# Patient Record
Sex: FEMALE | Race: WHITE | Employment: OTHER | URBAN - METROPOLITAN AREA
[De-identification: names, ages, dates, MRNs, and addresses within clinical notes are randomized per-mention and may not be internally consistent; named-entity substitution may affect disease eponyms.]

---

## 2019-05-15 NOTE — IP AVS SNAPSHOT
Patient Demographics     Address  [address] [facility] [address] Phone  [phone] (Home) *Preferred*  [phone] (Mobile)      Patient Contacts     Name Relation Home Work Mobile    Nancy Mcmullen Daughter   [phone]      Allergies as of 4/9/2024  Review status set to Review Complete on 4/3/2024   No Known Allergies     Code Status Information     Code Status    Not on file      Patient Instructions    None        Your Home Meds List      TAKE these medications       Instructions Authorizing Provider Morning Afternoon Evening As Needed   chlordiazePOXIDE 5 MG Caps  Commonly known as: Librium  Next dose due: Tonight       Take 1 capsule (5 mg total) by mouth in the morning and 1 capsule (5 mg total) before bedtime.   Alvaro Arvizu         escitalopram 10 MG Tabs  Commonly known as: Lexapro  Next dose due: Tonight       Take 1 tablet (10 mg total) by mouth nightly.   Alvaro Arvizu         folic acid 1 MG Tabs  Commonly known as: Folvite  Next dose due: Tomorrow morning      Take 1 tablet (1 mg total) by mouth daily.   Alvaro Arvizu         glipiZIDE 5 MG Tabs  Commonly known as: Glucotrol  Next dose due: Tomorrow morning      Take 1 tablet (5 mg total) by mouth every morning before breakfast. With food          lamoTRIgine 25 MG Tabs  Commonly known as: LaMICtal  Next dose due: Tonight       Take 1 tablet (25 mg total) by mouth 2 (two) times daily.   Alvaro Arvizu         multivitamin with minerals Tabs  Next dose due: Tomorrow morning      Take 1 tablet by mouth daily.   Alvaro Arvizu         traZODone 50 MG Tabs  Commonly known as: Desyrel  Next dose due: Tonight       Take 0.5 tablets (25 mg total) by mouth nightly.   Alvaro Arvizu                  [id number] - MAR ACTION REPORT  (last 48 hrs)    ** SITE UNKNOWN **     Order ID Medication Name Action Time Action Reason Comments    [id number] chlordiazePOXIDE (Librium) cap 5 mg 04/07/24 2025 Given      [id number] chlordiazePOXIDE (Librium) cap 5 mg 04/08/24 0919 Given by Other       [id number] chlordiazePOXIDE (Librium) cap 5 mg 04/08/24 2129 Given      [id number] chlordiazePOXIDE (Librium) cap 5 mg 04/09/24 0943 Given      [id number] escitalopram (Lexapro) tab 10 mg 04/07/24 2025 Given      [id number] escitalopram (Lexapro) tab 10 mg 04/08/24 2129 Given      [id number] folic acid (Folvite) tab 1 mg 04/08/24 0919 Given by Other      [id number] folic acid (Folvite) tab 1 mg 04/09/24 0943 Given      [id number] lamoTRIgine (LaMICtal) tab 25 mg 04/07/24 2025 Given      [id number] lamoTRIgine (LaMICtal) tab 25 mg 04/08/24 0920 Given by Other      [id number] lamoTRIgine (LaMICtal) tab 25 mg 04/08/24 2129 Given      [id number] lamoTRIgine (LaMICtal) tab 25 mg 04/09/24 0943 Given      [id number] multivitamin with minerals (Thera M Plus) tab 1 tablet 04/08/24 0919 Given by Other      [id number] multivitamin with minerals (Thera M Plus) tab 1 tablet 04/09/24 0943 Given      [id number] pantoprazole (Protonix) 40 mg in sodium chloride 0.9% PF 10 mL IV push 04/07/24 1712 Given      [id number] pantoprazole (Protonix) 40 mg in sodium chloride 0.9% PF 10 mL IV push 04/08/24 0510 Given      [id number] pantoprazole (Protonix) 40 mg in sodium chloride 0.9% PF 10 mL IV push 04/08/24 1806 Given      [id number] pantoprazole (Protonix) 40 mg in sodium chloride 0.9% PF 10 mL IV push 04/09/24 0540 Given      [id number] tamsulosin (Flomax) cap 0.4 mg 04/08/24 0510 Given      [id number] tamsulosin (Flomax) cap 0.4 mg 04/09/24 0540 Given      [id number] thiamine (Vitamin B1) tab 100 mg 04/08/24 0920 Given by Other      [id number] thiamine (Vitamin B1) tab 100 mg 04/09/24 0943 Given      [id number] traZODone (Desyrel) tab 25 mg 04/07/24 2055 Given      [id number] traZODone (Desyrel) tab 25 mg 04/08/24 2129 Given            LEFT LOWER ABDOMEN     Order ID Medication Name Action Time Action Reason Comments    [id number] insulin aspart (NovoLOG) 100 Units/mL FlexPen 1-5 Units 04/07/24 2055 Given            LEFT LOWER ARM     Order ID Medication  Name Action Time Action Reason Comments    [id number] insulin aspart (NovoLOG) 100 Units/mL FlexPen 1-5 Units 24 2130 Given  bs 178          LEFT UPPER ARM     Order ID Medication Name Action Time Action Reason Comments    [id number] insulin aspart (NovoLOG) 100 Units/mL FlexPen 1-5 Units 24 1348 Given      [id number] insulin aspart (NovoLOG) 100 Units/mL FlexPen 1-5 Units 24 1801 Given            RIGHT UPPER ARM     Order ID Medication Name Action Time Action Reason Comments    [id number] insulin aspart (NovoLOG) 100 Units/mL FlexPen 1-5 Units 24 1806 Given  187            Recent Vital Signs    Flowsheet Row Most Recent Value   /60 Filed at 2024   Pulse 108 Filed at 2024   Resp 17 Filed at 2024   Temp 98.3 °F (36.8 °C) Filed at 2024   SpO2 95 % Filed at 2024      Patient's Most Recent Weight    Flowsheet Row Most Recent Value   Patient Weight 69.7 kg (153 lb 11.2 oz)      Lab Results Last 24 Hours    No matching results found     Microbiology Results (All)     Procedure Component Value Units Date/Time    Blood Culture [[id number]] Collected: 24 0249    Order Status: Completed Lab Status: Final result Updated: 24    Specimen: Blood,peripheral      Blood Culture Result No Growth 5 Days    Blood Culture [[id number]] Collected: 24 0251    Order Status: Completed Lab Status: Final result Updated: 24    Specimen: Blood,peripheral      Blood Culture Result No Growth 5 Days         H&P - H&P Note      H&P signed by Marcia Meadows MD at 4/3/2024  6:30 AM  Version 1 of 1    Author: Marcia Meadows MD Service: Internal Medicine Author Type: Physician    Filed: 4/3/2024  6:30 AM Date of Service: 4/3/2024  2:56 AM Status: Signed    : Marcia Meadows MD (Physician)       [facility]  part [facility]    History & Physical    [facility] Patient Status:  Emergency     8/9/1960 N [id number]   Location [facility] EMERGENCY DEPARTMENT Attending Mariana Clay DO   Hosp Day # 0 PCP No primary care provider on file.     Date:  4/3/2024  Date of Admission:  4/2/2024    Chief Complaint:  Chief Complaint   Patient presents with    Weakness       History of Present Illness:  Christi Tavarez is a(n) 63 year old female, who presents for evaluation of multiple falls. PMHx significant for NIDDM type II, hypothyroidism, anxiety, hyperlipidemia, current alcohol use.  Patient accompanied by daughter at bedside.  History obtained mostly by patient who reports that she has been falling for the last 2 weeks.  She feels like her legs are giving out and she has been falling on the ground at home, hitting her head multiple times.  Denies lightheadedness or dizziness prior to the falls and states that her legs are just giving out and she feels very weak.  Daughter at bedside concerned that patient has had the falls as well as vomiting prior to ED presentation.  Daughter states that she has been vomiting, no blood noted per family and patient.  She has felt nauseous.  Has had difficulty with keeping any food down.  Denies any fever or chills.  Reports the last time she went to her PCP was in October 2023 and per chart review she did see her PCP back then.  Currently she reports taking Paxil for her anxiety.  She declines taking her metformin for her diabetes and does not check her blood sugar.  Denies any blood in her stool.  No recent colonoscopy.  Denies previous EGD.  Reports drinking alcohol, about 1-2 shots of vodka every day for the last 10 years.  Denies any previous history of alcohol withdrawal.  Last alcohol drink 2 days prior to current presentation.  Denies any change in vision, headache, loss of consciousness.  Reports that she is able to move her extremities, just difficult to walk.  Denies symptoms of dysuria.  Denies sick contacts.  On presentation to the ED, vital signs  [FreeTextEntry1] : Mr. CLAYTON SALAZAR is a 78 year who presents for evaluation of  right lower extremity leg pain and nonhealing right leg ulceration for over one year duration.  .s/p RFA to right LE.   patient states that he had recurrent ulcer after the wound had healed when he used Viagra and Cialis together.  He then developed a leg rash and swelling to the legs.\par  pt stated the wound stared off as a Basal cell which was excised by his dermatologist with clear margins and felt that the wound was not getting any better due to his varicose veins. Pt has no fevers or chills today he complains of pain in the wound. pt is currently wearing compression stocking.  He has been off prednisone\par Patient's leg discomfort is associated with  leg swelling, fatigue, heaviness, achiness. \par Patient's symptoms have persisted despite conservative management with leg elevation, exercise, attempted weight loss, over-the-counter medications.\par Patient's symptoms are aggravated by weight bearing, prolonged standing, prolonged sitting.\par \par 9/5 [facility] for right leg ablation 9/18\par VN 3x week\par Patient's symptoms are alleviated by  leg elevation, exercise. \par Patient's symptoms interfere with the patient's  ADLs such as work, shopping and housekeeping.  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 \par \par  remarkable for tachycardia.  Lab work remarkable for elevated blood glucose 168, mildly decreased sodium level 133, creatinine 1.10 with EGFR 56, WBC 13.8 with left shift, mild thrombocytopenia with platelet count 126.  Hemoglobin noted to be normal 12.0.   CT head reveals moderate acute to subacute inferior right cerebellar infarct with focal region of hemorrhagic transformation 2 x 1.9 cm versus mass with hemorrhage. Chest x-ray with right side anterior third/fourth rib fractures.    During my evaluation, patient noted to be have emesis with streaks of blood.  Patient is hemodynamically stable with reassuring hemoglobin of 12.  She will be admitted under hospitalist service with consultation to gastroenterology as well as neurology for further evaluation.     History:  History reviewed. No pertinent past medical history.  History reviewed. No pertinent surgical history.  No family history on file.   reports that she has never smoked. She has never used smokeless tobacco. She reports current alcohol use. She reports that she does not use drugs.    Allergies:  No Known Allergies    Home Medications:  None       Review of Systems:  Constitutional:  + Generalized weakness  Eye:  Negative.  Ear/Nose/Mouth/Throat:  Negative.  Respiratory:  Negative  Cardiovascular: Negative  Gastrointestinal:  Negative.  Genitourinary:  Negative  Endocrine:  Negative.  Immunologic:  Negative.  Musculoskeletal:  Negative.  Integumentary:  Negative.  Neurologic:  Negative.  Psychiatric:  Negative.  ROS reviewed as documented in chart    Physical Exam:  Temp:  [98.3 °F (36.8 °C)] 98.3 °F (36.8 °C)  Pulse:  [108-118] 108  Resp:  [18] 18  BP: (138-143)/(62-67) 143/67  SpO2:  [96 %-99 %] 99 %    General:  Alert and oriented.  Acutely ill-appearing  Diffuse skin problem:  None.  Eye:  Pupils are equal, round and reactive to light, extraocular movements are intact, Normal conjunctiva.  HENT:  Normocephalic, oral mucosa is moist.  Head:   Normocephalic, right-sided abrasion  Neck:  Supple, non-tender, no carotid bruit, no jugular venous distention, no lymphadenopathy, no thyromegaly.  Respiratory:  Lungs are clear to auscultation, respirations are non-labored, breath sounds are equal, symmetrical chest wall expansion.  Cardiovascular:  Normal rate, regular rhythm, no murmur, no edema.  Gastrointestinal:  Soft, non-tender, non-distended, normal bowel sounds, no organomegaly.  Lymphatics:  No lymphadenopathy neck, axilla, groin.  Musculoskeletal: Normal range of motion.  normal strength.  Feet:  Normal pulses.  Neurologic:  Alert, oriented, no focal deficits, cranial nerves II-XII are grossly intact.  Cognition and Speech:  Oriented, speech clear and coherent.  Psychiatric:  Cooperative, appropriate mood & affect.      Laboratory Data:   Lab Results   Component Value Date    WBC 13.8 04/03/2024    HGB 12.0 04/03/2024    HCT 32.5 04/03/2024    .0 04/03/2024    CREATSERUM 1.10 04/03/2024    BUN 13 04/03/2024     04/03/2024    K 3.9 04/03/2024    CL 97 04/03/2024    CO2 20.0 04/03/2024     04/03/2024    CA 8.3 04/03/2024       Imaging:  No results found.     Assessment and Plan:    Generalized weakness  Recurrent falls  Moderate acute to subacute inferior right cerebellar infarct versus mass with hemorrhage  -Obtain MRI brain to further evaluate based on CT findings.  -No neurological deficits on physical exam however will obtain neurology consult for further evaluation and recs, Dr. Hedrick notified.  -Neurochecks  -TSH/B12/Folate ordered  -PT/OT eval    Hematemesis  -Noted during physical exam, patient with emesis that contains streaks of blood.  Patient denies melena/hematochezia  -No previous EGD/colonoscopy  -Patient is a current alcohol user with last alcohol drink 2 days prior to presentation.  Cannot exclude bleeding ulcer.  -Hemoglobin stable at 12.    -Maintain n.p.o.  -Gastroenterology has been consulted, Dr. Mena notified,  pending evaluation for possible EGD  -Initiate Protonix IV twice daily  -Start patient on IV fluid hydration    Current alcohol use  -Last alcohol use 2 days prior to presentation  -Has been drinking heavily liquor for the last 10 years, almost daily  -No previous alcohol withdrawal symptoms/Dts  -Initiate CIWA protocol given her longstanding history of alcohol use    Acute kidney injury  Mild hyponatremia  Hypochloremia  Metabolic acidosis  -Suspecting secondary to decreased oral intake as well as worsened by alcohol use.  -Patient treated per sepsis protocol w/ 30 ml/kg IVF   -Initiate IV fluid hydration and reassess renal function in the a.m.  If renal function worsens, consider nephrology consult    Thrombocytopenia  -Noted to have platelet count 126.  -Suspecting secondary to alcohol use  -Continue to monitor with daily CBC    NIDDM type II  -Patient stopped taking metformin months ago.  -Last hemoglobin A1c  5.5  -Blood glucose on presentation 168.  Will initiate insulin sliding scale.  Hypoglycemia protocol.  Accu-Cheks.    H/o hyperlipidemia  -Patient stopped taking statin months ago.    H/o anxiety  -Reports being on Paxil.    Prophylaxis  SCDs    CODE STATUS  Full    Primary care physician  No primary care provider on file.    60 minutes spent on this admission - examining patient, obtaining history, reviewing previous medical records, going over test results/imaging and discussing plan of care. All questions answered.     Disposition  Clinical course will dictate outcome      Marcia Meadows MD  4/3/2024  2:56 AM       Electronically signed by Marcia Meadows MD on 4/3/2024  6:30 AM              Consults - MD Consult Notes      Consults signed by Kenny Dean MD at 4/8/2024  8:56 AM     Author: Kenny Dean MD Service: Physical Medicine and Rehabilitation Author Type: Physician    Filed: 4/8/2024  8:56 AM Date of Service: 4/8/2024  8:46 AM Status: Signed    : Kenny Dean MD  (Physician)       [facility]  [facility] Patient Status:  Inpatient    1960 MRN [id number]   Location [facility] 3W/SW Attending Alvaro Arvizu MD   Hosp Day # 5 PCP ÁNGEL Delgado       CC: Subacute CVA, hemorrhagic mass, complications of EtOH abuse    History of Present Illness:    63-year-old female normally independent lives by herself has been drinking heavily for the last 10 years has been having multiple falls.  Decreased p.o. intake.  Increasing difficulty ambulating.  CT of the head showed a acute to subacute infarct in the right cerebellar area with hemorrhagic transformation.  She is also found to have third and fourth right-sided rib fractures related to her fall.  By neurology in consultation focal workup initiated in addition to neuropathy/EtOH workup.  B12 levels are okay TSH was elevated however T4 is normal.  Neurology has recommended follow-up MRI in 3 months.  Inpatient course also significant for hematemesis.  Seen by GI in consultation.  Started on PPI therapy and manage conservatively.  H&H has been trending and stabilized.  Inpatient course also significant for acute kidney injury acute urinary retention.  Patient is voiding better.  Bladder scans thought to be inaccurate due to underlying ascites.  Of note patient does have underlying diabetes mellitus type 2.  Has been noncompliant with her medications.  Hemoglobin A1c was 5.5.  She is on Lexapro for anxiety.  Is also been followed by psychiatry.  Has significant proximal limb weakness underlying neuropathy and we were asked to see what the best way to approach her rehabilitation would be.      Medications Prior to Admission   Medication Sig Dispense Refill Last Dose    PARoxetine 10 MG Oral Tab Take 1 tablet (10 mg total) by mouth every morning.   Past Week    glipiZIDE 5 MG Oral Tab Take 1 tablet (5 mg total) by mouth every morning before breakfast. With food   Past Week     No  Known Allergies  History reviewed. No pertinent past medical history.  History reviewed. No pertinent surgical history.  Social History     Socioeconomic History    Marital status:      Spouse name: Not on file    Number of children: Not on file    Years of education: Not on file    Highest education level: Not on file   Occupational History    Not on file   Tobacco Use    Smoking status: Never    Smokeless tobacco: Never   Vaping Use    Vaping Use: Never used   Substance and Sexual Activity    Alcohol use: Yes     Comment: socially    Drug use: Never    Sexual activity: Not on file   Other Topics Concern    Not on file   Social History Narrative    Not on file     Social Determinants of Health     Financial Resource Strain: Not on file   Food Insecurity: No Food Insecurity (4/3/2024)    Food Insecurity     Food Insecurity: Never true   Transportation Needs: No Transportation Needs (4/3/2024)    Transportation Needs     Lack of Transportation: No   Physical Activity: Not on file   Stress: Not on file   Social Connections: Not on file   Housing Stability: Low Risk  (4/3/2024)    Housing Stability     Housing Instability: No     Housing Instability Emergency: Not on file     No family history on file.    PAIN SCALE: denies    ACP: Full code    Review of Systems  Denies any shortness of breath, chest pain except for on deep inspiration related to her rib pain she denies any nausea.  P.o. intake is slowly improving.  She would like to be able to stop drinking.  She has had multiple falls at home.  Was drinking at least two 6 ounce drinks of vodka a day.  She is having normal formed stools, has been able to urinate.  Has good support system including her 2 children are present during this exam.  All other systems and on 12 point review are negative    Physical Exam  Temp:  [97.9 °F (36.6 °C)-98.5 °F (36.9 °C)] 98.5 °F (36.9 °C)  Pulse:  [77-97] 77  Resp:  [16-18] 16  BP: (104-107)/(55-62) 107/58  SpO2:  [96 %-100  %] 96 %  96%    I/O last 3 completed shifts:  In: 400 [P.O.:400]  Out: 900 [Urine:900]  No intake/output data recorded.       S-S1-S2 RRR trace ankle edema by extremities  Extremities-warm to touch peripheral pulses are palpable  Lymph nodes-no palpable lymph nodes in neck or groin  Skin-shows ecchymosis around the right side of her head, both knees, elbows and along the thighs.  No breakdown of skin.  MSK-bilateral upper extremity strength  is a 5, elbow flexion elbow extension 4 show abduction is a 3.  Lower extremity examination DF PF 5 HF 2+  Neurological exam-alert and oriented x 3, sensation diminished to light touch vibration lower extremities joint proprioception unequivocal.  Good historian at this time    Previous Function:    Patient is independent in her own home, normally is able to drive.  Has had recurrent falls, may be related to intoxication but also feels that she has balance problems    CURRENT FUNCTION:  AM-PAC Score:  Raw Score: 11   Approx Degree of Impairment: 72.57%   Standardized Score (AM-PAC Scale): 33.86   CMS Modifier (G-Code): CL     FUNCTIONAL ABILITY STATUS  Functional Mobility/Gait Assessment  Gait Assistance: Maximum assistance (2nd assist with chair follow)  Distance (ft): 10ft  Assistive Device: Rolling walker  Pattern: Shuffle (decreased lali speed, unsteady, posterior lean, narrow base of support. Cues for upright posture and increasing step length. Distance ambulated limited by c/o dizziness.)  Supine to Sit: moderate assist. Supine>sit. Task was labored requiring increased time to complete. Assist with LE's and trunk. Sat EOB for 8 minutes requiring initial Mod for balance improving to Min A x 1 as time in sitting increased.   Sit to Stand: maximum assist. Cues for appropriate UE positioning with transitional movements. Posterior lean. Cues for anterior weight dispersion. Mod A x 1 to maintain static standing balance.     Labs  Lab Results   Component Value Date    WBC  10.6 04/07/2024    HGB 9.7 (L) 04/07/2024    HCT 26.4 (L) 04/07/2024    .8 (H) 04/07/2024    PLT 82.0 (L) 04/07/2024     No results found for: \"PTT\"  No results found for: \"PROTIME\"  Lab Results   Component Value Date     (L) 04/07/2024    K 4.3 04/07/2024    CO2 25.0 04/07/2024     04/07/2024    BUN 8 (L) 04/07/2024       Radiology:  CT UROGRAM (W+WO) (CPT=74178)    Result Date: 4/7/2024  PROCEDURE: CT UROGRAM (W+WO)  (CPT=74178)  COMPARISON: None.  INDICATIONS: Hematuria.  TECHNIQUE:   CT images of the abdomen and pelvis were obtained without and with non-ionic intravenous contrast material. Automated exposure control for dose reduction was used. Adjustment of the mA and/or kV was done based on the patient's size. Use of iterative reconstruction technique for dose reduction was used.  Dose information is transmitted to the ACR (American College of Radiology) NRDR (National Radiology Data Registry) which includes the Dose Index Registry.  FINDINGS:  KIDNEYS AND URINARY TRACT:  RIGHT: Majority of the ureter is not opacified.  The opacified collecting system is intact.  No   radiopaque renal calculus or hydronephrosis.  No enhancing renal lesion.  LEFT: Majority of the ureter is not opacified.  The opacified collecting system is intact. No   radiopaque renal calculus or hydronephrosis.  No enhancing renal lesion.  BLADDER: The bladder is decompressed from a Colmenares catheter.  There is hyperdensity seen within the bladder lumen.  Excretory phase sequences demonstrate marked bladder wall trabeculation.  No definite hematoma.  No bladder calculus is seen given limitations from hyperdensity within the bladder and decompressed state.   ADRENALS:   The adrenal glands are unremarkable. LIVER: The liver is atrophic with a nodular contour.  There are widening of the fissures. GALLBLADDER: Small amount of gallbladder sludge and stones. BILIARY: There is no intra-or extrahepatic biliary duct dilation.  SPLEEN: The spleen is unremarkable. PANCREAS: There are no pancreatic masses or pancreatic ductal dilation. AORTA/VASCULAR:   There is atherosclerotic calcification of the abdominal aorta extending into bilateral iliac arteries. RETROPERITONEUM: There are scattered subcentimeter nonspecific retroperitoneal lymph nodes. BOWEL/MESENTERY:  Mild colonic wall thickening seen involving majority of the colon.  Multiple areas of small bowel wall thickening seen within the left upper and lower quadrants.  No abnormal dilation. The appendix is normal. There are no inflammatory changes within the right lower quadrant.  There is diverticulosis involving the distal descending colon and sigmoid colon without evidence of diverticulitis.  Moderate volume of abdominal ascites with diffuse mesenteric edema.  There is no mass or loculated collection. Small hiatal hernia with wall thickening at the gastroesophageal junction. Remainder of the bowel is otherwise unremarkable without dilation or wall thickening. PELVIS: Moderate volume of pelvic ascites.  No mass or loculated collection.  Uterine fibroids are seen.  No pelvic lymphadenopathy. BONES:   There is degenerative disease of the thoracic and lumbar spine. Degenerative changes are seen within the sacroiliac joints and pubic symphysis. Degenerative changes are seen in the bilateral hips.  Soft tissue edema seen within the bilateral upper and lower quadrant subcutaneous tissues. LUNG BASES: Trace right and small left pleural effusions.  Left lower lobe atelectasis.          CONCLUSION:   Decompressed bladder which limits evaluation.  Hyperdensity seen within the bladder lumen suggestive of blood byproducts.  No renal calculus, enhancing renal lesion, hydronephrosis or filling defects within the opacified collecting systems. If there is continued hematuria, direct visualization could be performed per clinical discretion.  Cirrhosis.  Moderate volume of abdominal and pelvic ascites.   Diffuse large and small bowel wall thickening suggestive of reactive etiology from patient's cirrhosis and presumed low protein state.  A diffuse widespread enterocolitis is felt less likely but is also within the differential.  Anasarca  No obstruction.  Diverticulosis without diverticulitis.  Cholelithiasis with gallbladder sludge without CT evidence of acute cholecystitis.  Multiple other incidental findings as described in the body of the report.    Dictated by (CST): Johnson Waterman MD on 4/07/2024 at 10:07 AM     Finalized by (CST): Johnson Waterman MD on 4/07/2024 at 10:14 AM          MRI BRAIN (W+WO) (CPT=70553)    Result Date: 4/3/2024  PROCEDURE: MRI BRAIN (W+WO) (CPT=70553)  COMPARISON: [facility], CT BRAIN OR HEAD (CPT=70450), 4/03/2024, 1:08 AM.  INDICATIONS: Recurrent falls, CT head reveals moderate acute to subacute inferior right cerebellar infarct with focal region of hemorrhagic transformation, versus mass with  TECHNIQUE: A variety of imaging planes and parameters were utilized for visualization of suspected pathology.  Images were performed without and with gadolinium contrast.   FINDINGS:  Exam quality is degraded by patient motion despite repeat imaging.  This decreases sensitivity for small-sized abnormalities. CEREBRUM: No edema, hemorrhage, mass, acute infarction, or inappropriate atrophy.  CEREBELLUM: Lobulated intrinsic T1 and T2 bright lobulated focus within the right cerebellar vermis and adjacent inferior cerebellar hemisphere measuring 33 x 25 x 24 mm (AP x transverse x CC), without significant signal changes on post-contrast phase compatible with absence of significant enhancement.  It has a few thin internal septations and a lobulated hypointense T1 central component.  Minimal FLAIR signal edema along its lateral margin.  No significant mass effect upon the adjacent structures.  No abnormal diffusion restriction. BRAINSTEM: No edema, hemorrhage, mass, acute infarction, or  inappropriate atrophy.  CSF SPACES: Ventricles, cisterns, and sulci are appropriate for age.  No hydrocephalus, subarachnoid hemorrhage, or mass.  SKULL: No mass or other significant visible lesion.  SINUSES: Limited views demonstrate no significant mucosal thickening or fluid.  ORBITS: Limited views are unremarkable.          CONCLUSION: 3 x 3 x 2 cm located focus within the right cerebellum, with characterize most compatible with a subacute hematoma.  However, because there is patient motion and given the current [place] of T1 signal changes, there is potential this finding obscures a very tiny lesion.  Exam is also limited by patient motion.  Therefore recommend follow-up in approximately 3 months to assess for changes.  Dictated by (CST): Rob Duvall MD on 4/03/2024 at 11:12 AM     Finalized by (CST): Rob Duvall MD on 4/03/2024 at 12:19 PM          US LIVER (CPT=76705)    Result Date: 4/3/2024  PROCEDURE: US LIVER (CPT=76705)  COMPARISON: None.  INDICATIONS: Elevated Liver function tests, current alcohol use  TECHNIQUE:   The liver was evaluated with gray scale and colorflow of the main vessels.   FINDINGS:  LIVER:   Liver measures 16.8 cm in length increased echogenicity..  No masses or bile duct dilatation. Color flow and Doppler imaging of portal and hepatic veins show patency and antegrade flow. BILE DUCTS:   There is cholelithiasis and gallbladder sludge.  Mild circumferential gallbladder wall thickening.  Positive sonographic Pretty sign.  Common bile duct is nondilated measures 0.5 cm OTHER:   Limited visualization pancreas due to overlying bowel.  Visualized pancreas unremarkable.  Right kidney measures 12.7 cm in length and is unremarkable.  There is mild right upper quadrant ascites.         CONCLUSION:   Hepatic steatosis.  Mild right upper quadrant ascites.  Cholelithiasis and gallbladder sludge with mild circumferential gallbladder wall thickening.  Positive sonographic Pretty sign.  Findings may  relate to acute cholecystitis although gallbladder wall thickening can be seen in patients with hepatocellular disease and or ascites.  Advise clinical follow-up and consider evaluation with nuclear medicine hepatobiliary scan.     Dictated by (CST): Henrry Garduno MD on 4/03/2024 at 11:51 AM     Finalized by (CST): Henrry Garduno MD on 4/03/2024 at 11:54 AM          XR FOREARM (2 VIEWS), LEFT (CPT=73090)    Result Date: 4/3/2024  PROCEDURE: XR FOREARM (2 VIEWS), LEFT (CPT=73090)  COMPARISON: None.  INDICATIONS: Multiple falls within past 2 weeks.  TECHNIQUE: 2 views were obtained.   FINDINGS:  BONES: No significant arthropathy, fracture or acute abnormality. SOFT TISSUES: No visible soft tissue swelling. EFFUSION: None visible. OTHER: Negative.         CONCLUSION: No acute osseous abnormality of the left forearm.  [facility] provided a preliminary report for this examination. This final report agrees with their preliminary findings.   Dictated by (CST): Rob Duvall MD on 4/03/2024 at 10:02 AM     Finalized by (CST): Rob Duvall MD on 4/03/2024 at 10:02 AM          XR ELBOW, COMPLETE (MIN 3 VIEWS), RIGHT (CPT=73080)    Result Date: 4/3/2024  PROCEDURE: XR ELBOW, COMPLETE (MIN 3 VIEWS), RIGHT (CPT=73080)  COMPARISON: None.  INDICATIONS: Multiple falls within past 2 weeks.  TECHNIQUE: 4 views were obtained.   FINDINGS:  BONES: Enthesophyte along the peripheral aspect of the lateral femoral condyle.  Otherwise, no significant arthropathy, fracture or acute abnormality. SOFT TISSUES: Peripheral IV over the lateral aspect of the proximal forearm. No visible soft tissue swelling. EFFUSION: None visible. OTHER: Negative.         CONCLUSION: No acute osseous abnormality of the right elbow.  [facility] provided a preliminary report for this examination. This final report agrees with their preliminary findings.   Dictated by (CST): Rob Duvall MD on 4/03/2024 at 9:28 AM     Finalized by (CST): Jadiel  MD Rob on 4/03/2024 at 9:29 AM          XR CHEST AP PORTABLE  (CPT=71045)    Result Date: 4/3/2024  PROCEDURE: XR CHEST AP PORTABLE  (CPT=71045) TIME: 0045 hours  COMPARISON: None.  INDICATIONS: Multiple falls within past 2 weeks.  TECHNIQUE:   Single view.   FINDINGS:  CARDIAC/VASC: No cardiac silhouette abnormality or cardiomegaly.  Unremarkable pulmonary vasculature.  MEDIAST/YASMIN:   Atherosclerotic aorta with no visible aneurysm.  LUNGS/PLEURA: No pneumothorax.  No significant pulmonary parenchymal abnormalities.  No effusion or pleural thickening. BONES: Mild endplate change within the spine.  Subtle cortical deformity in the anterior right 3rd and 4th ribs, which may represent nondisplaced fractures. OTHER: Negative.          CONCLUSION: Findings suspicious for acute nondisplaced fractures of the anterior right 3rd and 4th ribs.  Correlate for pain at this site.  No pneumothorax.  Otherwise no acute cardiopulmonary abnormality.   [facility] provided a preliminary report for this examination. This final report agrees with their preliminary findings.   Dictated by (CST): Rob Duvall MD on 4/03/2024 at 9:26 AM     Finalized by (CST): Rob Duvall MD on 4/03/2024 at 9:28 AM          XR KNEE (1 OR 2 VIEWS), LEFT (CPT=73560)    Result Date: 4/3/2024  PROCEDURE: XR KNEE (1 OR 2 VIEWS), LEFT (CPT=73560)  COMPARISON: None.  INDICATIONS: Multiple falls within past 2 weeks.  TECHNIQUE: 2 views were obtained.   FINDINGS:  BONES: Fabella is present. No significant arthropathy, fracture or acute abnormality. SOFT TISSUES: No visible soft tissue swelling. EFFUSION: None visible. OTHER: Negative.         CONCLUSION: No acute osseous abnormality of the left knee.  [facility] provided a preliminary report for this examination. This final report agrees with their preliminary findings.   Dictated by (CST): Rob Duvall MD on 4/03/2024 at 9:25 AM     Finalized by (CST): Rob Duvall MD on 4/03/2024 at 9:26 AM           XR KNEE (1 OR 2 VIEWS), RIGHT (CPT=73560)    Result Date: 4/3/2024  PROCEDURE: XR KNEE (1 OR 2 VIEWS), RIGHT (CPT=73560)  COMPARISON: None.  INDICATIONS: Multiple falls within past 2 weeks.  TECHNIQUE: 2 views were obtained.   FINDINGS:  BONES: Fabella is present. No significant arthropathy, fracture or acute abnormality. SOFT TISSUES: Soft tissue inflammation ventral to the patella and patellar tendon EFFUSION: None visible. OTHER: Negative.         CONCLUSION: Soft tissue inflammation ventral to the patella and patellar tendon suggesting soft tissue injury.  No acute osseous abnormality of the right knee.  [facility] provided a preliminary report for this examination. This final report agrees with their preliminary findings.    Dictated by (CST): Rob Duvall MD on 4/03/2024 at 9:24 AM     Finalized by (CST): Rob Duvall MD on 4/03/2024 at 9:25 AM          XR PELVIS (1 VIEW) (CPT=72170)    Result Date: 4/3/2024  PROCEDURE: XR PELVIS (1 VIEW) (CPT=72170)  COMPARISON: None.  INDICATIONS: Multiple falls within past 2 weeks.  TECHNIQUE: 1 AP view was obtained.   FINDINGS:  BONES: Mild degenerative change at the lumbosacral junction.  Pseudoarthrosis of the right L5 transverse process with the sacral ala.  No acute fracture of the bony pelvis.  10 mm hypodense focus within the right femoral neck which could represent a bone  cyst or lytic lesion. SOFT TISSUES: No visible soft tissue swelling. EFFUSION: None visible. OTHER: Negative.         CONCLUSION:  1.  Hypodense focus within the right femoral neck which could represent a bone cyst or lytic lesion.  Consider bone scan or other follow up imaging. 2.  Mild degenerative change in the lower lumbar spine.   [facility] provided a preliminary report for this examination. This final report agrees with their preliminary findings.   Dictated by (CST): Rob Duvall MD on 4/03/2024 at 9:23 AM     Finalized by (CST): Rob Duvall MD on 4/03/2024 at 9:24 AM           XR FEMUR MIN 2 VIEWS RIGHT (CPT=73552)    Result Date: 4/3/2024  PROCEDURE: XR FEMUR MIN 2 VIEWS RIGHT (CPT=73552)  COMPARISON: [facility], XR PELVIS (1 VIEW) (CPT=72170), 4/03/2024, 0:54 AM.  INDICATIONS: Multiple falls within past 2 weeks.  TECHNIQUE: 2 views were obtained.   FINDINGS:  BONES: Ovoid 8-9 mm diameter lucencies within the right femoral neck, which could represent bone cysts or lytic foci.  Femoral head otherwise has normal articulation within the acetabulum and articulation at the knee is otherwise anatomic.  No acute fracture. SOFT TISSUES: No visible soft tissue swelling. EFFUSION: None visible. OTHER: Negative.         CONCLUSION: Subcentimeter ovoid lucencies within the right femoral neck which could represent bone cysts or lytic foci.  This could be further assessed with bone scan.   [facility] provided a preliminary report for this examination. This final report agrees with their preliminary findings.   Dictated by (CST): Rob Duvall MD on 4/03/2024 at 9:21 AM     Finalized by (CST): Rob Duvall MD on 4/03/2024 at 9:22 AM          CT BRAIN OR HEAD (68977)    Result Date: 4/3/2024  PROCEDURE: CT BRAIN OR HEAD (CPT=70450)  COMPARISON: [facility], XR PELVIS (1 VIEW) (CPT=72170), 4/03/2024, 0:54 AM.  INDICATIONS: fall.  Post strike to head.  TECHNIQUE: CT images were obtained without contrast material.  Automated exposure control for dose reduction was used.  Dose information is transmitted to the ACR (American College of Radiology) NRDR (National Radiology Data Registry) which includes the Dose Index Registry.  FINDINGS:  CSF SPACES: Ventricles, cisterns, and sulci are appropriate for age.  No hydrocephalus, subarachnoid hemorrhage, or mass.  No midline shift.  CEREBRUM: No edema, hemorrhage, mass, acute infarction, or significant atrophy.  WHITE MATTER: Mild chronic white matter ischemic changes.  CEREBELLUM: Mildly hyperdense ovoid shaped 28 x 14 x  18-mm focus within the right cerebellar vermis and adjacent hemisphere with surrounding vasogenic edema.  No definite mass effect.  No midline shift or intraventricular extension. BRAINSTEM: No edema, hemorrhage, mass, acute infarction, or significant atrophy.  CALVARIUM: No apparent fracture, mass, or other significant visible lesion.  SINUSES: Limited views demonstrate no significant mucosal thickening or fluid.  ORBITS: Limited views are unremarkable.   OTHER: Negative.          CONCLUSION: 3 x 1 x 2 cm for ovoid soft tissue density in the right cerebellar vermis and adjacent hemisphere with surrounding edema.  It could represent an acute/subacute/recent site of hemorrhage/hematoma versus hemorrhagic lesion (such as primary malignancy versus metastatic focus).  Recommend further characterization with MRI brain with without contrast.   [facility] provided a preliminary report for this examination. This final report agrees with their preliminary findings.   Dictated by (CST): Rob Duvall MD on 4/03/2024 at 8:43 AM     Finalized by (CST): Rob Duvall MD on 4/03/2024 at 8:50 AM              Assessment    Patient Active Problem List   Diagnosis    Fall    Fall, initial encounter    Cerebellar hemorrhage (HCC)    Major depressive disorder, recurrent episode, moderate degree (HCC)    Alcohol dependence, continuous (HCC)    Alcohol withdrawal syndrome, uncomplicated (HCC)       Plan    63-year-old female self-care mobility deficits secondary right cerebellar infarct, hematoma with versus mass with hemorrhage, hematemesis, EtOH abuse, EtOH neuropathy, UTI, urinary retention, thrombocytopenia, diabetes mellitus type 2, anxiety     Continue PT OT SLP  PVRs as per urology, primary service  Hematemesis-H&H being trended  Diabetes mellitus-SSI, being monitored  Neuropathy-TSH high T4 normal, B12 in normal range, may be in part related to diabetes as well as EtOH neuropathy    Patient with significant functional  deficits mild cognitive deficits poor nutritional state.  However good rehab potential appropriate for acute inpatient rehabilitation once hemodynamically ready from a medical standpoint    Thank you for this  consultation and allowing  me to participate in this patient's care        HECTOR DEAN MD    2024    8:47 AM      Electronically signed by Hector Dean MD on 2024  8:56 AM              Discharge Summary - D/C Summary      Discharge Summary signed by Alvaro Arvizu MD at 2024 12:56 PM  Version 1 of 1    Author: Alvaro Arvizu MD Service: Hospitalist Author Type: Physician    Filed: 2024 12:56 PM Date of Service: 2024 12:56 PM Status: Signed    : Alvaro Arvizu MD (Physician)       [facility]  Discharge Summary     Christi Tavarez  : 1960    Status: Inpatient  Day #: 6    Attending: Alvaro Arvizu MD  PCP: ÁNGEL Delgado     Date of Admission:  2024  Date of Discharge:  2024     Hospital Discharge Diagnoses:     Generalized weakness  Recurrent falls  Moderate acute to subacute inferior right cerebellar hematoma vs infarct vs mass with hemorrhage  Hematemesis  Alcohol abuse with withdrawal  Hematuria from lucas trauma  RAN  Mild hyponatremia  Metabolic acidosis  Thrombocytopenia  DM2   HL  Anxiety d/o      History of Present Illness:     Copied from Admission H&P:    Christi Tavarez is a(n) 63 year old female, who presents for evaluation of multiple falls. PMHx significant for NIDDM type II, hypothyroidism, anxiety, hyperlipidemia, current alcohol use.  Patient accompanied by daughter at bedside.  History obtained mostly by patient who reports that she has been falling for the last 2 weeks.  She feels like her legs are giving out and she has been falling on the ground at home, hitting her head multiple times.  Denies lightheadedness or dizziness prior to the falls and states that her legs are just giving out and she feels very weak.  Daughter at bedside concerned that  patient has had the falls as well as vomiting prior to ED presentation.  Daughter states that she has been vomiting, no blood noted per family and patient.  She has felt nauseous.  Has had difficulty with keeping any food down.  Denies any fever or chills.  Reports the last time she went to her PCP was in October 2023 and per chart review she did see her PCP back then.  Currently she reports taking Paxil for her anxiety.  She declines taking her metformin for her diabetes and does not check her blood sugar.  Denies any blood in her stool.  No recent colonoscopy.  Denies previous EGD.  Reports drinking alcohol, about 1-2 shots of vodka every day for the last 10 years.  Denies any previous history of alcohol withdrawal.  Last alcohol drink 2 days prior to current presentation.  Denies any change in vision, headache, loss of consciousness.  Reports that she is able to move her extremities, just difficult to walk.  Denies symptoms of dysuria.  Denies sick contacts.  On presentation to the ED, vital signs remarkable for tachycardia.  Lab work remarkable for elevated blood glucose 168, mildly decreased sodium level 133, creatinine 1.10 with EGFR 56, WBC 13.8 with left shift, mild thrombocytopenia with platelet count 126.  Hemoglobin noted to be normal 12.0.   CT head reveals moderate acute to subacute inferior right cerebellar infarct with focal region of hemorrhagic transformation 2 x 1.9 cm versus mass with hemorrhage. Chest x-ray with right side anterior third/fourth rib fractures.     During my evaluation, patient noted to be have emesis with streaks of blood.  Patient is hemodynamically stable with reassuring hemoglobin of 12.  She will be admitted under hospitalist service with consultation to gastroenterology as well as neurology for further evaluation.       Hospital Course:     Generalized weakness  Recurrent falls  Moderate acute to subacute inferior right cerebellar hematoma vs infarct vs mass with  hemorrhage  -MRI brain results reviewed - rec repeat MRI 3 months  -neuro consult appreciated  -Neurochecks stable  -TSH elevated, normal free T4  -vit B12 ok  -PT/OT eval  -PMR consulted - rec acute rehab     Hematemesis  -resolved  -GI consult appreciated  -monitor hgb - stable  -cont PPI  -no endoscopy rec as hgb now stable     Current alcohol use  Alcohol withdrawal - resolved  -Last alcohol use 2 days prior to presentation  -CIWA scores low. Ativan prn.     Questionable Acute UTI  -no culture sent unfortunately. UA without pyuria  -was put on ceftriaxone on admission, now off     Acute urinary retention - ruled out  Hematuria - likely lucas trauma  -appreciate urology consult  -bladder scans likely false positive due to ascites as not much came out when catheterized  -CT urogram reviewed  -lucas removed.      Acute kidney injury - resolved  Mild hyponatremia - resolved  Hypochloremia- resolved  Metabolic acidosis - resolved  -Suspecting secondary to decreased oral intake as well as worsened by alcohol use.  -off IVF     Thrombocytopenia  -Suspecting secondary to alcohol use  -stable     NIDDM type II  -Patient stopped taking metformin months ago.   -Last hemoglobin A1c  5.5  -ISS  -resume glipizide on discharge  -monitor BG     H/o hyperlipidemia  -Patient stopped taking statin months ago.     H/o anxiety  -cont lexapro     Consultants         Provider   Role Specialty     Niko Zuluaga MD  Consulting Physician UROLOGY     Kenny Dean MD  Consulting Physician Rehabilitation     Ma, Leonela Montano MD  Consulting Physician GASTROENTEROLOGY     Wallace Guillen MD  Consulting Physician Psychiatry     Lawrence Mena MD  Consulting Physician GASTROENTEROLOGY     Ryley, Pepper Caballero DO  Consulting Physician NEUROLOGY     Marcia Meadows MD  Consulting Physician Internal Medicine             Physical Exam:   Blood pressure 107/60, pulse 108, temperature 98.3 °F (36.8 °C), temperature source Oral, resp. rate 17,  height 4' 10\" (1.473 m), weight 153 lb 11.2 oz (69.7 kg), SpO2 95%.  General:  Alert, no distress  HEENT:  Normocephalic, atraumatic  Cardiac:  Regular rate, regular rhythm  Pulmonary:  Clear to auscultation bilaterally, respirations unlabored  Gastrointestinal:  Soft, non-tender, normal bowel sounds  Musculoskeletal:  No joint swelling  Extremities:  No edema, no cyanosis, no clubbing  Neurologic:  nonfocal  Psychiatric:  Normal affect, calm and appropriate         Discharge Medications        START taking these medications        Instructions Prescription details   chlordiazePOXIDE 5 MG Caps  Commonly known as: Librium      Take 1 capsule (5 mg total) by mouth in the morning and 1 capsule (5 mg total) before bedtime.   Refills: 0     escitalopram 10 MG Tabs  Commonly known as: Lexapro      Take 1 tablet (10 mg total) by mouth nightly.   Refills: 0     folic acid 1 MG Tabs  Commonly known as: Folvite      Take 1 tablet (1 mg total) by mouth daily.   Refills: 0     lamoTRIgine 25 MG Tabs  Commonly known as: LaMICtal      Take 1 tablet (25 mg total) by mouth 2 (two) times daily.   Refills: 0     multivitamin with minerals Tabs      Take 1 tablet by mouth daily.   Refills: 0     traZODone 50 MG Tabs  Commonly known as: Desyrel      Take 0.5 tablets (25 mg total) by mouth nightly.   Refills: 0            CONTINUE taking these medications        Instructions Prescription details   glipiZIDE 5 MG Tabs  Commonly known as: Glucotrol      Take 1 tablet (5 mg total) by mouth every morning before breakfast. With food   Refills: 0            STOP taking these medications      PARoxetine 10 MG Tabs  Commonly known as: Paxil                   Hospital Discharge Diagnoses:  brain hemorrhage    Lace+ Score: 56  59-90 High Risk  29-58 Medium Risk  0-28   Low Risk.    TCM Follow-Up Recommendation:  LACE 29-58: Moderate Risk of readmission after discharge from the hospital.        I spent >30 minutes on this discharge. Discussed  treatment and discharge plans.       Alvaro Arvizu MD      Electronically signed by Alvaro Arvizu MD on 4/9/2024 12:56 PM              Physical Therapy Notes (last 72 hours)      Physical Therapy Note signed by Siomara Araiza PT at 4/9/2024  9:50 AM  Version 1 of 1    Author: Siomara Araiza PT Service: — Author Type: Physical Therapist    Filed: 4/9/2024  9:50 AM Date of Service: 4/9/2024  9:00 AM Status: Signed    : Siomara Araiza PT (Physical Therapist)       PHYSICAL THERAPY TREATMENT NOTE - INPATIENT     Room Number: 303/303-A       Presenting Problem: LE weakness, R head trauma & s/p falls       Problem List  Principal Problem:    Fall, initial encounter  Active Problems:    Fall    Cerebellar hemorrhage (HCC)    Major depressive disorder, recurrent episode, moderate degree (HCC)    Alcohol dependence, continuous (HCC)    Alcohol withdrawal syndrome, uncomplicated (HCC)      PHYSICAL THERAPY ASSESSMENT   Patient demonstrates good  progress this session, goals  remain in progress.    Patient continues to function below baseline with bed mobility, transfers, and gait.  Contributing factors to remaining limitations include decreased functional strength and medical status.  Next session anticipate patient to progress bed mobility, transfers, and gait.  Physical Therapy will continue to follow patient for duration of hospitalization.    Patient continues to benefit from continued skilled PT services: to facilitate return to prior level of function as patient demonstrates high motivation with excellent tolerance to an intensive therapy program .    PLAN  PT Treatment Plan: Bed mobility;Body mechanics;Patient education;Gait training;Balance training;Transfer training  Frequency (Obs): 5x/week    SUBJECTIVE  \"I am worried about falling\"    OBJECTIVE  Precautions: Bed/chair alarm    PAIN ASSESSMENT   Rating: Unable to rate          BALANCE  Static Sitting: Fair +  Dynamic Sitting: Fair  Static Standing:  Poor +  Dynamic Standing: Poor    AM-PAC '6-Clicks' INPATIENT SHORT FORM - BASIC MOBILITY  How much difficulty does the patient currently have...  Patient Difficulty: Turning over in bed (including adjusting bedclothes, sheets and blankets)?: A Lot   Patient Difficulty: Sitting down on and standing up from a chair with arms (e.g., wheelchair, bedside commode, etc.): A Lot   Patient Difficulty: Moving from lying on back to sitting on the side of the bed?: A Lot   How much help from another person does the patient currently need...   Help from Another: Moving to and from a bed to a chair (including a wheelchair)?: A Lot   Help from Another: Need to walk in hospital room?: A Lot   Help from Another: Climbing 3-5 steps with a railing?: Total     AM-PAC Score:  Raw Score: 11   Approx Degree of Impairment: 72.57%   Standardized Score (AM-PAC Scale): 33.86   CMS Modifier (G-Code): CL    FUNCTIONAL ABILITY STATUS  Functional Mobility/Gait Assessment  Gait Assistance: Moderate assistance  Distance (ft): 10ft x 2  Assistive Device: Rolling walker  Pattern: Shuffle (decreased step length bilaterally, pushing walker in front)  Rolling:  not tested   Supine to Sit: moderate assist  Sit to Supine:  not tested   Sit to Stand: moderate assist    Additional information: Patient on room air, heart rate 109 and 119 after ambulation. Patient currently with mod A x 1-2 for mobility during the session. Stand pivot transfer mod A x 1 to bedside chair due to height of bed. Patient able to ambulate about 10ft x 2 with rolling walker with mod A x 1, chair follow, posterior lean when initially standing. Patient with shuffling gait and decreased step length bilaterally. Patient able to stand from bedside chair with min A x 1 first time then mod A x 1 for second attempt to stand. Patient with chair follow for ambulation. Patient confused during the session, good carryover for hand placement when sitting in bedside chair, cues needed for poor  carryover when standing from bedside chair. The patient's Approx Degree of Impairment: 72.57% has been calculated based on documentation in the [facility] '6 clicks' Inpatient Daily Activity Short Form.  Research supports that patients with this level of impairment may benefit from rehab facility. Patient received semi-fowlers in bed, agreeable to physical therapy. Education with patient provided verbally on physical therapy plan of care and physiological benefits of out of bed mobility. Patient with good carryover during session. Anticipated therapy needs remain appropriate based on the patient's performance, personal factors, and remaining functional impairments. Final disposition will be made by interdisciplinary medical team.    Patient End of Session: Up in chair;Needs met;Call light within reach;RN aware of session/findings;All patient questions and concerns addressed;Alarm set;Family present    CURRENT GOALS   Goals to be met by: 4/17  Patient Goal Patient's self-stated goal is: walk   Goal #1 Patient is able to demonstrate supine - sit EOB @ level: minimum assistance      Goal #1   Current Status Mod A x 1    Goal #2 Patient is able to demonstrate transfers Sit to/from Stand at assistance level: minimum assistance       Goal #2  Current Status Mod A x 1 with rolling walker    Goal #3 Patient is able to ambulate 50 feet with assist device: RW at assistance level: supervision   Goal #3   Current Status Mod A x 1 10ft x 2 with rolling walker chair follow   Goal #4 Patient will negotiate  stairs/one curb w/ assistive device and supervision   Goal #4   Current Status Not tested    Goal #5 Patient to demonstrate independence with home activity/exercise instructions provided to patient in preparation for discharge.   Goal #5   Current Status In progress     Gait Training: 10 minutes  Therapeutic Activity: 13 minutes           Physical Therapy Note signed by Titi Keating PTA at 4/8/2024  3:37 PM  Version 1 of 1     Author: Titi Keating PTA Service: Rehab Author Type: Physical Therapist    Filed: 2024  3:37 PM Date of Service: 2024  3:34 PM Status: Signed    : Titi Keating PTA (Physical Therapist)       PHYSICAL THERAPY TREATMENT NOTE - INPATIENT     Room Number: 303/303-A       Presenting Problem: LE weakness, R head trauma & s/p falls       Problem List  Principal Problem:    Fall, initial encounter  Active Problems:    Fall    Cerebellar hemorrhage (HCC)    Major depressive disorder, recurrent episode, moderate degree (HCC)    Alcohol dependence, continuous (HCC)    Alcohol withdrawal syndrome, uncomplicated (HCC)      PHYSICAL THERAPY ASSESSMENT   Patient demonstrates good  progress this session, goals  remain in progress.    Patient continues to function below baseline with bed mobility, transfers, and gait.  Contributing factors to remaining limitations include decreased functional strength, decreased endurance/aerobic capacity, and pain.  Next session anticipate patient to progress bed mobility, transfers, and gait.  Physical Therapy will continue to follow patient for duration of hospitalization.    Patient continues to benefit from continued skilled PT services: to facilitate return to prior level of function as patient demonstrates high motivation with excellent tolerance to an intensive therapy program .    PLAN  PT Treatment Plan: Body mechanics;Bed mobility;Patient education;Family education;Energy conservation  Frequency (Obs): 5x/week    SUBJECTIVE  Limited participation    OBJECTIVE  Precautions: Bed/chair alarm    WEIGHT BEARING RESTRICTION                PAIN ASSESSMENT   Ratin          BALANCE  Static Sitting: Poor +  Dynamic Sitting: Poor  Static Standing: Poor -  Dynamic Standing: Poor +    ACTIVITY TOLERANCE                          O2 WALK       AM-PAC '6-Clicks' INPATIENT SHORT FORM - BASIC MOBILITY  How much difficulty does the patient currently have...  Patient  Difficulty: Turning over in bed (including adjusting bedclothes, sheets and blankets)?: A Lot   Patient Difficulty: Sitting down on and standing up from a chair with arms (e.g., wheelchair, bedside commode, etc.): A Lot   Patient Difficulty: Moving from lying on back to sitting on the side of the bed?: A Lot   How much help from another person does the patient currently need...   Help from Another: Moving to and from a bed to a chair (including a wheelchair)?: A Lot   Help from Another: Need to walk in hospital room?: A Lot   Help from Another: Climbing 3-5 steps with a railing?: Total     AM-PAC Score:  Raw Score: 11   Approx Degree of Impairment: 72.57%   Standardized Score (AM-PAC Scale): 33.86   CMS Modifier (G-Code): CL    FUNCTIONAL ABILITY STATUS  Functional Mobility/Gait Assessment  Gait Assistance: Not tested  Distance (ft): 10ft  Assistive Device: Rolling walker  Pattern: Shuffle (decreased lali speed, unsteady, posterior lean, narrow base of support. Cues for upright posture and increasing step length. Distance ambulated limited by c/o dizziness.)  Rolling: maximum assist  Supine to Sit: maximum assist  Sit to Supine: maximum assist  Sit to Stand:     Additional information: Pt seen daily    The patient's Approx Degree of Impairment: 72.57% has been calculated based on documentation in the [facility] '6 clicks' Inpatient Daily Activity Short Form.  Research supports that patients with this level of impairment may benefit from Acute Rehabilitation.  Final disposition will be made by interdisciplinary medical team.    THERAPEUTIC EXERCISES  Lower Extremity Ankle pumps  Glut sets  Quad sets     Position Supine       Patient End of Session: Up in chair;Call light within reach;RN aware of session/findings;All patient questions and concerns addressed    CURRENT GOALS     Patient Goal Patient's self-stated goal is: walk   Goal #1 Patient is able to demonstrate supine - sit EOB @ level: minimum assistance      Goal  #1   Current Status Mod A x 1 supine>sit   Goal #2 Patient is able to demonstrate transfers Sit to/from Stand at assistance level: minimum assistance       Goal #2  Current Status NT   Goal #3 Patient is able to ambulate 50 feet with assist device: RW at assistance level: supervision   Goal #3   Current Status NT   Goal #4 Patient will negotiate  stairs/one curb w/ assistive device and supervision   Goal #4   Current Status NT   Goal #5 Patient to demonstrate independence with home activity/exercise instructions provided to patient in preparation for discharge.   Goal #5   Current Status ongoing     Gait Training:  minutes  Therapeutic Activity: 20 minutes  Neuromuscular Re-education:  minutes  Therapeutic Exercise: 10 minutes  Canalith Repositioning:  minutes  Manual Therapy:  minutes  Can add/delete any of these               Occupational Therapy Notes (last 72 hours)  Notes from 4/6/2024  1:02 PM through 4/9/2024  1:02 PM   No notes of this type exist for this encounter.     Video Swallow Study Notes    No notes of this type exist for this encounter.        SLP Note - SLP Notes      SLP Note signed by Lili Hooper SLP at 4/9/2024 11:02 AM  Version 1 of 1    Author: Chambers, Lili, SLP Service: Rehab Author Type: Speech and Language Pathologist    Filed: 4/9/2024 11:02 AM Date of Service: 4/9/2024 10:55 AM Status: Signed    : Lili Hooper SLP (Speech and Language Pathologist)    Summary:regular/thin liquids, pick softer foods             SPEECH DAILY NOTE - INPATIENT    ASSESSMENT & PLAN   ASSESSMENT  PPE REQUIRED. THIS THERAPIST WORE GLOVES AND MASK FOR DURATION OF EVALUATION. HANDS WASHED UPON ENTRANCE/EXIT.    SLP f/u for ongoing dysphagia tx/meal assessment per recommendations of soft bite sized/thin liquids per BSE. RN reports pt tolerates diet and medication well with no overt clinical s/s aspiration. Pt denies any swallowing challenges.     Pt positioned upright in bedside chair,  alert/cooperative/family member present. Pt afebrile, tolerating room air with oxygen status 95% prior to the start of oral trials. SLP reviewed aspiration precautions and safe swallowing compensatory strategies with the patient. Safe swallow guidelines remain written on the white board in purple. Patient and family v/u. Provided min assistance, pt tolerates soft bite sized consistency and thin liquids via cup with no overt clinical signs/symptoms of aspiration. Pt presented with trials of hard solids. Pt with adequate oral acceptance and bilabial seal. Pt with intact bite, prolonged mastication 2/2 sparse dentition, and timely A/P transfer. Pharyngeal swallow appears timely with adequate laryngeal elevation/excursion. No clinical signs of aspiration (e.g., immediate/delayed throat clear, immediate/delayed cough, wet vocal quality, increased O2 effort) observed across all trials. Oxygen status remained stable t/o the entire session. Recommend upgrade to regular consistency (pick softer foods), remain on thin liquids with adherence to aspiration precautions and swallow strategies.     SLP to f/u with meal assessment x1-2, monitor  CXR, and VFSS if any overt CSA and/or decline in CXR. RN alerted with results and recommendations.     MOST RECENT CXR 4/3  CONCLUSION: Findings suspicious for acute nondisplaced fractures of the anterior right 3rd and 4th ribs.  Correlate for pain at this site.  No pneumothorax.  Otherwise no acute cardiopulmonary abnormality.       Diet Recommendations - Solids: Regular (pick softer foods 2/2 dentition)  Diet Recommendations - Liquids: Thin Liquids    Compensatory Strategies Recommended: No straws;Slow rate;Alternate consistencies;Small bites and sips  Aspiration Precautions: Upright position;Slow rate;Small bites and sips;No straw  Medication Administration Recommendations:  (as tolerated)    Patient Experiencing Pain: No                Treatment Plan  Treatment Plan/Recommendations:  Aspiration precautions    Interdisciplinary Communication: Discussed with RN            GOALS  Goal #1 The patient will tolerate regular (softer foods) consistency and thin liquids without overt signs or symptoms of aspiration with 100 % accuracy over 2 session(s).        Revised 4/9   Goal #2 The patient/family/caregiver will demonstrate understanding and implementation of aspiration precautions and swallow strategies independently over 2 session(s).    In Progress   Goal #3 SLE pending MRI results.    MRI +. Refer to eval for full report.    GOAL MET        FOLLOW UP  Follow Up Needed (Documentation Required): Yes  SLP Follow-up Date: 04/10/24  Number of Visits to Meet Established Goals: 2    Session: 2    If you have any questions, please contact ASHANTI Llanos M.S. CCC-SLP  Speech Language Pathologist  Phone Number Ext. [phone]      Electronically signed by Lili Hooper SLP on 4/9/2024 11:02 AM           Immunizations     Name Date      TDAP 04/03/24       Multidisciplinary Problems     Active Goals     Not on file          Resolved Goals        Problem: Patient/Family Goals    Goal Priority Disciplines Outcome Interventions   Patient/Family Long Term Goal   (Resolved)     Interdisciplinary Adequate for Discharge    Description: Patient's Long Term Goal: to return home    Interventions:  - PT/OT eval  -monitor labs  -monitor CIWA  -monitor neuro status    - See additional Care Plan goals for specific interventions   Patient/Family Short Term Goal   (Resolved)     Interdisciplinary Adequate for Discharge    Description: Patient's Short Term Goal: improved strength    Interventions:   - PT/OT eval and treat  -monitor labs  -dietary consult    - See additional Care Plan goals for specific interventions

## 2024-04-02 ENCOUNTER — HOSPITAL ENCOUNTER (INPATIENT)
Facility: HOSPITAL | Age: 64
LOS: 6 days | Discharge: NURSING, REHAB OR LONG-TERM CARE | End: 2024-04-09
Attending: EMERGENCY MEDICINE | Admitting: STUDENT IN AN ORGANIZED HEALTH CARE EDUCATION/TRAINING PROGRAM
Payer: COMMERCIAL

## 2024-04-02 ENCOUNTER — HOSPITAL ENCOUNTER (INPATIENT)
Facility: HOSPITAL | Age: 64
LOS: 6 days | Discharge: NURSING, REHAB OR LONG-TERM CARE | DRG: 065 | End: 2024-04-09
Attending: EMERGENCY MEDICINE | Admitting: STUDENT IN AN ORGANIZED HEALTH CARE EDUCATION/TRAINING PROGRAM
Payer: COMMERCIAL
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
## 2024-04-02 DIAGNOSIS — F10.930 ALCOHOL WITHDRAWAL SYNDROME, UNCOMPLICATED (HCC): ICD-10-CM

## 2024-04-02 PROCEDURE — HZ2ZZZZ DETOXIFICATION SERVICES FOR SUBSTANCE ABUSE TREATMENT: ICD-10-PCS | Performed by: HOSPITALIST

## 2024-04-03 ENCOUNTER — APPOINTMENT (OUTPATIENT)
Dept: GENERAL RADIOLOGY | Facility: HOSPITAL | Age: 64
DRG: 065 | End: 2024-04-03
Attending: EMERGENCY MEDICINE
Payer: COMMERCIAL

## 2024-04-03 ENCOUNTER — APPOINTMENT (OUTPATIENT)
Dept: CT IMAGING | Facility: HOSPITAL | Age: 64
DRG: 065 | End: 2024-04-03
Attending: EMERGENCY MEDICINE
Payer: COMMERCIAL

## 2024-04-03 ENCOUNTER — APPOINTMENT (OUTPATIENT)
Dept: GENERAL RADIOLOGY | Facility: HOSPITAL | Age: 64
End: 2024-04-03
Attending: EMERGENCY MEDICINE
Payer: COMMERCIAL

## 2024-04-03 ENCOUNTER — APPOINTMENT (OUTPATIENT)
Dept: MRI IMAGING | Facility: HOSPITAL | Age: 64
End: 2024-04-03
Attending: STUDENT IN AN ORGANIZED HEALTH CARE EDUCATION/TRAINING PROGRAM
Payer: COMMERCIAL

## 2024-04-03 ENCOUNTER — APPOINTMENT (OUTPATIENT)
Dept: ULTRASOUND IMAGING | Facility: HOSPITAL | Age: 64
End: 2024-04-03
Attending: STUDENT IN AN ORGANIZED HEALTH CARE EDUCATION/TRAINING PROGRAM
Payer: COMMERCIAL

## 2024-04-03 ENCOUNTER — APPOINTMENT (OUTPATIENT)
Dept: ULTRASOUND IMAGING | Facility: HOSPITAL | Age: 64
DRG: 065 | End: 2024-04-03
Attending: STUDENT IN AN ORGANIZED HEALTH CARE EDUCATION/TRAINING PROGRAM
Payer: COMMERCIAL

## 2024-04-03 ENCOUNTER — APPOINTMENT (OUTPATIENT)
Dept: CT IMAGING | Facility: HOSPITAL | Age: 64
End: 2024-04-03
Attending: EMERGENCY MEDICINE
Payer: COMMERCIAL

## 2024-04-03 ENCOUNTER — APPOINTMENT (OUTPATIENT)
Dept: MRI IMAGING | Facility: HOSPITAL | Age: 64
DRG: 065 | End: 2024-04-03
Attending: STUDENT IN AN ORGANIZED HEALTH CARE EDUCATION/TRAINING PROGRAM
Payer: COMMERCIAL

## 2024-04-03 PROBLEM — F33.1 MAJOR DEPRESSIVE DISORDER, RECURRENT EPISODE, MODERATE DEGREE (HCC): Status: ACTIVE | Noted: 2024-04-03

## 2024-04-03 PROBLEM — I61.4 CEREBELLAR HEMORRHAGE (HCC): Status: ACTIVE | Noted: 2024-04-03

## 2024-04-03 PROBLEM — F10.930 ALCOHOL WITHDRAWAL SYNDROME, UNCOMPLICATED (HCC): Status: ACTIVE | Noted: 2024-04-03

## 2024-04-03 PROBLEM — F10.20 ALCOHOL DEPENDENCE, CONTINUOUS (HCC): Status: ACTIVE | Noted: 2024-04-03
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
## 2024-04-03 LAB
ALBUMIN SERPL-MCNC: 2.8 G/DL (ref 3.2–4.8)
ALP LIVER SERPL-CCNC: 248 U/L
ALT SERPL-CCNC: 47 U/L
ANION GAP SERPL CALC-SCNC: 16 MMOL/L (ref 0–18)
ANTIBODY SCREEN: NEGATIVE
AST SERPL-CCNC: 141 U/L (ref ?–34)
ATRIAL RATE: 110 BPM
BASOPHILS # BLD AUTO: 0.04 X10(3) UL (ref 0–0.2)
BASOPHILS # BLD AUTO: 0.05 X10(3) UL (ref 0–0.2)
BASOPHILS NFR BLD AUTO: 0.3 %
BASOPHILS NFR BLD AUTO: 0.4 %
BILIRUB DIRECT SERPL-MCNC: 3.9 MG/DL (ref ?–0.3)
BILIRUB SERPL-MCNC: 5.9 MG/DL (ref 0.2–1.1)
BUN BLD-MCNC: 13 MG/DL (ref 9–23)
BUN/CREAT SERPL: 11.8 (ref 10–20)
CALCIUM BLD-MCNC: 8.3 MG/DL (ref 8.7–10.4)
CHLORIDE SERPL-SCNC: 97 MMOL/L (ref 98–112)
CHOLEST SERPL-MCNC: 149 MG/DL (ref ?–200)
CK SERPL-CCNC: 138 U/L
CLARITY UR: CLEAR
CO2 SERPL-SCNC: 20 MMOL/L (ref 21–32)
CREAT BLD-MCNC: 1.1 MG/DL
DEPRECATED RDW RBC AUTO: 60.2 FL (ref 35.1–46.3)
DEPRECATED RDW RBC AUTO: 63.3 FL (ref 35.1–46.3)
EGFRCR SERPLBLD CKD-EPI 2021: 56 ML/MIN/1.73M2 (ref 60–?)
EOSINOPHIL # BLD AUTO: 0.01 X10(3) UL (ref 0–0.7)
EOSINOPHIL # BLD AUTO: 0.01 X10(3) UL (ref 0–0.7)
EOSINOPHIL NFR BLD AUTO: 0.1 %
EOSINOPHIL NFR BLD AUTO: 0.1 %
ERYTHROCYTE [DISTWIDTH] IN BLOOD BY AUTOMATED COUNT: 15.9 % (ref 11–15)
ERYTHROCYTE [DISTWIDTH] IN BLOOD BY AUTOMATED COUNT: 15.9 % (ref 11–15)
ETHANOL SERPL-MCNC: <3 MG/DL (ref ?–3)
GLUCOSE BLD-MCNC: 168 MG/DL (ref 70–99)
GLUCOSE BLDC GLUCOMTR-MCNC: 107 MG/DL (ref 70–99)
GLUCOSE BLDC GLUCOMTR-MCNC: 112 MG/DL (ref 70–99)
GLUCOSE BLDC GLUCOMTR-MCNC: 122 MG/DL (ref 70–99)
GLUCOSE BLDC GLUCOMTR-MCNC: 158 MG/DL (ref 70–99)
GLUCOSE BLDC GLUCOMTR-MCNC: 163 MG/DL (ref 70–99)
GLUCOSE BLDC GLUCOMTR-MCNC: 67 MG/DL (ref 70–99)
GLUCOSE UR-MCNC: 100 MG/DL
HCT VFR BLD AUTO: 27.3 %
HCT VFR BLD AUTO: 32.5 %
HDLC SERPL-MCNC: 9 MG/DL (ref 40–59)
HGB BLD-MCNC: 10 G/DL
HGB BLD-MCNC: 10.2 G/DL
HGB BLD-MCNC: 12 G/DL
HYALINE CASTS #/AREA URNS AUTO: PRESENT /LPF
HYALINE CASTS #/AREA URNS AUTO: PRESENT /LPF
IMM GRANULOCYTES # BLD AUTO: 0.04 X10(3) UL (ref 0–1)
IMM GRANULOCYTES # BLD AUTO: 0.05 X10(3) UL (ref 0–1)
IMM GRANULOCYTES NFR BLD: 0.3 %
IMM GRANULOCYTES NFR BLD: 0.4 %
INR BLD: 3.02 (ref 0.8–1.2)
KETONES UR-MCNC: 15 MG/DL
LACTATE SERPL-SCNC: 2.7 MMOL/L (ref 0.5–2)
LACTATE SERPL-SCNC: 6 MMOL/L (ref 0.5–2)
LACTATE SERPL-SCNC: 9.7 MMOL/L (ref 0.5–2)
LDLC SERPL CALC-MCNC: 116 MG/DL (ref ?–100)
LEUKOCYTE ESTERASE UR QL STRIP.AUTO: NEGATIVE
LYMPHOCYTES # BLD AUTO: 1.44 X10(3) UL (ref 1–4)
LYMPHOCYTES # BLD AUTO: 2.46 X10(3) UL (ref 1–4)
LYMPHOCYTES NFR BLD AUTO: 10.4 %
LYMPHOCYTES NFR BLD AUTO: 19.5 %
MAGNESIUM SERPL-MCNC: 1.6 MG/DL (ref 1.6–2.6)
MCH RBC QN AUTO: 37.9 PG (ref 26–34)
MCH RBC QN AUTO: 39.6 PG (ref 26–34)
MCHC RBC AUTO-ENTMCNC: 36.6 G/DL (ref 31–37)
MCHC RBC AUTO-ENTMCNC: 36.9 G/DL (ref 31–37)
MCV RBC AUTO: 103.4 FL
MCV RBC AUTO: 107.3 FL
MONOCYTES # BLD AUTO: 1.22 X10(3) UL (ref 0.1–1)
MONOCYTES # BLD AUTO: 1.22 X10(3) UL (ref 0.1–1)
MONOCYTES NFR BLD AUTO: 8.8 %
MONOCYTES NFR BLD AUTO: 9.7 %
NEUTROPHILS # BLD AUTO: 11.08 X10 (3) UL (ref 1.5–7.7)
NEUTROPHILS # BLD AUTO: 11.08 X10(3) UL (ref 1.5–7.7)
NEUTROPHILS # BLD AUTO: 8.86 X10 (3) UL (ref 1.5–7.7)
NEUTROPHILS # BLD AUTO: 8.86 X10(3) UL (ref 1.5–7.7)
NEUTROPHILS NFR BLD AUTO: 70 %
NEUTROPHILS NFR BLD AUTO: 80 %
NITRITE UR QL STRIP.AUTO: POSITIVE
NONHDLC SERPL-MCNC: 140 MG/DL (ref ?–130)
OSMOLALITY SERPL CALC.SUM OF ELEC: 280 MOSM/KG (ref 275–295)
P AXIS: 18 DEGREES
P-R INTERVAL: 116 MS
PH UR: 5.5 [PH] (ref 5–8)
PLATELET # BLD AUTO: 126 10(3)UL (ref 150–450)
PLATELET # BLD AUTO: 94 10(3)UL (ref 150–450)
PLATELETS.RETICULATED NFR BLD AUTO: 6.4 % (ref 0–7)
POTASSIUM SERPL-SCNC: 3.9 MMOL/L (ref 3.5–5.1)
PROT SERPL-MCNC: 8.3 G/DL (ref 5.7–8.2)
PROTHROMBIN TIME: 33.2 SECONDS (ref 11.6–14.8)
Q-T INTERVAL: 374 MS
QRS DURATION: 84 MS
QTC CALCULATION (BEZET): 506 MS
R AXIS: 5 DEGREES
RBC # BLD AUTO: 2.64 X10(6)UL
RBC # BLD AUTO: 3.03 X10(6)UL
RH BLOOD TYPE: POSITIVE
RH BLOOD TYPE: POSITIVE
SODIUM SERPL-SCNC: 133 MMOL/L (ref 136–145)
SP GR UR STRIP: >=1.03 (ref 1–1.03)
T AXIS: 52 DEGREES
T4 FREE SERPL-MCNC: 1 NG/DL (ref 0.8–1.7)
TRIGL SERPL-MCNC: 130 MG/DL (ref 30–149)
TROPONIN I SERPL HS-MCNC: 5 NG/L
TSI SER-ACNC: 8.14 MIU/ML (ref 0.55–4.78)
UROBILINOGEN UR STRIP-ACNC: >=8
VENTRICULAR RATE: 110 BPM
VIT B12 SERPL-MCNC: >2000 PG/ML (ref 211–911)
VLDLC SERPL CALC-MCNC: 23 MG/DL (ref 0–30)
WBC # BLD AUTO: 12.6 X10(3) UL (ref 4–11)
WBC # BLD AUTO: 13.8 X10(3) UL (ref 4–11)

## 2024-04-03 PROCEDURE — 90792 PSYCH DIAG EVAL W/MED SRVCS: CPT | Performed by: OTHER

## 2024-04-03 PROCEDURE — 72170 X-RAY EXAM OF PELVIS: CPT | Performed by: EMERGENCY MEDICINE

## 2024-04-03 PROCEDURE — 99255 IP/OBS CONSLTJ NEW/EST HI 80: CPT | Performed by: INTERNAL MEDICINE

## 2024-04-03 PROCEDURE — 73560 X-RAY EXAM OF KNEE 1 OR 2: CPT | Performed by: EMERGENCY MEDICINE

## 2024-04-03 PROCEDURE — 76705 ECHO EXAM OF ABDOMEN: CPT | Performed by: STUDENT IN AN ORGANIZED HEALTH CARE EDUCATION/TRAINING PROGRAM

## 2024-04-03 PROCEDURE — 99223 1ST HOSP IP/OBS HIGH 75: CPT | Performed by: STUDENT IN AN ORGANIZED HEALTH CARE EDUCATION/TRAINING PROGRAM

## 2024-04-03 PROCEDURE — 73090 X-RAY EXAM OF FOREARM: CPT | Performed by: EMERGENCY MEDICINE

## 2024-04-03 PROCEDURE — 73080 X-RAY EXAM OF ELBOW: CPT | Performed by: EMERGENCY MEDICINE

## 2024-04-03 PROCEDURE — 99223 1ST HOSP IP/OBS HIGH 75: CPT | Performed by: OTHER

## 2024-04-03 PROCEDURE — 73552 X-RAY EXAM OF FEMUR 2/>: CPT | Performed by: EMERGENCY MEDICINE

## 2024-04-03 PROCEDURE — 70553 MRI BRAIN STEM W/O & W/DYE: CPT | Performed by: STUDENT IN AN ORGANIZED HEALTH CARE EDUCATION/TRAINING PROGRAM

## 2024-04-03 PROCEDURE — 71045 X-RAY EXAM CHEST 1 VIEW: CPT | Performed by: EMERGENCY MEDICINE

## 2024-04-03 PROCEDURE — 70450 CT HEAD/BRAIN W/O DYE: CPT | Performed by: EMERGENCY MEDICINE

## 2024-04-03 RX ORDER — MAGNESIUM SULFATE HEPTAHYDRATE 40 MG/ML
2 INJECTION, SOLUTION INTRAVENOUS ONCE
Qty: 50 ML | Refills: 0 | Status: COMPLETED | OUTPATIENT
Start: 2024-04-03 | End: 2024-04-03

## 2024-04-03 RX ORDER — PAROXETINE 10 MG/1
10 TABLET, FILM COATED ORAL EVERY MORNING
COMMUNITY
End: 2024-04-09

## 2024-04-03 RX ORDER — LORAZEPAM 2 MG/1
2 TABLET ORAL EVERY 2 HOUR PRN
Status: DISCONTINUED | OUTPATIENT
Start: 2024-04-03 | End: 2024-04-05

## 2024-04-03 RX ORDER — CHLORDIAZEPOXIDE HYDROCHLORIDE 5 MG/1
5 CAPSULE, GELATIN COATED ORAL 2 TIMES DAILY
Status: DISCONTINUED | OUTPATIENT
Start: 2024-04-03 | End: 2024-04-09

## 2024-04-03 RX ORDER — PROCHLORPERAZINE EDISYLATE 5 MG/ML
5 INJECTION INTRAMUSCULAR; INTRAVENOUS EVERY 8 HOURS PRN
Status: DISCONTINUED | OUTPATIENT
Start: 2024-04-03 | End: 2024-04-09

## 2024-04-03 RX ORDER — POLYETHYLENE GLYCOL 3350 17 G/17G
17 POWDER, FOR SOLUTION ORAL DAILY PRN
Status: DISCONTINUED | OUTPATIENT
Start: 2024-04-03 | End: 2024-04-09

## 2024-04-03 RX ORDER — MULTIPLE VITAMINS W/ MINERALS TAB 9MG-400MCG
1 TAB ORAL DAILY
Status: DISCONTINUED | OUTPATIENT
Start: 2024-04-03 | End: 2024-04-09

## 2024-04-03 RX ORDER — BISACODYL 10 MG
10 SUPPOSITORY, RECTAL RECTAL
Status: DISCONTINUED | OUTPATIENT
Start: 2024-04-03 | End: 2024-04-09

## 2024-04-03 RX ORDER — LORAZEPAM 2 MG/ML
5 INJECTION INTRAMUSCULAR
Status: DISCONTINUED | OUTPATIENT
Start: 2024-04-03 | End: 2024-04-06

## 2024-04-03 RX ORDER — LAMOTRIGINE 25 MG/1
25 TABLET ORAL 2 TIMES DAILY
Status: DISCONTINUED | OUTPATIENT
Start: 2024-04-03 | End: 2024-04-09

## 2024-04-03 RX ORDER — ACETAMINOPHEN 500 MG
1000 TABLET ORAL ONCE
Status: COMPLETED | OUTPATIENT
Start: 2024-04-03 | End: 2024-04-03

## 2024-04-03 RX ORDER — FOLIC ACID 1 MG/1
1 TABLET ORAL DAILY
Status: DISCONTINUED | OUTPATIENT
Start: 2024-04-03 | End: 2024-04-09

## 2024-04-03 RX ORDER — GLIPIZIDE 5 MG/1
5 TABLET ORAL
Status: ON HOLD | COMMUNITY

## 2024-04-03 RX ORDER — ONDANSETRON 2 MG/ML
4 INJECTION INTRAMUSCULAR; INTRAVENOUS ONCE
Status: COMPLETED | OUTPATIENT
Start: 2024-04-03 | End: 2024-04-03

## 2024-04-03 RX ORDER — LORAZEPAM 2 MG/ML
2 INJECTION INTRAMUSCULAR EVERY 2 HOUR PRN
Status: DISCONTINUED | OUTPATIENT
Start: 2024-04-03 | End: 2024-04-05

## 2024-04-03 RX ORDER — ENEMA 19; 7 G/133ML; G/133ML
1 ENEMA RECTAL ONCE AS NEEDED
Status: DISCONTINUED | OUTPATIENT
Start: 2024-04-03 | End: 2024-04-09

## 2024-04-03 RX ORDER — TEMAZEPAM 15 MG/1
15 CAPSULE ORAL NIGHTLY PRN
Status: DISCONTINUED | OUTPATIENT
Start: 2024-04-03 | End: 2024-04-05

## 2024-04-03 RX ORDER — ESCITALOPRAM OXALATE 10 MG/1
10 TABLET ORAL NIGHTLY
Status: DISCONTINUED | OUTPATIENT
Start: 2024-04-03 | End: 2024-04-09

## 2024-04-03 RX ORDER — NICOTINE POLACRILEX 4 MG
30 LOZENGE BUCCAL
Status: DISCONTINUED | OUTPATIENT
Start: 2024-04-03 | End: 2024-04-09

## 2024-04-03 RX ORDER — NICOTINE POLACRILEX 4 MG
15 LOZENGE BUCCAL
Status: DISCONTINUED | OUTPATIENT
Start: 2024-04-03 | End: 2024-04-09

## 2024-04-03 RX ORDER — LORAZEPAM 2 MG/ML
4 INJECTION INTRAMUSCULAR EVERY 30 MIN PRN
Status: DISCONTINUED | OUTPATIENT
Start: 2024-04-03 | End: 2024-04-06

## 2024-04-03 RX ORDER — DIPHENHYDRAMINE HYDROCHLORIDE 50 MG/ML
10 INJECTION, SOLUTION INTRAMUSCULAR; INTRAVENOUS
Status: COMPLETED | OUTPATIENT
Start: 2024-04-03 | End: 2024-04-03

## 2024-04-03 RX ORDER — LORAZEPAM 2 MG/ML
1 INJECTION INTRAMUSCULAR EVERY 4 HOURS PRN
Status: DISCONTINUED | OUTPATIENT
Start: 2024-04-03 | End: 2024-04-06

## 2024-04-03 RX ORDER — SODIUM CHLORIDE 9 MG/ML
INJECTION, SOLUTION INTRAVENOUS CONTINUOUS
Status: DISCONTINUED | OUTPATIENT
Start: 2024-04-03 | End: 2024-04-05

## 2024-04-03 RX ORDER — LORAZEPAM 2 MG/ML
3 INJECTION INTRAMUSCULAR
Status: DISCONTINUED | OUTPATIENT
Start: 2024-04-03 | End: 2024-04-05

## 2024-04-03 RX ORDER — ONDANSETRON 2 MG/ML
4 INJECTION INTRAMUSCULAR; INTRAVENOUS EVERY 6 HOURS PRN
Status: DISCONTINUED | OUTPATIENT
Start: 2024-04-03 | End: 2024-04-09

## 2024-04-03 RX ORDER — PAROXETINE 10 MG/1
10 TABLET, FILM COATED ORAL EVERY MORNING
Status: DISCONTINUED | OUTPATIENT
Start: 2024-04-03 | End: 2024-04-03

## 2024-04-03 RX ORDER — THIAMINE HYDROCHLORIDE 100 MG/ML
100 INJECTION, SOLUTION INTRAMUSCULAR; INTRAVENOUS ONCE
Status: COMPLETED | OUTPATIENT
Start: 2024-04-03 | End: 2024-04-03

## 2024-04-03 RX ORDER — SODIUM CHLORIDE 9 MG/ML
INJECTION, SOLUTION INTRAVENOUS CONTINUOUS
Status: DISCONTINUED | OUTPATIENT
Start: 2024-04-03 | End: 2024-04-03

## 2024-04-03 RX ORDER — DEXTROSE MONOHYDRATE 25 G/50ML
50 INJECTION, SOLUTION INTRAVENOUS
Status: DISCONTINUED | OUTPATIENT
Start: 2024-04-03 | End: 2024-04-09

## 2024-04-03 RX ORDER — LORAZEPAM 0.5 MG/1
1 TABLET ORAL EVERY 4 HOURS PRN
Status: DISCONTINUED | OUTPATIENT
Start: 2024-04-03 | End: 2024-04-06

## 2024-04-03 RX ORDER — SENNOSIDES 8.6 MG
17.2 TABLET ORAL NIGHTLY PRN
Status: DISCONTINUED | OUTPATIENT
Start: 2024-04-03 | End: 2024-04-09

## 2024-04-03 RX ORDER — DEXTROSE MONOHYDRATE AND SODIUM CHLORIDE 5; .9 G/100ML; G/100ML
INJECTION, SOLUTION INTRAVENOUS CONTINUOUS
Status: DISCONTINUED | OUTPATIENT
Start: 2024-04-03 | End: 2024-04-03

## 2024-04-03 RX ORDER — MELATONIN
100 DAILY
Status: DISCONTINUED | OUTPATIENT
Start: 2024-04-04 | End: 2024-04-09

## 2024-04-03 RX ORDER — LORAZEPAM 2 MG/ML
6 INJECTION INTRAMUSCULAR EVERY 10 MIN PRN
Status: DISCONTINUED | OUTPATIENT
Start: 2024-04-03 | End: 2024-04-06

## 2024-04-03 NOTE — PAYOR COMM NOTE
--------------  ADMISSION REVIEW     Payor: [employer]  Subscriber #:  [id number]  Authorization Number: [id number]    Admit date: 4/3/24  Admit time:  5:11 AM       REVIEW DOCUMENTATION:       Patient Seen in: [facility] Emergency Department      History     Chief Complaint   Patient presents with    Weakness     Stated Complaint: vomiting    Subjective:   HPI    63-year-old female presenting to the emergency department due to generalized weakness noted most so in bilateral lower extremities with recurrent falls.    Patient states that weakness noted approximately 2 weeks ago.  States that weakness is most noted to bilateral lower extremities.  Does not detail specific aggravating relieving factors.  Does not detail specific injury as an inciting event for weakness.  Does not detail of strength or sensation changes to right versus left side of the body.  Does not detail changes in speech or mentation per patient or family.  Patient with recurrent falls over the past 2 weeks as well.  Once on prior Wednesday and once on Saturday.  Additional fall noted at 6 PM this evening with head strike without loss of consciousness.    Otherwise patient denies fever, chest pain, shortness of breath, change in vision or hearing, abdominal pain, dysuria.  Denies incontinence, retention, saddle paresthesia, IV drug use history.    Physical Exam     ED Triage Vitals [04/02/24 2121]   /62   Pulse 118   Resp 18   Temp 98.3 °F (36.8 °C)   Temp src Temporal   SpO2 96 %   O2 Device None (Room air)       Physical Exam:   /66   Pulse 101   Temp 98.3 °F (36.8 °C) (Temporal)   Resp 18   Ht 147.3 cm (4' 10\")   Wt 59 kg   SpO2 98%   BMI 27.17 kg/m²  - I reviewed these vital signs    Constitutional: Pt is well appearing, in no distress  HEENT: Normocephalic, abrasions noted to right side of face, PERRL, EOM grossly normal, Conjunctiva Clear, MMM   Neck: Range of motion intact, no midline tenderness to  palpation  Lungs: CTA B, No crepitus, Talking in full sentences in no respiratory distress  Cardiovascular: RRR: minimally tachycardic but regular  Abdominal: Normoactive BSs, No rebound or guarding, soft, non-distended, no masses, no discomfort to palpation   Back: No costo-vertebral angle discomfort on percussion, no point spinal ttp, no erythema or rash  Rectal: deferred  : deferred  Musculoskeletal: No deformities noted, No cyanosis/clubbing noted, tenderness to palpation noted over areas of ecchymosis focal to right and left elbow, right and left knee and right hip, no tenderness with logrolling of right or left lower extremity, no prepatellar tenderness to palpation, patient able to raise both legs with knee fully extended  Neurologic: A&O x 3, Speech clear, No facial asymmetry noted, DTR intact in UE and LE bilaterally, intact sensation throughout V1-3 distribution, Shoulder shrug equal bilaterally, midline tongue protrusion, equal audiotry capabilities and facial sensation bilaterally, 5/5 strength and sensation in UE and LE bilaterally  Psychiatric: Mood and affect are appropriate, Speech not pressured, Thought process is logical  Skin: Warm and dry, No rash      ED Course     Labs Reviewed   BASIC METABOLIC PANEL (8) - Abnormal; Notable for the following components:       Result Value    Glucose 168 (*)     Sodium 133 (*)     Chloride 97 (*)     CO2 20.0 (*)     Creatinine 1.10 (*)     Calcium, Total 8.3 (*)     eGFR-Cr 56 (*)     All other components within normal limits   URINALYSIS, ROUTINE - Abnormal; Notable for the following components:    Urine Color Elizabeth (*)     Glucose Urine 100 (*)     Bilirubin Urine Large (*)     Ketones Urine 15 (*)     Blood Urine Large (*)     Protein Urine 100 mg/dL (*)     Urobilinogen Urine >=8.0 (*)     Nitrite Urine Positive (*)     Bacteria Urine 2+ (*)     Squamous Epi. Cells Few (*)     Hyaline Casts Present (*)     All other components within normal limits   UA  MICROSCOPIC ONLY, URINE - Abnormal; Notable for the following components:    Bacteria Urine 2+ (*)     Squamous Epi. Cells Few (*)     Hyaline Casts Present (*)     All other components within normal limits   LACTIC ACID, PLASMA - Abnormal; Notable for the following components:    Lactic Acid 9.7 (*)     All other components within normal limits   CBC W/ DIFFERENTIAL - Abnormal; Notable for the following components:    WBC 13.8 (*)     RBC 3.03 (*)     HCT 32.5 (*)     .3 (*)     MCH 39.6 (*)     RDW-SD 63.3 (*)     RDW 15.9 (*)     .0 (*)     Neutrophil Absolute Prelim 11.08 (*)     Neutrophil Absolute 11.08 (*)     Monocyte Absolute 1.22 (*)     All other components within normal limits   TROPONIN I HIGH SENSITIVITY - Normal   LACTIC ACID REFLEX POST POSTIVE   BLOOD CULTURE   BLOOD CULTURE     ED Course as of 04/03/24 0503  ------------------------------------------------------------  Time: 04/03 0033  Comment: EKG read interpreted by me showing rate 110, , QRS 84, QTc 506, sinus tachycardia, no STEMI.  No evidence of arrhythmia.  ------------------------------------------------------------  Time: 04/03 0035  Comment: CBC notable for leukocytosis with left shift.  Unable to definitively rule out acute infectious etiology at this time.  Patient afebrile.  Hemoglobin at 12 lessening concern for anemia with platelets at 126.  ------------------------------------------------------------  Time: 04/03 0111  Value: Troponin I (High Sensitivity): 5  Comment: Lessening concern for ACS  ------------------------------------------------------------  Time: 04/03 0111  Comment: BMP with glucose at 168 lessening concern for hyper or hypoglycemia as a cause for the patient's weakness and falls.  ------------------------------------------------------------  Time: 04/03 0213  Comment: Radiology has called to discuss CT.  Concern for mass versus right cerebellar infarct with hemorrhagic transformation.   Recommending further evaluation with MRI  ------------------------------------------------------------  Time: 04/03 0214  Comment: Radiology has called to discuss x-rays and reports that they are negative aside from chest x-ray with possible right-sided third and fourth rib fracture.  ------------------------------------------------------------  Time: 04/03 0214  Comment: Urinalysis positive for nitrites with bacteria noted and 1-5 white blood cells.  Will treat for UTI at this time.     Patient given Tylenol and Zofran in the emergency department.  Tetanus updated.     Disposition and Plan     Clinical Impression:  1. Fall, initial encounter         Disposition:  Admit  4/3/2024  2:21 am      Signed by Mariana Clay DO on 4/3/2024  5:07 AM           HISTORY AND PHYSICAL      Reports drinking alcohol, about 1-2 shots of vodka every day for the last 10 years.  Denies any previous history of alcohol withdrawal.  Last alcohol drink 2 days prior to current presentation.  Denies any change in vision, headache, loss of consciousness.  Reports that she is able to move her extremities, just difficult to walk.  Denies symptoms of dysuria.  Denies sick contacts.  On presentation to the ED, vital signs remarkable for tachycardia.  Lab work remarkable for elevated blood glucose 168, mildly decreased sodium level 133, creatinine 1.10 with EGFR 56, WBC 13.8 with left shift, mild thrombocytopenia with platelet count 126.  Hemoglobin noted to be normal 12.0.   CT head reveals moderate acute to subacute inferior right cerebellar infarct with focal region of hemorrhagic transformation 2 x 1.9 cm versus mass with hemorrhage. Chest x-ray with right side anterior third/fourth rib fractures.     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  -No neurological  deficits on physical exam however will obtain neurology consult for further evaluation and recs, Dr. Hedrick notified.  -Neurochecks  -TSH/B12/Folate ordered  -PT/OT eval     Hematemesis  -Noted during physical exam, patient with emesis that contains streaks of blood.  Patient denies melena/hematochezia  -No previous EGD/colonoscopy  -Patient is a current alcohol user with last alcohol drink 2 days prior to presentation.  Cannot exclude bleeding ulcer.  -Hemoglobin stable at 12.    -Maintain n.p.o.  -Gastroenterology has been consulted, Dr. Mena notified, pending evaluation for possible EGD  -Initiate Protonix IV twice daily  -Start patient on IV fluid hydration     Current alcohol use  -Last alcohol use 2 days prior to presentation  -Has been drinking heavily liquor for the last 10 years, almost daily  -No previous alcohol withdrawal symptoms/Dts  -Initiate CIWA protocol given her longstanding history of alcohol use     Acute kidney injury  Mild hyponatremia  Hypochloremia  Metabolic acidosis  -Suspecting secondary to decreased oral intake as well as worsened by alcohol use.  -Patient treated per sepsis protocol w/ 30 ml/kg IVF   -Initiate IV fluid hydration and reassess renal function in the a.m.  If renal function worsens, consider nephrology consult     Thrombocytopenia  -Noted to have platelet count 126.  -Suspecting secondary to alcohol use  -Continue to monitor with daily CBC     NIDDM type II  -Patient stopped taking metformin months ago.  -Last hemoglobin A1c  5.5  -Blood glucose on presentation 168.  Will initiate insulin sliding scale.  Hypoglycemia protocol.  Accu-Cheks.     H/o hyperlipidemia  -Patient stopped taking statin months ago.     H/o anxiety  -Reports being on Paxil.     Prophylaxis  SCDs        CT SCAN OF THE HEAD     CONCLUSION: 3 x 1 x 2 cm for ovoid soft tissue density in the right cerebellar vermis and adjacent hemisphere with surrounding edema.  It could represent an acute/subacute/recent  site of hemorrhage/hematoma versus hemorrhagic lesion (such as primary malignancy versus metastatic focus).  Recommend further characterization with MRI brain with without contrast.       MRI BRAIN      CONCLUSION: 3 x 3 x 2 cm located focus within the right cerebellum, with characterize most compatible with a subacute hematoma.  However, because there is patient motion and given the current [place] of T1 signal changes, there is potential this finding   obscures a very tiny lesion.  Exam is also limited by patient motion.       MEDICATIONS ADMINISTERED IN LAST 1 DAY:  acetaminophen (Tylenol Extra Strength) tab 1,000 mg       Date Action Dose Route User    4/3/2024 0119 Given 1,000 mg Oral Sina Kam RN          cefTRIAXone (Rocephin) 1 g in D5W 100 mL IVPB-ADD       Date Action Dose Route User    4/3/2024 0252 New Bag 1 g Intravenous Sina Kam RN          dextrose 50% injection 50 mL       Date Action Dose Route User    4/3/2024 1135 Given 50 mL Intravenous Debbie Tamayo RN          dextrose 5%-sodium chloride 0.9% infusion   50 ML HR       Date Action Dose Route User    4/3/2024 1159 New Bag (none) Intravenous Debbie Tamayo RN          folic acid (Folvite) tab 1 mg       Date Action Dose Route User    4/3/2024 0615 Given 1 mg Oral Laney Cabral RN          gadoterate meglumine (Dotarem) 5 MMOL/10ML injection 10 mL       Date Action Dose Route User    4/3/2024 1051 Given 10 mL Intravenous (Right Antecubital) Kadeem Gomez          LORazepam (Ativan) tab 2 mg       Date Action Dose Route User    4/3/2024 0615 Given 2 mg Oral Laney Cabral RN          LORazepam (Ativan) 2 mg/mL injection 2 mg       Date Action Dose Route User    4/3/2024 0943 Given 2 mg Intravenous Debbie Tamayo RN          magnesium sulfate in sterile water for injection 2 g/50mL IVPB premix 2 g       Date Action Dose Route User    4/3/2024 1249 New Bag 2 g Intravenous Debbie Tamayo RN           ondansetron (Zofran) 4 MG/2ML injection 4 mg       Date Action Dose Route User    4/3/2024 0019 Given 4 mg Intravenous Sina Kam RN          pantoprazole (Protonix) 40 mg in sodium chloride 0.9% PF 10 mL IV push       Date Action Dose Route User    4/3/2024 0530 Given 40 mg Intravenous Laney Cabral RN          sodium chloride 0.9% infusion   100 ML HR        Date Action Dose Route User    4/3/2024 0530 New Bag (none) Intravenous Laney Cabral RN          sodium chloride 0.9 % IV bolus 1,770 mL       Date Action Dose Route User    4/3/2024 0432 New Bag 1,770 mL Intravenous Sina Kam RN          Tetanus-Diphth-Acell Pertussis (Tdap) (Boostrix) injection 0.5 mL       Date Action Dose Route User    4/3/2024 0149 Given 0.5 mL Intramuscular (Right Deltoid) Sina Kam RN          thiamine 100 mg/mL injection 100 mg       Date Action Dose Route User    4/3/2024 0615 Given 100 mg Intravenous Laney Cabral RN            Vitals (last day)       Date/Time Temp Pulse Resp BP SpO2 Weight O2 Device O2 Flow Rate (L/min) [facility]    04/03/24 1200 97.6 °F (36.4 °C) 86 16 103/61 95 % -- None (Room air) --     04/03/24 0900 -- 89 15 97/53 97 % -- None (Room air) --     04/03/24 0800 98 °F (36.7 °C) 90 18 106/53 95 % -- None (Room air) --     04/03/24 0300 -- 103 -- 127/59 97 % -- None (Room air) --     04/02/24 2345 -- 108 -- 143/67 99 % -- None (Room air) --     04/02/24 2121 98.3 °F (36.8 °C) 118 18 138/62 96 % 130 lb None (Room air) -- [place]          CIWA Scores (since admission)       Date/Time CIWA-Ar Total [facility]    04/03/24 1200 3     04/03/24 0900 2     04/03/24 9860 11 JS

## 2024-04-03 NOTE — ED QUICK NOTES
IV fluids recommended to MD for administration. Per MD, this RN is not to administer any fluids unless instructed by MD.

## 2024-04-03 NOTE — ED INITIAL ASSESSMENT (HPI)
Pt arrives through triage with family       complaints of \"weak in the legs for about for 2 weeks\", pt states that she has fallen 2-3x in the last 2 weeks. Pt reports hitting her head on carpet otoniel. Denies LOC or thinners.  Abrasion noted on right side of eye/ face. Bleeding controlled in triage. Report vomiting earlier today.

## 2024-04-03 NOTE — ED QUICK NOTES
Critical lab results reported to Carlos SWANN, hospitalist called for update. Per Carlos SWANN RN is to initiate sepsis bolus protocol.

## 2024-04-03 NOTE — CONSULTS
Is this a shared or split note between Advanced Practice Provider and Physician? Yes      [facility]   Gastroenterology Consultation Note    Christi Tavarez  Patient Status:    Inpatient  Date of Admission:         4/2/2024, Hospital day #0  Attending:   Luke Johnson MD  PCP:     ÁNGEL Delgado    Reason for Consultation:  Hematemesis    History of Present Illness:  Christi Tavarez is a 63 year old female w/ PMHx of BMI 29.21, DM2, hypothyroidism, HLD, disordered ETOH use who presents to the ED after falling at home.      Pt somewhat drowsy following administration of lorazepam per CIWA protocol.  Pt's granddaughter and son at bedside, assisted with hx.    Pt states that she had the stomach flu 2-3 weeks ago with upset stomach, nausea and intermittent vomiting.  She was taking ASA daily during this time.  She states that she did not notice blood in her vomit or anything that resembled coffee grounds but states that it looked like chili, which is also how she was describe her vomit today in the ED (noted to be streaks of BRB per provider note).  She denies ABD pain, current nausea, dyspepsia, difficult/painful swallowing, black/bloody stools, constipation/diarrhea, fever, chills, chest pain, SOB.  She feels that she had lost 5-10 pounds in the past month.      Pertinent Family Hx:  - No known history of esophageal, gastric or colon cancers  - No known IBD  - No known liver pathologies    Endoscopy Hx:  - No prior endoscopic evaluation    Social Hx:  - Current tobacco use, 5-10 cigarettes per day  - Drinks 2 shots of vodka per day for the past 12 years  - Denies cannabis/illicit drug use  - Takes ASA as needed, but noted daily use for the past 2-3 weeks with stomach flu  - Lives alone  - Occupation: not currently working       History:  History reviewed. No pertinent past medical history.  History reviewed. No pertinent surgical history.  No family history on file.   reports that she has never  smoked. She has never used smokeless tobacco. She reports current alcohol use. She reports that she does not use drugs.    Allergies:  No Known Allergies    Medications:    Current Facility-Administered Medications:     polyethylene glycol (PEG 3350) (Miralax) 17 g oral packet 17 g, 17 g, Oral, Daily PRN    sennosides (Senokot) tab 17.2 mg, 17.2 mg, Oral, Nightly PRN    bisacodyl (Dulcolax) 10 MG rectal suppository 10 mg, 10 mg, Rectal, Daily PRN    fleet enema (Fleet) 7-19 GM/118ML rectal enema 133 mL, 1 enema, Rectal, Once PRN    ondansetron (Zofran) 4 MG/2ML injection 4 mg, 4 mg, Intravenous, Q6H PRN    prochlorperazine (Compazine) 10 MG/2ML injection 5 mg, 5 mg, Intravenous, Q8H PRN    pantoprazole (Protonix) 40 mg in sodium chloride 0.9% PF 10 mL IV push, 40 mg, Intravenous, Q12H    [START ON 4/4/2024] cefTRIAXone (Rocephin) 1 g in D5W 100 mL IVPB-ADD, 1 g, Intravenous, Q24H    glucose (Dex4) 15 GM/59ML oral liquid 15 g, 15 g, Oral, Q15 Min PRN **OR** glucose (Glutose) 40% oral gel 15 g, 15 g, Oral, Q15 Min PRN **OR** glucose-vitamin C (Dex-4) chewable tab 4 tablet, 4 tablet, Oral, Q15 Min PRN **OR** dextrose 50% injection 50 mL, 50 mL, Intravenous, Q15 Min PRN **OR** glucose (Dex4) 15 GM/59ML oral liquid 30 g, 30 g, Oral, Q15 Min PRN **OR** glucose (Glutose) 40% oral gel 30 g, 30 g, Oral, Q15 Min PRN **OR** glucose-vitamin C (Dex-4) chewable tab 8 tablet, 8 tablet, Oral, Q15 Min PRN    insulin aspart (NovoLOG) 100 Units/mL FlexPen 1-5 Units, 1-5 Units, Subcutaneous, TID CC and HS    LORazepam (Ativan) tab 1 mg, 1 mg, Oral, Q4H PRN **OR** LORazepam (Ativan) 2 mg/mL injection 1 mg, 1 mg, Intravenous, Q4H PRN    LORazepam (Ativan) tab 2 mg, 2 mg, Oral, Q2H PRN **OR** LORazepam (Ativan) 2 mg/mL injection 2 mg, 2 mg, Intravenous, Q2H PRN    LORazepam (Ativan) tab 3 mg, 3 mg, Oral, Q1H PRN **OR** LORazepam (Ativan) 2 mg/mL injection 3 mg, 3 mg, Intravenous, Q1H PRN    LORazepam (Ativan) 2 mg/mL injection 4 mg, 4 mg,  Intravenous, Q30 Min PRN    LORazepam (Ativan) 2 mg/mL injection 5 mg, 5 mg, Intravenous, Q15 Min PRN    LORazepam (Ativan) 2 mg/mL injection 6 mg, 6 mg, Intravenous, Q10 Min PRN    [START ON 4/4/2024] thiamine (Vitamin B1) tab 100 mg, 100 mg, Oral, Daily    multivitamin with minerals (Thera M Plus) tab 1 tablet, 1 tablet, Oral, Daily    folic acid (Folvite) tab 1 mg, 1 mg, Oral, Daily    magnesium sulfate in sterile water for injection 2 g/50mL IVPB premix 2 g, 2 g, Intravenous, Once    dextrose 5%-sodium chloride 0.9% infusion, , Intravenous, Continuous    Review of Systems:   CONSTITUTIONAL:  negative for fevers, chills, unintentional weight loss   EYES:  negative for diplopia or change in vision   RESPIRATORY:  negative for severe shortness of breath  CARDIOVASCULAR:  negative for crushing sub-sternal chest pain  GASTROINTESTINAL:  see HPI  GENITOURINARY:  negative for dysuria or gross hematuria  INTEGUMENT/BREAST:  SKIN:  negative for jaundice or new rash   ALLERGIC/IMMUNOLOGIC:  negative for hay fever   ENDOCRINE:  negative for cold intolerance and heat intolerance  MUSCULOSKELETAL:  negative for joint effusion/severe erythema  NEURO: negative for new loss of consciousness or dizziness   BEHAVIOR/PSYCH:  negative for psychotic behavior    Physical Exam:    Blood pressure 103/61, pulse 86, temperature 97.6 °F (36.4 °C), temperature source Temporal, resp. rate 16, height 4' 10\" (1.473 m), weight 139 lb 12.4 oz (63.4 kg), SpO2 95%. Body mass index is 29.21 kg/m².    Gen: awake, drowsy patient, NAD  HEENT: EOMI, the sclera appears icteric, oropharynx clear, mucus membranes appear moist, bruising to R orbit  CV: RRR  Lung: no conversational dyspnea   Abdomen: soft NTND abdomen with NABS appreciated   Back: No CVA tenderness   Skin: dry, warm, + jaundice  Ext: no LE edema is evident  Neuro: Drowsy. Oriented x3 and interactive  Psych: calm, cooperative    Laboratory Data:  Lab Results   Component Value Date    WBC  12.6 04/03/2024    HGB 10.0 04/03/2024    HCT 27.3 04/03/2024    PLT 94.0 04/03/2024    CREATSERUM 1.10 04/03/2024    BUN 13 04/03/2024     04/03/2024    K 3.9 04/03/2024    CL 97 04/03/2024    CO2 20.0 04/03/2024     04/03/2024    CA 8.3 04/03/2024    ALB 2.8 04/03/2024    ALKPHO 248 04/03/2024    BILT 5.9 04/03/2024    TP 8.3 04/03/2024     04/03/2024    ALT 47 04/03/2024    T4F 1.0 04/03/2024    TSH 8.140 04/03/2024    MG 1.6 04/03/2024    ETOH <3 04/03/2024       Imaging:  MRI BRAIN (W+WO) (CPT=70553)    Result Date: 4/3/2024  CONCLUSION: 3 x 3 x 2 cm located focus within the right cerebellum, with characterize most compatible with a subacute hematoma.  However, because there is patient motion and given the current [place] of T1 signal changes, there is potential this finding obscures a very tiny lesion.  Exam is also limited by patient motion.  Therefore recommend follow-up in approximately 3 months to assess for changes.  Dictated by (CST): Rob Duvall MD on 4/03/2024 at 11:12 AM     Finalized by (CST): Rbo Duvall MD on 4/03/2024 at 12:19 PM          US LIVER (CPT=76705)    Result Date: 4/3/2024  CONCLUSION:   Hepatic steatosis.  Mild right upper quadrant ascites.  Cholelithiasis and gallbladder sludge with mild circumferential gallbladder wall thickening.  Positive sonographic Pretty sign.  Findings may relate to acute cholecystitis although gallbladder wall thickening can be seen in patients with hepatocellular disease and or ascites.  Advise clinical follow-up and consider evaluation with nuclear medicine hepatobiliary scan.     Dictated by (CST): Henrry Garduno MD on 4/03/2024 at 11:51 AM     Finalized by (CST): Henrry Garduno MD on 4/03/2024 at 11:54 AM          XR FOREARM (2 VIEWS), LEFT (CPT=73090)    Result Date: 4/3/2024  CONCLUSION: No acute osseous abnormality of the left forearm.  [facility] provided a preliminary report for this examination. This final report  agrees with their preliminary findings.   Dictated by (CST): Rob Duvall MD on 4/03/2024 at 10:02 AM     Finalized by (CST): Rob Duvall MD on 4/03/2024 at 10:02 AM          XR ELBOW, COMPLETE (MIN 3 VIEWS), RIGHT (CPT=73080)    Result Date: 4/3/2024  CONCLUSION: No acute osseous abnormality of the right elbow.  [facility] provided a preliminary report for this examination. This final report agrees with their preliminary findings.   Dictated by (CST): Rob Duvall MD on 4/03/2024 at 9:28 AM     Finalized by (CST): Rob Duvall MD on 4/03/2024 at 9:29 AM          XR CHEST AP PORTABLE  (CPT=71045)    Result Date: 4/3/2024  CONCLUSION: Findings suspicious for acute nondisplaced fractures of the anterior right 3rd and 4th ribs.  Correlate for pain at this site.  No pneumothorax.  Otherwise no acute cardiopulmonary abnormality.   [facility] provided a preliminary report for this examination. This final report agrees with their preliminary findings.   Dictated by (CST): Rob Duvall MD on 4/03/2024 at 9:26 AM     Finalized by (CST): Rob Duvall MD on 4/03/2024 at 9:28 AM          XR KNEE (1 OR 2 VIEWS), LEFT (CPT=73560)    Result Date: 4/3/2024  CONCLUSION: No acute osseous abnormality of the left knee.  [facility] provided a preliminary report for this examination. This final report agrees with their preliminary findings.   Dictated by (CST): oRb Duvall MD on 4/03/2024 at 9:25 AM     Finalized by (CST): Rob Duvall MD on 4/03/2024 at 9:26 AM          XR KNEE (1 OR 2 VIEWS), RIGHT (CPT=73560)    Result Date: 4/3/2024  CONCLUSION: Soft tissue inflammation ventral to the patella and patellar tendon suggesting soft tissue injury.  No acute osseous abnormality of the right knee.  [facility] provided a preliminary report for this examination. This final report agrees with their preliminary findings.    Dictated by (CST): Rob Duvall MD on 4/03/2024 at 9:24 AM     Finalized by (CST): Rob Duvall  MD on 4/03/2024 at 9:25 AM          XR PELVIS (1 VIEW) (CPT=72170)    Result Date: 4/3/2024  CONCLUSION:  1.  Hypodense focus within the right femoral neck which could represent a bone cyst or lytic lesion.  Consider bone scan or other follow up imaging. 2.  Mild degenerative change in the lower lumbar spine.   [facility] provided a preliminary report for this examination. This final report agrees with their preliminary findings.   Dictated by (CST): Rob Duvall MD on 4/03/2024 at 9:23 AM     Finalized by (CST): Rob Duvall MD on 4/03/2024 at 9:24 AM          XR FEMUR MIN 2 VIEWS RIGHT (CPT=73552)    Result Date: 4/3/2024  CONCLUSION: Subcentimeter ovoid lucencies within the right femoral neck which could represent bone cysts or lytic foci.  This could be further assessed with bone scan.   [facility] provided a preliminary report for this examination. This final report agrees with their preliminary findings.   Dictated by (CST): Rob Duvall MD on 4/03/2024 at 9:21 AM     Finalized by (CST): Rob Duvall MD on 4/03/2024 at 9:22 AM          CT BRAIN OR HEAD (19993)    Result Date: 4/3/2024  CONCLUSION: 3 x 1 x 2 cm for ovoid soft tissue density in the right cerebellar vermis and adjacent hemisphere with surrounding edema.  It could represent an acute/subacute/recent site of hemorrhage/hematoma versus hemorrhagic lesion (such as primary malignancy versus metastatic focus).  Recommend further characterization with MRI brain with without contrast.   [facility] provided a preliminary report for this examination. This final report agrees with their preliminary findings.   Dictated by (CST): Rob Duvall MD on 4/03/2024 at 8:43 AM     Finalized by (CST): Rob Duvall MD on 4/03/2024 at 8:50 AM           Assessment & Plan   Christi Tavarez is a 63 year old female w/ PMHx of BMI 29.21, DM2, hypothyroidism, HLD, disordered ETOH use who presents to the ED after falling at home.  GI consulted for hematemesis  noted by admitting provider during assessment.     #ETOH use disorder  #ETOH Hepatitis  -labs on admission , , T.bili 5.9 Lactic acid 9.7, albumin 2.8, INR 3.02, Hgb 12 down to 10, WBC 13.8 and .  -Liver US with hepatic steatosis, mild RUQ ascites, cholelithiasis, GB sludge, GB wall thickening  -Pt drinks 2 shots of vodka per day, currently on CIWA protocol with last drink 2 days ago.  -Etiology suspect component of malnutrition with ETOH disuse along with ETOH hepatitis.  MDF 90.5.  Will give vitamin K for elevated INR.  Infectious workup for leukocytosis.  Lactic acidosis, possible ketosis from malnutrition.  Thrombocytopenia likely bone marrow suppression.  Will consider initiation of prednisolone if infectious work up negative and INR/T. Bili does not improve.    #Hematemesis  -Reported as stomach contents streaked with blood.  Pt states she thought it was chili, which her son relays she reported 2 weeks ago during stomach flu with reported N/V at home  -ETOH use daily along with ASA use  -No prior EGD  -Etiology, possible emetogenic injury, esophagitis, gastritis, PUD, AVM, less likely neoplasm  -No recurrence since admission, brown stool this am  -Hgb stable  -Will start empiric PPI.  Consider inpatient EGD pending clinical course with improvement in acute illness and withdrawal.    Recommend:  -Vitamin K 10mg PO daily x3 days  -Nutrition consult   -Thiamine, folic acid and MV  -CIWA per protocol  -Empiric PPI BID  -Trend LFTs, CBC, PT/INR  -Monitor for overt GIB  -Transfuse to hgb goal >7  -CLD  -Inpatient EGD pending clinical course    Thank you for the opportunity to participate in the care of this patient.    Case discussed with Leonela Castellon MD and Debbie GARCIA.    Sarah Lim DNP, FNP-[facility]  4/3/2024

## 2024-04-03 NOTE — PHYSICAL THERAPY NOTE
PHYSICAL THERAPY EVALUATION - INPATIENT     Room Number: 206/206-A  Evaluation Date: 4/3/2024  Type of Evaluation: Initial   Physician Order: PT Eval and Treat    Presenting Problem: LE weakness, R head trauma & s/p falls, Rib fxs     Reason for Therapy: Mobility Dysfunction and Discharge Planning    PHYSICAL THERAPY ASSESSMENT   Patient is a 63 year old female admitted 2024 for frequent falls, subacute brain bleed.  Prior to admission, patient's baseline is to go home.  Patient is currently functioning below baseline with bed mobility, transfers, and gait.  Patient is requiring mod A as a result of the following impairments: decreased functional strength impaired coordination and impaired balance.  Patient has hypometria when aiming towards targets. Physical Therapy will continue to follow for duration of hospitalization.    Patient will benefit from continued skilled PT Services to promote return to prior level of function and safety with continuous assistance and gradual rehabilitative therapy .    PLAN  PT Treatment Plan: Bed mobility;Patient education;Family education;Gait training;Transfer training  Rehab Potential : Fair  Frequency (Obs): 5x/week    PHYSICAL THERAPY MEDICAL/SOCIAL HISTORY   History related to current admission:      Problem List  Principal Problem:    Fall, initial encounter  Active Problems:    Fall    Cerebellar hemorrhage (HCC)      HOME SITUATION        Prior Level of [place]: ind    SUBJECTIVE  \"Did I have my MRI?\"    PHYSICAL THERAPY EXAMINATION   OBJECTIVE          WEIGHT BEARING RESTRICTION                   PAIN ASSESSMENT  Ratin          COGNITION  Overall Cognitive Status:  WFL - within functional limits    RANGE OF MOTION AND STRENGTH ASSESSMENT  Upper extremity ROM and strength are below functional limits   Lower extremity ROM is below functional limits   Lower extremity strength is below functional limits     BALANCE  Static Sitting: Fair -  Dynamic Sitting: Fair  -  Static Standing: Fair -  Dynamic Standing: Fair -    AM-PAC '6-Clicks' INPATIENT SHORT FORM - BASIC MOBILITY  How much difficulty does the patient currently have...  Patient Difficulty: Turning over in bed (including adjusting bedclothes, sheets and blankets)?: A Little   Patient Difficulty: Sitting down on and standing up from a chair with arms (e.g., wheelchair, bedside commode, etc.): A Little   Patient Difficulty: Moving from lying on back to sitting on the side of the bed?: A Lot   How much help from another person does the patient currently need...   Help from Another: Moving to and from a bed to a chair (including a wheelchair)?: A Lot   Help from Another: Need to walk in hospital room?: A Lot   Help from Another: Climbing 3-5 steps with a railing?: Total     AM-PAC Score:  Raw Score: 13   Approx Degree of Impairment: 64.91%   Standardized Score (AM-PAC Scale): 36.74   CMS Modifier (G-Code): CL    FUNCTIONAL ABILITY STATUS  Functional Mobility/Gait Assessment  Gait Assistance: Moderate assistance  Distance (ft): 3  Rolling: moderate assist  Supine to Sit: moderate assist  Sit to Supine: moderate assist  Sit to Stand: moderate assist    Exercise/Education Provided:  Bed mobility  Transfer training    The patient's Approx Degree of Impairment: 64.91% has been calculated based on documentation in the [facility] '6 clicks' Inpatient Basic Mobility Short Form.  Research supports that patients with this level of impairment may benefit from ELISABETH.  Final disposition will be made by interdisciplinary medical team.    Patient End of Session: Up in chair    CURRENT GOALS  Goals to be met by: 4/17  Patient Goal Patient's self-stated goal is: walk   Goal #1 Patient is able to demonstrate supine - sit EOB @ level: minimum assistance     Goal #1   Current Status    Goal #2 Patient is able to demonstrate transfers Sit to/from Stand at assistance level: minimum assistance      Goal #2  Current Status    Goal #3 Patient is able  to ambulate 50 feet with assist device: RW at assistance level: supervision   Goal #3   Current Status    Goal #4 Patient will negotiate  stairs/one curb w/ assistive device and supervision   Goal #4   Current Status    Goal #5 Patient to demonstrate independence with home activity/exercise instructions provided to patient in preparation for discharge.   Goal #5   Current Status    Goal #6    Goal #6  Current Status      Patient Evaluation Complexity Level:  History Low - no personal factors and/or co-morbidities   Examination of body systems Low -  addressing 1-2 elements   Clinical Presentation Low- Stable   Clinical Decision Making  Low Complexity     Theractivity: 13 minutes

## 2024-04-03 NOTE — PLAN OF CARE
Patient alert and oriented x4. Ativan given per CIWA protocol and before MRI. Patient on room air. Brief run of vtach. Magnesium replaced per protocol. No nausea or vomiting. Tolerating clear liquid diet. While cleaning patient after BM bright red blood smear on wipe but no evidence in stool.  Patient had large incontinence episode in AM soaked the bed sheets and bed pad. Patient only had 150ml out, in afternoon bladder scan only 56 ml. SCDs on. Neuro q 4hr. CIWA per protocol.   Problem: Diabetes/Glucose Control  Goal: Glucose maintained within prescribed range  Description: INTERVENTIONS:  - Monitor Blood Glucose as ordered  - Assess for signs and symptoms of hyperglycemia and hypoglycemia  - Administer ordered medications to maintain glucose within target range  - Assess barriers to adequate nutritional intake and initiate nutrition consult as needed  - Instruct patient on self management of diabetes  Outcome: Progressing     Problem: NEUROLOGICAL - ADULT  Goal: Achieves stable or improved neurological status  Description: INTERVENTIONS  - Assess for and report changes in neurological status  - Initiate measures to prevent increased intracranial pressure  - Maintain blood pressure and fluid volume within ordered parameters to optimize cerebral perfusion and minimize risk of hemorrhage  - Monitor temperature, glucose, and sodium. Initiate appropriate interventions as ordered  Outcome: Progressing  Goal: Absence of seizures  Description: INTERVENTIONS  - Monitor for seizure activity  - Administer anti-seizure medications as ordered  - Monitor neurological status  Outcome: Progressing     Problem: Impaired Functional Mobility  Goal: Achieve highest/safest level of mobility/gait  Description: Interventions:  - Assess patient's functional ability and stability  - Promote increasing activity/tolerance for mobility and gait  - Educate and engage patient/family in tolerated activity level and precautions  - Recommend use  of total lift for transfers  Outcome: Not Progressing

## 2024-04-03 NOTE — ED QUICK NOTES
Orders for admission, patient is aware of plan and ready to go upstairs. Any questions, please call ED RN Sina at extension [phone].     Patient Covid vaccination status: Fully vaccinated     COVID Test Ordered in ED: None    COVID Suspicion at Admission: N/A    Running Infusions:      Mental Status/LOC at time of transport: a&oX4    Other pertinent information:   CIWA score: N/A   NIH score:  N/A

## 2024-04-03 NOTE — SEPSIS REASSESSMENT
[facility]  part of [facility]    Sepsis Reassessment Note-    BP 97/53 (BP Location: Left arm)   Pulse 89   Temp 98 °F (36.7 °C) (Temporal)   Resp 15   Ht 147.3 cm (4' 10\")   Wt 139 lb 12.4 oz (63.4 kg)   SpO2 97%   BMI 29.21 kg/m²      I completed the sepsis reassessment at 0730    Cardiac:  Regularity: Regular  Rate: Normal  Heart Sounds: S1,S2    Lungs:   Right: Clear  Left: Clear    Peripheral Pulses:  Radial: Right 1+ or Left 1+      Capillary Refill:  <3 Secs    Skin:  Temp/Moisture: Warm and Dry  Color: Normal      DIANE Alas  4/3/2024  10:06 AM

## 2024-04-03 NOTE — CONSULTS
[facility]  [address], [address]  [facility] [address]  [phone]          INPATIENT NEUROLOGY   INITIAL CONSULT NOTE       [facility]  part of [facility]    Report of Consultation    Christi Tavarez Patient Status:  Inpatient     1960 MRN [id number]    Location [facility] 2W/SW Attending Luke Johnson MD    Hosp Day # 0 PCP ÁNGEL Delgado      Date of Admission:  2024  Date of Consult:  4/3/2024    HPI:     Christi Tavarez is a 63 year old woman with past medical history of type 2 diabetes, hypothyroidism, fatty liver, anxiety, hyperlipidemia, alcohol use disorder who presented with 2 weeks of generalized weakness worse in her lower extremities complicate by recurrent falls.  1 was associated with head trauma without loss of consciousness.    In the emergency department she was normotensive but tachycardic to 118.  Afebrile.  Noted to have full strength throughout.  Labs are notable for hyponatremia 133.  CT brain with right cerebellar vermis/hemispheric hyperdensity with mass effect, further characterized by contrasted MRI brain most compatible with subacute hematoma.    Has had a chronic postural tremor of UE.  Her paternal uncle also had a tremor.  She had Ativan prior to MRI.       CURRENT MEDICATIONS  No current outpatient medications on file.       OUTPATIENT MEDICATIONS  No current facility-administered medications on file prior to encounter.     Current Outpatient Medications on File Prior to Encounter   Medication Sig Dispense Refill    PARoxetine 10 MG Oral Tab Take 1 tablet (10 mg total) by mouth every morning.      glipiZIDE 5 MG Oral Tab Take 1 tablet (5 mg total) by mouth every morning before breakfast. With food          MEDICAL HISTORY  History reviewed. No pertinent past medical history.    ?SURGICAL HISTORY  History reviewed. No pertinent surgical history.    SOCIAL HISTORY  Social History     Socioeconomic History     Marital status:    Tobacco Use    Smoking status: Never    Smokeless tobacco: Never   Vaping Use    Vaping Use: Never used   Substance and Sexual Activity    Alcohol use: Yes     Comment: socially    Drug use: Never       FAMILY HISTORY  No family history on file.    ALLERGIES  No Known Allergies    ?REVIEW OF SYSTEMS:   13-point review of systems was done and is negative unless otherwise stated in HPI.     ?PHYSICAL EXAM:     /61 (BP Location: Left arm)   Pulse 86   Temp 97.6 °F (36.4 °C) (Temporal)   Resp 16   Ht 58\"   Wt 139 lb 12.4 oz (63.4 kg)   SpO2 95%   BMI 29.21 kg/m²   General appearance: Well appearing, and in no acute distress  Skin: skin color, texture normal.  No rashes or lesions.    Head: Normocephalic, atraumatic.  Extremely poor dentition     Neurological exam:  Mental Status: Lethargic, needs stimulation to stay awake, follows commands, speech fluent   Cranial Nerves: visual fields intact to confrontation, extraocular movements intact, facial sensation intact, face symmetric, no facial droop or ptosis, normal bedside auditory acuity bilaterally, moderate-severe dysarthria  Motor: muscle strength 5/5 both upper and lower extremities  Reflexes: UE and LE reflexes are equal and hypoactive  Sensation: intact to light touch  Coordination: Finger-to- nose-finger intact bilaterally   Gait: not assessed       LABS/DATA:    Lab Results   Component Value Date    WBC 12.6 04/03/2024    HGB 10.0 04/03/2024    HCT 27.3 04/03/2024    PLT 94.0 04/03/2024    CREATSERUM 1.10 04/03/2024    BUN 13 04/03/2024     04/03/2024    K 3.9 04/03/2024    CL 97 04/03/2024    CO2 20.0 04/03/2024     04/03/2024    CA 8.3 04/03/2024    ALB 2.8 04/03/2024    ALKPHO 248 04/03/2024    BILT 5.9 04/03/2024    TP 8.3 04/03/2024     04/03/2024    ALT 47 04/03/2024    T4F 1.0 04/03/2024    TSH 8.140 04/03/2024    MG 1.6 04/03/2024    ETOH <3 04/03/2024       HGBA1C:  No results found for: \"A1C\",  \"EAG\"    Lab Results   Component Value Date     04/03/2024    HDL 9 04/03/2024    TRIG 130 04/03/2024    VLDL 23 04/03/2024           IMAGING:  MRI BRAIN (W+WO) (CPT=70553)    Result Date: 4/3/2024  CONCLUSION: 3 x 3 x 2 cm located focus within the right cerebellum, with characterize most compatible with a subacute hematoma.  However, because there is patient motion and given the current 10 [place] of T1 signal changes, there is potential this finding obscures a very tiny lesion.  Exam is also limited by patient motion.  Therefore recommend follow-up in approximately 3 months to assess for changes.  Dictated by (CST): Rob Duvall MD on 4/03/2024 at 11:12 AM     Finalized by (CST): Rob Duvall MD on 4/03/2024 at 12:19 PM          XR ELBOW, COMPLETE (MIN 3 VIEWS), RIGHT (CPT=73080)    Result Date: 4/3/2024  CONCLUSION: No acute osseous abnormality of the right elbow.  [facility] provided a preliminary report for this examination. This final report agrees with their preliminary findings.   Dictated by (CST): Rob Duvall MD on 4/03/2024 at 9:28 AM     Finalized by (CST): Rob Duvall MD on 4/03/2024 at 9:29 AM          XR CHEST AP PORTABLE  (CPT=71045)    Result Date: 4/3/2024  CONCLUSION: Findings suspicious for acute nondisplaced fractures of the anterior right 3rd and 4th ribs.  Correlate for pain at this site.  No pneumothorax.  Otherwise no acute cardiopulmonary abnormality.   [facility] provided a preliminary report for this examination. This final report agrees with their preliminary findings.   Dictated by (CST): Rob Duvall MD on 4/03/2024 at 9:26 AM     Finalized by (CST): Rob Duvall MD on 4/03/2024 at 9:28 AM          XR KNEE (1 OR 2 VIEWS), LEFT (CPT=73560)    Result Date: 4/3/2024  CONCLUSION: No acute osseous abnormality of the left knee.  [facility] provided a preliminary report for this examination. This final report agrees with their preliminary findings.   Dictated by (CST):  Rob Duvall MD on 4/03/2024 at 9:25 AM     Finalized by (CST): Rob Duvall MD on 4/03/2024 at 9:26 AM          XR KNEE (1 OR 2 VIEWS), RIGHT (CPT=73560)    Result Date: 4/3/2024  CONCLUSION: Soft tissue inflammation ventral to the patella and patellar tendon suggesting soft tissue injury.  No acute osseous abnormality of the right knee.  [facility] provided a preliminary report for this examination. This final report agrees with their preliminary findings.    Dictated by (CST): Rob Duvall MD on 4/03/2024 at 9:24 AM     Finalized by (CST): Rob Duvall MD on 4/03/2024 at 9:25 AM          XR PELVIS (1 VIEW) (CPT=72170)    Result Date: 4/3/2024  CONCLUSION:  1.  Hypodense focus within the right femoral neck which could represent a bone cyst or lytic lesion.  Consider bone scan or other follow up imaging. 2.  Mild degenerative change in the lower lumbar spine.   [facility] provided a preliminary report for this examination. This final report agrees with their preliminary findings.   Dictated by (CST): Rob Duvall MD on 4/03/2024 at 9:23 AM     Finalized by (CST): Rob Duvall MD on 4/03/2024 at 9:24 AM          XR FEMUR MIN 2 VIEWS RIGHT (CPT=73552)    Result Date: 4/3/2024  CONCLUSION: Subcentimeter ovoid lucencies within the right femoral neck which could represent bone cysts or lytic foci.  This could be further assessed with bone scan.   [facility] provided a preliminary report for this examination. This final report agrees with their preliminary findings.   Dictated by (CST): Rob Duvall MD on 4/03/2024 at 9:21 AM     Finalized by (CST): Rob Duvall MD on 4/03/2024 at 9:22 AM          CT BRAIN OR HEAD (95899)    Result Date: 4/3/2024  CONCLUSION: 3 x 1 x 2 cm for ovoid soft tissue density in the right cerebellar vermis and adjacent hemisphere with surrounding edema.  It could represent an acute/subacute/recent site of hemorrhage/hematoma versus hemorrhagic lesion (such as primary malignancy  versus metastatic focus).  Recommend further characterization with MRI brain with without contrast.   [facility] provided a preliminary report for this examination. This final report agrees with their preliminary findings.   Dictated by (CST): Rob Duvall MD on 4/03/2024 at 8:43 AM     Finalized by (CST): Rob Duvall MD on 4/03/2024 at 8:50 AM          I PERSONALLY REVIEWED THESE IMAGES.     ASSESSMENT:  The patient is a 63 year old woman with past medical history of type 2 diabetes, hypothyroidism, fatty liver, anxiety, hyperlipidemia, alcohol use disorder who presented with 2 weeks of generalized weakness worse in her lower extremities complicate by recurrent falls.  1 was associated with head trauma without loss of consciousness.    In the emergency department she was normotensive but tachycardic to 118.  Afebrile.  Noted to have full strength throughout.  Labs are notable for hyponatremia 133.  CT brain with right cerebellar vermis/hemispheric hyperdensity with mass effect, further characterized by contrasted MRI brain most compatible with subacute hematoma.      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      Subacute right cerebellar intracerebral hemorrhage suspect multifactorial from thrombocytopenia (could be hepatic in etiology), alcohol use disorder, hepatic injury itself and SSRI use   -Stop alcohol use with [facility] protocol  - Stop Paroxetine  -PT, OT and speech therapy  - Repeat MRI brain with and without in 3 months    No further inpatient neurological testing needed.  Follow up in 1 month.  Please call w/ further questions.        This note was prepared using Dragon Medical voice recognition dictation software and as a result, errors may occur. When identified, these errors have been corrected. While every attempt is  made to correct errors during dictation, discrepancies may still exist    AKASH Hedrick DO   Staff Neurologist   4/3/2024  12:33 PM

## 2024-04-03 NOTE — CONSULTS
[facility]  part of [facility]    Report of Consultation    Christi Tavarez Patient Status:  Inpatient    1960 MRN [id number]   Location [facility] 2W/SW Attending Luke Johnson MD   Hosp Day # 0 PCP ÁNGEL Delgado     Date of Admission:  2024  Date of Consult:  2024   Reason for Consultation:   Patient presented with alcohol withdrawal, Luke Ellison MD requested psychiatric consult for evaluation and advice.    Consult Duration     The patient seen for initial psychiatric consult evaluation.   Record reviewed, communication with attending, communication with RN and patient seen face to face evaluation.    History of Present Illness:   Patient is a 63 year old   female with past medical history of NIDDM type II, hypothyroidism, anxiety, hyperlipidemia and current alcohol use  who was admitted to the hospital for fall. The patient has been demonstrating symptoms of alcohol withdrawal. Patient admitted to the CCU and psych consult requested for evaluation and advise.    Per chart review, the patient presented to the hospital with family with reports of weakness. Patient reports multiple falls recently. It was reported that patient drinks alcohol daily. Her last drink was two days ago. Patient was admitted to the CCU where she has been demonstrating some symptoms of withdrawal.     The patient received the following psychotropic medications: patient receiving ativan per [facility] protocol.     Labs and imaging reviewed: TSH 8.14, , BAL negative.   Brain MRI indicating small subacute hematoma.    Most recent [facility] 2  Vital signs BP 97/53 HR 89    The patient seen today in the presence of her son, daughter-in-law and granddaughter by the patient permission.    The patient is alert and oriented to person, place, date and condition. The patient otherwise demonstrates some tiredness and sleepiness.  Patient had multiple facial bruises.    The  patient indicating that recently has moved to the [facility] to be closer to her son and she has been anxious about the current move.  Patient also admitted that maybe she has been drinking little more recently.  Patient indicated that she has been drinking about 2 glasses of vodka every night for the last 7-10 years.  Patient reporting drinking but that might help her sleep and her her mind rested.  Patient reported that she has not been alcoholic prior to 7-10 years ago but she started picking up drink after her alcoholic  passed away in 2017.    The patient today admitted that she is naturally anxious woman and her anxiety has been escalated after the death of her  with some depressive symptoms presented by tearfulness, difficulty sleeping, lack of energy and lack of motivation.  Patient reported that she retired at age 55 and since then her desire to go out and be active  .    The patient reported that she never had withdrawal symptoms before but she had not started drinking at all.  Patient reporting that lately she feel her legs has been wobbly and weak.  Patient admitted that her appetite has been declined but no definite weight loss.    The patient denies any direct hopelessness or helplessness otherwise patient demonstrate some sense of worthlessness with increased depressed mood, anxiety and decline in her health and self-care.    Family admitted that patient has been demonstrating some deterioration for that they tried to move her to close to where the son and his wife lives.    Today the patient demonstrated fine tremors, tiredness, restlessness, increased anxiety and difficulty sleeping.  Patient denied history of seizure or DTs.    The patient is not demonstrating any lisa or psychosis  The patient denies auditory or visual hallucinations  The patient denies suicidal or homicidal ideation.    The patient has been demonstrating increased anxiety, depression which perpetuate the increase of  alcohol intake, subsequently increased neuropathy and weakness leads to fall.  Patient agreeable on the need for treatment.    Past Psychiatric/Medication History:  1. Prior diagnoses: Alcohol abuse, anxiety  2. Past psychiatric inpatient: None  3. Past outpatient history: PCP  4. Past suicide history: None  5. Medication history: paxil 10 mg daily    Social History:   Patient is  female who retired at age 55 lost her  in 2017.  Patient lives in her own and recently moved closer to live near her son and his wife.  Patient with history of daily drinking for the last 7-10 years of 2 glasses of straight vodka nightly    Family History:  Unknown  Medical History:   Past Medical History  History reviewed. No pertinent past medical history.    Past Surgical History  History reviewed. No pertinent surgical history.    Family History  No family history on file.    Social History  Social History     Socioeconomic History    Marital status:    Tobacco Use    Smoking status: Never    Smokeless tobacco: Never   Vaping Use    Vaping Use: Never used   Substance and Sexual Activity    Alcohol use: Yes     Comment: socially    Drug use: Never     Social Determinants of Health     Food Insecurity: No Food Insecurity (4/3/2024)    Food Insecurity     Food Insecurity: Never true   Transportation Needs: No Transportation Needs (4/3/2024)    Transportation Needs     Lack of Transportation: No   Housing Stability: Low Risk  (4/3/2024)    Housing Stability     Housing Instability: No           Current Medications:  Current Facility-Administered Medications   Medication Dose Route Frequency    sodium chloride 0.9% infusion   Intravenous Continuous    polyethylene glycol (PEG 3350) (Miralax) 17 g oral packet 17 g  17 g Oral Daily PRN    sennosides (Senokot) tab 17.2 mg  17.2 mg Oral Nightly PRN    bisacodyl (Dulcolax) 10 MG rectal suppository 10 mg  10 mg Rectal Daily PRN    fleet enema (Fleet) 7-19 GM/118ML rectal  enema 133 mL  1 enema Rectal Once PRN    ondansetron (Zofran) 4 MG/2ML injection 4 mg  4 mg Intravenous Q6H PRN    prochlorperazine (Compazine) 10 MG/2ML injection 5 mg  5 mg Intravenous Q8H PRN    pantoprazole (Protonix) 40 mg in sodium chloride 0.9% PF 10 mL IV push  40 mg Intravenous Q12H    [START ON 4/4/2024] cefTRIAXone (Rocephin) 1 g in D5W 100 mL IVPB-ADD  1 g Intravenous Q24H    glucose (Dex4) 15 GM/59ML oral liquid 15 g  15 g Oral Q15 Min PRN    Or    glucose (Glutose) 40% oral gel 15 g  15 g Oral Q15 Min PRN    Or    glucose-vitamin C (Dex-4) chewable tab 4 tablet  4 tablet Oral Q15 Min PRN    Or    dextrose 50% injection 50 mL  50 mL Intravenous Q15 Min PRN    Or    glucose (Dex4) 15 GM/59ML oral liquid 30 g  30 g Oral Q15 Min PRN    Or    glucose (Glutose) 40% oral gel 30 g  30 g Oral Q15 Min PRN    Or    glucose-vitamin C (Dex-4) chewable tab 8 tablet  8 tablet Oral Q15 Min PRN    insulin aspart (NovoLOG) 100 Units/mL FlexPen 1-5 Units  1-5 Units Subcutaneous TID CC and HS    LORazepam (Ativan) tab 1 mg  1 mg Oral Q4H PRN    Or    LORazepam (Ativan) 2 mg/mL injection 1 mg  1 mg Intravenous Q4H PRN    LORazepam (Ativan) tab 2 mg  2 mg Oral Q2H PRN    Or    LORazepam (Ativan) 2 mg/mL injection 2 mg  2 mg Intravenous Q2H PRN    LORazepam (Ativan) tab 3 mg  3 mg Oral Q1H PRN    Or    LORazepam (Ativan) 2 mg/mL injection 3 mg  3 mg Intravenous Q1H PRN    LORazepam (Ativan) 2 mg/mL injection 4 mg  4 mg Intravenous Q30 Min PRN    LORazepam (Ativan) 2 mg/mL injection 5 mg  5 mg Intravenous Q15 Min PRN    LORazepam (Ativan) 2 mg/mL injection 6 mg  6 mg Intravenous Q10 Min PRN    [START ON 4/4/2024] thiamine (Vitamin B1) tab 100 mg  100 mg Oral Daily    multivitamin with minerals (Thera M Plus) tab 1 tablet  1 tablet Oral Daily    folic acid (Folvite) tab 1 mg  1 mg Oral Daily     No medications prior to admission.       Allergies  No Known Allergies    Review of Systems:   As by Admitting/Attending    Results:    Laboratory Data:  Lab Results   Component Value Date    WBC 12.6 (H) 04/03/2024    HGB 10.0 (L) 04/03/2024    HCT 27.3 (L) 04/03/2024    PLT 94.0 (L) 04/03/2024    CREATSERUM 1.10 (H) 04/03/2024    BUN 13 04/03/2024     (L) 04/03/2024    K 3.9 04/03/2024    CL 97 (L) 04/03/2024    CO2 20.0 (L) 04/03/2024     (H) 04/03/2024    CA 8.3 (L) 04/03/2024    ALB 2.8 (L) 04/03/2024    ALKPHO 248 (H) 04/03/2024    TP 8.3 (H) 04/03/2024     (H) 04/03/2024    ALT 47 04/03/2024    T4F 1.0 04/03/2024    TSH 8.140 (H) 04/03/2024    ETOH <3 04/03/2024         Imaging:  XR FOREARM (2 VIEWS), LEFT (CPT=73090)    Result Date: 4/3/2024  CONCLUSION: No acute osseous abnormality of the left forearm.  [facility] provided a preliminary report for this examination. This final report agrees with their preliminary findings.   Dictated by (CST): Rob Duvall MD on 4/03/2024 at 10:02 AM     Finalized by (CST): Rob Duvall MD on 4/03/2024 at 10:02 AM          XR ELBOW, COMPLETE (MIN 3 VIEWS), RIGHT (CPT=73080)    Result Date: 4/3/2024  CONCLUSION: No acute osseous abnormality of the right elbow.  [facility] provided a preliminary report for this examination. This final report agrees with their preliminary findings.   Dictated by (CST): Rob Duvall MD on 4/03/2024 at 9:28 AM     Finalized by (CST): Rob Duvall MD on 4/03/2024 at 9:29 AM          XR CHEST AP PORTABLE  (CPT=71045)    Result Date: 4/3/2024  CONCLUSION: Findings suspicious for acute nondisplaced fractures of the anterior right 3rd and 4th ribs.  Correlate for pain at this site.  No pneumothorax.  Otherwise no acute cardiopulmonary abnormality.   [facility] provided a preliminary report for this examination. This final report agrees with their preliminary findings.   Dictated by (CST): Rob Duvall MD on 4/03/2024 at 9:26 AM     Finalized by (CST): Rob Duvall MD on 4/03/2024 at 9:28 AM          XR KNEE (1 OR 2 VIEWS), LEFT  (MVH=09242)    Result Date: 4/3/2024  CONCLUSION: No acute osseous abnormality of the left knee.  [facility] provided a preliminary report for this examination. This final report agrees with their preliminary findings.   Dictated by (CST): Rob Duvall MD on 4/03/2024 at 9:25 AM     Finalized by (CST): Rob Duvall MD on 4/03/2024 at 9:26 AM          XR KNEE (1 OR 2 VIEWS), RIGHT (CPT=73560)    Result Date: 4/3/2024  CONCLUSION: Soft tissue inflammation ventral to the patella and patellar tendon suggesting soft tissue injury.  No acute osseous abnormality of the right knee.  [facility] provided a preliminary report for this examination. This final report agrees with their preliminary findings.    Dictated by (CST): Rob Duvall MD on 4/03/2024 at 9:24 AM     Finalized by (CST): Rob Duvall MD on 4/03/2024 at 9:25 AM          XR PELVIS (1 VIEW) (CPT=72170)    Result Date: 4/3/2024  CONCLUSION:  1.  Hypodense focus within the right femoral neck which could represent a bone cyst or lytic lesion.  Consider bone scan or other follow up imaging. 2.  Mild degenerative change in the lower lumbar spine.   [facility] provided a preliminary report for this examination. This final report agrees with their preliminary findings.   Dictated by (CST): Rob Duvall MD on 4/03/2024 at 9:23 AM     Finalized by (CST): Rob Duvall MD on 4/03/2024 at 9:24 AM          XR FEMUR MIN 2 VIEWS RIGHT (CPT=73552)    Result Date: 4/3/2024  CONCLUSION: Subcentimeter ovoid lucencies within the right femoral neck which could represent bone cysts or lytic foci.  This could be further assessed with bone scan.   [facility] provided a preliminary report for this examination. This final report agrees with their preliminary findings.   Dictated by (CST): Rob Duvall MD on 4/03/2024 at 9:21 AM     Finalized by (CST): Rob Duvall MD on 4/03/2024 at 9:22 AM          CT BRAIN OR HEAD (91310)    Result Date: 4/3/2024  CONCLUSION: 3  x 1 x 2 cm for ovoid soft tissue density in the right cerebellar vermis and adjacent hemisphere with surrounding edema.  It could represent an acute/subacute/recent site of hemorrhage/hematoma versus hemorrhagic lesion (such as primary malignancy versus metastatic focus).  Recommend further characterization with MRI brain with without contrast.   [facility] provided a preliminary report for this examination. This final report agrees with their preliminary findings.   Dictated by (CST): Rob Duvall MD on 4/03/2024 at 8:43 AM     Finalized by (CST): Rob Duvall MD on 4/03/2024 at 8:50 AM           Vital Signs:   Blood pressure 97/53, pulse 89, temperature 98 °F (36.7 °C), temperature source Temporal, resp. rate 15, height 58\", weight 63.4 kg (139 lb 12.4 oz), SpO2 97%.    Mental Status Exam:   Appearance: Stated age female, in hospital gown, laying down in hospital bed.  Patient with facial bruises  Psychomotor: No psychomotor agitation, or retardation.  Fine tremors observed  Orientation: Alert and oriented to person, place, time and condition.  Gait: Not evaluated.  Attitude/Coorperation: Cooperative and attentive.  With some noticeable tiredness.  Behavior: Appropriate.  No bizarre behavior  Speech: Regular rate and rhythm speech.  Soft low-tone speech  Mood: Patient reporting depressed and anxious mood.  Affect: Anxious affect, congruent with the mood.  Thought process: Linear and appropriate.  Slightly slowed due to tiredness and concussion.  Thought content: Patient denies any suicidal or homicidal ideation.  Perceptions: Patient denies any auditory or visual hallucinations.  Concentration: Grossly slow  Memory: Grossly intact.  Intellect: Average.  Judgment and Insight: Questionable.     Impression:     Major depressive disorder, recurrent moderate.  Alcohol withdrawal syndrome uncomplicated.  Alcohol dependence, continuous    Fall, initial encounter    Fall    Patient is a 63 year old female with past  medical history of NIDDM type II, hypothyroidism, anxiety, hyperlipidemia, current alcohol use  who was admitted to the hospital for Fall, initial encounter: The patient has been demonstrating symptoms of alcohol withdrawal.    Evaluation today indicating that patient was not alcoholic and told she lost her alcoholic  in 2017 and possibly patient connecting to her   through alcohol and utilizing drinking as a method to deal with her emotion and to process her grieving.  Otherwise the patient has been demonstrating symptom of depression has started since which perpetuate the drinking habit.  Patient started to have some deficit in her mineral and vitamin and losing blood all related to alcohol and her depression and limited intake with increased neuropathy causing her fall.    Discussed risk and benefit, acknowledging the current symptom and severity.  At this point, I would recommend the following approach:     Focus on safety  Focus on education and support.  Focus on insight orientation helping the patient understand diagnosis and treatment plan.  Patient admitting to the fact that she need to stop drinking  Continue CIWA protocol.  Start Librium 5 mg twice daily.  Discontinue Paxil.  Start Lexapro 10 mg nightly.  Start Lamictal 25 mg twice daily.  Multivitamin with mineral and thiamine daily.  Patient and family received education about medication management and discussed risk and benefit.  All in agreement  Patient and family agreed on form of outpatient rehab treatment  Coordinate plan with team    Orders This Visit:  Orders Placed This Encounter   Procedures    CBC With Differential With Platelet    Basic Metabolic Panel (8)    Urinalysis, Routine    Troponin I (High Sensitivity)    UA Microscopic only, urine    Lactic Acid, Plasma    Basic Metabolic Panel (8)    CBC With Differential With Platelet    TSH W Reflex To Free T4    Vitamin B12 with Reflex to MMA    Ethyl Alcohol    Lipid  Panel    Hemoglobin A1C    Hepatic Function Panel (7)    Lactic Acid Reflex Post Positive    T4, FREE (S)    CBC With Differential With Platelet    Lactic Acid Post Positive    Scan slide    Type and screen    ABORH (Blood Type)    Antibody Screen    ABORH Confirmation    Blood Culture    Occult Blood Stool, Diagnostic       Meds This Visit:  Requested Prescriptions      No prescriptions requested or ordered in this encounter       Wallace Guillen MD  4/3/2024    Note to Patient: The 21st Century Cures Act makes medical notes like these available to patients in the interest of transparency. However, be advised this is a medical document. It is intended as peer to peer communication. It is written in medical language and may contain abbreviations or verbiage that are unfamiliar. It may appear blunt or direct. Medical documents are intended to carry relevant information, facts as evident, and the clinical opinion of the practitioner. This note may have been transcribed using a voice dictation system. Voice recognition errors may occur. This should not be taken to alter the content or meaning of this note.

## 2024-04-03 NOTE — ED QUICK NOTES
Pt to ed w/c of fall and weakness of the legs. Pt denies loc, denies blood thinners. Pt states that this is her 3rd fall in the last 2 weeks. Has not been checked until today. Pt in no apparent distress, denies any other symptoms. In room with family.

## 2024-04-03 NOTE — SLP NOTE
ADULT SWALLOWING EVALUATION    ASSESSMENT    ASSESSMENT/OVERALL IMPRESSION:  PPE REQUIRED. THIS THERAPIST WORE GLOVES, DROPLET MASK, AND GOGGLES FOR DURATION OF EVALUATION. HANDS WASHED UPON ENTRANCE/EXIT.    SLP BSSE orders received and acknowledged. A swallow evaluation warranted secondary to stroke protocol. Pt afebrile with clear vocal quality, on room air, with oxygen saturation at 100. Pt with no prior hx of dysphagia at [facility]. Pt positioned upright in bed with family at bedside. Pt with no complaints of pain, RN aware. Oral [facility] exam completed and mild L sided facial droop with overall reduced strength. Per family, pt chooses softer solids at home due to scattered dentition. Pt with adequate oral acceptance and bilabial seal across all trials. Pt with weak bite, prolonged mastication of solids, and increased LICHA. Pt's swallow response appears timely with reduced hyolaryngeal elevation/excursion. No clinical signs of aspiration (e.g., immediate/delayed throat clear, immediate/delayed cough, wet vocal quality, increased O2 effort) observed across all trials. Oxygen status remained >99 t/o the entire evaluation.     At this time, pt presents with mild oral dysphagia and probable pharyngeal dysfunction. Recommend a bite sized diet and thin liquids with strict adherence to safe swallowing compensatory strategies. Results and recommendations reviewed with RN, pt, and family. Pt/pt's family v/u to all results/recommendations. Recommendations remain written on whiteboard. SLP collaborated with RN for MD diet orders.     PLAN: SLP to f/u x2 meal assessments, monitor CXR, VFSS if clinically warranted, and SLE pending MRI results.     RECOMMENDATIONS   Diet Recommendations - Solids: Mechanical soft chopped/ Soft & Bite Sized  Diet Recommendations - Liquids: Thin Liquids      Compensatory Strategies Recommended: No straws;Slow rate;Alternate consistencies;Small bites and sips  Aspiration Precautions: Upright position;Slow  rate;Small bites and sips;No straw  Medication Administration Recommendations: One pill at a time  Treatment Plan/Recommendations: Aspiration precautions    HISTORY   MEDICAL HISTORY  Reason for Referral: Stroke protocol;R/O aspiration    Problem List  Principal Problem:    Fall, initial encounter  Active Problems:    Fall      Past Medical History  History reviewed. No pertinent past medical history.    Prior Living Situation: Home alone  Diet Prior to Admission: Thin liquids (soft solids)  Precautions: Aspiration    Patient/Family Goals: Pt chooses soft solids at home    SWALLOWING HISTORY  Current Diet Consistency: NPO  Dysphagia History: None at [facility]    Imaging Results:     CT BRAIN 4/3/24:  CONCLUSION: 3 x 1 x 2 cm for ovoid soft tissue density in the right cerebellar vermis and adjacent hemisphere with surrounding edema.  It could represent an acute/subacute/recent site of hemorrhage/hematoma versus hemorrhagic lesion (such as primary   malignancy versus metastatic focus).  Recommend further characterization with MRI brain with without contrast.         [facility] provided a preliminary report for this examination. This final report agrees with their preliminary findings.       Dictated by (CST): Rob Duvall MD on 4/03/2024 at 8:43 AM       Finalized by (CST): Rob Duvall MD on 4/03/2024 at 8:50 AM       CXR 4/3/24:  CONCLUSION: Findings suspicious for acute nondisplaced fractures of the anterior right 3rd and 4th ribs.  Correlate for pain at this site.  No pneumothorax.  Otherwise no acute cardiopulmonary abnormality.         [facility] provided a preliminary report for this examination. This final report agrees with their preliminary findings.       Dictated by (CST): Rob Duvall MD on 4/03/2024 at 9:26 AM       Finalized by (CST): Rob Duvall MD on 4/03/2024 at 9:28 AM     OBJECTIVE   ORAL MOTOR EXAMINATION  Dentition:  (scattered)  Symmetry: Reduced left facial  Strength:  (reduced)      Range of Motion: Within Functional Limits  Rate of Motion: Reduced    Voice Quality: Clear  Respiratory Status: Unlabored  Consistencies Trialed: Thin liquids;Puree;Soft solid;Hard solid  Method of Presentation: Self presentation;Staff/Clinician assistance;Spoon;Cup;Single sips  Patient Positioning: Upright;Midline    Oral Phase of Swallow: Impaired  Bolus Retrieval: Intact  Bilabial Seal: Intact  Bolus Formation: Impaired  Bolus Propulsion: Impaired  Mastication: Impaired  Retention: Impaired    Pharyngeal Phase of Swallow: Impaired  Laryngeal Elevation Timing: Appears intact  Laryngeal Elevation Strength: Appears impaired     (Please note: Silent aspiration cannot be evaluated clinically. Videofluoroscopic Swallow Study is required to rule-out silent aspiration.)    Esophageal Phase of Swallow: No complaints consistent with possible esophageal involvement      GOALS  Goal #1 The patient will tolerate bite sized consistency and thin liquids without overt signs or symptoms of aspiration with 100 % accuracy over 2 session(s).  In Progress   Goal #2 The patient/family/caregiver will demonstrate understanding and implementation of aspiration precautions and swallow strategies independently over 2 session(s).    In Progress   Goal #3 SLE pending MRI results.  In Progress       FOLLOW UP  Treatment Plan/Recommendations: Aspiration precautions  Number of Visits to Meet Established Goals: 2  Follow Up Needed (Documentation Required): Yes  SLP Follow-up Date: 04/04/24    Thank you for your referral.   If you have any questions, please contact ASHANTI Jc M.S. CCC-SLP  Speech Language Pathologist  Phone Number [phone]

## 2024-04-03 NOTE — ED QUICK NOTES
Multiple bruises at different healing stages noted throughout the patient's legs, arms and an abrasion noted on the right side of the face. The pt reports landing on her face on carpet.

## 2024-04-03 NOTE — ED PROVIDER NOTES
Patient Seen in: [facility] Emergency Department      History     Chief Complaint   Patient presents with    Weakness     Stated Complaint: vomiting    Subjective:   HPI    63-year-old female presenting to the emergency department due to generalized weakness noted most so in bilateral lower extremities with recurrent falls.    Patient states that weakness noted approximately 2 weeks ago.  States that weakness is most noted to bilateral lower extremities.  Does not detail specific aggravating relieving factors.  Does not detail specific injury as an inciting event for weakness.  Does not detail of strength or sensation changes to right versus left side of the body.  Does not detail changes in speech or mentation per patient or family.  Patient with recurrent falls over the past 2 weeks as well.  Once on prior Wednesday and once on Saturday.  Additional fall noted at 6 PM this evening with head strike without loss of consciousness.  Patient lives at home without family assistance immediately available.  Does have some difficulty performing activities of daily living such as getting to the bathroom.    Otherwise patient denies fever, chest pain, shortness of breath, change in vision or hearing, abdominal pain, dysuria.  Denies incontinence, retention, saddle paresthesia, IV drug use history.    Of note patient does report history of alcohol use.     Objective:   History reviewed. No pertinent past medical history.           History reviewed. No pertinent surgical history.             Social History     Socioeconomic History    Marital status:    Tobacco Use    Smoking status: Never    Smokeless tobacco: Never   Vaping Use    Vaping Use: Never used   Substance and Sexual Activity    Alcohol use: Yes     Comment: socially    Drug use: Never              Review of Systems    Positive for stated complaint: vomiting  Other systems are as noted in HPI.  Constitutional and vital signs reviewed.      All other  systems reviewed and negative except as noted above.    Physical Exam     ED Triage Vitals [04/02/24 2121]   /62   Pulse 118   Resp 18   Temp 98.3 °F (36.8 °C)   Temp src Temporal   SpO2 96 %   O2 Device None (Room air)       Physical Exam:   /66   Pulse 101   Temp 98.3 °F (36.8 °C) (Temporal)   Resp 18   Ht 147.3 cm (4' 10\")   Wt 59 kg   SpO2 98%   BMI 27.17 kg/m²  - I reviewed these vital signs    Constitutional: Pt is well appearing, in no distress  HEENT: Normocephalic, abrasions noted to right side of face, PERRL, EOM grossly normal, Conjunctiva Clear, MMM   Neck: Range of motion intact, no midline tenderness to palpation  Lungs: CTA B, No crepitus, Talking in full sentences in no respiratory distress  Cardiovascular: RRR: minimally tachycardic but regular  Abdominal: Normoactive BSs, No rebound or guarding, soft, non-distended, no masses, no discomfort to palpation   Back: No costo-vertebral angle discomfort on percussion, no point spinal ttp, no erythema or rash  Rectal: deferred  : deferred  Musculoskeletal: No deformities noted, No cyanosis/clubbing noted, tenderness to palpation noted over areas of ecchymosis focal to right and left elbow, right and left knee and right hip, no tenderness with logrolling of right or left lower extremity, no prepatellar tenderness to palpation, patient able to raise both legs with knee fully extended  Neurologic: A&O x 3, Speech clear, No facial asymmetry noted, DTR intact in UE and LE bilaterally, intact sensation throughout V1-3 distribution, Shoulder shrug equal bilaterally, midline tongue protrusion, equal audiotry capabilities and facial sensation bilaterally, 5/5 strength and sensation in UE and LE bilaterally  Psychiatric: Mood and affect are appropriate, Speech not pressured, Thought process is logical  Skin: Warm and dry, No rash      ED Course     Labs Reviewed   BASIC METABOLIC PANEL (8) - Abnormal; Notable for the following components:        Result Value    Glucose 168 (*)     Sodium 133 (*)     Chloride 97 (*)     CO2 20.0 (*)     Creatinine 1.10 (*)     Calcium, Total 8.3 (*)     eGFR-Cr 56 (*)     All other components within normal limits   URINALYSIS, ROUTINE - Abnormal; Notable for the following components:    Urine Color Elizabeth (*)     Glucose Urine 100 (*)     Bilirubin Urine Large (*)     Ketones Urine 15 (*)     Blood Urine Large (*)     Protein Urine 100 mg/dL (*)     Urobilinogen Urine >=8.0 (*)     Nitrite Urine Positive (*)     Bacteria Urine 2+ (*)     Squamous Epi. Cells Few (*)     Hyaline Casts Present (*)     All other components within normal limits   UA MICROSCOPIC ONLY, URINE - Abnormal; Notable for the following components:    Bacteria Urine 2+ (*)     Squamous Epi. Cells Few (*)     Hyaline Casts Present (*)     All other components within normal limits   LACTIC ACID, PLASMA - Abnormal; Notable for the following components:    Lactic Acid 9.7 (*)     All other components within normal limits   CBC W/ DIFFERENTIAL - Abnormal; Notable for the following components:    WBC 13.8 (*)     RBC 3.03 (*)     HCT 32.5 (*)     .3 (*)     MCH 39.6 (*)     RDW-SD 63.3 (*)     RDW 15.9 (*)     .0 (*)     Neutrophil Absolute Prelim 11.08 (*)     Neutrophil Absolute 11.08 (*)     Monocyte Absolute 1.22 (*)     All other components within normal limits   TROPONIN I HIGH SENSITIVITY - Normal   CBC WITH DIFFERENTIAL WITH PLATELET    Narrative:     The following orders were created for panel order CBC With Differential With Platelet.  Procedure                               Abnormality         Status                     ---------                               -----------         ------                     CBC W/ DIFFERENTIAL[[id number]]          Abnormal            Final result                 Please view results for these tests on the individual orders.   LACTIC ACID REFLEX POST POSTIVE   BLOOD CULTURE   BLOOD CULTURE          MDM         Admission disposition: 4/3/2024  2:21 AM           Medical Decision Making  63-year-old female presenting to the emergency department due to concern for generalized weakness noted mostly in the lower extremities bilaterally with recurrent falls.    Patient mentating appropriately on initial evaluation, minimally tachycardic but no acute distress.    Differential diagnosis includes the following but is not limited to: Arrhythmia, ACS, Acute Infectious etiology, Electrolyte Abnormality, Dehydration, Hypoglycemia, anemia, intoxication, stroke less likely given lack of focal findings for complaints, other acute intracranial structural abnormality such as hemorrhage, acute fracture versus dislocation, uncomplicated ecchymosis    ED Course as of 04/03/24 0503  ------------------------------------------------------------  Time: 04/03 0033  Comment: EKG read interpreted by me showing rate 110, , QRS 84, QTc 506, sinus tachycardia, no STEMI.  No evidence of arrhythmia.  ------------------------------------------------------------  Time: 04/03 0035  Comment: CBC notable for leukocytosis with left shift.  Unable to definitively rule out acute infectious etiology at this time.  Patient afebrile.  Hemoglobin at 12 lessening concern for anemia with platelets at 126.  ------------------------------------------------------------  Time: 04/03 0111  Value: Troponin I (High Sensitivity): 5  Comment: Lessening concern for ACS  ------------------------------------------------------------  Time: 04/03 0111  Comment: BMP with glucose at 168 lessening concern for hyper or hypoglycemia as a cause for the patient's weakness and falls.  ------------------------------------------------------------  Time: 04/03 0213  Comment: Radiology has called to discuss CT.  Concern for mass versus right cerebellar infarct with hemorrhagic transformation.  Recommending further evaluation with  MRI  ------------------------------------------------------------  Time: 04/03 0214  Comment: Radiology has called to discuss x-rays and reports that they are negative aside from chest x-ray with possible right-sided third and fourth rib fracture.  ------------------------------------------------------------  Time: 04/03 0214  Comment: Urinalysis positive for nitrites with bacteria noted and 1-5 white blood cells.  Will treat for UTI at this time.     Patient given Tylenol and Zofran in the emergency department.  Tetanus updated.     Unclear exact etiology of falls.  Possibly due to alcohol intoxication versus mechanical versus cerebellar changes versus UTI. Possible rib fractures noted on imaging. Uncomplicated ecchymosis otherwise.     Patient require further workup and admission at this time.  Have discussed with admitting physician who is agreeable with plan moving forward.  States that she will order MRI in addition to neurology consult after discussion.      Amount and/or Complexity of Data Reviewed  Independent Historian:      Details: Family x2  Labs: ordered.     Details: As detailed  Radiology: ordered and independent interpretation performed.     Details: As detailed    ECG/medicine tests: ordered and independent interpretation performed.     Details: As detailed    Discussion of management or test interpretation with external provider(s): Admitting team member    Risk  OTC drugs.  Prescription drug management.  Decision regarding hospitalization.        Disposition and Plan     Clinical Impression:  1. Fall, initial encounter         Disposition:  Admit  4/3/2024  2:21 am    Follow-up:  No follow-up provider specified.        Medications Prescribed:  There are no discharge medications for this patient.                        Hospital Problems       Present on Admission             ICD-10-CM Noted POA    * (Principal) Fall, initial encounter W19.XXXA 4/3/2024 Unknown    Fall W19.XXXA 4/3/2024 Unknown

## 2024-04-03 NOTE — OCCUPATIONAL THERAPY NOTE
2 attempts made to see pt for OT eval this date. Nurse requested to reschedule eval this am as imaging was pending. Upon returned this pm, Dr Guillen at bed side having a session with pt. Plan to re attempt tomorrow.

## 2024-04-03 NOTE — ED QUICK NOTES
Laney RN called for nurse to nurse report. All questions answered for RN, no further questions at this moment.

## 2024-04-03 NOTE — H&P
[facility]  part of [facility]    History & Physical    Christi Tavarez Patient Status:  Emergency    1960 MRN [id number]   Location [facility] EMERGENCY DEPARTMENT Attending Mariana Clay DO   Hosp Day # 0 PCP No primary care provider on file.     Date:  4/3/2024  Date of Admission:  2024    Chief Complaint:  Chief Complaint   Patient presents with    Weakness       History of Present Illness:  Christi Tavarez is a(n) 63 year old female, who presents for evaluation of multiple falls. PMHx significant for NIDDM type II, hypothyroidism, anxiety, hyperlipidemia, current alcohol use.  Patient accompanied by daughter at bedside.  History obtained mostly by patient who reports that she has been falling for the last 2 weeks.  She feels like her legs are giving out and she has been falling on the ground at home, hitting her head multiple times.  Denies lightheadedness or dizziness prior to the falls and states that her legs are just giving out and she feels very weak.  Daughter at bedside concerned that patient has had the falls as well as vomiting prior to ED presentation.  Daughter states that she has been vomiting, no blood noted per family and patient.  She has felt nauseous.  Has had difficulty with keeping any food down.  Denies any fever or chills.  Reports the last time she went to her PCP was in 2023 and per chart review she did see her PCP back then.  Currently she reports taking Paxil for her anxiety.  She declines taking her metformin for her diabetes and does not check her blood sugar.  Denies any blood in her stool.  No recent colonoscopy.  Denies previous EGD.  Reports drinking alcohol, about 1-2 shots of vodka every day for the last 10 years.  Denies any previous history of alcohol withdrawal.  Last alcohol drink 2 days prior to current presentation.  Denies any change in vision, headache, loss of consciousness.  Reports that she is able to move her  extremities, just difficult to walk.  Denies symptoms of dysuria.  Denies sick contacts.  On presentation to the ED, vital signs remarkable for tachycardia.  Lab work remarkable for elevated blood glucose 168, mildly decreased sodium level 133, creatinine 1.10 with EGFR 56, WBC 13.8 with left shift, mild thrombocytopenia with platelet count 126.  Hemoglobin noted to be normal 12.0.   CT head reveals moderate acute to subacute inferior right cerebellar infarct with focal region of hemorrhagic transformation 2 x 1.9 cm versus mass with hemorrhage. Chest x-ray with right side anterior third/fourth rib fractures.    During my evaluation, patient noted to be have emesis with streaks of blood.  Patient is hemodynamically stable with reassuring hemoglobin of 12.  She will be admitted under hospitalist service with consultation to gastroenterology as well as neurology for further evaluation.     History:  History reviewed. No pertinent past medical history.  History reviewed. No pertinent surgical history.  No family history on file.   reports that she has never smoked. She has never used smokeless tobacco. She reports current alcohol use. She reports that she does not use drugs.    Allergies:  No Known Allergies    Home Medications:  None       Review of Systems:  Constitutional:  + Generalized weakness  Eye:  Negative.  Ear/Nose/Mouth/Throat:  Negative.  Respiratory:  Negative  Cardiovascular: Negative  Gastrointestinal:  Negative.  Genitourinary:  Negative  Endocrine:  Negative.  Immunologic:  Negative.  Musculoskeletal:  Negative.  Integumentary:  Negative.  Neurologic:  Negative.  Psychiatric:  Negative.  ROS reviewed as documented in chart    Physical Exam:  Temp:  [98.3 °F (36.8 °C)] 98.3 °F (36.8 °C)  Pulse:  [108-118] 108  Resp:  [18] 18  BP: (138-143)/(62-67) 143/67  SpO2:  [96 %-99 %] 99 %    General:  Alert and oriented.  Acutely ill-appearing  Diffuse skin problem:  None.  Eye:  Pupils are equal, round and  reactive to light, extraocular movements are intact, Normal conjunctiva.  HENT:  Normocephalic, oral mucosa is moist.  Head:  Normocephalic, right-sided abrasion  Neck:  Supple, non-tender, no carotid bruit, no jugular venous distention, no lymphadenopathy, no thyromegaly.  Respiratory:  Lungs are clear to auscultation, respirations are non-labored, breath sounds are equal, symmetrical chest wall expansion.  Cardiovascular:  Normal rate, regular rhythm, no murmur, no edema.  Gastrointestinal:  Soft, non-tender, non-distended, normal bowel sounds, no organomegaly.  Lymphatics:  No lymphadenopathy neck, axilla, groin.  Musculoskeletal: Normal range of motion.  normal strength.  Feet:  Normal pulses.  Neurologic:  Alert, oriented, no focal deficits, cranial nerves II-XII are grossly intact.  Cognition and Speech:  Oriented, speech clear and coherent.  Psychiatric:  Cooperative, appropriate mood & affect.      Laboratory Data:   Lab Results   Component Value Date    WBC 13.8 04/03/2024    HGB 12.0 04/03/2024    HCT 32.5 04/03/2024    .0 04/03/2024    CREATSERUM 1.10 04/03/2024    BUN 13 04/03/2024     04/03/2024    K 3.9 04/03/2024    CL 97 04/03/2024    CO2 20.0 04/03/2024     04/03/2024    CA 8.3 04/03/2024       Imaging:  No results found.     Assessment and Plan:    Generalized weakness  Recurrent falls  Moderate acute to subacute inferior right cerebellar infarct versus mass with hemorrhage  -Obtain MRI brain to further evaluate based on CT findings.  -No neurological deficits on physical exam however will obtain neurology consult for further evaluation and recs, Dr. Hedrick notified.  -Neurochecks  -TSH/B12/Folate ordered  -PT/OT eval    Hematemesis  -Noted during physical exam, patient with emesis that contains streaks of blood.  Patient denies melena/hematochezia  -No previous EGD/colonoscopy  -Patient is a current alcohol user with last alcohol drink 2 days prior to presentation.  Cannot  exclude bleeding ulcer.  -Hemoglobin stable at 12.    -Maintain n.p.o.  -Gastroenterology has been consulted, Dr. Mena notified, pending evaluation for possible EGD  -Initiate Protonix IV twice daily  -Start patient on IV fluid hydration    Current alcohol use  -Last alcohol use 2 days prior to presentation  -Has been drinking heavily liquor for the last 10 years, almost daily  -No previous alcohol withdrawal symptoms/Dts  -Initiate CIWA protocol given her longstanding history of alcohol use    Acute kidney injury  Mild hyponatremia  Hypochloremia  Metabolic acidosis  -Suspecting secondary to decreased oral intake as well as worsened by alcohol use.  -Patient treated per sepsis protocol w/ 30 ml/kg IVF   -Initiate IV fluid hydration and reassess renal function in the a.m.  If renal function worsens, consider nephrology consult    Thrombocytopenia  -Noted to have platelet count 126.  -Suspecting secondary to alcohol use  -Continue to monitor with daily CBC    NIDDM type II  -Patient stopped taking metformin months ago.  -Last hemoglobin A1c  5.5  -Blood glucose on presentation 168.  Will initiate insulin sliding scale.  Hypoglycemia protocol.  Accu-Cheks.    H/o hyperlipidemia  -Patient stopped taking statin months ago.    H/o anxiety  -Reports being on Paxil.    Prophylaxis  SCDs    CODE STATUS  Full    Primary care physician  No primary care provider on file.    60 minutes spent on this admission - examining patient, obtaining history, reviewing previous medical records, going over test results/imaging and discussing plan of care. All questions answered.     Disposition  Clinical course will dictate outcome      Marcia Meadows MD  4/3/2024  2:56 AM

## 2024-04-03 NOTE — PLAN OF CARE
Pt A&OX4 on RA. Neuro q4. CIWA protocol initiated. Lactic 9.7 bolus given in ED, repeat 6, MD aware. Speech/PT/OT ordered. STAT MRI ordered, pt waiting to go, screening complete. Daughter at bedside and updated on plan of care.   Problem: Patient Centered Care  Goal: Patient preferences are identified and integrated in the patient's plan of care  Description: Interventions:  - What would you like us to know as we care for you?   - Provide timely, complete, and accurate information to patient/family  - Incorporate patient and family knowledge, values, beliefs, and cultural backgrounds into the planning and delivery of care  - Encourage patient/family to participate in care and decision-making at the level they choose  - Honor patient and family perspectives and choices  Outcome: Progressing     Problem: Diabetes/Glucose Control  Goal: Glucose maintained within prescribed range  Description: INTERVENTIONS:  - Monitor Blood Glucose as ordered  - Assess for signs and symptoms of hyperglycemia and hypoglycemia  - Administer ordered medications to maintain glucose within target range  - Assess barriers to adequate nutritional intake and initiate nutrition consult as needed  - Instruct patient on self management of diabetes  Outcome: Progressing     Problem: Patient/Family Goals  Goal: Patient/Family Long Term Goal  Description: Patient's Long Term Goal:     Interventions:  -   - See additional Care Plan goals for specific interventions  Outcome: Progressing  Goal: Patient/Family Short Term Goal  Description: Patient's Short Term Goal:     Interventions:   -   - See additional Care Plan goals for specific interventions  Outcome: Progressing     Problem: PAIN - ADULT  Goal: Verbalizes/displays adequate comfort level or patient's stated pain goal  Description: INTERVENTIONS:  - Encourage pt to monitor pain and request assistance  - Assess pain using appropriate pain scale  - Administer analgesics based on type and severity  of pain and evaluate response  - Implement non-pharmacological measures as appropriate and evaluate response  - Consider cultural and social influences on pain and pain management  - Manage/alleviate anxiety  - Utilize distraction and/or relaxation techniques  - Monitor for opioid side effects  - Notify MD/LIP if interventions unsuccessful or patient reports new pain  - Anticipate increased pain with activity and pre-medicate as appropriate  Outcome: Progressing     Problem: SAFETY ADULT - FALL  Goal: Free from fall injury  Description: INTERVENTIONS:  - Assess pt frequently for physical needs  - Identify cognitive and physical deficits and behaviors that affect risk of falls.  - [place] fall precautions as indicated by assessment.  - Educate pt/family on patient safety including physical limitations  - Instruct pt to call for assistance with activity based on assessment  - Modify environment to reduce risk of injury  - Provide assistive devices as appropriate  - Consider OT/PT consult to assist with strengthening/mobility  - Encourage toileting schedule  Outcome: Progressing     Problem: DISCHARGE PLANNING  Goal: Discharge to home or other facility with appropriate resources  Description: INTERVENTIONS:  - Identify barriers to discharge w/pt and caregiver  - Include patient/family/discharge partner in discharge planning  - Arrange for needed discharge resources and transportation as appropriate  - Identify discharge learning needs (meds, wound care, etc)  - Arrange for interpreters to assist at discharge as needed  - Consider post-discharge preferences of patient/family/discharge partner  - Complete POLST form as appropriate  - Assess patient's ability to be responsible for managing their own health  - Refer to Case Management Department for coordinating discharge planning if the patient needs post-hospital services based on physician/LIP order or complex needs related to functional status, cognitive ability or  social support system  Outcome: Progressing     Problem: NEUROLOGICAL - ADULT  Goal: Achieves stable or improved neurological status  Description: INTERVENTIONS  - Assess for and report changes in neurological status  - Initiate measures to prevent increased intracranial pressure  - Maintain blood pressure and fluid volume within ordered parameters to optimize cerebral perfusion and minimize risk of hemorrhage  - Monitor temperature, glucose, and sodium. Initiate appropriate interventions as ordered  Outcome: Progressing  Goal: Absence of seizures  Description: INTERVENTIONS  - Monitor for seizure activity  - Administer anti-seizure medications as ordered  - Monitor neurological status  Outcome: Progressing  Goal: Remains free of injury related to seizure activity  Description: INTERVENTIONS:  - Maintain airway, patient safety  and administer oxygen as ordered  - Monitor patient for seizure activity, document and report duration and description of seizure to MD/LIP  - If seizure occurs, turn patient to side and suction secretions as needed  - Reorient patient post seizure  - Seizure pads on all 4 side rails  - Instruct patient/family to notify RN of any seizure activity  - Instruct patient/family to call for assistance with activity based on assessment  Outcome: Progressing  Goal: Achieves maximal functionality and self care  Description: INTERVENTIONS  - Monitor swallowing and airway patency with patient fatigue and changes in neurological status  - Encourage and assist patient to increase activity and self care with guidance from PT/OT  - Encourage visually impaired, hearing impaired and aphasic patients to use assistive/communication devices  Outcome: Progressing     Problem: Impaired Functional Mobility  Goal: Achieve highest/safest level of mobility/gait  Description: Interventions:  - Assess patient's functional ability and stability  - Promote increasing activity/tolerance for mobility and gait  - Educate and  engage patient/family in tolerated activity level and precautions  - Recommend use of chair position in bed 3 times per day  Outcome: Progressing

## 2024-04-04 LAB
ALBUMIN SERPL-MCNC: 2.2 G/DL (ref 3.2–4.8)
ALP LIVER SERPL-CCNC: 191 U/L
ALT SERPL-CCNC: 33 U/L
ANION GAP SERPL CALC-SCNC: 5 MMOL/L (ref 0–18)
AST SERPL-CCNC: 95 U/L (ref ?–34)
BASOPHILS # BLD AUTO: 0.04 X10(3) UL (ref 0–0.2)
BASOPHILS NFR BLD AUTO: 0.5 %
BILIRUB DIRECT SERPL-MCNC: 4 MG/DL (ref ?–0.3)
BILIRUB SERPL-MCNC: 6.7 MG/DL (ref 0.2–1.1)
BUN BLD-MCNC: 8 MG/DL (ref 9–23)
BUN/CREAT SERPL: 12.3 (ref 10–20)
CALCIUM BLD-MCNC: 7.7 MG/DL (ref 8.7–10.4)
CHLORIDE SERPL-SCNC: 105 MMOL/L (ref 98–112)
CO2 SERPL-SCNC: 26 MMOL/L (ref 21–32)
CREAT BLD-MCNC: 0.65 MG/DL
DEPRECATED RDW RBC AUTO: 61.2 FL (ref 35.1–46.3)
EGFRCR SERPLBLD CKD-EPI 2021: 99 ML/MIN/1.73M2 (ref 60–?)
EOSINOPHIL # BLD AUTO: 0.08 X10(3) UL (ref 0–0.7)
EOSINOPHIL NFR BLD AUTO: 0.9 %
ERYTHROCYTE [DISTWIDTH] IN BLOOD BY AUTOMATED COUNT: 15.8 % (ref 11–15)
GLUCOSE BLD-MCNC: 195 MG/DL (ref 70–99)
GLUCOSE BLDC GLUCOMTR-MCNC: 134 MG/DL (ref 70–99)
GLUCOSE BLDC GLUCOMTR-MCNC: 161 MG/DL (ref 70–99)
GLUCOSE BLDC GLUCOMTR-MCNC: 191 MG/DL (ref 70–99)
GLUCOSE BLDC GLUCOMTR-MCNC: 76 MG/DL (ref 70–99)
HCT VFR BLD AUTO: 27.9 %
HGB BLD-MCNC: 10.1 G/DL
HGBA1C: 4.4 %
IMM GRANULOCYTES # BLD AUTO: 0.03 X10(3) UL (ref 0–1)
IMM GRANULOCYTES NFR BLD: 0.3 %
INR BLD: 2.68 (ref 0.8–1.2)
LYMPHOCYTES # BLD AUTO: 1.45 X10(3) UL (ref 1–4)
LYMPHOCYTES NFR BLD AUTO: 16.7 %
MAGNESIUM SERPL-MCNC: 2 MG/DL (ref 1.6–2.6)
MCH RBC QN AUTO: 38.5 PG (ref 26–34)
MCHC RBC AUTO-ENTMCNC: 36.2 G/DL (ref 31–37)
MCV RBC AUTO: 106.5 FL
MONOCYTES # BLD AUTO: 0.68 X10(3) UL (ref 0.1–1)
MONOCYTES NFR BLD AUTO: 7.8 %
NEUTROPHILS # BLD AUTO: 6.42 X10 (3) UL (ref 1.5–7.7)
NEUTROPHILS # BLD AUTO: 6.42 X10(3) UL (ref 1.5–7.7)
NEUTROPHILS NFR BLD AUTO: 73.8 %
OSMOLALITY SERPL CALC.SUM OF ELEC: 286 MOSM/KG (ref 275–295)
PLATELET # BLD AUTO: 83 10(3)UL (ref 150–450)
PLATELETS.RETICULATED NFR BLD AUTO: 7 % (ref 0–7)
POTASSIUM SERPL-SCNC: 3.5 MMOL/L (ref 3.5–5.1)
PROT SERPL-MCNC: 6.7 G/DL (ref 5.7–8.2)
PROTHROMBIN TIME: 30.2 SECONDS (ref 11.6–14.8)
RBC # BLD AUTO: 2.62 X10(6)UL
SODIUM SERPL-SCNC: 136 MMOL/L (ref 136–145)
VIT B12 SERPL-MCNC: >2000 PG/ML (ref 211–911)
WBC # BLD AUTO: 8.7 X10(3) UL (ref 4–11)

## 2024-04-04 PROCEDURE — 99233 SBSQ HOSP IP/OBS HIGH 50: CPT | Performed by: HOSPITALIST

## 2024-04-04 PROCEDURE — 99233 SBSQ HOSP IP/OBS HIGH 50: CPT | Performed by: INTERNAL MEDICINE

## 2024-04-04 PROCEDURE — 99233 SBSQ HOSP IP/OBS HIGH 50: CPT | Performed by: OTHER

## 2024-04-04 NOTE — PROGRESS NOTES
Patient is a 63 year old   female with past medical history of NIDDM type II, hypothyroidism, anxiety, hyperlipidemia and current alcohol use  who was admitted to the hospital for fall. The patient has been demonstrating symptoms of alcohol withdrawal. Patient admitted to the CCU and psych consult requested for evaluation and advise.    Consult Duration     The patient seen for over 50-minute, follow-up evaluation, over 50% counseling and coordinating care addressing alcohol withdrawal.  Record reviewed, communication with attending, communication with RN and patient seen face to face evaluation.     History of Present Illness:     Communicating with the team, no issues reported overnight. Patient has not demonstrated any concerning behaviors.    The patient received the following psychotropic medications: librium 5 mg BID, lexapro 10 mg, Lamictal 25 mg BID. Patient receiving ativan per [facility] protocol otherwise has not received any recently.     Labs and imaging reviewed: mag 2.0, B12>2000  Brain MRI indicating small subacute hematoma.    Most recent CIWA 1  Vital signs /69 HR 77    The patient seen today laying in hospital bed with her daughter and son at bedside. Patient states she has determination and wants to stop drinking alcohol.     The patient is alert and oriented to person, place, date and condition. The patient otherwise demonstrates some tiredness and sleepiness. Patient had multiple facial bruises.  No tremors observed.    Patient otherwise reporting that she is feeling much better today and she slept really well.    Patient reflecting on her life and her drinking and how she ended up in such position.  Patient admitted that she has been anxious generally speaking in her baseline and she gets more depressed after the death of her .  Patient indicated that she started to drink to help herself with the sleep and depression and suddenly find herself in serious  condition.    Patient decayed that she want to stop drinking completely with the support of her family.    The patient reported that she never had withdrawal symptoms before but she had not started drinking at all.  Patient reporting that lately she feel her legs has been wobbly and weak.  Patient admitted that her appetite has been declined but no definite weight loss.    The patient denies any direct hopelessness or helplessness otherwise patient demonstrate some sense of worthlessness with increased depressed mood, anxiety and decline in her health and self-care.    Family admitted that patient has been demonstrating some deterioration for that they tried to move her to close to where the son and his wife lives.    Patient denied history of seizure or DTs.    The patient is not demonstrating any lisa or psychosis  The patient denies auditory or visual hallucinations  The patient denies suicidal or homicidal ideation.    The patient has been demonstrating increased anxiety, depression which perpetuate the increase of alcohol intake, subsequently increased neuropathy and weakness leads to fall.  Patient agreeable on the need for treatment.    Past Psychiatric/Medication History:  1. Prior diagnoses: Alcohol abuse, anxiety  2. Past psychiatric inpatient: None  3. Past outpatient history: PCP  4. Past suicide history: None  5. Medication history: paxil 10 mg daily    Social History:   Patient is  female who retired at age 55 lost her  in 2017.  Patient lives in her own and recently moved closer to live near her son and his wife.  Patient with history of daily drinking for the last 7-10 years of 2 glasses of straight vodka nightly    Family History:  Unknown  Medical History:   Past Medical History  History reviewed. No pertinent past medical history.    Past Surgical History  History reviewed. No pertinent surgical history.    Family History  No family history on file.    Social History  Social History      Socioeconomic History    Marital status:    Tobacco Use    Smoking status: Never    Smokeless tobacco: Never   Vaping Use    Vaping Use: Never used   Substance and Sexual Activity    Alcohol use: Yes     Comment: socially    Drug use: Never     Social Determinants of Health     Food Insecurity: No Food Insecurity (4/3/2024)    Food Insecurity     Food Insecurity: Never true   Transportation Needs: No Transportation Needs (4/3/2024)    Transportation Needs     Lack of Transportation: No   Housing Stability: Low Risk  (4/3/2024)    Housing Stability     Housing Instability: No           Current Medications:      Medications Prior to Admission   Medication Sig    PARoxetine 10 MG Oral Tab Take 1 tablet (10 mg total) by mouth every morning.    glipiZIDE 5 MG Oral Tab Take 1 tablet (5 mg total) by mouth every morning before breakfast. With food       Allergies  No Known Allergies    Review of Systems:   As by Admitting/Attending    Results:   Laboratory Data:  Lab Results   Component Value Date    WBC 8.7 04/04/2024    HGB 10.1 (L) 04/04/2024    HCT 27.9 (L) 04/04/2024    PLT 83.0 (L) 04/04/2024    CREATSERUM 0.65 04/04/2024    BUN 8 (L) 04/04/2024     04/04/2024    K 3.5 04/04/2024     04/04/2024    CO2 26.0 04/04/2024     (H) 04/04/2024    CA 7.7 (L) 04/04/2024    ALB 2.8 (L) 04/03/2024    ALKPHO 248 (H) 04/03/2024    TP 8.3 (H) 04/03/2024     (H) 04/03/2024    ALT 47 04/03/2024    INR 2.68 (H) 04/04/2024    PTP 30.2 (H) 04/04/2024    T4F 1.0 04/03/2024    TSH 8.140 (H) 04/03/2024    MG 2.0 04/04/2024     04/03/2024    B12 >2,000 (H) 04/03/2024    ETOH <3 04/03/2024         Imaging:  MRI BRAIN (W+WO) (CPT=70553)    Result Date: 4/3/2024  CONCLUSION: 3 x 3 x 2 cm located focus within the right cerebellum, with characterize most compatible with a subacute hematoma.  However, because there is patient motion and given the current [place] of T1 signal changes, there is potential  this finding obscures a very tiny lesion.  Exam is also limited by patient motion.  Therefore recommend follow-up in approximately 3 months to assess for changes.  Dictated by (CST): Rob Duvall MD on 4/03/2024 at 11:12 AM     Finalized by (CST): Rob Duvall MD on 4/03/2024 at 12:19 PM          US LIVER (CPT=76705)    Result Date: 4/3/2024  CONCLUSION:   Hepatic steatosis.  Mild right upper quadrant ascites.  Cholelithiasis and gallbladder sludge with mild circumferential gallbladder wall thickening.  Positive sonographic Pretty sign.  Findings may relate to acute cholecystitis although gallbladder wall thickening can be seen in patients with hepatocellular disease and or ascites.  Advise clinical follow-up and consider evaluation with nuclear medicine hepatobiliary scan.     Dictated by (CST): Henrry Garduno MD on 4/03/2024 at 11:51 AM     Finalized by (CST): Henrry Garduno MD on 4/03/2024 at 11:54 AM          XR FOREARM (2 VIEWS), LEFT (CPT=73090)    Result Date: 4/3/2024  CONCLUSION: No acute osseous abnormality of the left forearm.  [facility] provided a preliminary report for this examination. This final report agrees with their preliminary findings.   Dictated by (CST): Rob Duvall MD on 4/03/2024 at 10:02 AM     Finalized by (CST): Rob Duvall MD on 4/03/2024 at 10:02 AM          XR ELBOW, COMPLETE (MIN 3 VIEWS), RIGHT (CPT=73080)    Result Date: 4/3/2024  CONCLUSION: No acute osseous abnormality of the right elbow.  [facility] provided a preliminary report for this examination. This final report agrees with their preliminary findings.   Dictated by (CST): Rob Duvall MD on 4/03/2024 at 9:28 AM     Finalized by (CST): Rob Duvall MD on 4/03/2024 at 9:29 AM          XR CHEST AP PORTABLE  (CPT=71045)    Result Date: 4/3/2024  CONCLUSION: Findings suspicious for acute nondisplaced fractures of the anterior right 3rd and 4th ribs.  Correlate for pain at this site.  No pneumothorax.   Otherwise no acute cardiopulmonary abnormality.   [facility] provided a preliminary report for this examination. This final report agrees with their preliminary findings.   Dictated by (CST): Rob Duvall MD on 4/03/2024 at 9:26 AM     Finalized by (CST): Rob Duvall MD on 4/03/2024 at 9:28 AM          XR KNEE (1 OR 2 VIEWS), LEFT (CPT=73560)    Result Date: 4/3/2024  CONCLUSION: No acute osseous abnormality of the left knee.  [facility] provided a preliminary report for this examination. This final report agrees with their preliminary findings.   Dictated by (CST): Rob Duvall MD on 4/03/2024 at 9:25 AM     Finalized by (CST): Rob Duvall MD on 4/03/2024 at 9:26 AM          XR KNEE (1 OR 2 VIEWS), RIGHT (CPT=73560)    Result Date: 4/3/2024  CONCLUSION: Soft tissue inflammation ventral to the patella and patellar tendon suggesting soft tissue injury.  No acute osseous abnormality of the right knee.  [facility] provided a preliminary report for this examination. This final report agrees with their preliminary findings.    Dictated by (CST): Rob Duvall MD on 4/03/2024 at 9:24 AM     Finalized by (CST): Rob Duvall MD on 4/03/2024 at 9:25 AM          XR PELVIS (1 VIEW) (CPT=72170)    Result Date: 4/3/2024  CONCLUSION:  1.  Hypodense focus within the right femoral neck which could represent a bone cyst or lytic lesion.  Consider bone scan or other follow up imaging. 2.  Mild degenerative change in the lower lumbar spine.   [facility] provided a preliminary report for this examination. This final report agrees with their preliminary findings.   Dictated by (CST): Rob Duvall MD on 4/03/2024 at 9:23 AM     Finalized by (CST): Rob Duvall MD on 4/03/2024 at 9:24 AM          XR FEMUR MIN 2 VIEWS RIGHT (CPT=73552)    Result Date: 4/3/2024  CONCLUSION: Subcentimeter ovoid lucencies within the right femoral neck which could represent bone cysts or lytic foci.  This could be further assessed with  bone scan.   [facility] provided a preliminary report for this examination. This final report agrees with their preliminary findings.   Dictated by (CST): Rob Duvall MD on 4/03/2024 at 9:21 AM     Finalized by (CST): Rob Duvall MD on 4/03/2024 at 9:22 AM          CT BRAIN OR HEAD (04802)    Result Date: 4/3/2024  CONCLUSION: 3 x 1 x 2 cm for ovoid soft tissue density in the right cerebellar vermis and adjacent hemisphere with surrounding edema.  It could represent an acute/subacute/recent site of hemorrhage/hematoma versus hemorrhagic lesion (such as primary malignancy versus metastatic focus).  Recommend further characterization with MRI brain with without contrast.   [facility] provided a preliminary report for this examination. This final report agrees with their preliminary findings.   Dictated by (CST): Rob Duvall MD on 4/03/2024 at 8:43 AM     Finalized by (CST): Rob Duvall MD on 4/03/2024 at 8:50 AM           Vital Signs:   Blood pressure 119/69, pulse 77, temperature 97 °F (36.1 °C), temperature source Temporal, resp. rate 14, height 58\", weight 64.5 kg (142 lb 3.2 oz), SpO2 94%.    Mental Status Exam:   Appearance: Stated age female, in hospital gown, laying down in hospital bed.  Patient with facial bruises  Psychomotor: No psychomotor agitation, or retardation.  Fine tremors observed  Orientation: Alert and oriented to person, place, time and condition.  Gait: Not evaluated.  Attitude/Coorperation: Cooperative and attentive.  With some noticeable tiredness.  Behavior: Appropriate.  No bizarre behavior  Speech: Regular rate and rhythm speech.  Soft low-tone speech  Mood: Patient reporting depressed and anxious mood.  Affect: Anxious affect, congruent with the mood.  Thought process: Linear and appropriate.  Slightly slowed due to tiredness and concussion.  Thought content: Patient denies any suicidal or homicidal ideation.  Perceptions: Patient denies any auditory or visual  hallucinations.  Concentration: Grossly slow  Memory: Grossly intact.  Intellect: Average.  Judgment and Insight: Questionable.     Impression:     Major depressive disorder, recurrent moderate.  Alcohol withdrawal syndrome uncomplicated.  Alcohol dependence, continuous    Fall, initial encounter    Fall    Patient is a 63 year old female with past medical history of NIDDM type II, hypothyroidism, anxiety, hyperlipidemia, current alcohol use  who was admitted to the hospital for Fall, initial encounter: The patient has been demonstrating symptoms of alcohol withdrawal.    Evaluation today indicating that patient was not alcoholic and told she lost her alcoholic  in 2017 and possibly patient connecting to her   through alcohol and utilizing drinking as a method to deal with her emotion and to process her grieving.  Otherwise the patient has been demonstrating symptom of depression has started since which perpetuate the drinking habit.  Patient started to have some deficit in her mineral and vitamin and losing blood all related to alcohol and her depression and limited intake with increased neuropathy causing her fall.    24: The patient today demonstrating depressed mood but motivated to stop drinking alcohol.  No withdrawal symptom has been observed or reported.    Discussed risk and benefit, acknowledging the current symptom and severity.  At this point, I would recommend the following approach:     Focus on safety  Focus on education and support.  Focus on insight orientation helping the patient understand diagnosis and treatment plan.  Patient admitting to the fact that she need to stop drinking  Continue CIWA protocol.  Continue Librium 5 mg twice daily.  Continue Lexapro 10 mg nightly.  Continue Lamictal 25 mg twice daily.  Multivitamin with mineral and thiamine daily.  Patient and family received education about medication management and discussed risk and benefit.  All in  agreement  Patient and family agreed on form of outpatient rehab treatment  Coordinate plan with team    Orders This Visit:  Orders Placed This Encounter   Procedures    CBC With Differential With Platelet    Basic Metabolic Panel (8)    Urinalysis, Routine    Troponin I (High Sensitivity)    UA Microscopic only, urine    Lactic Acid, Plasma    Basic Metabolic Panel (8)    CBC With Differential With Platelet    TSH W Reflex To Free T4    Vitamin B12 with Reflex to MMA    Ethyl Alcohol    Lipid Panel    Hemoglobin A1C    Hepatic Function Panel (7)    Lactic Acid Reflex Post Positive    T4, FREE (S)    CBC With Differential With Platelet    Lactic Acid Post Positive    Scan slide    Magnesium    Magnesium    Hemoglobin    Prothrombin Time (PT)    Hemoglobin A1C    Vitamin B12    CK (Creatine Kinase) (Not Creatinine)    Prothrombin Time (PT)    Vitamin B12 with reflex to MMA    Type and screen    ABORH (Blood Type)    Antibody Screen    ABORH Confirmation    Blood Culture       Meds This Visit:  Requested Prescriptions      No prescriptions requested or ordered in this encounter       Wallace Guillen MD  4/4/2024    Note to Patient: The 21st Century Cures Act makes medical notes like these available to patients in the interest of transparency. However, be advised this is a medical document. It is intended as peer to peer communication. It is written in medical language and may contain abbreviations or verbiage that are unfamiliar. It may appear blunt or direct. Medical documents are intended to carry relevant information, facts as evident, and the clinical opinion of the practitioner. This note may have been transcribed using a voice dictation system. Voice recognition errors may occur. This should not be taken to alter the content or meaning of this note.

## 2024-04-04 NOTE — OCCUPATIONAL THERAPY NOTE
OCCUPATIONAL THERAPY EVALUATION - INPATIENT     Room Number: 206/206-A  Evaluation Date: 4/4/2024  Type of Evaluation: Initial  Presenting Problem: weakness, fall    Physician Order: IP Consult to Occupational Therapy  Reason for Therapy: ADL/IADL Dysfunction and Discharge Planning    OCCUPATIONAL THERAPY ASSESSMENT   Patient is a 63 year old female admitted 4/2/2024 for multiple falls with c/o LE weakness.  Prior to admission, patient's baseline is independent.  Patient is currently functioning below baseline with BADLs and functional mobility.  Patient is requiring maximum assist as a result of the following impairments: decreased functional strength, decreased endurance, impaired stand balance, cognitive deficits (problem solving, insight), medical status, and decreased insight to deficits. Occupational Therapy will continue to follow for duration of hospitalization.    Patient will benefit from continued skilled OT Services to facilitate return to prior level of function as patient demonstrates high motivation with excellent tolerance to an intensive therapy program     PLAN  OT Treatment Plan: Balance activities;ADL training;Functional transfer training;Endurance training;Cognitive reorientation;UE strengthening/ROM;Patient/Family education;Patient/Family training;Equipment eval/education;Compensatory technique education     OCCUPATIONAL THERAPY MEDICAL/SOCIAL HISTORY   Problem List  Principal Problem:    Fall, initial encounter  Active Problems:    Fall    Cerebellar hemorrhage (HCC)    Major depressive disorder, recurrent episode, moderate degree (HCC)    Alcohol dependence, continuous (HCC)    Alcohol withdrawal syndrome, uncomplicated (HCC)    HOME SITUATION     Stairs in Home: [address] 2 story house  Use of Assistive Device(s): none    Prior Level of [place]: Pt lives alone and is typically I without AD, retired    SUBJECTIVE  \"My feet keep sliding\"    OCCUPATIONAL THERAPY EXAMINATION     OBJECTIVE  Precautions: Bed/chair alarm  Fall Risk: High fall risk    PAIN ASSESSMENT  Ratin    ACTIVITY TOLERANCE   Fatigues quickly, no SOB    COGNITION  Able to self correct her initial incorrect response to Orientation questions.   Pt demo impaired attention to task, impaired problem solving, impaired insight    Behavioral/Emotional/Social: pleasantly confused    RANGE OF MOTION   Upper extremity ROM is within functional limits     STRENGTH ASSESSMENT  Upper extremity strength is within functional limits     COORDINATION  Fine Motor: impaired    ACTIVITIES OF DAILY LIVING ASSESSMENT  AM-PAC ‘6-Clicks’ Inpatient Daily Activity Short Form  How much help from another person does the patient currently need…  -   Putting on and taking off regular lower body clothing?: A Lot  -   Bathing (including washing, rinsing, drying)?: A Lot  -   Toileting, which includes using toilet, bedpan or urinal? : A Lot  -   Putting on and taking off regular upper body clothing?: A Lot  -   Taking care of personal grooming such as brushing teeth?: A Lot  -   Eating meals?: A Little    AM-PAC Score:  Score: 13  Approx Degree of Impairment: 63.03%  Standardized Score (AM-PAC Scale): 32.03  CMS Modifier (G-Code): CL    FUNCTIONAL TRANSFER ASSESSMENT  Sit to Stand: Edge of Bed; Chair  Edge of Bed: Minimal Assist  Chair: Minimal Assist    BED MOBILITY  Rolling: Contact Guard Assist  Supine to Sit : Minimal Assist  Sit to Supine (OT): Not Tested    BALANCE ASSESSMENT  Impaired static and dynamic stand balance    FUNCTIONAL ADL ASSESSMENT  Grooming: Min A form supported sitting  UE self care: Min A  LE self care: Mod A    Skilled Therapy Provided: Pt received in bed with multiple family members. Pt awake and alert. Pt benefits from increased time to perform tasks, process verbal information.    The patient's Approx Degree of Impairment: 63.03% has been calculated based on documentation in the [facility] '6 clicks' Inpatient Daily Activity  Short Form.  Research supports that patients with this level of impairment may benefit from Acute Rehab.  Final disposition will be made by interdisciplinary medical team.     Patient End of Session: Up in chair;Needs met;Call light within reach;RN aware of session/findings;All patient questions and concerns addressed;Alarm set    OT Goals  Patients self stated goal is: Get home\"     Patient will complete functional transfer with CGA  Comment:     Patient will complete toileting with CGA  Comment:     Patient will tolerate standing for 3 minutes in prep for adls with CGA   Comment:    Patient will complete UE/LE dressing with Min A 1x  Comment:          Goals  on: 24  Frequency: 5x/week    Patient Evaluation Complexity Level:   Occupational Profile/Medical History MODERATE - Expanded review of history including review of medical or therapy record   Specific performance deficits impacting engagement in ADL/IADL MODERATE  3 - 5 performance deficits   Client Assessment/Performance Deficits MODERATE - Comorbidities and min to mod modifications of tasks    Clinical Decision Making MODERATE - Analysis of occupational profile, detailed assessments, several treatment options    Overall Complexity MODERATE       Self-Care Home Management: 25 minutes

## 2024-04-04 NOTE — SLP NOTE
SPEECH DAILY NOTE - INPATIENT    ASSESSMENT & PLAN   ASSESSMENT    Proper PPE worn. Hands sanitized upon entrance/exit Pt room.       Pt alert, afebrile and on room air. Pt seen for swallow analysis per BSE recommendations (after consulting with RN). Family present. Pt agreed to participate. Pt's preferred method of learning verbal. Pt upright in bed; observed with current diet of bite size/thin liquids for monitoring diet tolerance. Swallowing precautions/strategies discussed; mild cues needed for execution. Functional bite reflex/mastication/lingual skills on bite size food.   No significant oral retention. Pharyngeal response appeared to trigger within 1-2 sec per hyolaryngeal elevation to completion (functional rise/strength per palpation). No overt CSA on bite size nor thin liquids (no coughing, no throat clearing, clear vocal quality and no SOB). Collaborated with RN regarding Pt's swallowing plan of care. No report of difficulty taking meds. No new CXR completed. Sp02 ~96% during this session. Call light within Pt's reach upon SLP discharge from room.      CXR 4/03/24:  CONCLUSION: Findings suspicious for acute nondisplaced fractures of the anterior right 3rd and 4th ribs.  Correlate for pain at this site.  No pneumothorax.  Otherwise no acute cardiopulmonary abnormality.     PLAN:    To f/u x2 sessions to ensure safe intake of diet and reinforce swallowing/aspiration precautions. To monitor for new CXR results. Diet upgrade as tolerated. VFSS IF appropriate. Family education to be completed/continued as available.       Diet Recommendations - Solids: Mechanical soft chopped/ Soft & Bite Sized  Diet Recommendations - Liquids: Thin Liquids    Compensatory Strategies Recommended: No straws;Slow rate;Alternate consistencies;Small bites and sips  Aspiration Precautions: Upright position;Slow rate;Small bites and sips;No straw  Medication Administration Recommendations: One pill at a time    Patient Experiencing  Pain: No  Treatment Plan  Treatment Plan/Recommendations: Aspiration precautions  Interdisciplinary Communication: Discussed with RN    GOALS  Goal #1 The patient will tolerate bite sized consistency and thin liquids without overt signs or symptoms of aspiration with 100 % accuracy over 2 session(s).    No CSA on current diet.        In Progress   Goal #2 The patient/family/caregiver will demonstrate understanding and implementation of aspiration precautions and swallow strategies independently over 2 session(s).    Swallowing precautions posted in room. Discussed with family; v/u.     In Progress   Goal #3 SLE pending MRI results.    MRI +. Refer to eval for full report.    GOAL MET      FOLLOW UP  Follow Up Needed (Documentation Required): Yes  SLP Follow-up Date: 04/05/24  Number of Visits to Meet Established Goals: 2    Session: 1    If you have any questions, please contact   Kellee Morrison M.S. CCC/SLP  Speech-Language Pathologist  [facility]  #[phone]

## 2024-04-04 NOTE — PLAN OF CARE
Patient A&Ox3- 4, forgetful. Neuros intact- NEVILLE, PERRLA, no deficits noted. Tolerating room air, no distress. VSS, NSR on monitor. Afebrile. Denies any pain. Up to chair with PT. Tolerating diet, no nausea or vomiting noted. Safety maintained. Labs monitored. To transfer to tele [facility]. See assessments. Will monitor.       Problem: Patient Centered Care  Goal: Patient preferences are identified and integrated in the patient's plan of care  Description: Interventions:  - What would you like us to know as we care for you? From home alone, just moved closer to kids  - Provide timely, complete, and accurate information to patient/family  - Incorporate patient and family knowledge, values, beliefs, and cultural backgrounds into the planning and delivery of care  - Encourage patient/family to participate in care and decision-making at the level they choose  - Honor patient and family perspectives and choices  Outcome: Progressing     Problem: Diabetes/Glucose Control  Goal: Glucose maintained within prescribed range  Description: INTERVENTIONS:  - Monitor Blood Glucose as ordered  - Assess for signs and symptoms of hyperglycemia and hypoglycemia  - Administer ordered medications to maintain glucose within target range  - Assess barriers to adequate nutritional intake and initiate nutrition consult as needed  - Instruct patient on self management of diabetes  Outcome: Progressing     Problem: Patient/Family Goals  Goal: Patient/Family Long Term Goal  Description: Patient's Long Term Goal: to return home    Interventions:  - PT/OT eval  -monitor labs  -monitor CIWA  -monitor neuro status    - See additional Care Plan goals for specific interventions  Outcome: Progressing  Goal: Patient/Family Short Term Goal  Description: Patient's Short Term Goal: improved strength    Interventions:   - PT/OT eval and treat  -monitor labs  -dietary consult    - See additional Care Plan goals for specific interventions  Outcome: Progressing      Problem: PAIN - ADULT  Goal: Verbalizes/displays adequate comfort level or patient's stated pain goal  Description: INTERVENTIONS:  - Encourage pt to monitor pain and request assistance  - Assess pain using appropriate pain scale  - Administer analgesics based on type and severity of pain and evaluate response  - Implement non-pharmacological measures as appropriate and evaluate response  - Consider cultural and social influences on pain and pain management  - Manage/alleviate anxiety  - Utilize distraction and/or relaxation techniques  - Monitor for opioid side effects  - Notify MD/LIP if interventions unsuccessful or patient reports new pain  - Anticipate increased pain with activity and pre-medicate as appropriate  Outcome: Progressing     Problem: SAFETY ADULT - FALL  Goal: Free from fall injury  Description: INTERVENTIONS:  - Assess pt frequently for physical needs  - Identify cognitive and physical deficits and behaviors that affect risk of falls.  - [place] fall precautions as indicated by assessment.  - Educate pt/family on patient safety including physical limitations  - Instruct pt to call for assistance with activity based on assessment  - Modify environment to reduce risk of injury  - Provide assistive devices as appropriate  - Consider OT/PT consult to assist with strengthening/mobility  - Encourage toileting schedule  Outcome: Progressing     Problem: DISCHARGE PLANNING  Goal: Discharge to home or other facility with appropriate resources  Description: INTERVENTIONS:  - Identify barriers to discharge w/pt and caregiver  - Include patient/family/discharge partner in discharge planning  - Arrange for needed discharge resources and transportation as appropriate  - Identify discharge learning needs (meds, wound care, etc)  - Arrange for interpreters to assist at discharge as needed  - Consider post-discharge preferences of patient/family/discharge partner  - Complete POLST form as appropriate  - Assess  patient's ability to be responsible for managing their own health  - Refer to Case Management Department for coordinating discharge planning if the patient needs post-hospital services based on physician/LIP order or complex needs related to functional status, cognitive ability or social support system  Outcome: Progressing     Problem: NEUROLOGICAL - ADULT  Goal: Achieves stable or improved neurological status  Description: INTERVENTIONS  - Assess for and report changes in neurological status  - Initiate measures to prevent increased intracranial pressure  - Maintain blood pressure and fluid volume within ordered parameters to optimize cerebral perfusion and minimize risk of hemorrhage  - Monitor temperature, glucose, and sodium. Initiate appropriate interventions as ordered  Outcome: Progressing  Goal: Absence of seizures  Description: INTERVENTIONS  - Monitor for seizure activity  - Administer anti-seizure medications as ordered  - Monitor neurological status  Outcome: Progressing  Goal: Remains free of injury related to seizure activity  Description: INTERVENTIONS:  - Maintain airway, patient safety  and administer oxygen as ordered  - Monitor patient for seizure activity, document and report duration and description of seizure to MD/LIP  - If seizure occurs, turn patient to side and suction secretions as needed  - Reorient patient post seizure  - Seizure pads on all 4 side rails  - Instruct patient/family to notify RN of any seizure activity  - Instruct patient/family to call for assistance with activity based on assessment  Outcome: Progressing  Goal: Achieves maximal functionality and self care  Description: INTERVENTIONS  - Monitor swallowing and airway patency with patient fatigue and changes in neurological status  - Encourage and assist patient to increase activity and self care with guidance from PT/OT  - Encourage visually impaired, hearing impaired and aphasic patients to use assistive/communication  devices  Outcome: Progressing     Problem: Impaired Functional Mobility  Goal: Achieve highest/safest level of mobility/gait  Description: Interventions:  - Assess patient's functional ability and stability  - Promote increasing activity/tolerance for mobility and gait  - Educate and engage patient/family in tolerated activity level and precautions  - Recommend use of total lift for transfers  Outcome: Progressing

## 2024-04-04 NOTE — PHYSICAL THERAPY NOTE
PHYSICAL THERAPY TREATMENT NOTE - INPATIENT     Room Number: 206/-A       Presenting Problem: LE weakness, R head trauma & s/p falls       Problem List  Principal Problem:    Fall, initial encounter  Active Problems:    Fall    Cerebellar hemorrhage (HCC)    Major depressive disorder, recurrent episode, moderate degree (HCC)    Alcohol dependence, continuous (HCC)    Alcohol withdrawal syndrome, uncomplicated (HCC)      PHYSICAL THERAPY ASSESSMENT   Patient demonstrates limited progress this session, goals  remain in progress.    Patient continues to function near baseline with bed mobility, transfers, and gait.  Contributing factors to remaining limitations include decreased functional strength, impaired ataxic balance, and coordination .  Next session anticipate patient to progress transfers and gait.  Physical Therapy will continue to follow patient for duration of hospitalization.    Patient continues to benefit from continued skilled PT services: to facilitate return to prior level of function as patient demonstrates high motivation with excellent tolerance to an intensive therapy program .    PLAN  PT Treatment Plan: Bed mobility;Patient education;Family education;Gait training;Transfer training  Frequency (Obs): 5x/week    SUBJECTIVE  \"It feels wobbly\"    OBJECTIVE  Precautions: Bed/chair alarm    PAIN ASSESSMENT   Ratin          BALANCE  Static Sitting: Good  Dynamic Sitting: Good  Static Standing: Fair -  Dynamic Standing: Fair -      AM-PAC '6-Clicks' INPATIENT SHORT FORM - BASIC MOBILITY  How much difficulty does the patient currently have...  Patient Difficulty: Turning over in bed (including adjusting bedclothes, sheets and blankets)?: A Lot   Patient Difficulty: Sitting down on and standing up from a chair with arms (e.g., wheelchair, bedside commode, etc.): A Lot   Patient Difficulty: Moving from lying on back to sitting on the side of the bed?: A Lot   How much help from another person does  the patient currently need...   Help from Another: Moving to and from a bed to a chair (including a wheelchair)?: A Lot   Help from Another: Need to walk in hospital room?: A Lot   Help from Another: Climbing 3-5 steps with a railing?: Total     AM-PAC Score:  Raw Score: 11   Approx Degree of Impairment: 72.57%   Standardized Score (AM-PAC Scale): 33.86   CMS Modifier (G-Code): CL    FUNCTIONAL ABILITY STATUS  Functional Mobility/Gait Assessment  Gait Assistance: Moderate assistance  Distance (ft): 8  Assistive Device: Rolling walker  Rolling: moderate assist  Supine to Sit: moderate assist  Sit to Supine: moderate assist  Sit to Stand: moderate assist      The patient's Approx Degree of Impairment: 72.57% has been calculated based on documentation in the [facility] '6 clicks' Inpatient Daily Activity Short Form.  Research supports that patients with this level of impairment may benefit from IR.  Final disposition will be made by interdisciplinary medical team.    THERAPEUTIC EXERCISES  Lower Extremity Alternating marching  Ankle pumps  LAQ     Position Sitting  10xea     Patient End of Session: Up in chair    CURRENT GOALS   Goals to be met by: 4/17  Patient Goal Patient's self-stated goal is: walk   Goal #1 Patient is able to demonstrate supine - sit EOB @ level: minimum assistance     Goal #1   Current Status Goal partially met mod A   Goal #2 Patient is able to demonstrate transfers Sit to/from Stand at assistance level: minimum assistance      Goal #2  Current Status Goal partially met mod A   Goal #3 Patient is able to ambulate 50 feet with assist device: RW at assistance level: supervision   Goal #3   Current Status Goal partially met, Mod A   Goal #4 Patient will negotiate  stairs/one curb w/ assistive device and supervision   Goal #4   Current Status NT   Goal #5 Patient to demonstrate independence with home activity/exercise instructions provided to patient in preparation for discharge.   Goal #5   Current  Status NT   Goal #6    Goal #6  Current Status      Therex: 8 minutes  Gait: 8 minutes  Theractivity: 10 minutes

## 2024-04-04 NOTE — PLAN OF CARE
Pt A/O x4. Q4 neuro checks complete w/ no new deficits noted. CIWA per protocol. Meds given, see eMAR. Safety maintained. Hourly nursing rounds made. Plan of care ongoing.     Problem: Patient Centered Care  Goal: Patient preferences are identified and integrated in the patient's plan of care  Description: Interventions:  - What would you like us to know as we care for you?   - Provide timely, complete, and accurate information to patient/family  - Incorporate patient and family knowledge, values, beliefs, and cultural backgrounds into the planning and delivery of care  - Encourage patient/family to participate in care and decision-making at the level they choose  - Honor patient and family perspectives and choices  Outcome: Progressing     Problem: Diabetes/Glucose Control  Goal: Glucose maintained within prescribed range  Description: INTERVENTIONS:  - Monitor Blood Glucose as ordered  - Assess for signs and symptoms of hyperglycemia and hypoglycemia  - Administer ordered medications to maintain glucose within target range  - Assess barriers to adequate nutritional intake and initiate nutrition consult as needed  - Instruct patient on self management of diabetes  Outcome: Progressing     Problem: PAIN - ADULT  Goal: Verbalizes/displays adequate comfort level or patient's stated pain goal  Description: INTERVENTIONS:  - Encourage pt to monitor pain and request assistance  - Assess pain using appropriate pain scale  - Administer analgesics based on type and severity of pain and evaluate response  - Implement non-pharmacological measures as appropriate and evaluate response  - Consider cultural and social influences on pain and pain management  - Manage/alleviate anxiety  - Utilize distraction and/or relaxation techniques  - Monitor for opioid side effects  - Notify MD/LIP if interventions unsuccessful or patient reports new pain  - Anticipate increased pain with activity and pre-medicate as appropriate  Outcome:  Progressing     Problem: SAFETY ADULT - FALL  Goal: Free from fall injury  Description: INTERVENTIONS:  - Assess pt frequently for physical needs  - Identify cognitive and physical deficits and behaviors that affect risk of falls.  - [place] fall precautions as indicated by assessment.  - Educate pt/family on patient safety including physical limitations  - Instruct pt to call for assistance with activity based on assessment  - Modify environment to reduce risk of injury  - Provide assistive devices as appropriate  - Consider OT/PT consult to assist with strengthening/mobility  - Encourage toileting schedule  Outcome: Progressing     Problem: DISCHARGE PLANNING  Goal: Discharge to home or other facility with appropriate resources  Description: INTERVENTIONS:  - Identify barriers to discharge w/pt and caregiver  - Include patient/family/discharge partner in discharge planning  - Arrange for needed discharge resources and transportation as appropriate  - Identify discharge learning needs (meds, wound care, etc)  - Arrange for interpreters to assist at discharge as needed  - Consider post-discharge preferences of patient/family/discharge partner  - Complete POLST form as appropriate  - Assess patient's ability to be responsible for managing their own health  - Refer to Case Management Department for coordinating discharge planning if the patient needs post-hospital services based on physician/LIP order or complex needs related to functional status, cognitive ability or social support system  Outcome: Progressing     Problem: NEUROLOGICAL - ADULT  Goal: Achieves stable or improved neurological status  Description: INTERVENTIONS  - Assess for and report changes in neurological status  - Initiate measures to prevent increased intracranial pressure  - Maintain blood pressure and fluid volume within ordered parameters to optimize cerebral perfusion and minimize risk of hemorrhage  - Monitor temperature, glucose, and  sodium. Initiate appropriate interventions as ordered  Outcome: Progressing  Goal: Absence of seizures  Description: INTERVENTIONS  - Monitor for seizure activity  - Administer anti-seizure medications as ordered  - Monitor neurological status  Outcome: Progressing  Goal: Remains free of injury related to seizure activity  Description: INTERVENTIONS:  - Maintain airway, patient safety  and administer oxygen as ordered  - Monitor patient for seizure activity, document and report duration and description of seizure to MD/LIP  - If seizure occurs, turn patient to side and suction secretions as needed  - Reorient patient post seizure  - Seizure pads on all 4 side rails  - Instruct patient/family to notify RN of any seizure activity  - Instruct patient/family to call for assistance with activity based on assessment  Outcome: Progressing  Goal: Achieves maximal functionality and self care  Description: INTERVENTIONS  - Monitor swallowing and airway patency with patient fatigue and changes in neurological status  - Encourage and assist patient to increase activity and self care with guidance from PT/OT  - Encourage visually impaired, hearing impaired and aphasic patients to use assistive/communication devices  Outcome: Progressing     Problem: Impaired Functional Mobility  Goal: Achieve highest/safest level of mobility/gait  Description: Interventions:  - Assess patient's functional ability and stability  - Promote increasing activity/tolerance for mobility and gait  - Educate and engage patient/family in tolerated activity level and precautions  Outcome: Progressing

## 2024-04-04 NOTE — CM/SW NOTE
Anticipated therapy need: Intensive Therapy Program    PMR order was placed earlier with Dr. Dean.    CM to follow-up on results of PMR.  Speech noted cog deficits are more chronic in nature due to use of ETOH. Patient lives home alone.    Meg ARAUJOA BSN RN CRRN   RN Case Manager  [phone]

## 2024-04-04 NOTE — PROGRESS NOTES
[facility]  Progress Note     Christi Tavarez  : 1960    Status: Inpatient  Day #: 1    Attending: Alvaro Arvizu MD  PCP: ÁNGEL Delgado     Assessment and Plan:    Generalized weakness  Recurrent falls  Moderate acute to subacute inferior right cerebellar hematoma vs infarct vs mass with hemorrhage  -MRI brain results reviewed - rec repeat MRI 3 months  -neuro consult appreciated  -Neurochecks  -TSH/B12/Folate ordered  -PT/OT eval     Hematemesis  -resolved  -GI consult appreciated  -monitor hgb  -cont PPI  -holding off on endoscopy for now     Current alcohol use  -Last alcohol use 2 days prior to presentation  -CIWA scores low. Ativan prn.     Possible Acute UTI  -no culture sent unfortunately. UA without pyuria  -stop ceftriaxone IV and monitor    Acute kidney injury - resolved  Mild hyponatremia - resolved  Hypochloremia- resolved  Metabolic acidosis - resolved  -Suspecting secondary to decreased oral intake as well as worsened by alcohol use.  -cont IVF with lower UOP     Thrombocytopenia  -Suspecting secondary to alcohol use  -downtrend likely dilutional     NIDDM type II  -Patient stopped taking metformin months ago.  -Last hemoglobin A1c  5.5  -ISS  -monitor BG     H/o hyperlipidemia  -Patient stopped taking statin months ago.     H/o anxiety  -cont lexapro     DVT Mechanical Prophylaxis:   SCDs,    DVT Pharmacologic Prophylaxis   Medication   None               Subjective:      Initial Chief Complaint:  weakness    Feels better. Still unsteady and weak when walking. A little dizzy when getting up. No CP, no SOB.       Objective:      Temp:  [97 °F (36.1 °C)-97.9 °F (36.6 °C)] 97.6 °F (36.4 °C)  Pulse:  [] 92  Resp:  [13-19] 15  BP: ()/(55-83) 114/75  SpO2:  [92 %-98 %] 97 %  General:  Alert, no distress  HEENT:  Normocephalic, atraumatic  Cardiac:  Regular rate, regular rhythm  Pulmonary:  Clear to auscultation bilaterally, respirations unlabored  Gastrointestinal:  Soft,  non-tender, normal bowel sounds  Musculoskeletal:  No joint swelling  Extremities:  No edema, no cyanosis, no clubbing  Neurologic:  nonfocal  Psychiatric:  Normal affect, calm and appropriate    Intake/Output Summary (Last 24 hours) at 4/4/2024 1415  Last data filed at 4/4/2024 0800  Gross per 24 hour   Intake 2206.4 ml   Output 700 ml   Net 1506.4 ml         Recent Labs   Lab 04/03/24  0023 04/03/24  0638 04/03/24  1309 04/04/24  0907   WBC 13.8* 12.6*  --  8.7   HGB 12.0 10.0* 10.2* 10.1*   HCT 32.5* 27.3*  --  27.9*   .0* 94.0*  --  83.0*   RBC 3.03* 2.64*  --  2.62*   .3* 103.4*  --  106.5*   MCH 39.6* 37.9*  --  38.5*   MCHC 36.9 36.6  --  36.2   RDW 15.9* 15.9*  --  15.8*   NEPRELIM 11.08* 8.86*  --  6.42     Recent Labs   Lab 04/03/24  0023 04/03/24  0539 04/03/24  1309 04/03/24  1434 04/04/24  0906 04/04/24  0907   BUN 13  --   --   --   --  8*   CREATSERUM 1.10*  --   --   --   --  0.65   CA 8.3*  --   --   --   --  7.7*   ALB 2.8*  --   --   --   --  2.2*   *  --   --   --   --  136   K 3.9  --   --   --   --  3.5   CL 97*  --   --   --   --  105   CO2 20.0*  --   --   --   --  26.0   *  --   --   --   --  195*   MG 1.6  --   --   --   --  2.0   BILT 5.9*  --   --   --   --  6.7*   *  --   --   --   --  95*   ALT 47  --   --   --   --  33   ALKPHO 248*  --   --   --   --  191*   TP 8.3*  --   --   --   --  6.7   INR  --   --  3.02*  --   --  2.68*   TSH 8.140*  --   --   --   --   --    T4F 1.0  --   --   --   --   --    CK  --  138  --   --   --   --    B12  --   --   --  >2,000* >2,000*  --    ETOH  --  <3  --   --   --   --        MRI BRAIN (W+WO) (CPT=70553)    Result Date: 4/3/2024  CONCLUSION: 3 x 3 x 2 cm located focus within the right cerebellum, with characterize most compatible with a subacute hematoma.  However, because there is patient motion and given the current 10 City of T1 signal changes, there is potential this finding obscures a very tiny lesion.  Exam  is also limited by patient motion.  Therefore recommend follow-up in approximately 3 months to assess for changes.  Dictated by (CST): Rob Duvall MD on 4/03/2024 at 11:12 AM     Finalized by (CST): Rob Duvall MD on 4/03/2024 at 12:19 PM          US LIVER (CPT=76705)    Result Date: 4/3/2024  CONCLUSION:   Hepatic steatosis.  Mild right upper quadrant ascites.  Cholelithiasis and gallbladder sludge with mild circumferential gallbladder wall thickening.  Positive sonographic Pretty sign.  Findings may relate to acute cholecystitis although gallbladder wall thickening can be seen in patients with hepatocellular disease and or ascites.  Advise clinical follow-up and consider evaluation with nuclear medicine hepatobiliary scan.     Dictated by (CST): Henrry Garduno MD on 4/03/2024 at 11:51 AM     Finalized by (CST): Henrry Garduno MD on 4/03/2024 at 11:54 AM          XR FOREARM (2 VIEWS), LEFT (CPT=73090)    Result Date: 4/3/2024  CONCLUSION: No acute osseous abnormality of the left forearm.  [facility] provided a preliminary report for this examination. This final report agrees with their preliminary findings.   Dictated by (CST): Rob Duvall MD on 4/03/2024 at 10:02 AM     Finalized by (CST): Rob Duvall MD on 4/03/2024 at 10:02 AM          XR ELBOW, COMPLETE (MIN 3 VIEWS), RIGHT (CPT=73080)    Result Date: 4/3/2024  CONCLUSION: No acute osseous abnormality of the right elbow.  [facility] provided a preliminary report for this examination. This final report agrees with their preliminary findings.   Dictated by (CST): Rob Duvall MD on 4/03/2024 at 9:28 AM     Finalized by (CST): Rob Duvall MD on 4/03/2024 at 9:29 AM          XR CHEST AP PORTABLE  (CPT=71045)    Result Date: 4/3/2024  CONCLUSION: Findings suspicious for acute nondisplaced fractures of the anterior right 3rd and 4th ribs.  Correlate for pain at this site.  No pneumothorax.  Otherwise no acute cardiopulmonary abnormality.    [facility] provided a preliminary report for this examination. This final report agrees with their preliminary findings.   Dictated by (CST): Rob Duvall MD on 4/03/2024 at 9:26 AM     Finalized by (CST): Rob Duvall MD on 4/03/2024 at 9:28 AM          XR KNEE (1 OR 2 VIEWS), LEFT (CPT=73560)    Result Date: 4/3/2024  CONCLUSION: No acute osseous abnormality of the left knee.  [facility] provided a preliminary report for this examination. This final report agrees with their preliminary findings.   Dictated by (CST): Rob Duvall MD on 4/03/2024 at 9:25 AM     Finalized by (CST): Rob Duvall MD on 4/03/2024 at 9:26 AM          XR KNEE (1 OR 2 VIEWS), RIGHT (CPT=73560)    Result Date: 4/3/2024  CONCLUSION: Soft tissue inflammation ventral to the patella and patellar tendon suggesting soft tissue injury.  No acute osseous abnormality of the right knee.  [facility] provided a preliminary report for this examination. This final report agrees with their preliminary findings.    Dictated by (CST): Rob Duvall MD on 4/03/2024 at 9:24 AM     Finalized by (CST): Rob Duvall MD on 4/03/2024 at 9:25 AM          XR PELVIS (1 VIEW) (CPT=72170)    Result Date: 4/3/2024  CONCLUSION:  1.  Hypodense focus within the right femoral neck which could represent a bone cyst or lytic lesion.  Consider bone scan or other follow up imaging. 2.  Mild degenerative change in the lower lumbar spine.   [facility] provided a preliminary report for this examination. This final report agrees with their preliminary findings.   Dictated by (CST): Rob Duvall MD on 4/03/2024 at 9:23 AM     Finalized by (CST): Rob Duvall MD on 4/03/2024 at 9:24 AM          XR FEMUR MIN 2 VIEWS RIGHT (CPT=73552)    Result Date: 4/3/2024  CONCLUSION: Subcentimeter ovoid lucencies within the right femoral neck which could represent bone cysts or lytic foci.  This could be further assessed with bone scan.   [facility] provided a  preliminary report for this examination. This final report agrees with their preliminary findings.   Dictated by (CST): Rob Duvall MD on 4/03/2024 at 9:21 AM     Finalized by (CST): Rob Duvall MD on 4/03/2024 at 9:22 AM          CT BRAIN OR HEAD (37876)    Result Date: 4/3/2024  CONCLUSION: 3 x 1 x 2 cm for ovoid soft tissue density in the right cerebellar vermis and adjacent hemisphere with surrounding edema.  It could represent an acute/subacute/recent site of hemorrhage/hematoma versus hemorrhagic lesion (such as primary malignancy versus metastatic focus).  Recommend further characterization with MRI brain with without contrast.   [facility] provided a preliminary report for this examination. This final report agrees with their preliminary findings.   Dictated by (CST): Rob Duvall MD on 4/03/2024 at 8:43 AM     Finalized by (CST): Rob Duvall MD on 4/03/2024 at 8:50 AM         EKG    Result Date: 4/3/2024  Sinus tachycardia Septal infarct , age undetermined Abnormal ECG No previous ECGs found in [facility] Confirmed by CARO HENRIQUEZ MICHAEL ([id number]) on 4/3/2024 4:05:58 PM   Medications:   pantoprazole  40 mg Intravenous Q12H    cefTRIAXone  1 g Intravenous Q24H    insulin aspart  1-5 Units Subcutaneous TID CC and HS    thiamine  100 mg Oral Daily    multivitamin with minerals  1 tablet Oral Daily    folic acid  1 mg Oral Daily    chlordiazePOXIDE  5 mg Oral BID    lamoTRIgine  25 mg Oral BID    escitalopram  10 mg Oral Nightly    phytonadione  10 mg Oral Daily      PRN Meds: polyethylene glycol (PEG 3350), sennosides, bisacodyl, fleet enema, ondansetron, prochlorperazine, glucose **OR** glucose **OR** glucose-vitamin C **OR** dextrose **OR** glucose **OR** glucose **OR** glucose-vitamin C, LORazepam **OR** LORazepam, LORazepam **OR** LORazepam, LORazepam **OR** LORazepam, LORazepam, LORazepam, LORazepam, temazepam    Supplementary Documentation:        MDM High. Time spent on chart/note review, review of  labs/imaging, discussion with patient, physical exam, discussion with staff, consultants, coordinating care, writing progress note, discussion of plan of care.      Alvaro Arvizu MD

## 2024-04-04 NOTE — SLP NOTE
SPEECH/LANGUAGE/COGNITIVE EVALUATION - INPATIENT    Admission Date: 4/2/2024  Evaluation Date: 04/04/24    Reason for Referral: Stroke protocol;R/O aspiration    ASSESSMENT & PLAN     Pt admitted 4/02/24 d/t fall with subsequent subacute hematoma. PMH includes This Pt DM, anxiety, alcohol use. Pt resides at home alone; family involved. Pt alert, afebrile, on room air and assessed sitting upright in bed. Family present. Pt agreed to participate. Family present.     Brain MRI 4/03/24:   CONCLUSION: 3 x 3 x 2 cm located focus within the right cerebellum, with characterize most compatible with a subacute hematoma.  However, because there is patient motion and given the current 10 City of T1 signal changes, there is potential this finding obscures a very tiny lesion.  Exam is also limited by patient motion.  Therefore recommend follow-up in approximately 3 months to assess for changes.           The SLUMs was administered to d/t report of reduced cognitive skills. Pt achieved a score of     /30 which indicate presence of xxx cogntive deficits. Results were as follows:                  Orientation                                3/3              Delayed recall                           2/5              Mental calculation                     0/3              Word retrieval                           1/3              Attention/digit recall                  2/2              Clock drawing                           0/4              Auditory commands                 1/2              Paragraph comprehension       8/8                   Total Score                              17/30    Pt with fluctuating performance throughout exam with intermittent accurate responses to \"I don't know\"; \"that's enough after naming three animals during word-retrieval task. Significant errors during mental math task. Pt was able to maintain adequate attention during paragraph comprehension; however, unable to follow one of two simple commands.      IMPRESSIONS:    Pt presents with mild to moderate cognitive deficits. SLUMS results/rec discussed with Pt and family. Family v/u. Pt required reinforcement.     PLAN:    Rec cog-com tx at next LOC d/t cognitive deficits not attributed to acute change but to long-standing hx of alcohol use; discussed with RN.                 Patient Experiencing Pain: No      Prior Living Situation: Home alone  Prior Level of Function: Independent      Patient/Family Goals: to go home    Interdisciplinary Communication: Discussed with RN    Patient, family and/or caregiver has been informed and has taken part in this evaluation and plan of treatment and have been advised and agree on the findings and goals.      FOLLOW UP  Treatment Plan/Recommendations: Aspiration precautions  Number of Visits to Meet Established Goals: 2 for dysphagia  Follow Up Needed (Documentation Required): Yes next LOC for cog-com tx  SLP Follow-up Date: 04/05/24    Thank you for your referral.  If you have any questions please contact   Kellee Morrison M.S. CCC/SLP  Speech-Language Pathologist  [facility]  #[id number]

## 2024-04-04 NOTE — PROGRESS NOTES
[facility]     Gastroenterology Progress Note    Christi Tavarez Patient Status:  Inpatient    1960 MRN [id number]   Location [facility] 2W/SW Attending Alvaro Arvizu MD   Hosp Day # 1 PCP ÁNGEL Delgado       Subjective:   Says she feels better today. No abd pain, n/v. No bm. No vomiting.     Objective:   Blood pressure 119/69, pulse 77, temperature 97 °F (36.1 °C), temperature source Temporal, resp. rate 14, height 4' 10\" (1.473 m), weight 142 lb 3.2 oz (64.5 kg), SpO2 94%. Body mass index is 29.72 kg/m².    General: awake, alert and oriented, no acute distress  HEENT: moist mucus membranes  PULM: no conversational dyspnea  CARDIOVASCULAR: regular rate and rhythm, the extremities are warm and well perfused  GI: soft, non-tender, non-distended, + BS, no rebound/guarding   EXTREMITIES: no edema, moving all extremities  SKIN: no visible rash  NEURO: appropriate and interactive    Assessment and Plan:   63F with long standing ETOH use who presented after two weeks of generalized weakness and recurrent falls, found to have acute ICH. GI consulted for hematemesis and elevated LFTs.    # Hematemesis, self limited and resolved  - likely emetogenic injury such as esophagitis vs self limited margie burt  - hgb stable at 10.1  - continue ppi bid  - ok to advance to general diet   - reserve endoscopic evaluation to recurrent episodes or significant drop in hgb     # Acute alcohol hepatitis in setting of alcohol induced steatohepatitis, alcohol ketoacidosis.   - Vitamin K 10mg PO daily x 3 days for malnutrition.   - Daily LFTs and INR.   - Infectious work-up given mildly elevated leukocytosis.   - Nutrition is paramount.   - Considerations for steroids for treatment of acute alcohol hepatitis pending clinical course.         Leonela Castellon MD  [facility]      Results:     Lab Results   Component Value Date    WBC 8.7 2024    HGB 10.1 (L) 2024    HCT 27.9 (L)  04/04/2024    PLT 83.0 (L) 04/04/2024    CREATSERUM 0.65 04/04/2024    BUN 8 (L) 04/04/2024     04/04/2024    K 3.5 04/04/2024     04/04/2024    CO2 26.0 04/04/2024     (H) 04/04/2024    CA 7.7 (L) 04/04/2024    ALB 2.2 (L) 04/04/2024    ALKPHO 191 (H) 04/04/2024    BILT 6.7 (H) 04/04/2024    TP 6.7 04/04/2024    AST 95 (H) 04/04/2024    ALT 33 04/04/2024    INR 2.68 (H) 04/04/2024    T4F 1.0 04/03/2024    TSH 8.140 (H) 04/03/2024    MG 2.0 04/04/2024     04/03/2024    B12 >2,000 (H) 04/03/2024    ETOH <3 04/03/2024       MRI BRAIN (W+WO) (CPT=70553)    Result Date: 4/3/2024  CONCLUSION: 3 x 3 x 2 cm located focus within the right cerebellum, with characterize most compatible with a subacute hematoma.  However, because there is patient motion and given the current 10 City of T1 signal changes, there is potential this finding obscures a very tiny lesion.  Exam is also limited by patient motion.  Therefore recommend follow-up in approximately 3 months to assess for changes.  Dictated by (CST): Rob Duvall MD on 4/03/2024 at 11:12 AM     Finalized by (CST): Rob Duvall MD on 4/03/2024 at 12:19 PM          US LIVER (CPT=76705)    Result Date: 4/3/2024  CONCLUSION:   Hepatic steatosis.  Mild right upper quadrant ascites.  Cholelithiasis and gallbladder sludge with mild circumferential gallbladder wall thickening.  Positive sonographic Pretty sign.  Findings may relate to acute cholecystitis although gallbladder wall thickening can be seen in patients with hepatocellular disease and or ascites.  Advise clinical follow-up and consider evaluation with nuclear medicine hepatobiliary scan.     Dictated by (CST): Henrry Garduno MD on 4/03/2024 at 11:51 AM     Finalized by (CST): Henrry Garduno MD on 4/03/2024 at 11:54 AM          XR FOREARM (2 VIEWS), LEFT (CPT=73090)    Result Date: 4/3/2024  CONCLUSION: No acute osseous abnormality of the left forearm.  [facility] provided a preliminary  report for this examination. This final report agrees with their preliminary findings.   Dictated by (CST): Rob Duvall MD on 4/03/2024 at 10:02 AM     Finalized by (CST): Rob Duvall MD on 4/03/2024 at 10:02 AM          XR ELBOW, COMPLETE (MIN 3 VIEWS), RIGHT (CPT=73080)    Result Date: 4/3/2024  CONCLUSION: No acute osseous abnormality of the right elbow.  [facility] provided a preliminary report for this examination. This final report agrees with their preliminary findings.   Dictated by (CST): Rob Duvall MD on 4/03/2024 at 9:28 AM     Finalized by (CST): Rob Duvall MD on 4/03/2024 at 9:29 AM          XR CHEST AP PORTABLE  (CPT=71045)    Result Date: 4/3/2024  CONCLUSION: Findings suspicious for acute nondisplaced fractures of the anterior right 3rd and 4th ribs.  Correlate for pain at this site.  No pneumothorax.  Otherwise no acute cardiopulmonary abnormality.   [facility] provided a preliminary report for this examination. This final report agrees with their preliminary findings.   Dictated by (CST): Rob Duvall MD on 4/03/2024 at 9:26 AM     Finalized by (CST): Rob Duvall MD on 4/03/2024 at 9:28 AM          XR KNEE (1 OR 2 VIEWS), LEFT (CPT=73560)    Result Date: 4/3/2024  CONCLUSION: No acute osseous abnormality of the left knee.  [facility] provided a preliminary report for this examination. This final report agrees with their preliminary findings.   Dictated by (CST): Rob Duvall MD on 4/03/2024 at 9:25 AM     Finalized by (CST): Rob Duvall MD on 4/03/2024 at 9:26 AM          XR KNEE (1 OR 2 VIEWS), RIGHT (CPT=73560)    Result Date: 4/3/2024  CONCLUSION: Soft tissue inflammation ventral to the patella and patellar tendon suggesting soft tissue injury.  No acute osseous abnormality of the right knee.  [facility] provided a preliminary report for this examination. This final report agrees with their preliminary findings.    Dictated by (CST): Rob Duvall MD on 4/03/2024  at 9:24 AM     Finalized by (CST): Rob Duvall MD on 4/03/2024 at 9:25 AM          XR PELVIS (1 VIEW) (CPT=72170)    Result Date: 4/3/2024  CONCLUSION:  1.  Hypodense focus within the right femoral neck which could represent a bone cyst or lytic lesion.  Consider bone scan or other follow up imaging. 2.  Mild degenerative change in the lower lumbar spine.   [facility] provided a preliminary report for this examination. This final report agrees with their preliminary findings.   Dictated by (CST): Rob Duvall MD on 4/03/2024 at 9:23 AM     Finalized by (CST): Rob Duvall MD on 4/03/2024 at 9:24 AM          XR FEMUR MIN 2 VIEWS RIGHT (CPT=73552)    Result Date: 4/3/2024  CONCLUSION: Subcentimeter ovoid lucencies within the right femoral neck which could represent bone cysts or lytic foci.  This could be further assessed with bone scan.   [facility] provided a preliminary report for this examination. This final report agrees with their preliminary findings.   Dictated by (CST): Rob Duvall MD on 4/03/2024 at 9:21 AM     Finalized by (CST): Rob Duvall MD on 4/03/2024 at 9:22 AM          CT BRAIN OR HEAD (31270)    Result Date: 4/3/2024  CONCLUSION: 3 x 1 x 2 cm for ovoid soft tissue density in the right cerebellar vermis and adjacent hemisphere with surrounding edema.  It could represent an acute/subacute/recent site of hemorrhage/hematoma versus hemorrhagic lesion (such as primary malignancy versus metastatic focus).  Recommend further characterization with MRI brain with without contrast.   [facility] provided a preliminary report for this examination. This final report agrees with their preliminary findings.   Dictated by (CST): Rob Duvall MD on 4/03/2024 at 8:43 AM     Finalized by (CST): Rob Duvall MD on 4/03/2024 at 8:50 AM         EKG    Result Date: 4/3/2024  Sinus tachycardia Septal infarct , age undetermined Abnormal ECG No previous ECGs found in [facility] Confirmed by CARO HENRIQUEZ,  PRICILA ([id number]) on 4/3/2024 4:05:58 PM

## 2024-04-05 LAB
ALBUMIN SERPL-MCNC: 2.1 G/DL (ref 3.2–4.8)
ALP LIVER SERPL-CCNC: 186 U/L
ALT SERPL-CCNC: 36 U/L
ANION GAP SERPL CALC-SCNC: 3 MMOL/L (ref 0–18)
AST SERPL-CCNC: 71 U/L (ref ?–34)
BASOPHILS # BLD AUTO: 0.02 X10(3) UL (ref 0–0.2)
BASOPHILS NFR BLD AUTO: 0.2 %
BILIRUB DIRECT SERPL-MCNC: 3.6 MG/DL (ref ?–0.3)
BILIRUB SERPL-MCNC: 5.7 MG/DL (ref 0.2–1.1)
BUN BLD-MCNC: 7 MG/DL (ref 9–23)
BUN/CREAT SERPL: 14 (ref 10–20)
CALCIUM BLD-MCNC: 7.6 MG/DL (ref 8.7–10.4)
CHLORIDE SERPL-SCNC: 108 MMOL/L (ref 98–112)
CO2 SERPL-SCNC: 26 MMOL/L (ref 21–32)
CREAT BLD-MCNC: 0.5 MG/DL
DEPRECATED RDW RBC AUTO: 61.1 FL (ref 35.1–46.3)
EGFRCR SERPLBLD CKD-EPI 2021: 105 ML/MIN/1.73M2 (ref 60–?)
EOSINOPHIL # BLD AUTO: 0.12 X10(3) UL (ref 0–0.7)
EOSINOPHIL NFR BLD AUTO: 1.5 %
ERYTHROCYTE [DISTWIDTH] IN BLOOD BY AUTOMATED COUNT: 15.8 % (ref 11–15)
GLUCOSE BLD-MCNC: 86 MG/DL (ref 70–99)
GLUCOSE BLDC GLUCOMTR-MCNC: 106 MG/DL (ref 70–99)
GLUCOSE BLDC GLUCOMTR-MCNC: 135 MG/DL (ref 70–99)
GLUCOSE BLDC GLUCOMTR-MCNC: 139 MG/DL (ref 70–99)
GLUCOSE BLDC GLUCOMTR-MCNC: 147 MG/DL (ref 70–99)
GLUCOSE BLDC GLUCOMTR-MCNC: 68 MG/DL (ref 70–99)
GLUCOSE BLDC GLUCOMTR-MCNC: 75 MG/DL (ref 70–99)
GLUCOSE BLDC GLUCOMTR-MCNC: 99 MG/DL (ref 70–99)
HCT VFR BLD AUTO: 27.2 %
HGB BLD-MCNC: 9.9 G/DL
IMM GRANULOCYTES # BLD AUTO: 0.02 X10(3) UL (ref 0–1)
IMM GRANULOCYTES NFR BLD: 0.2 %
INR BLD: 2.2 (ref 0.8–1.2)
LYMPHOCYTES # BLD AUTO: 2.09 X10(3) UL (ref 1–4)
LYMPHOCYTES NFR BLD AUTO: 25.9 %
MCH RBC QN AUTO: 38.1 PG (ref 26–34)
MCHC RBC AUTO-ENTMCNC: 36.4 G/DL (ref 31–37)
MCV RBC AUTO: 104.6 FL
MONOCYTES # BLD AUTO: 0.72 X10(3) UL (ref 0.1–1)
MONOCYTES NFR BLD AUTO: 8.9 %
NEUTROPHILS # BLD AUTO: 5.09 X10 (3) UL (ref 1.5–7.7)
NEUTROPHILS # BLD AUTO: 5.09 X10(3) UL (ref 1.5–7.7)
NEUTROPHILS NFR BLD AUTO: 63.3 %
OSMOLALITY SERPL CALC.SUM OF ELEC: 281 MOSM/KG (ref 275–295)
PLATELET # BLD AUTO: 78 10(3)UL (ref 150–450)
PLATELETS.RETICULATED NFR BLD AUTO: 7.4 % (ref 0–7)
POTASSIUM SERPL-SCNC: 3.4 MMOL/L (ref 3.5–5.1)
PROT SERPL-MCNC: 6.3 G/DL (ref 5.7–8.2)
PROTHROMBIN TIME: 25.8 SECONDS (ref 11.6–14.8)
RBC # BLD AUTO: 2.6 X10(6)UL
SODIUM SERPL-SCNC: 137 MMOL/L (ref 136–145)
WBC # BLD AUTO: 8.1 X10(3) UL (ref 4–11)

## 2024-04-05 PROCEDURE — 99233 SBSQ HOSP IP/OBS HIGH 50: CPT | Performed by: OTHER

## 2024-04-05 PROCEDURE — 99233 SBSQ HOSP IP/OBS HIGH 50: CPT | Performed by: INTERNAL MEDICINE

## 2024-04-05 PROCEDURE — 99233 SBSQ HOSP IP/OBS HIGH 50: CPT | Performed by: HOSPITALIST

## 2024-04-05 RX ORDER — POTASSIUM CHLORIDE 1.5 G/1.58G
40 POWDER, FOR SOLUTION ORAL ONCE
Status: COMPLETED | OUTPATIENT
Start: 2024-04-05 | End: 2024-04-05

## 2024-04-05 RX ORDER — TRAZODONE HYDROCHLORIDE 50 MG/1
25 TABLET ORAL NIGHTLY
Status: DISCONTINUED | OUTPATIENT
Start: 2024-04-05 | End: 2024-04-09

## 2024-04-05 RX ORDER — TAMSULOSIN HYDROCHLORIDE 0.4 MG/1
0.4 CAPSULE ORAL
Status: DISCONTINUED | OUTPATIENT
Start: 2024-04-05 | End: 2024-04-09

## 2024-04-05 NOTE — PROGRESS NOTES
Patient is a 63 year old   female with past medical history of NIDDM type II, hypothyroidism, anxiety, hyperlipidemia and current alcohol use  who was admitted to the hospital for fall. The patient has been demonstrating symptoms of alcohol withdrawal. Patient admitted to the CCU and psych consult requested for evaluation and advise.    Consult Duration     The patient seen for over 50-minute, follow-up evaluation, over 50% counseling and coordinating care addressing alcohol withdrawal.  Record reviewed, communication with attending, communication with RN and patient seen face to face evaluation.     History of Present Illness:     Communicating with the team, no issues reported overnight. Patient has not demonstrated any concerning behaviors.    The patient received the following psychotropic medications: librium 5 mg BID, lexapro 10 mg, Lamictal 25 mg BID. Patient receiving ativan per [facility] protocol otherwise has not received any recently.     Labs and imaging reviewed: mag 2.0, B12>2000  Brain MRI indicating small subacute hematoma.    Most recent [facility] 3  Vital signs /95     The patient seen today laying in hospital bed with her daughter and son at bedside. Patient states she is not sleeping well due to moderate anxiety about being in the hospital. She is worried about difficulty walking and how long she will be in the hospital. Patient denies any numbness in her legs.     The patient is alert and oriented to person, place, date and condition. Patient had multiple facial bruises.  No tremors observed.    Patient reflecting on her life and her drinking and how she ended up in such position.  Patient admitted that she has been anxious generally speaking in her baseline and she gets more depressed after the death of her .  Patient indicated that she started to drink to help herself with the sleep and depression and suddenly find herself in serious condition.    Patient delayed that  she want to stop drinking completely with the support of her family.     The patient reported that she never had withdrawal symptoms before but she had not started drinking at all.  Patient reporting that lately she feel her legs has been wobbly and weak.  Patient admitted that her appetite has been declined but no definite weight loss.    The patient denies any direct hopelessness or helplessness otherwise patient demonstrate some sense of worthlessness with increased depressed mood, anxiety and decline in her health and self-care.    Family admitted that patient has been demonstrating some deterioration for that they tried to move her to close to where the son and his wife lives.    Patient denied history of seizure or DTs.    The patient is not demonstrating any lisa or psychosis  The patient denies auditory or visual hallucinations  The patient denies suicidal or homicidal ideation.    The patient has been demonstrating increased anxiety, depression which perpetuate the increase of alcohol intake, subsequently increased neuropathy and weakness leads to fall.  Patient agreeable on the need for treatment.    Past Psychiatric/Medication History:  1. Prior diagnoses: Alcohol abuse, anxiety  2. Past psychiatric inpatient: None  3. Past outpatient history: PCP  4. Past suicide history: None  5. Medication history: paxil 10 mg daily    Social History:   Patient is  female who retired at age 55 lost her  in 2017.  Patient lives in her own and recently moved closer to live near her son and his wife.  Patient with history of daily drinking for the last 7-10 years of 2 glasses of straight vodka nightly    Family History:  Unknown  Medical History:   Past Medical History  History reviewed. No pertinent past medical history.    Past Surgical History  History reviewed. No pertinent surgical history.    Family History  No family history on file.    Social History  Social History     Socioeconomic History     Marital status:    Tobacco Use    Smoking status: Never    Smokeless tobacco: Never   Vaping Use    Vaping Use: Never used   Substance and Sexual Activity    Alcohol use: Yes     Comment: socially    Drug use: Never     Social Determinants of Health     Food Insecurity: No Food Insecurity (4/3/2024)    Food Insecurity     Food Insecurity: Never true   Transportation Needs: No Transportation Needs (4/3/2024)    Transportation Needs     Lack of Transportation: No   Housing Stability: Low Risk  (4/3/2024)    Housing Stability     Housing Instability: No           Current Medications:      Medications Prior to Admission   Medication Sig    PARoxetine 10 MG Oral Tab Take 1 tablet (10 mg total) by mouth every morning.    glipiZIDE 5 MG Oral Tab Take 1 tablet (5 mg total) by mouth every morning before breakfast. With food       Allergies  No Known Allergies    Review of Systems:   As by Admitting/Attending    Results:   Laboratory Data:  Lab Results   Component Value Date    WBC 8.1 04/05/2024    HGB 9.9 (L) 04/05/2024    HCT 27.2 (L) 04/05/2024    PLT 78.0 (L) 04/05/2024    CREATSERUM 0.50 (L) 04/05/2024    BUN 7 (L) 04/05/2024     04/05/2024    K 3.4 (L) 04/05/2024     04/05/2024    CO2 26.0 04/05/2024    GLU 86 04/05/2024    CA 7.6 (L) 04/05/2024    ALB 2.1 (L) 04/05/2024    ALKPHO 186 (H) 04/05/2024    TP 6.3 04/05/2024    AST 71 (H) 04/05/2024    ALT 36 04/05/2024    INR 2.20 (H) 04/05/2024    PTP 25.8 (H) 04/05/2024    T4F 1.0 04/03/2024    TSH 8.140 (H) 04/03/2024    MG 2.0 04/04/2024     04/03/2024    B12 >2,000 (H) 04/04/2024    ETOH <3 04/03/2024         Imaging:  MRI BRAIN (W+WO) (CPT=70553)    Result Date: 4/3/2024  CONCLUSION: 3 x 3 x 2 cm located focus within the right cerebellum, with characterize most compatible with a subacute hematoma.  However, because there is patient motion and given the current [place] of T1 signal changes, there is potential this finding obscures a very tiny  lesion.  Exam is also limited by patient motion.  Therefore recommend follow-up in approximately 3 months to assess for changes.  Dictated by (CST): Rob Duvall MD on 4/03/2024 at 11:12 AM     Finalized by (CST): Rob Duvall MD on 4/03/2024 at 12:19 PM          US LIVER (CPT=76705)    Result Date: 4/3/2024  CONCLUSION:   Hepatic steatosis.  Mild right upper quadrant ascites.  Cholelithiasis and gallbladder sludge with mild circumferential gallbladder wall thickening.  Positive sonographic Pretty sign.  Findings may relate to acute cholecystitis although gallbladder wall thickening can be seen in patients with hepatocellular disease and or ascites.  Advise clinical follow-up and consider evaluation with nuclear medicine hepatobiliary scan.     Dictated by (CST): Henrry Garduno MD on 4/03/2024 at 11:51 AM     Finalized by (CST): Henrry Garduno MD on 4/03/2024 at 11:54 AM          XR FOREARM (2 VIEWS), LEFT (CPT=73090)    Result Date: 4/3/2024  CONCLUSION: No acute osseous abnormality of the left forearm.  [facility] provided a preliminary report for this examination. This final report agrees with their preliminary findings.   Dictated by (CST): Rob Duvall MD on 4/03/2024 at 10:02 AM     Finalized by (CST): Rob Duvall MD on 4/03/2024 at 10:02 AM          XR ELBOW, COMPLETE (MIN 3 VIEWS), RIGHT (CPT=73080)    Result Date: 4/3/2024  CONCLUSION: No acute osseous abnormality of the right elbow.  [facility] provided a preliminary report for this examination. This final report agrees with their preliminary findings.   Dictated by (CST): Rob Duvall MD on 4/03/2024 at 9:28 AM     Finalized by (CST): Rob Duvall MD on 4/03/2024 at 9:29 AM          XR CHEST AP PORTABLE  (CPT=71045)    Result Date: 4/3/2024  CONCLUSION: Findings suspicious for acute nondisplaced fractures of the anterior right 3rd and 4th ribs.  Correlate for pain at this site.  No pneumothorax.  Otherwise no acute cardiopulmonary  abnormality.   [facility] provided a preliminary report for this examination. This final report agrees with their preliminary findings.   Dictated by (CST): Rob Duvall MD on 4/03/2024 at 9:26 AM     Finalized by (CST): Rob Duvall MD on 4/03/2024 at 9:28 AM          XR KNEE (1 OR 2 VIEWS), LEFT (CPT=73560)    Result Date: 4/3/2024  CONCLUSION: No acute osseous abnormality of the left knee.  [facility] provided a preliminary report for this examination. This final report agrees with their preliminary findings.   Dictated by (CST): Rob Duvall MD on 4/03/2024 at 9:25 AM     Finalized by (CST): Rob Duvall MD on 4/03/2024 at 9:26 AM          XR KNEE (1 OR 2 VIEWS), RIGHT (CPT=73560)    Result Date: 4/3/2024  CONCLUSION: Soft tissue inflammation ventral to the patella and patellar tendon suggesting soft tissue injury.  No acute osseous abnormality of the right knee.  [facility] provided a preliminary report for this examination. This final report agrees with their preliminary findings.    Dictated by (CST): Rob Duvall MD on 4/03/2024 at 9:24 AM     Finalized by (CST): Rob Duvall MD on 4/03/2024 at 9:25 AM          XR PELVIS (1 VIEW) (CPT=72170)    Result Date: 4/3/2024  CONCLUSION:  1.  Hypodense focus within the right femoral neck which could represent a bone cyst or lytic lesion.  Consider bone scan or other follow up imaging. 2.  Mild degenerative change in the lower lumbar spine.   [facility] provided a preliminary report for this examination. This final report agrees with their preliminary findings.   Dictated by (CST): Rob Duvall MD on 4/03/2024 at 9:23 AM     Finalized by (CST): Rob Duvall MD on 4/03/2024 at 9:24 AM          XR FEMUR MIN 2 VIEWS RIGHT (CPT=73552)    Result Date: 4/3/2024  CONCLUSION: Subcentimeter ovoid lucencies within the right femoral neck which could represent bone cysts or lytic foci.  This could be further assessed with bone scan.   [facility]  provided a preliminary report for this examination. This final report agrees with their preliminary findings.   Dictated by (CST): Rob Duvall MD on 4/03/2024 at 9:21 AM     Finalized by (CST): Rob Duvall MD on 4/03/2024 at 9:22 AM          CT BRAIN OR HEAD (42469)    Result Date: 4/3/2024  CONCLUSION: 3 x 1 x 2 cm for ovoid soft tissue density in the right cerebellar vermis and adjacent hemisphere with surrounding edema.  It could represent an acute/subacute/recent site of hemorrhage/hematoma versus hemorrhagic lesion (such as primary malignancy versus metastatic focus).  Recommend further characterization with MRI brain with without contrast.   [facility] provided a preliminary report for this examination. This final report agrees with their preliminary findings.   Dictated by (CST): Rob Duvall MD on 4/03/2024 at 8:43 AM     Finalized by (CST): Rob Duvall MD on 4/03/2024 at 8:50 AM           Vital Signs:   Blood pressure (!) 112/95, pulse 82, temperature 98.4 °F (36.9 °C), temperature source Temporal, resp. rate 14, height 58\", weight 64.5 kg (142 lb 3.2 oz), SpO2 99%.    Mental Status Exam:   Appearance: Stated age female, in hospital gown, laying down in hospital bed.  Patient with facial bruises  Psychomotor: No psychomotor agitation, or retardation.  Fine tremors observed  Orientation: Alert and oriented to person, place, time and condition.  Gait: Not evaluated.  Attitude/Coorperation: Cooperative and attentive.   Behavior: Appropriate.  No bizarre behavior  Speech: Regular rate and rhythm speech.  Soft low-tone speech  Mood: Patient reporting anxious mood.  Affect: Anxious affect, congruent with the mood.  Thought process: Linear and appropriate.  Slightly slowed due to tiredness and concussion.  Thought content: Patient denies any suicidal or homicidal ideation.  Perceptions: Patient denies any auditory or visual hallucinations.  Concentration: Grossly slow  Memory: Grossly intact.  Intellect:  Average.  Judgment and Insight: Questionable.     Impression:     Major depressive disorder, recurrent moderate.  Alcohol withdrawal syndrome uncomplicated.  Alcohol dependence, continuous    Fall, initial encounter    Fall    Patient is a 63 year old female with past medical history of NIDDM type II, hypothyroidism, anxiety, hyperlipidemia, current alcohol use  who was admitted to the hospital for Fall, initial encounter: The patient has been demonstrating symptoms of alcohol withdrawal.    Evaluation today indicating that patient was not alcoholic and told she lost her alcoholic  in 2017 and possibly patient connecting to her   through alcohol and utilizing drinking as a method to deal with her emotion and to process her grieving.  Otherwise the patient has been demonstrating symptom of depression has started since which perpetuate the drinking habit.  Patient started to have some deficit in her mineral and vitamin and losing blood all related to alcohol and her depression and limited intake with increased neuropathy causing her fall.    24: The patient today demonstrating depressed mood but motivated to stop drinking alcohol.  No withdrawal symptom has been observed or reported.    24: The patient today demonstrating anxious mood and difficulty sleeping.  No withdrawal symptom has been observed or reported.  Patient consistently indicating her strong desire to not return to alcohol.    Discussed risk and benefit, acknowledging the current symptom and severity.  At this point, I would recommend the following approach:     Focus on safety  Focus on education and support.  Focus on insight orientation helping the patient understand diagnosis and treatment plan.  Patient admitting to the fact that she need to stop drinking  Continue CIWA protocol.  Discontinue Librium 5 mg twice daily.  Continue Lexapro 10 mg nightly.  Continue Lamictal 25 mg twice daily.  Discontinue temazepam 15 mg  nightly as needed for insomnia.   Start trazodone 25 mg nightly.  Multivitamin with mineral and thiamine daily.  Patient and family received education about medication management and discussed risk and benefit.  All in agreement  Patient and family agreed on form of outpatient rehab treatment  Coordinate plan with team    Orders This Visit:  Orders Placed This Encounter   Procedures    CBC With Differential With Platelet    Basic Metabolic Panel (8)    Urinalysis, Routine    Troponin I (High Sensitivity)    UA Microscopic only, urine    Lactic Acid, Plasma    Basic Metabolic Panel (8)    CBC With Differential With Platelet    TSH W Reflex To Free T4    Vitamin B12 with Reflex to MMA    Ethyl Alcohol    Lipid Panel    Hemoglobin A1C    Hepatic Function Panel (7)    Lactic Acid Reflex Post Positive    T4, FREE (S)    CBC With Differential With Platelet    Lactic Acid Post Positive    Scan slide    Magnesium    Magnesium    Hemoglobin    Prothrombin Time (PT)    Hemoglobin A1C    Vitamin B12    CK (Creatine Kinase) (Not Creatinine)    Prothrombin Time (PT)    Vitamin B12 with reflex to MMA    Hepatic Function Panel (7)    Prothrombin Time (PT)    Hepatic Function Panel (7)    Potassium    CBC With Differential With Platelet    Basic Metabolic Panel (8)    Type and screen    ABORH (Blood Type)    Antibody Screen    ABORH Confirmation    Blood Culture       Meds This Visit:  Requested Prescriptions      No prescriptions requested or ordered in this encounter       Wallace Guillen MD  4/5/2024    Note to Patient: The 21st Century Cures Act makes medical notes like these available to patients in the interest of transparency. However, be advised this is a medical document. It is intended as peer to peer communication. It is written in medical language and may contain abbreviations or verbiage that are unfamiliar. It may appear blunt or direct. Medical documents are intended to carry relevant information, facts as evident,  and the clinical opinion of the practitioner. This note may have been transcribed using a voice dictation system. Voice recognition errors may occur. This should not be taken to alter the content or meaning of this note.

## 2024-04-05 NOTE — PHYSICAL THERAPY NOTE
PHYSICAL THERAPY TREATMENT NOTE - INPATIENT     Room Number: 236/236-A       Presenting Problem: LE weakness, R head trauma & s/p falls       Problem List  Principal Problem:    Fall, initial encounter  Active Problems:    Fall    Cerebellar hemorrhage (HCC)    Major depressive disorder, recurrent episode, moderate degree (HCC)    Alcohol dependence, continuous (HCC)    Alcohol withdrawal syndrome, uncomplicated (HCC)      PHYSICAL THERAPY ASSESSMENT   Patient demonstrates good  progress this session, goals  remain in progress.    Patient continues to function below baseline with bed mobility, transfers, gait, and standing prolonged periods.  Contributing factors to remaining limitations include decreased functional strength, decreased endurance/aerobic capacity, impaired coordination, impaired motor planning, decreased muscular endurance, and medical status.  Next session anticipate patient to progress bed mobility, transfers, gait, and standing prolonged periods.  Physical Therapy will continue to follow patient for duration of hospitalization.    Patient continues to benefit from continued skilled PT services: to facilitate return to prior level of function as patient demonstrates high motivation with excellent tolerance to an intensive therapy program .    PLAN  PT Treatment Plan: Bed mobility;Coordination;Endurance;Energy conservation;Patient education;Gait training;Neuromuscular re-educate;Range of motion;Strengthening;Transfer training;Balance training  Frequency (Obs): 5x/week    SUBJECTIVE  Pt agreeable to participate.     OBJECTIVE  Precautions: Bed/chair alarm    WEIGHT BEARING RESTRICTION                PAIN ASSESSMENT   Ratin          BALANCE  Static Sitting: Good  Dynamic Sitting: Fair +  Static Standing: Fair -  Dynamic Standing: Poor +    ACTIVITY TOLERANCE                          O2 WALK       AM-PAC '6-Clicks' INPATIENT SHORT FORM - BASIC MOBILITY  How much difficulty does the patient  currently have...  Patient Difficulty: Turning over in bed (including adjusting bedclothes, sheets and blankets)?: A Lot   Patient Difficulty: Sitting down on and standing up from a chair with arms (e.g., wheelchair, bedside commode, etc.): A Lot   Patient Difficulty: Moving from lying on back to sitting on the side of the bed?: A Lot   How much help from another person does the patient currently need...   Help from Another: Moving to and from a bed to a chair (including a wheelchair)?: A Lot   Help from Another: Need to walk in hospital room?: A Lot   Help from Another: Climbing 3-5 steps with a railing?: Total     AM-PAC Score:  Raw Score: 11   Approx Degree of Impairment: 72.57%   Standardized Score (AM-PAC Scale): 33.86   CMS Modifier (G-Code): CL    FUNCTIONAL ABILITY STATUS  Functional Mobility/Gait Assessment  Gait Assistance: Moderate assistance  Distance (ft): 8 ft  Assistive Device: Rolling walker  Pattern:  (short, shuffle steps)  Rolling: moderate assist  Supine to Sit: moderate assist  Sit to Stand: moderate assist    Additional information: Pt received in bed at start of session, agreeable to participate. Family at bedside throughout session. Pt demonstrated supine to sit EOB with mod A. Good static sit balance. Completed functional transfers from EOB and chair with mod A and use of RW. Poor immediate stand balance. Increased postural sway noted. Pt ambulated 5-8 ft to bedside chair with mod A and step by step instructions. Pt with shuffle pattern and very short steps. Vcs and assist for managing walker. Pt remained up in chair at end of session, needs in reach and RN aware of session outcome.     The patient's Approx Degree of Impairment: 72.57% has been calculated based on documentation in the [facility] '6 clicks' Inpatient Daily Activity Short Form.  Research supports that patients with this level of impairment may benefit from an intensive therapy program.    Final disposition will be made by  interdisciplinary medical team.      Patient End of Session: Up in chair;Needs met;Call light within reach;RN aware of session/findings;All patient questions and concerns addressed;Family present    CURRENT GOALS   Goals to be met by: 4/17  Patient Goal Patient's self-stated goal is: walk   Goal #1 Patient is able to demonstrate supine - sit EOB @ level: minimum assistance      Goal #1   Current Status Goal partially met mod A   Goal #2 Patient is able to demonstrate transfers Sit to/from Stand at assistance level: minimum assistance       Goal #2  Current Status Goal partially met mod A   Goal #3 Patient is able to ambulate 50 feet with assist device: RW at assistance level: supervision   Goal #3   Current Status Goal partially met, Mod A   Goal #4 Patient will negotiate  stairs/one curb w/ assistive device and supervision   Goal #4   Current Status NT   Goal #5 Patient to demonstrate independence with home activity/exercise instructions provided to patient in preparation for discharge.   Goal #5   Current Status NT     Gait Training: 10 minutes  Therapeutic Activity: 15 minutes

## 2024-04-05 NOTE — PROGRESS NOTES
[facility]     Gastroenterology Progress Note    Christi Tavarez Patient Status:  Inpatient    1960 MRN [id number]   Location [facility] 2W/SW Attending Alvaro Arvizu MD   Hosp Day # 2 PCP ÁNGEL Delgado       Subjective:   Says she feels better today. No abd pain, n/v. No bm. No vomiting.     Objective:   Blood pressure (!) 112/95, pulse 82, temperature 98.4 °F (36.9 °C), temperature source Temporal, resp. rate 14, height 4' 10\" (1.473 m), weight 142 lb 3.2 oz (64.5 kg), SpO2 99%. Body mass index is 29.72 kg/m².    General: awake, alert and oriented, no acute distress  HEENT: moist mucus membranes  PULM: no conversational dyspnea  CARDIOVASCULAR: regular rate and rhythm, the extremities are warm and well perfused  GI: soft, non-tender, non-distended, + BS, no rebound/guarding   EXTREMITIES: no edema, moving all extremities  SKIN: no visible rash  NEURO: appropriate and interactive    Assessment and Plan:   63F with long standing ETOH use who presented after two weeks of generalized weakness and recurrent falls, found to have acute ICH. GI consulted for hematemesis and elevated LFTs.    # Hematemesis, self limited and resolved  - likely emetogenic injury such as esophagitis vs self limited margie burt  - hgb stable at around 10  - continue ppi bid  - diet as tolerated  - reserve endoscopic evaluation to recurrent episodes or significant drop in hgb     # Acute alcohol hepatitis in setting of alcohol induced steatohepatitis, alcohol ketoacidosis.   - Vitamin K 10mg PO daily x 3 days for malnutrition.   - Daily LFTs and INR.   - Infectious work-up given mildly elevated leukocytosis.   - Nutrition is paramount.   - INR and bilirubin improving with supportive care. Anticipate INR will continue to decline during admission s/p vitamin K. Considerations for initiation of prednisolone in coming days. MDF 64 today.     Leonela Castellon MD  [facility]      Results:     Lab  Results   Component Value Date    WBC 8.1 04/05/2024    HGB 9.9 (L) 04/05/2024    HCT 27.2 (L) 04/05/2024    PLT 78.0 (L) 04/05/2024    CREATSERUM 0.50 (L) 04/05/2024    BUN 7 (L) 04/05/2024     04/05/2024    K 3.4 (L) 04/05/2024     04/05/2024    CO2 26.0 04/05/2024    GLU 86 04/05/2024    CA 7.6 (L) 04/05/2024    ALB 2.1 (L) 04/05/2024    ALKPHO 186 (H) 04/05/2024    BILT 5.7 (H) 04/05/2024    TP 6.3 04/05/2024    AST 71 (H) 04/05/2024    ALT 36 04/05/2024    INR 2.20 (H) 04/05/2024    T4F 1.0 04/03/2024    TSH 8.140 (H) 04/03/2024    MG 2.0 04/04/2024     04/03/2024    B12 >2,000 (H) 04/04/2024    ETOH <3 04/03/2024       No results found.

## 2024-04-05 NOTE — PLAN OF CARE
Q4 neuros intact, no new deficits noted. CIWA per protocol. A/O x4. Plan of care ongoing. Safety maintained. Awaiting bed for transfer.     Problem: Patient Centered Care  Goal: Patient preferences are identified and integrated in the patient's plan of care  Description: Interventions:  - What would you like us to know as we care for you? From home alone, just moved closer to kids  - Provide timely, complete, and accurate information to patient/family  - Incorporate patient and family knowledge, values, beliefs, and cultural backgrounds into the planning and delivery of care  - Encourage patient/family to participate in care and decision-making at the level they choose  - Honor patient and family perspectives and choices  Outcome: Progressing     Problem: Diabetes/Glucose Control  Goal: Glucose maintained within prescribed range  Description: INTERVENTIONS:  - Monitor Blood Glucose as ordered  - Assess for signs and symptoms of hyperglycemia and hypoglycemia  - Administer ordered medications to maintain glucose within target range  - Assess barriers to adequate nutritional intake and initiate nutrition consult as needed  - Instruct patient on self management of diabetes  Outcome: Progressing     Problem: Patient/Family Goals  Goal: Patient/Family Long Term Goal  Description: Patient's Long Term Goal: to return home    Interventions:  - PT/OT eval  -monitor labs  -monitor CIWA  -monitor neuro status    - See additional Care Plan goals for specific interventions  Outcome: Progressing  Goal: Patient/Family Short Term Goal  Description: Patient's Short Term Goal: improved strength    Interventions:   - PT/OT eval and treat  -monitor labs  -dietary consult    - See additional Care Plan goals for specific interventions  Outcome: Progressing     Problem: PAIN - ADULT  Goal: Verbalizes/displays adequate comfort level or patient's stated pain goal  Description: INTERVENTIONS:  - Encourage pt to monitor pain and request  assistance  - Assess pain using appropriate pain scale  - Administer analgesics based on type and severity of pain and evaluate response  - Implement non-pharmacological measures as appropriate and evaluate response  - Consider cultural and social influences on pain and pain management  - Manage/alleviate anxiety  - Utilize distraction and/or relaxation techniques  - Monitor for opioid side effects  - Notify MD/LIP if interventions unsuccessful or patient reports new pain  - Anticipate increased pain with activity and pre-medicate as appropriate  Outcome: Progressing     Problem: SAFETY ADULT - FALL  Goal: Free from fall injury  Description: INTERVENTIONS:  - Assess pt frequently for physical needs  - Identify cognitive and physical deficits and behaviors that affect risk of falls.  - [place] fall precautions as indicated by assessment.  - Educate pt/family on patient safety including physical limitations  - Instruct pt to call for assistance with activity based on assessment  - Modify environment to reduce risk of injury  - Provide assistive devices as appropriate  - Consider OT/PT consult to assist with strengthening/mobility  - Encourage toileting schedule  Outcome: Progressing     Problem: DISCHARGE PLANNING  Goal: Discharge to home or other facility with appropriate resources  Description: INTERVENTIONS:  - Identify barriers to discharge w/pt and caregiver  - Include patient/family/discharge partner in discharge planning  - Arrange for needed discharge resources and transportation as appropriate  - Identify discharge learning needs (meds, wound care, etc)  - Arrange for interpreters to assist at discharge as needed  - Consider post-discharge preferences of patient/family/discharge partner  - Complete POLST form as appropriate  - Assess patient's ability to be responsible for managing their own health  - Refer to Case Management Department for coordinating discharge planning if the patient needs post-hospital  services based on physician/LIP order or complex needs related to functional status, cognitive ability or social support system  Outcome: Progressing     Problem: NEUROLOGICAL - ADULT  Goal: Achieves stable or improved neurological status  Description: INTERVENTIONS  - Assess for and report changes in neurological status  - Initiate measures to prevent increased intracranial pressure  - Maintain blood pressure and fluid volume within ordered parameters to optimize cerebral perfusion and minimize risk of hemorrhage  - Monitor temperature, glucose, and sodium. Initiate appropriate interventions as ordered  Outcome: Progressing  Goal: Absence of seizures  Description: INTERVENTIONS  - Monitor for seizure activity  - Administer anti-seizure medications as ordered  - Monitor neurological status  Outcome: Progressing  Goal: Remains free of injury related to seizure activity  Description: INTERVENTIONS:  - Maintain airway, patient safety  and administer oxygen as ordered  - Monitor patient for seizure activity, document and report duration and description of seizure to MD/LIP  - If seizure occurs, turn patient to side and suction secretions as needed  - Reorient patient post seizure  - Seizure pads on all 4 side rails  - Instruct patient/family to notify RN of any seizure activity  - Instruct patient/family to call for assistance with activity based on assessment  Outcome: Progressing  Goal: Achieves maximal functionality and self care  Description: INTERVENTIONS  - Monitor swallowing and airway patency with patient fatigue and changes in neurological status  - Encourage and assist patient to increase activity and self care with guidance from PT/OT  - Encourage visually impaired, hearing impaired and aphasic patients to use assistive/communication devices  Outcome: Progressing     Problem: Impaired Functional Mobility  Goal: Achieve highest/safest level of mobility/gait  Description: Interventions:  - Assess patient's  functional ability and stability  - Promote increasing activity/tolerance for mobility and gait  - Educate and engage patient/family in tolerated activity level and precautions  Outcome: Progressing

## 2024-04-05 NOTE — CM/SW NOTE
04/05/24 1800   CM/SW Referral Data   Referral Source    Reason for Referral Discharge planning   Medical Hx   Does patient have an established PCP? Yes   Significant Past Medical/Mental Health Hx ETOH use   Patient Info   Patient lives with Alone   Discharge Needs   Anticipated D/C needs Acute rehab     CM reached out to Dr. Dean earlier today.  Consult was completed, Dr. Dean will be placing note.  AIR referrals started in [place] as intensive rehab needs identified by therapy.  Patient has a [facility] fed plan which does not always offer ELIS[facility] services.    CM/SW will upload consult in [place] once completed and assist with dc planning needs.    / to remain available for support and/or discharge planning.      Meg Jimenez MBA BSN RN CRRN   RN Case Manager  [phone]

## 2024-04-05 NOTE — OCCUPATIONAL THERAPY NOTE
OCCUPATIONAL THERAPY TREATMENT NOTE - INPATIENT        Room Number: 236/236-A     Presenting Problem: weak, fall    Problem List  Principal Problem:    Fall, initial encounter  Active Problems:    Fall    Cerebellar hemorrhage (HCC)    Major depressive disorder, recurrent episode, moderate degree (HCC)    Alcohol dependence, continuous (HCC)    Alcohol withdrawal syndrome, uncomplicated (HCC)      OCCUPATIONAL THERAPY ASSESSMENT   Patient demonstrates good  progress this session, goals remain in progress.    Patient continues to function below baseline with ADLs/functional transfers/mobility.   Contributing factors to remaining limitations include decreased functional strength, decreased functional reach, decreased endurance, impaired   balance, decreased muscular endurance, cognitive deficits (forgetful at times), and decreased safety awareness.  Next session anticipate patient to progress lower body dressing, bed mobility, static standing balance, dynamic standing balance, and functional standing tolerance.  Occupational Therapy will continue to follow patient for duration of hospitalization.    Patient continues to benefit from continued skilled OT services: to facilitate return to prior level of function as patient demonstrates high motivation with excellent tolerance to an intensive therapy program .     PLAN  OT Treatment Plan: Balance activities;ADL training;Functional transfer training;Endurance training;Cognitive reorientation;UE strengthening/ROM;Patient/Family education;Patient/Family training;Equipment eval/education;Compensatory technique education     SUBJECTIVE  \"I want to walk\"    OBJECTIVE  Precautions: Bed/chair alarm    WEIGHT BEARING RESTRICTION     PAIN ASSESSMENT  [place]      ACTIVITIES OF DAILY LIVING ASSESSMENT  AM-PAC ‘6-Clicks’ Inpatient Daily Activity Short Form  How much help from another person does the patient currently need…  -   Putting on and taking off regular lower body  clothing?: A Lot  -   Bathing (including washing, rinsing, drying)?: A Lot  -   Toileting, which includes using toilet, bedpan or urinal? : A Lot  -   Putting on and taking off regular upper body clothing?: A Little  -   Taking care of personal grooming such as brushing teeth?: A Little  -   Eating meals?: A Little    AM-PAC Score:  Score: 15  Approx Degree of Impairment: 56.46%  Standardized Score (AM-PAC Scale): 34.69  CMS Modifier (G-Code): CK       THERAPEUTIC EXERCISE     Skilled Therapy Provided: RN approved session, performed functional mobility task bed>chair with mod A and FWW, increased time and cues for FWW management/placement. Performed sit<>stand transfers from chair x3 trials, fair carryover of cues for technique requiring repeated cues-practice performed to enhance safety/technique in prep for toilet transfers. Required mod A for transfers. Pt required pamper change, mod A to roberto/doff and required min-mod A while standing to pull to waist, educated on 1UE placement on FWW for assist with balance. Max A required for toileting hygiene in standing. Returned to seated in chair, performed grooming tasks with SPV. Left in chair with all needs met, RN aware    EDUCATION PROVIDED  Patient: Role of Occupational Therapy; Functional Transfer Techniques; Fall Prevention; Posture/Positioning; Compensatory ADL Techniques  Patient's Response to Education: Verbalized Understanding; Returned Demonstration; Requires Further Education  Family/Caregiver's Response to Education: Verbalized Understanding    The patient's Approx Degree of Impairment: 56.46% has been calculated based on documentation in the [facility] '6 clicks' Inpatient Daily Activity Short Form.  Research supports that patients with this level of impairment may benefit from rehab.  Final disposition will be made by interdisciplinary medical team.    Patient End of Session: Up in chair;Needs met;RN aware of session/findings;Call light within reach;All patient  questions and concerns addressed;Family present    OT Goals: ongoing   Patient will complete functional transfer with CGA  Comment:     Patient will complete toileting with CGA  Comment:     Patient will tolerate standing for 3 minutes in prep for adls with CGA   Comment:    Patient will complete UE/LE dressing with Min A 1x  Comment:            Goals  on: 24  Frequency: 5x/week    OT Session Time: 25 minutes  Self-Care Home Management: 10 minutes  Therapeutic Activity: 15 minutes    Danii Hoover OTR/L

## 2024-04-05 NOTE — PROGRESS NOTES
[facility]  Progress Note     Christi Tavarez  : 1960    Status: Inpatient  Day #: 2    Attending: Alvaro Arvizu MD  PCP: ÁNGEL Delgado     Assessment and Plan:    Generalized weakness  Recurrent falls  Moderate acute to subacute inferior right cerebellar hematoma vs infarct vs mass with hemorrhage  -MRI brain results reviewed - rec repeat MRI 3 months  -neuro consult appreciated  -Neurochecks  -TSH/B12/Folate ordered  -PT/OT eval  -PMR consult     Hematemesis  -resolved  -GI consult appreciated  -monitor hgb  -cont PPI  -holding off on endoscopy for now     Current alcohol use  -Last alcohol use 2 days prior to presentation  -CIWA scores low. Ativan prn.     Possible Acute UTI  -no culture sent unfortunately. UA without pyuria  -stop ceftriaxone IV and monitor    Acute kidney injury - resolved  Mild hyponatremia - resolved  Hypochloremia- resolved  Metabolic acidosis - resolved  -Suspecting secondary to decreased oral intake as well as worsened by alcohol use.  -ok to stop IVF     Thrombocytopenia  -Suspecting secondary to alcohol use  -downtrend likely dilutional     NIDDM type II  -Patient stopped taking metformin months ago.  -Last hemoglobin A1c  5.5  -ISS  -monitor BG - low this AM before breakfast     H/o hyperlipidemia  -Patient stopped taking statin months ago.     H/o anxiety  -cont lexapro     DVT Mechanical Prophylaxis:   SCDs,    DVT Pharmacologic Prophylaxis   Medication   None               Subjective:      Initial Chief Complaint:  weakness    Feeling better. Has not been out of bed yet today. No CP, no SOB, no abd pain, no n/v.       Objective:      Temp:  [97.1 °F (36.2 °C)-98.3 °F (36.8 °C)] 97.1 °F (36.2 °C)  Pulse:  [76-92] 82  Resp:  [13-19] 14  BP: ()/(59-95) 112/95  SpO2:  [94 %-100 %] 95 %  General:  Alert, no distress  HEENT:  Normocephalic, atraumatic  Cardiac:  Regular rate, regular rhythm  Pulmonary:  Clear to auscultation bilaterally, respirations  unlabored  Gastrointestinal:  Soft, non-tender, normal bowel sounds  Musculoskeletal:  No joint swelling  Extremities:  No edema, no cyanosis, no clubbing  Neurologic:  nonfocal  Psychiatric:  Normal affect, calm and appropriate    Intake/Output Summary (Last 24 hours) at 4/5/2024 0945  Last data filed at 4/5/2024 0600  Gross per 24 hour   Intake 2981.2 ml   Output 725 ml   Net 2256.2 ml         Recent Labs   Lab 04/03/24  0638 04/03/24  1309 04/04/24  0907 04/05/24  0426   WBC 12.6*  --  8.7 8.1   HGB 10.0* 10.2* 10.1* 9.9*   HCT 27.3*  --  27.9* 27.2*   PLT 94.0*  --  83.0* 78.0*   RBC 2.64*  --  2.62* 2.60*   .4*  --  106.5* 104.6*   MCH 37.9*  --  38.5* 38.1*   MCHC 36.6  --  36.2 36.4   RDW 15.9*  --  15.8* 15.8*   NEPRELIM 8.86*  --  6.42 5.09     Recent Labs   Lab 04/03/24  0023 04/03/24  0539 04/03/24  1309 04/03/24  1434 04/04/24  0906 04/04/24  0907 04/05/24  0426   BUN 13  --   --   --   --  8* 7*   CREATSERUM 1.10*  --   --   --   --  0.65 0.50*   CA 8.3*  --   --   --   --  7.7* 7.6*   ALB 2.8*  --   --   --   --  2.2* 2.1*   *  --   --   --   --  136 137   K 3.9  --   --   --   --  3.5 3.4*   CL 97*  --   --   --   --  105 108   CO2 20.0*  --   --   --   --  26.0 26.0   *  --   --   --   --  195* 86   MG 1.6  --   --   --   --  2.0  --    BILT 5.9*  --   --   --   --  6.7* 5.7*   *  --   --   --   --  95* 71*   ALT 47  --   --   --   --  33 36   ALKPHO 248*  --   --   --   --  191* 186*   TP 8.3*  --   --   --   --  6.7 6.3   INR  --   --  3.02*  --   --  2.68* 2.20*   TSH 8.140*  --   --   --   --   --   --    T4F 1.0  --   --   --   --   --   --    CK  --  138  --   --   --   --   --    B12  --   --   --  >2,000* >2,000*  --   --    ETOH  --  <3  --   --   --   --   --        MRI BRAIN (W+WO) (CPT=70553)    Result Date: 4/3/2024  CONCLUSION: 3 x 3 x 2 cm located focus within the right cerebellum, with characterize most compatible with a subacute hematoma.  However, because  there is patient motion and given the current [place] of T1 signal changes, there is potential this finding obscures a very tiny lesion.  Exam is also limited by patient motion.  Therefore recommend follow-up in approximately 3 months to assess for changes.  Dictated by (CST): Rob Duvall MD on 4/03/2024 at 11:12 AM     Finalized by (CST): Rob Duvall MD on 4/03/2024 at 12:19 PM          US LIVER (CPT=76705)    Result Date: 4/3/2024  CONCLUSION:   Hepatic steatosis.  Mild right upper quadrant ascites.  Cholelithiasis and gallbladder sludge with mild circumferential gallbladder wall thickening.  Positive sonographic Pretty sign.  Findings may relate to acute cholecystitis although gallbladder wall thickening can be seen in patients with hepatocellular disease and or ascites.  Advise clinical follow-up and consider evaluation with nuclear medicine hepatobiliary scan.     Dictated by (CST): Henrry Garduno MD on 4/03/2024 at 11:51 AM     Finalized by (CST): Henrry Garduno MD on 4/03/2024 at 11:54 AM             Medications:   pantoprazole  40 mg Intravenous Q12H    insulin aspart  1-5 Units Subcutaneous TID CC and HS    thiamine  100 mg Oral Daily    multivitamin with minerals  1 tablet Oral Daily    folic acid  1 mg Oral Daily    chlordiazePOXIDE  5 mg Oral BID    lamoTRIgine  25 mg Oral BID    escitalopram  10 mg Oral Nightly    phytonadione  10 mg Oral Daily      PRN Meds: polyethylene glycol (PEG 3350), sennosides, bisacodyl, fleet enema, ondansetron, prochlorperazine, glucose **OR** glucose **OR** glucose-vitamin C **OR** dextrose **OR** glucose **OR** glucose **OR** glucose-vitamin C, LORazepam **OR** LORazepam, LORazepam **OR** LORazepam, LORazepam **OR** LORazepam, LORazepam, LORazepam, LORazepam, temazepam    Supplementary Documentation:        [facility]. Time spent on chart/note review, review of labs/imaging, discussion with patient, physical exam, discussion with staff, consultants, coordinating care,  writing progress note, discussion of plan of care.      Alvaro Arvizu MD

## 2024-04-06 ENCOUNTER — TELEPHONE (OUTPATIENT)
Dept: ENDOSCOPY | Facility: HOSPITAL | Age: 64
End: 2024-04-06

## 2024-04-06 LAB
ALBUMIN SERPL-MCNC: 2.1 G/DL (ref 3.2–4.8)
ALP LIVER SERPL-CCNC: 185 U/L
ALT SERPL-CCNC: 26 U/L
ANION GAP SERPL CALC-SCNC: 3 MMOL/L (ref 0–18)
AST SERPL-CCNC: 73 U/L (ref ?–34)
BASOPHILS # BLD AUTO: 0.06 X10(3) UL (ref 0–0.2)
BASOPHILS NFR BLD AUTO: 0.6 %
BILIRUB DIRECT SERPL-MCNC: 3.3 MG/DL (ref ?–0.3)
BILIRUB SERPL-MCNC: 5 MG/DL (ref 0.2–1.1)
BUN BLD-MCNC: 7 MG/DL (ref 9–23)
BUN/CREAT SERPL: 13 (ref 10–20)
CALCIUM BLD-MCNC: 7.8 MG/DL (ref 8.7–10.4)
CHLORIDE SERPL-SCNC: 106 MMOL/L (ref 98–112)
CO2 SERPL-SCNC: 26 MMOL/L (ref 21–32)
CREAT BLD-MCNC: 0.54 MG/DL
DEPRECATED RDW RBC AUTO: 63.8 FL (ref 35.1–46.3)
EGFRCR SERPLBLD CKD-EPI 2021: 103 ML/MIN/1.73M2 (ref 60–?)
EOSINOPHIL # BLD AUTO: 0.25 X10(3) UL (ref 0–0.7)
EOSINOPHIL NFR BLD AUTO: 2.6 %
ERYTHROCYTE [DISTWIDTH] IN BLOOD BY AUTOMATED COUNT: 16.3 % (ref 11–15)
GLUCOSE BLD-MCNC: 99 MG/DL (ref 70–99)
GLUCOSE BLDC GLUCOMTR-MCNC: 103 MG/DL (ref 70–99)
GLUCOSE BLDC GLUCOMTR-MCNC: 155 MG/DL (ref 70–99)
GLUCOSE BLDC GLUCOMTR-MCNC: 162 MG/DL (ref 70–99)
GLUCOSE BLDC GLUCOMTR-MCNC: 174 MG/DL (ref 70–99)
HCT VFR BLD AUTO: 26.8 %
HGB BLD-MCNC: 9.6 G/DL
IMM GRANULOCYTES # BLD AUTO: 0.04 X10(3) UL (ref 0–1)
IMM GRANULOCYTES NFR BLD: 0.4 %
INR BLD: 1.9 (ref 0.8–1.2)
LYMPHOCYTES # BLD AUTO: 3.02 X10(3) UL (ref 1–4)
LYMPHOCYTES NFR BLD AUTO: 31.6 %
MCH RBC QN AUTO: 38.2 PG (ref 26–34)
MCHC RBC AUTO-ENTMCNC: 35.8 G/DL (ref 31–37)
MCV RBC AUTO: 106.8 FL
MONOCYTES # BLD AUTO: 1.08 X10(3) UL (ref 0.1–1)
MONOCYTES NFR BLD AUTO: 11.3 %
NEUTROPHILS # BLD AUTO: 5.12 X10 (3) UL (ref 1.5–7.7)
NEUTROPHILS # BLD AUTO: 5.12 X10(3) UL (ref 1.5–7.7)
NEUTROPHILS NFR BLD AUTO: 53.5 %
OSMOLALITY SERPL CALC.SUM OF ELEC: 278 MOSM/KG (ref 275–295)
PLATELET # BLD AUTO: 82 10(3)UL (ref 150–450)
PLATELETS.RETICULATED NFR BLD AUTO: 7.7 % (ref 0–7)
POTASSIUM SERPL-SCNC: 4 MMOL/L (ref 3.5–5.1)
POTASSIUM SERPL-SCNC: 4 MMOL/L (ref 3.5–5.1)
PROT SERPL-MCNC: 6.4 G/DL (ref 5.7–8.2)
PROTHROMBIN TIME: 23 SECONDS (ref 11.6–14.8)
RBC # BLD AUTO: 2.51 X10(6)UL
RBC #/AREA URNS AUTO: >10 /HPF
SODIUM SERPL-SCNC: 135 MMOL/L (ref 136–145)
WBC # BLD AUTO: 9.6 X10(3) UL (ref 4–11)
WBC #/AREA URNS AUTO: >50 /HPF

## 2024-04-06 PROCEDURE — 99233 SBSQ HOSP IP/OBS HIGH 50: CPT | Performed by: INTERNAL MEDICINE

## 2024-04-06 PROCEDURE — 99233 SBSQ HOSP IP/OBS HIGH 50: CPT | Performed by: HOSPITALIST

## 2024-04-06 NOTE — PLAN OF CARE
PT resting in bed overnight. VSS. Room air. Frequent bladder scans as PT is retaining urine, consistently >600 even after voiding, straight cath x2, only yielding 50 mL each time until flow of urine stopped. MD notified of this, Flomax and lucas ordered, will endorse need for urology consult to day shift RN. No other complaints. All safety measures maintained, bed locked in lowest position, frequent rounding. Family at bedside most of shift.    Problem: Patient Centered Care  Goal: Patient preferences are identified and integrated in the patient's plan of care  Description: Interventions:  - What would you like us to know as we care for you? From home alone, just moved closer to kids  - Provide timely, complete, and accurate information to patient/family  - Incorporate patient and family knowledge, values, beliefs, and cultural backgrounds into the planning and delivery of care  - Encourage patient/family to participate in care and decision-making at the level they choose  - Honor patient and family perspectives and choices  Outcome: Progressing     Problem: Diabetes/Glucose Control  Goal: Glucose maintained within prescribed range  Description: INTERVENTIONS:  - Monitor Blood Glucose as ordered  - Assess for signs and symptoms of hyperglycemia and hypoglycemia  - Administer ordered medications to maintain glucose within target range  - Assess barriers to adequate nutritional intake and initiate nutrition consult as needed  - Instruct patient on self management of diabetes  Outcome: Progressing     Problem: Patient/Family Goals  Goal: Patient/Family Long Term Goal  Description: Patient's Long Term Goal: to return home    Interventions:  - PT/OT eval  -monitor labs  -monitor CIWA  -monitor neuro status    - See additional Care Plan goals for specific interventions  Outcome: Progressing  Goal: Patient/Family Short Term Goal  Description: Patient's Short Term Goal: improved strength    Interventions:   - PT/OT eval and  treat  -monitor labs  -dietary consult    - See additional Care Plan goals for specific interventions  Outcome: Progressing     Problem: PAIN - ADULT  Goal: Verbalizes/displays adequate comfort level or patient's stated pain goal  Description: INTERVENTIONS:  - Encourage pt to monitor pain and request assistance  - Assess pain using appropriate pain scale  - Administer analgesics based on type and severity of pain and evaluate response  - Implement non-pharmacological measures as appropriate and evaluate response  - Consider cultural and social influences on pain and pain management  - Manage/alleviate anxiety  - Utilize distraction and/or relaxation techniques  - Monitor for opioid side effects  - Notify MD/LIP if interventions unsuccessful or patient reports new pain  - Anticipate increased pain with activity and pre-medicate as appropriate  Outcome: Progressing     Problem: SAFETY ADULT - FALL  Goal: Free from fall injury  Description: INTERVENTIONS:  - Assess pt frequently for physical needs  - Identify cognitive and physical deficits and behaviors that affect risk of falls.  - [place] fall precautions as indicated by assessment.  - Educate pt/family on patient safety including physical limitations  - Instruct pt to call for assistance with activity based on assessment  - Modify environment to reduce risk of injury  - Provide assistive devices as appropriate  - Consider OT/PT consult to assist with strengthening/mobility  - Encourage toileting schedule  Outcome: Progressing     Problem: DISCHARGE PLANNING  Goal: Discharge to home or other facility with appropriate resources  Description: INTERVENTIONS:  - Identify barriers to discharge w/pt and caregiver  - Include patient/family/discharge partner in discharge planning  - Arrange for needed discharge resources and transportation as appropriate  - Identify discharge learning needs (meds, wound care, etc)  - Arrange for interpreters to assist at discharge as  needed  - Consider post-discharge preferences of patient/family/discharge partner  - Complete POLST form as appropriate  - Assess patient's ability to be responsible for managing their own health  - Refer to Case Management Department for coordinating discharge planning if the patient needs post-hospital services based on physician/LIP order or complex needs related to functional status, cognitive ability or social support system  Outcome: Progressing     Problem: NEUROLOGICAL - ADULT  Goal: Achieves stable or improved neurological status  Description: INTERVENTIONS  - Assess for and report changes in neurological status  - Initiate measures to prevent increased intracranial pressure  - Maintain blood pressure and fluid volume within ordered parameters to optimize cerebral perfusion and minimize risk of hemorrhage  - Monitor temperature, glucose, and sodium. Initiate appropriate interventions as ordered  Outcome: Progressing  Goal: Absence of seizures  Description: INTERVENTIONS  - Monitor for seizure activity  - Administer anti-seizure medications as ordered  - Monitor neurological status  Outcome: Progressing  Goal: Remains free of injury related to seizure activity  Description: INTERVENTIONS:  - Maintain airway, patient safety  and administer oxygen as ordered  - Monitor patient for seizure activity, document and report duration and description of seizure to MD/LIP  - If seizure occurs, turn patient to side and suction secretions as needed  - Reorient patient post seizure  - Seizure pads on all 4 side rails  - Instruct patient/family to notify RN of any seizure activity  - Instruct patient/family to call for assistance with activity based on assessment  Outcome: Progressing  Goal: Achieves maximal functionality and self care  Description: INTERVENTIONS  - Monitor swallowing and airway patency with patient fatigue and changes in neurological status  - Encourage and assist patient to increase activity and self  care with guidance from PT/OT  - Encourage visually impaired, hearing impaired and aphasic patients to use assistive/communication devices  Outcome: Progressing     Problem: Impaired Functional Mobility  Goal: Achieve highest/safest level of mobility/gait  Description: Interventions:  - Assess patient's functional ability and stability  - Promote increasing activity/tolerance for mobility and gait  - Educate and engage patient/family in tolerated activity level and precautions  Outcome: Progressing

## 2024-04-06 NOTE — CONSULTS
[facility]  part of [facility]      Consult     Christi Tavarez Patient Status:  Inpatient    1960 MRN [id number]   Location [facility] 2W/SW Attending Alvaro Arvizu MD   Hosp Day # 3 PCP ÁNGEL Delgado     Referring Provider: Nolberto  Reason for Consultation: urinary retention    Subjective:    Christi Tavarez is a 63 year old female with urinary retention  She presents with acute inferior Right cerebellar hematoma/bleed. Admitted on 4/3.  She was found to have low urine outputs and required CIC with only small outputs.  However, her bladder scans were showing greater than 600cc.  Colmenares was placed and she drained 800cc overnight    She has liver steatosis and ascites in the abdomen on ultrasound.      She denies previous history of urinary retention.      No abdominal CT imaging done.    Recent Labs   Lab 24  0907 24  0426 24  0334   * 86 99   BUN 8* 7* 7*   CREATSERUM 0.65 0.50* 0.54*   EGFRCR 99 105 103   CA 7.7* 7.6* 7.8*    137 135*   K 3.5 3.4* 4.0  4.0    108 106   CO2 26.0 26.0 26.0           History/Other:      Past Medical History:History reviewed. No pertinent past medical history.     Past Surgical History: History reviewed. No pertinent surgical history.    Social History:  reports that she has never smoked. She has never used smokeless tobacco. She reports current alcohol use. She reports that she does not use drugs.    Family History: No family history on file.    Allergies: No Known Allergies    Medications:    No current facility-administered medications on file prior to encounter.     Current Outpatient Medications on File Prior to Encounter   Medication Sig Dispense Refill    PARoxetine 10 MG Oral Tab Take 1 tablet (10 mg total) by mouth every morning.      glipiZIDE 5 MG Oral Tab Take 1 tablet (5 mg total) by mouth every morning before breakfast. With food         Review of Systems:   Review of systems was completed.  Pertinent  positives and negatives noted in the HPI.    Objective:     /87 (BP Location: Left arm)   Pulse 120   Temp 98.3 °F (36.8 °C)   Resp 13   Ht 58\"   Wt 161 lb 13.1 oz (73.4 kg)   SpO2 91%   BMI 33.82 kg/m²     General: No acute distress.  Alert ,         Respiratory: No wheezes, no rhonchi, clear to auscultation bilaterally,    Cardiovascular: S1, S2.  Abdomen: Soft, nontender, nondistended.    Extremities: No edema, no cyanosis.    Results:    Labs:  Laboratory data reviewed.      Selected labs - last 24 hours:  Endo  Lytes  Renal   Glu 99  Na 135 Ca 7.8  BUN 7   POC Gluc  174  K 4.0; 4.0 PO4 -  Cr 0.54   A1c -  Cl 106 Mg -  eGFR 103   TSH -  CO2 26.0         LFT  CBC  Other   AST 73  WBC 9.6  PTT - Procal -   ALT 26  Hb 9.6  INR 1.90 CRP -   APk 185  Hct 26.8  Trop - D dim -   T simona 5.0  PLT 82.0  pBNP -  BNP -  - Ferritin  -   Prot 6.4    CK  - Lactate  -   Alb 2.1    LDL  - COVID  -       Imaging: Imaging data reviewed in Epic.    Assessment & Plan:    #62 yo female  Urinary retention   Cic by nursing yield very little urine post void   Bladder scanner is likely reading ascites in the abdomen   Ok to remove lucas   If concern for urinary retention - please obtain formal bladder ultrasound   Normal renal function  Urology will sign off.  Please call if questions.      Plan of care discussed with Erika - nursing.      Niko Zuluaga MD  4/6/2024    Supplementary Documentation:

## 2024-04-06 NOTE — PROGRESS NOTES
[facility]  Progress Note     Christi Tavarez  : 1960    Status: Inpatient  Day #: 3    Attending: Alvaro Arvizu MD  PCP: ÁNGEL Delgado     Assessment and Plan:    Generalized weakness  Recurrent falls  Moderate acute to subacute inferior right cerebellar hematoma vs infarct vs mass with hemorrhage  -MRI brain results reviewed - rec repeat MRI 3 months  -neuro consult appreciated  -Neurochecks stable  -TSH elevated, normal free T4  -vit B12 ok  -PT/OT eval  -PMR consulted - reportedly recommending acute rehab. SW following     Hematemesis  -resolved  -GI consult appreciated  -monitor hgb  -cont PPI  -holding off on endoscopy for now     Current alcohol use  -Last alcohol use 2 days prior to presentation  -CIWA scores low. Ativan prn.     Questionable Acute UTI  -no culture sent unfortunately. UA without pyuria  -was put on ceftriaxone on admission, now off    Acute urinary retention  -likely due to inactivity  -straight cath'ed per protocol, now lucas in place  -2 BMs documented yesterday  -PT/OT  -flomax started  -urology consulted    Acute kidney injury - resolved  Mild hyponatremia - resolved  Hypochloremia- resolved  Metabolic acidosis - resolved  -Suspecting secondary to decreased oral intake as well as worsened by alcohol use.  -ok to stop IVF     Thrombocytopenia  -Suspecting secondary to alcohol use  -downtrend likely dilutional     NIDDM type II  -Patient stopped taking metformin months ago.  -Last hemoglobin A1c  5.5  -ISS  -monitor BG - low this AM before breakfast     H/o hyperlipidemia  -Patient stopped taking statin months ago.     H/o anxiety  -cont lexapro     DVT Mechanical Prophylaxis:   SCDs,    DVT Pharmacologic Prophylaxis   Medication   None               Subjective:      Initial Chief Complaint:  weakness    No CP, no SOB, no abd pain, no n/v.       Objective:      Temp:  [98 °F (36.7 °C)-99.5 °F (37.5 °C)] 98.3 °F (36.8 °C)  Pulse:  [] 120  Resp:  [12-18] 13  BP:  ()/(50-87) 123/87  SpO2:  [91 %-100 %] 91 %  General:  Alert, no distress  HEENT:  Normocephalic, atraumatic  Cardiac:  Regular rate, regular rhythm  Pulmonary:  Clear to auscultation bilaterally, respirations unlabored  Gastrointestinal:  Soft, non-tender, normal bowel sounds  Musculoskeletal:  No joint swelling  Extremities:  No edema, no cyanosis, no clubbing  Neurologic:  nonfocal  Psychiatric:  Normal affect, calm and appropriate    Intake/Output Summary (Last 24 hours) at 4/6/2024 1446  Last data filed at 4/6/2024 0600  Gross per 24 hour   Intake --   Output 1200 ml   Net -1200 ml         Recent Labs   Lab 04/04/24  0907 04/05/24  0426 04/06/24  0334   WBC 8.7 8.1 9.6   HGB 10.1* 9.9* 9.6*   HCT 27.9* 27.2* 26.8*   PLT 83.0* 78.0* 82.0*   RBC 2.62* 2.60* 2.51*   .5* 104.6* 106.8*   MCH 38.5* 38.1* 38.2*   MCHC 36.2 36.4 35.8   RDW 15.8* 15.8* 16.3*   NEPRELIM 6.42 5.09 5.12     Recent Labs   Lab 04/03/24  0023 04/03/24  0539 04/03/24  1309 04/03/24  1434 04/04/24  0906 04/04/24  0907 04/05/24  0426 04/06/24  0334 04/06/24  1015   BUN 13  --   --   --   --  8* 7* 7*  --    CREATSERUM 1.10*  --   --   --   --  0.65 0.50* 0.54*  --    CA 8.3*  --   --   --   --  7.7* 7.6* 7.8*  --    ALB 2.8*  --   --   --   --  2.2* 2.1* 2.1*  --    *  --   --   --   --  136 137 135*  --    K 3.9  --   --   --   --  3.5 3.4* 4.0  4.0  --    CL 97*  --   --   --   --  105 108 106  --    CO2 20.0*  --   --   --   --  26.0 26.0 26.0  --    *  --   --   --   --  195* 86 99  --    MG 1.6  --   --   --   --  2.0  --   --   --    BILT 5.9*  --   --   --   --  6.7* 5.7* 5.0*  --    *  --   --   --   --  95* 71* 73*  --    ALT 47  --   --   --   --  33 36 26  --    ALKPHO 248*  --   --   --   --  191* 186* 185*  --    TP 8.3*  --   --   --   --  6.7 6.3 6.4  --    INR  --   --    < >  --   --  2.68* 2.20*  --  1.90*   TSH 8.140*  --   --   --   --   --   --   --   --    T4F 1.0  --   --   --   --   --    --   --   --    CK  --  138  --   --   --   --   --   --   --    B12  --   --   --  >2,000* >2,000*  --   --   --   --    ETOH  --  <3  --   --   --   --   --   --   --     < > = values in this interval not displayed.       No results found.      Medications:   traZODone  25 mg Oral Nightly    tamsulosin  0.4 mg Oral Daily @ 0700    pantoprazole  40 mg Intravenous Q12H    insulin aspart  1-5 Units Subcutaneous TID CC and HS    thiamine  100 mg Oral Daily    multivitamin with minerals  1 tablet Oral Daily    folic acid  1 mg Oral Daily    chlordiazePOXIDE  5 mg Oral BID    lamoTRIgine  25 mg Oral BID    escitalopram  10 mg Oral Nightly      PRN Meds: polyethylene glycol (PEG 3350), sennosides, bisacodyl, fleet enema, ondansetron, prochlorperazine, glucose **OR** glucose **OR** glucose-vitamin C **OR** dextrose **OR** glucose **OR** glucose **OR** glucose-vitamin C, LORazepam **OR** LORazepam, LORazepam, LORazepam, LORazepam    Supplementary Documentation:        [facility]. Time spent on chart/note review, review of labs/imaging, discussion with patient, physical exam, discussion with staff, consultants, coordinating care, writing progress note, discussion of plan of care.      Alvaro Arvizu MD

## 2024-04-06 NOTE — PLAN OF CARE
Patient transferred from PCCU. Upon arrival this RN noted hematuria. Dr. Zuluaga notified, see orders. Plan is for CT urogram. Patient and family updated on plan of care.    Problem: Patient Centered Care  Goal: Patient preferences are identified and integrated in the patient's plan of care  Description: Interventions:  - What would you like us to know as we care for you? From home alone, just moved closer to kids  - Provide timely, complete, and accurate information to patient/family  - Incorporate patient and family knowledge, values, beliefs, and cultural backgrounds into the planning and delivery of care  - Encourage patient/family to participate in care and decision-making at the level they choose  - Honor patient and family perspectives and choices  4/6/2024 1845 by Brenda Christopher RN  Outcome: Progressing  4/6/2024 1845 by Brenda Christopher RN  Outcome: Progressing     Problem: Diabetes/Glucose Control  Goal: Glucose maintained within prescribed range  Description: INTERVENTIONS:  - Monitor Blood Glucose as ordered  - Assess for signs and symptoms of hyperglycemia and hypoglycemia  - Administer ordered medications to maintain glucose within target range  - Assess barriers to adequate nutritional intake and initiate nutrition consult as needed  - Instruct patient on self management of diabetes  4/6/2024 1845 by Brenda Christopher RN  Outcome: Progressing  4/6/2024 1845 by Brenda Christopher RN  Outcome: Progressing     Problem: Patient/Family Goals  Goal: Patient/Family Long Term Goal  Description: Patient's Long Term Goal: to return home    Interventions:  - PT/OT eval  -monitor labs  -monitor CIWA  -monitor neuro status    - See additional Care Plan goals for specific interventions  Outcome: Progressing  Goal: Patient/Family Short Term Goal  Description: Patient's Short Term Goal: improved strength    Interventions:   - PT/OT eval and treat  -monitor labs  -dietary consult    - See additional Care Plan goals for  specific interventions  Outcome: Progressing     Problem: PAIN - ADULT  Goal: Verbalizes/displays adequate comfort level or patient's stated pain goal  Description: INTERVENTIONS:  - Encourage pt to monitor pain and request assistance  - Assess pain using appropriate pain scale  - Administer analgesics based on type and severity of pain and evaluate response  - Implement non-pharmacological measures as appropriate and evaluate response  - Consider cultural and social influences on pain and pain management  - Manage/alleviate anxiety  - Utilize distraction and/or relaxation techniques  - Monitor for opioid side effects  - Notify MD/LIP if interventions unsuccessful or patient reports new pain  - Anticipate increased pain with activity and pre-medicate as appropriate  Outcome: Progressing     Problem: SAFETY ADULT - FALL  Goal: Free from fall injury  Description: INTERVENTIONS:  - Assess pt frequently for physical needs  - Identify cognitive and physical deficits and behaviors that affect risk of falls.  - [place] fall precautions as indicated by assessment.  - Educate pt/family on patient safety including physical limitations  - Instruct pt to call for assistance with activity based on assessment  - Modify environment to reduce risk of injury  - Provide assistive devices as appropriate  - Consider OT/PT consult to assist with strengthening/mobility  - Encourage toileting schedule  Outcome: Progressing     Problem: DISCHARGE PLANNING  Goal: Discharge to home or other facility with appropriate resources  Description: INTERVENTIONS:  - Identify barriers to discharge w/pt and caregiver  - Include patient/family/discharge partner in discharge planning  - Arrange for needed discharge resources and transportation as appropriate  - Identify discharge learning needs (meds, wound care, etc)  - Arrange for interpreters to assist at discharge as needed  - Consider post-discharge preferences of patient/family/discharge  partner  - Complete POLST form as appropriate  - Assess patient's ability to be responsible for managing their own health  - Refer to Case Management Department for coordinating discharge planning if the patient needs post-hospital services based on physician/LIP order or complex needs related to functional status, cognitive ability or social support system  Outcome: Progressing     Problem: NEUROLOGICAL - ADULT  Goal: Achieves stable or improved neurological status  Description: INTERVENTIONS  - Assess for and report changes in neurological status  - Initiate measures to prevent increased intracranial pressure  - Maintain blood pressure and fluid volume within ordered parameters to optimize cerebral perfusion and minimize risk of hemorrhage  - Monitor temperature, glucose, and sodium. Initiate appropriate interventions as ordered  Outcome: Progressing  Goal: Absence of seizures  Description: INTERVENTIONS  - Monitor for seizure activity  - Administer anti-seizure medications as ordered  - Monitor neurological status  Outcome: Progressing  Goal: Remains free of injury related to seizure activity  Description: INTERVENTIONS:  - Maintain airway, patient safety  and administer oxygen as ordered  - Monitor patient for seizure activity, document and report duration and description of seizure to MD/LIP  - If seizure occurs, turn patient to side and suction secretions as needed  - Reorient patient post seizure  - Seizure pads on all 4 side rails  - Instruct patient/family to notify RN of any seizure activity  - Instruct patient/family to call for assistance with activity based on assessment  Outcome: Progressing  Goal: Achieves maximal functionality and self care  Description: INTERVENTIONS  - Monitor swallowing and airway patency with patient fatigue and changes in neurological status  - Encourage and assist patient to increase activity and self care with guidance from PT/OT  - Encourage visually impaired, hearing  impaired and aphasic patients to use assistive/communication devices  Outcome: Progressing     Problem: Impaired Functional Mobility  Goal: Achieve highest/safest level of mobility/gait  Description: Interventions:  - Assess patient's functional ability and stability  - Promote increasing activity/tolerance for mobility and gait  - Educate and engage patient/family in tolerated activity level and precautions    Outcome: Progressing

## 2024-04-06 NOTE — PROGRESS NOTES
[facility]     Gastroenterology Progress Note    Christi Tavarez Patient Status:  Inpatient    1960 MRN [id number]   Location [facility] 2W/SW Attending Alvaro Arvizu MD   Hosp Day # 3 PCP ÁNGEL Delgado       Subjective:   No complaints this morning. No abd pain, n/v. No bm. No vomiting.     Objective:   Blood pressure 123/87, pulse 120, temperature 98.3 °F (36.8 °C), resp. rate 13, height 4' 10\" (1.473 m), weight 161 lb 13.1 oz (73.4 kg), SpO2 91%. Body mass index is 33.82 kg/m².    General: awake, alert and oriented, no acute distress  HEENT: moist mucus membranes  PULM: no conversational dyspnea  CARDIOVASCULAR: regular rate and rhythm, the extremities are warm and well perfused  GI: soft, non-tender, non-distended, + BS, no rebound/guarding   EXTREMITIES: no edema, moving all extremities  SKIN: no visible rash  NEURO: appropriate and interactive    Assessment and Plan:   63F with long standing ETOH use who presented after two weeks of generalized weakness and recurrent falls, found to have acute ICH. GI consulted for hematemesis and elevated LFTs.    # Hematemesis, self limited and resolved  - likely emetogenic injury such as esophagitis vs self limited margie burt  - hgb stable at around 10  - continue ppi bid  - diet as tolerated  - no plans for inpatient endoscopic evaluation unless rebleeding given acute ICH    # Acute alcohol hepatitis in setting of alcohol induced steatohepatitis, alcohol ketoacidosis.   - Vitamin K 10mg PO daily x 3 days for malnutrition.   - Daily LFTs and INR.   - Infectious work-up given mildly elevated leukocytosis.   - Nutrition is paramount.   - INR and bilirubin improving with supportive care, nutrition, and time away from etoh. Hold off on steroids at this time. Anticipate INR will continue to decline during admission s/p vitamin K.      GI will sign off, please call with questions. She may follow-up GI clinic upon discharge    Leonela Castellon,  MD [facility]      Results:     Lab Results   Component Value Date    WBC 9.6 04/06/2024    HGB 9.6 (L) 04/06/2024    HCT 26.8 (L) 04/06/2024    PLT 82.0 (L) 04/06/2024    CREATSERUM 0.54 (L) 04/06/2024    BUN 7 (L) 04/06/2024     (L) 04/06/2024    K 4.0 04/06/2024    K 4.0 04/06/2024     04/06/2024    CO2 26.0 04/06/2024    GLU 99 04/06/2024    CA 7.8 (L) 04/06/2024    ALB 2.1 (L) 04/06/2024    ALKPHO 185 (H) 04/06/2024    BILT 5.0 (H) 04/06/2024    TP 6.4 04/06/2024    AST 73 (H) 04/06/2024    ALT 26 04/06/2024    INR 1.90 (H) 04/06/2024    T4F 1.0 04/03/2024    TSH 8.140 (H) 04/03/2024    MG 2.0 04/04/2024     04/03/2024    B12 >2,000 (H) 04/04/2024    ETOH <3 04/03/2024       No results found.

## 2024-04-06 NOTE — PHYSICAL THERAPY NOTE
PHYSICAL THERAPY TREATMENT NOTE - INPATIENT     Room Number: 236/236-A       Presenting Problem: LE weakness, R head trauma & s/p falls       Problem List  Principal Problem:    Fall, initial encounter  Active Problems:    Fall    Cerebellar hemorrhage (HCC)    Major depressive disorder, recurrent episode, moderate degree (HCC)    Alcohol dependence, continuous (HCC)    Alcohol withdrawal syndrome, uncomplicated ([facility])      PHYSICAL THERAPY ASSESSMENT   Patient demonstrates good  progress this session, goals  remain in progress.    Patient continues to function below baseline with bed mobility, transfers, gait, stair negotiation, maintaining seated position, standing prolonged periods, and performing household tasks.  Contributing factors to remaining limitations include decreased functional strength, decreased endurance/aerobic capacity, pain, impaired sitting and standing static and dynamic balance, impaired motor planning, decreased muscular endurance, difficulty maintaining precautions, cognitive deficits (follows commands with increased time), and medical status.  Next session anticipate patient to progress bed mobility, transfers, gait, maintaining seated position, and standing prolonged periods.  Physical Therapy will continue to follow patient for duration of hospitalization.    Patient continues to benefit from continued skilled PT services: to facilitate return to prior level of function as patient demonstrates high motivation with excellent tolerance to an intensive therapy program .    PLAN  PT Treatment Plan: Bed mobility;Coordination;Endurance;Energy conservation;Patient education;Gait training;Neuromuscular re-educate;Range of motion;Strengthening;Transfer training;Balance training  Frequency (Obs): 5x/week    SUBJECTIVE  \"I need to eat breakfast\"     OBJECTIVE  Precautions: Bed/chair alarm      PAIN ASSESSMENT   Ratin          BALANCE  Static Sitting: Poor +  Dynamic Sitting: Poor  Static  Standing: Poor -  Dynamic Standing: Poor +    ACTIVITY TOLERANCE  Pulse: 115  Heart Rate Source: Monitor  Resp: 18  BP: 130/66 (123/87mmHg in sitting)  BP Location: Left arm  BP Method: Automatic  Patient Position: Semi-Pena     O2 WALK  Oxygen Therapy  SPO2% on Room Air at Rest: 94  SPO2% Ambulation on Room Air: 94    AM-PAC '6-Clicks' INPATIENT SHORT FORM - BASIC MOBILITY  How much difficulty does the patient currently have...  Patient Difficulty: Turning over in bed (including adjusting bedclothes, sheets and blankets)?: A Lot   Patient Difficulty: Sitting down on and standing up from a chair with arms (e.g., wheelchair, bedside commode, etc.): A Lot   Patient Difficulty: Moving from lying on back to sitting on the side of the bed?: A Lot   How much help from another person does the patient currently need...   Help from Another: Moving to and from a bed to a chair (including a wheelchair)?: A Lot   Help from Another: Need to walk in hospital room?: A Lot   Help from Another: Climbing 3-5 steps with a railing?: Total     AM-PAC Score:  Raw Score: 11   Approx Degree of Impairment: 72.57%   Standardized Score (AM-PAC Scale): 33.86   CMS Modifier (G-Code): CL    FUNCTIONAL ABILITY STATUS  Functional Mobility/Gait Assessment  Gait Assistance: Maximum assistance (2nd assist with chair follow)  Distance (ft): 10ft  Assistive Device: Rolling walker  Pattern: Shuffle (decreased lali speed, unsteady, posterior lean, narrow base of support. Cues for upright posture and increasing step length. Distance ambulated limited by c/o dizziness.)  Supine to Sit: moderate assist. Supine>sit. Task was labored requiring increased time to complete. Assist with LE's and trunk. Sat EOB for 8 minutes requiring initial Mod for balance improving to Min A x 1 as time in sitting increased.   Sit to Stand: maximum assist. Cues for appropriate UE positioning with transitional movements. Posterior lean. Cues for anterior weight dispersion.  Mod A x 1 to maintain static standing balance.     Additional information: Patient received supine in bed with dtr present. RN approved activity. Coordinated session with OT. Therapist educated pt on POC and physiological benefits of mobilization. Patient agreeable to participate. Follows commands with increased time. Denies pain. Provided with ice pack for R knee 2* bruising/swelling. Vitals stable throughout    The patient's Approx Degree of Impairment: 72.57% has been calculated based on documentation in the [facility] '6 clicks' Inpatient Daily Activity Short Form.  Research supports that patients with this level of impairment may benefit from rehab.  Final disposition will be made by interdisciplinary medical team.    THERAPEUTIC EXERCISES  Lower Extremity Ankle pumps     Position Sitting and Supine       Patient End of Session: Up in chair;Needs met;Call light within reach;RN aware of session/findings;Alarm set;Family present    CURRENT GOALS   Goals to be met by:   Patient Goal Patient's self-stated goal is: walk   Goal #1 Patient is able to demonstrate supine - sit EOB @ level: minimum assistance      Goal #1   Current Status Mod A x 1 supine>sit   Goal #2 Patient is able to demonstrate transfers Sit to/from Stand at assistance level: minimum assistance       Goal #2  Current Status Max A x 1 with RW   Goal #3 Patient is able to ambulate 50 feet with assist device: RW at assistance level: supervision   Goal #3   Current Status Max A x 1 with RW; 10ft (2nd assist for close chair follow)   Goal #4 Patient will negotiate  stairs/one curb w/ assistive device and supervision   Goal #4   Current Status NT   Goal #5 Patient to demonstrate independence with home activity/exercise instructions provided to patient in preparation for discharge.   Goal #5   Current Status ongoing     Gait Trainin minutes  Therapeutic Activity: 15 minutes

## 2024-04-07 ENCOUNTER — APPOINTMENT (OUTPATIENT)
Dept: CT IMAGING | Facility: HOSPITAL | Age: 64
DRG: 065 | End: 2024-04-07
Attending: UROLOGY
Payer: COMMERCIAL

## 2024-04-07 ENCOUNTER — APPOINTMENT (OUTPATIENT)
Dept: CT IMAGING | Facility: HOSPITAL | Age: 64
End: 2024-04-07
Attending: UROLOGY
Payer: COMMERCIAL

## 2024-04-07 LAB
ANION GAP SERPL CALC-SCNC: 4 MMOL/L (ref 0–18)
BUN BLD-MCNC: 8 MG/DL (ref 9–23)
BUN/CREAT SERPL: 12.7 (ref 10–20)
CALCIUM BLD-MCNC: 8.4 MG/DL (ref 8.7–10.4)
CHLORIDE SERPL-SCNC: 106 MMOL/L (ref 98–112)
CO2 SERPL-SCNC: 25 MMOL/L (ref 21–32)
CREAT BLD-MCNC: 0.63 MG/DL
DEPRECATED RDW RBC AUTO: 65.5 FL (ref 35.1–46.3)
EGFRCR SERPLBLD CKD-EPI 2021: 100 ML/MIN/1.73M2 (ref 60–?)
ERYTHROCYTE [DISTWIDTH] IN BLOOD BY AUTOMATED COUNT: 16.7 % (ref 11–15)
GLUCOSE BLD-MCNC: 104 MG/DL (ref 70–99)
GLUCOSE BLDC GLUCOMTR-MCNC: 115 MG/DL (ref 70–99)
GLUCOSE BLDC GLUCOMTR-MCNC: 162 MG/DL (ref 70–99)
GLUCOSE BLDC GLUCOMTR-MCNC: 187 MG/DL (ref 70–99)
GLUCOSE BLDC GLUCOMTR-MCNC: 210 MG/DL (ref 70–99)
HCT VFR BLD AUTO: 26.4 %
HGB BLD-MCNC: 9.7 G/DL
INR BLD: 2 (ref 0.8–1.2)
MCH RBC QN AUTO: 39.6 PG (ref 26–34)
MCHC RBC AUTO-ENTMCNC: 36.7 G/DL (ref 31–37)
MCV RBC AUTO: 107.8 FL
OSMOLALITY SERPL CALC.SUM OF ELEC: 279 MOSM/KG (ref 275–295)
PLATELET # BLD AUTO: 82 10(3)UL (ref 150–450)
PLATELETS.RETICULATED NFR BLD AUTO: 6.1 % (ref 0–7)
POTASSIUM SERPL-SCNC: 4.3 MMOL/L (ref 3.5–5.1)
PROTHROMBIN TIME: 23.9 SECONDS (ref 11.6–14.8)
RBC # BLD AUTO: 2.45 X10(6)UL
SODIUM SERPL-SCNC: 135 MMOL/L (ref 136–145)
WBC # BLD AUTO: 10.6 X10(3) UL (ref 4–11)

## 2024-04-07 PROCEDURE — 99233 SBSQ HOSP IP/OBS HIGH 50: CPT | Performed by: HOSPITALIST

## 2024-04-07 PROCEDURE — 74178 CT ABD&PLV WO CNTR FLWD CNTR: CPT | Performed by: UROLOGY

## 2024-04-07 NOTE — PROGRESS NOTES
[facility]  part of [facility]    Progress Note    Christi Tavarez Patient Status:  Inpatient    1960 MRN [id number]   Location [facility] 3W/SW Attending Alvaro Arvizu MD   Hosp Day # 4 PCP ÁNGEL Delgado     Subjective:   Christi Tavarez is a(n) 63 year old female     Hematuria yesterday   She is anticoagulated  CT urogram reviewed  No stones  Has significant ascites.      She is comfortable    Objective:   Blood pressure 99/49, pulse 102, temperature 99.1 °F (37.3 °C), temperature source Oral, resp. rate 16, height 58\", weight 158 lb (71.7 kg), SpO2 95%.    Awake alert  Abd soft,   Colmenares clear    Results:   Lab Results   Component Value Date    WBC 9.6 2024    HGB 9.6 (L) 2024    HCT 26.8 (L) 2024    PLT 82.0 (L) 2024    CREATSERUM 0.63 2024    BUN 8 (L) 2024     (L) 2024    K 4.3 2024     2024    CO2 25.0 2024     (H) 2024    CA 8.4 (L) 2024    ALB 2.1 (L) 2024    ALKPHO 185 (H) 2024    BILT 5.0 (H) 2024    TP 6.4 2024    AST 73 (H) 2024    ALT 26 2024    INR 2.00 (H) 2024    T4F 1.0 2024    TSH 8.140 (H) 2024    MG 2.0 2024    TROPHS 5 2024     2024    B12 >2,000 (H) 2024    ETOH <3 2024       CT UROGRAM (W+WO) (CPT=74178)    Result Date: 2024  CONCLUSION:   Decompressed bladder which limits evaluation.  Hyperdensity seen within the bladder lumen suggestive of blood byproducts.  No renal calculus, enhancing renal lesion, hydronephrosis or filling defects within the opacified collecting systems. If there is continued hematuria, direct visualization could be performed per clinical discretion.  Cirrhosis.  Moderate volume of abdominal and pelvic ascites.  Diffuse large and small bowel wall thickening suggestive of reactive etiology from patient's cirrhosis and presumed low protein state.  A diffuse  widespread enterocolitis is felt less likely but is also within the differential.  Anasarca  No obstruction.  Diverticulosis without diverticulitis.  Cholelithiasis with gallbladder sludge without CT evidence of acute cholecystitis.  Multiple other incidental findings as described in the body of the report.    Dictated by (CST): Johnson Waterman MD on 4/07/2024 at 10:07 AM     Finalized by (CST): Johnson Waterman MD on 4/07/2024 at 10:14 AM               Assessment & Plan:       Urinary retention -    Voiding well   Bladder scan is seeing the ascites in the pelvis  Hematuria, gross   Likely due to lucas    Clear now   CT urogram reviewed     DC lucas  Urology will sign off    Discussed with patient and nursing    MD Niko Petersen MD  4/7/2024

## 2024-04-07 NOTE — PLAN OF CARE
Patient is alert and oriented x4 but forgetful at times. Patient ambulated to chair with two assist - unsteady gait and shuffling steps. CT urogram completed. Colmenares catheter removed.  Plan is for possible discharge to rehab facility in the morning. Patient and family updated on plan of care.    Problem: Patient Centered Care  Goal: Patient preferences are identified and integrated in the patient's plan of care  Description: Interventions:  - What would you like us to know as we care for you? From home alone, just moved closer to kids  - Provide timely, complete, and accurate information to patient/family  - Incorporate patient and family knowledge, values, beliefs, and cultural backgrounds into the planning and delivery of care  - Encourage patient/family to participate in care and decision-making at the level they choose  - Honor patient and family perspectives and choices  Outcome: Progressing     Problem: Diabetes/Glucose Control  Goal: Glucose maintained within prescribed range  Description: INTERVENTIONS:  - Monitor Blood Glucose as ordered  - Assess for signs and symptoms of hyperglycemia and hypoglycemia  - Administer ordered medications to maintain glucose within target range  - Assess barriers to adequate nutritional intake and initiate nutrition consult as needed  - Instruct patient on self management of diabetes  Outcome: Progressing     Problem: Patient/Family Goals  Goal: Patient/Family Long Term Goal  Description: Patient's Long Term Goal: to return home    Interventions:  - PT/OT eval  -monitor labs  -monitor CIWA  -monitor neuro status    - See additional Care Plan goals for specific interventions  Outcome: Progressing  Goal: Patient/Family Short Term Goal  Description: Patient's Short Term Goal: improved strength    Interventions:   - PT/OT eval and treat  -monitor labs  -dietary consult    - See additional Care Plan goals for specific interventions  Outcome: Progressing     Problem: PAIN -  ADULT  Goal: Verbalizes/displays adequate comfort level or patient's stated pain goal  Description: INTERVENTIONS:  - Encourage pt to monitor pain and request assistance  - Assess pain using appropriate pain scale  - Administer analgesics based on type and severity of pain and evaluate response  - Implement non-pharmacological measures as appropriate and evaluate response  - Consider cultural and social influences on pain and pain management  - Manage/alleviate anxiety  - Utilize distraction and/or relaxation techniques  - Monitor for opioid side effects  - Notify MD/LIP if interventions unsuccessful or patient reports new pain  - Anticipate increased pain with activity and pre-medicate as appropriate  Outcome: Progressing     Problem: SAFETY ADULT - FALL  Goal: Free from fall injury  Description: INTERVENTIONS:  - Assess pt frequently for physical needs  - Identify cognitive and physical deficits and behaviors that affect risk of falls.  - [place] fall precautions as indicated by assessment.  - Educate pt/family on patient safety including physical limitations  - Instruct pt to call for assistance with activity based on assessment  - Modify environment to reduce risk of injury  - Provide assistive devices as appropriate  - Consider OT/PT consult to assist with strengthening/mobility  - Encourage toileting schedule  Outcome: Progressing     Problem: DISCHARGE PLANNING  Goal: Discharge to home or other facility with appropriate resources  Description: INTERVENTIONS:  - Identify barriers to discharge w/pt and caregiver  - Include patient/family/discharge partner in discharge planning  - Arrange for needed discharge resources and transportation as appropriate  - Identify discharge learning needs (meds, wound care, etc)  - Arrange for interpreters to assist at discharge as needed  - Consider post-discharge preferences of patient/family/discharge partner  - Complete POLST form as appropriate  - Assess patient's ability  to be responsible for managing their own health  - Refer to Case Management Department for coordinating discharge planning if the patient needs post-hospital services based on physician/LIP order or complex needs related to functional status, cognitive ability or social support system  Outcome: Progressing     Problem: NEUROLOGICAL - ADULT  Goal: Achieves stable or improved neurological status  Description: INTERVENTIONS  - Assess for and report changes in neurological status  - Initiate measures to prevent increased intracranial pressure  - Maintain blood pressure and fluid volume within ordered parameters to optimize cerebral perfusion and minimize risk of hemorrhage  - Monitor temperature, glucose, and sodium. Initiate appropriate interventions as ordered  Outcome: Progressing  Goal: Absence of seizures  Description: INTERVENTIONS  - Monitor for seizure activity  - Administer anti-seizure medications as ordered  - Monitor neurological status  Outcome: Progressing  Goal: Remains free of injury related to seizure activity  Description: INTERVENTIONS:  - Maintain airway, patient safety  and administer oxygen as ordered  - Monitor patient for seizure activity, document and report duration and description of seizure to MD/LIP  - If seizure occurs, turn patient to side and suction secretions as needed  - Reorient patient post seizure  - Seizure pads on all 4 side rails  - Instruct patient/family to notify RN of any seizure activity  - Instruct patient/family to call for assistance with activity based on assessment  Outcome: Progressing  Goal: Achieves maximal functionality and self care  Description: INTERVENTIONS  - Monitor swallowing and airway patency with patient fatigue and changes in neurological status  - Encourage and assist patient to increase activity and self care with guidance from PT/OT  - Encourage visually impaired, hearing impaired and aphasic patients to use assistive/communication devices  Outcome:  Progressing     Problem: Impaired Functional Mobility  Goal: Achieve highest/safest level of mobility/gait  Description: Interventions:  - Assess patient's functional ability and stability  - Promote increasing activity/tolerance for mobility and gait  - Educate and engage patient/family in tolerated activity level and precautions    Outcome: Progressing

## 2024-04-07 NOTE — PROGRESS NOTES
[facility]  Progress Note     Christi Tavarez  : 1960    Status: Inpatient  Day #: 4    Attending: Alvaro Arvizu MD  PCP: ÁNGEL Delgado     Assessment and Plan:    Generalized weakness  Recurrent falls  Moderate acute to subacute inferior right cerebellar hematoma vs infarct vs mass with hemorrhage  -MRI brain results reviewed - rec repeat MRI 3 months  -neuro consult appreciated  -Neurochecks stable  -TSH elevated, normal free T4  -vit B12 ok  -PT/OT eval  -PMR consulted - reportedly recommending acute rehab. SW following     Hematemesis  -resolved  -GI consult appreciated  -monitor hgb  -cont PPI  -holding off on endoscopy for now     Current alcohol use  -Last alcohol use 2 days prior to presentation  -CIWA scores low. Ativan prn.     Questionable Acute UTI  -no culture sent unfortunately. UA without pyuria  -was put on ceftriaxone on admission, now off    Acute urinary retention   Hematuria - likely lucas trauma  -appreciate urology consult  -bladder scans likely false positive due to ascites  -CT urogram reviewed  -lucas removed    Acute kidney injury - resolved  Mild hyponatremia - resolved  Hypochloremia- resolved  Metabolic acidosis - resolved  -Suspecting secondary to decreased oral intake as well as worsened by alcohol use.  -off IVF     Thrombocytopenia  -Suspecting secondary to alcohol use  -downtrend likely dilutional, stable now     NIDDM type II  -Patient stopped taking metformin months ago.  -Last hemoglobin A1c  5.5  -ISS  -monitor BG     H/o hyperlipidemia  -Patient stopped taking statin months ago.     H/o anxiety  -cont lexapro     DVT Mechanical Prophylaxis:   SCDs,    DVT Pharmacologic Prophylaxis   Medication   None               Subjective:      Initial Chief Complaint:  weakness    No CP, no SOB, no abd pain, no n/v.       Objective:      Temp:  [97.6 °F (36.4 °C)-99.3 °F (37.4 °C)] 98.3 °F (36.8 °C)  Pulse:  [] 102  Resp:  [16-18] 16  BP: ()/(49-66)  104/55  SpO2:  [92 %-100 %] 97 %  General:  Alert, no distress  HEENT:  Normocephalic, atraumatic  Cardiac:  Regular rate, regular rhythm  Pulmonary:  Clear to auscultation bilaterally, respirations unlabored  Gastrointestinal:  Soft, non-tender, normal bowel sounds  Musculoskeletal:  No joint swelling  Extremities:  No edema, no cyanosis, no clubbing  Neurologic:  nonfocal  Psychiatric:  Normal affect, calm and appropriate    Intake/Output Summary (Last 24 hours) at 4/7/2024 1353  Last data filed at 4/7/2024 0900  Gross per 24 hour   Intake 400 ml   Output 650 ml   Net -250 ml         Recent Labs   Lab 04/04/24  0907 04/05/24  0426 04/06/24  0334 04/07/24  0857   WBC 8.7 8.1 9.6 10.6   HGB 10.1* 9.9* 9.6* 9.7*   HCT 27.9* 27.2* 26.8* 26.4*   PLT 83.0* 78.0* 82.0* 82.0*   RBC 2.62* 2.60* 2.51* 2.45*   .5* 104.6* 106.8* 107.8*   MCH 38.5* 38.1* 38.2* 39.6*   MCHC 36.2 36.4 35.8 36.7   RDW 15.8* 15.8* 16.3* 16.7*   NEPRELIM 6.42 5.09 5.12  --      Recent Labs   Lab 04/03/24  0023 04/03/24  0539 04/03/24  1309 04/03/24  1434 04/04/24  0906 04/04/24  0907 04/05/24  0426 04/06/24  0334 04/06/24  1015 04/07/24  0857   BUN 13  --   --   --   --  8* 7* 7*  --  8*   CREATSERUM 1.10*  --   --   --   --  0.65 0.50* 0.54*  --  0.63   CA 8.3*  --   --   --   --  7.7* 7.6* 7.8*  --  8.4*   ALB 2.8*  --   --   --   --  2.2* 2.1* 2.1*  --   --    *  --   --   --   --  136 137 135*  --  135*   K 3.9  --   --   --   --  3.5 3.4* 4.0  4.0  --  4.3   CL 97*  --   --   --   --  105 108 106  --  106   CO2 20.0*  --   --   --   --  26.0 26.0 26.0  --  25.0   *  --   --   --   --  195* 86 99  --  104*   MG 1.6  --   --   --   --  2.0  --   --   --   --    BILT 5.9*  --   --   --   --  6.7* 5.7* 5.0*  --   --    *  --   --   --   --  95* 71* 73*  --   --    ALT 47  --   --   --   --  33 36 26  --   --    ALKPHO 248*  --   --   --   --  191* 186* 185*  --   --    TP 8.3*  --   --   --   --  6.7 6.3 6.4  --   --     INR  --   --    < >  --   --  2.68* 2.20*  --  1.90* 2.00*   TSH 8.140*  --   --   --   --   --   --   --   --   --    T4F 1.0  --   --   --   --   --   --   --   --   --    CK  --  138  --   --   --   --   --   --   --   --    B12  --   --   --  >2,000* >2,000*  --   --   --   --   --    ETOH  --  <3  --   --   --   --   --   --   --   --     < > = values in this interval not displayed.       CT UROGRAM (W+WO) (CPT=74178)    Result Date: 4/7/2024  CONCLUSION:   Decompressed bladder which limits evaluation.  Hyperdensity seen within the bladder lumen suggestive of blood byproducts.  No renal calculus, enhancing renal lesion, hydronephrosis or filling defects within the opacified collecting systems. If there is continued hematuria, direct visualization could be performed per clinical discretion.  Cirrhosis.  Moderate volume of abdominal and pelvic ascites.  Diffuse large and small bowel wall thickening suggestive of reactive etiology from patient's cirrhosis and presumed low protein state.  A diffuse widespread enterocolitis is felt less likely but is also within the differential.  Anasarca  No obstruction.  Diverticulosis without diverticulitis.  Cholelithiasis with gallbladder sludge without CT evidence of acute cholecystitis.  Multiple other incidental findings as described in the body of the report.    Dictated by (CST): Johnson Waterman MD on 4/07/2024 at 10:07 AM     Finalized by (CST): Johnson Waterman MD on 4/07/2024 at 10:14 AM             Medications:   traZODone  25 mg Oral Nightly    tamsulosin  0.4 mg Oral Daily @ 0700    pantoprazole  40 mg Intravenous Q12H    insulin aspart  1-5 Units Subcutaneous TID CC and HS    thiamine  100 mg Oral Daily    multivitamin with minerals  1 tablet Oral Daily    folic acid  1 mg Oral Daily    chlordiazePOXIDE  5 mg Oral BID    lamoTRIgine  25 mg Oral BID    escitalopram  10 mg Oral Nightly      PRN Meds: polyethylene glycol (PEG 3350), sennosides, bisacodyl, fleet enema,  ondansetron, prochlorperazine, glucose **OR** glucose **OR** glucose-vitamin C **OR** dextrose **OR** glucose **OR** glucose **OR** glucose-vitamin C    Supplementary Documentation:        MDM High. Time spent on chart/note review, review of labs/imaging, discussion with patient, physical exam, discussion with staff, consultants, coordinating care, writing progress note, discussion of plan of care.      Alvaro Arvizu MD

## 2024-04-07 NOTE — PLAN OF CARE
Patient alert x4. Vitals and neuro stable. Colmenares output pink/mary with small sediment. Fall precautions in place. Call light within reach. Plan for CT urogram.  Problem: Patient Centered Care  Goal: Patient preferences are identified and integrated in the patient's plan of care  Description: Interventions:  - What would you like us to know as we care for you? From home alone, just moved closer to kids  - Provide timely, complete, and accurate information to patient/family  - Incorporate patient and family knowledge, values, beliefs, and cultural backgrounds into the planning and delivery of care  - Encourage patient/family to participate in care and decision-making at the level they choose  - Honor patient and family perspectives and choices  Outcome: Progressing     Problem: Diabetes/Glucose Control  Goal: Glucose maintained within prescribed range  Description: INTERVENTIONS:  - Monitor Blood Glucose as ordered  - Assess for signs and symptoms of hyperglycemia and hypoglycemia  - Administer ordered medications to maintain glucose within target range  - Assess barriers to adequate nutritional intake and initiate nutrition consult as needed  - Instruct patient on self management of diabetes  Outcome: Progressing     Problem: Patient/Family Goals  Goal: Patient/Family Long Term Goal  Description: Patient's Long Term Goal: to return home    Interventions:  - PT/OT eval  -monitor labs  -monitor CIWA  -monitor neuro status    - See additional Care Plan goals for specific interventions  Outcome: Progressing  Goal: Patient/Family Short Term Goal  Description: Patient's Short Term Goal: improved strength    Interventions:   - PT/OT eval and treat  -monitor labs  -dietary consult    - See additional Care Plan goals for specific interventions  Outcome: Progressing     Problem: PAIN - ADULT  Goal: Verbalizes/displays adequate comfort level or patient's stated pain goal  Description: INTERVENTIONS:  - Encourage pt to monitor  pain and request assistance  - Assess pain using appropriate pain scale  - Administer analgesics based on type and severity of pain and evaluate response  - Implement non-pharmacological measures as appropriate and evaluate response  - Consider cultural and social influences on pain and pain management  - Manage/alleviate anxiety  - Utilize distraction and/or relaxation techniques  - Monitor for opioid side effects  - Notify MD/LIP if interventions unsuccessful or patient reports new pain  - Anticipate increased pain with activity and pre-medicate as appropriate  Outcome: Progressing     Problem: SAFETY ADULT - FALL  Goal: Free from fall injury  Description: INTERVENTIONS:  - Assess pt frequently for physical needs  - Identify cognitive and physical deficits and behaviors that affect risk of falls.  - [place] fall precautions as indicated by assessment.  - Educate pt/family on patient safety including physical limitations  - Instruct pt to call for assistance with activity based on assessment  - Modify environment to reduce risk of injury  - Provide assistive devices as appropriate  - Consider OT/PT consult to assist with strengthening/mobility  - Encourage toileting schedule  Outcome: Progressing     Problem: DISCHARGE PLANNING  Goal: Discharge to home or other facility with appropriate resources  Description: INTERVENTIONS:  - Identify barriers to discharge w/pt and caregiver  - Include patient/family/discharge partner in discharge planning  - Arrange for needed discharge resources and transportation as appropriate  - Identify discharge learning needs (meds, wound care, etc)  - Arrange for interpreters to assist at discharge as needed  - Consider post-discharge preferences of patient/family/discharge partner  - Complete POLST form as appropriate  - Assess patient's ability to be responsible for managing their own health  - Refer to Case Management Department for coordinating discharge planning if the patient  needs post-hospital services based on physician/LIP order or complex needs related to functional status, cognitive ability or social support system  Outcome: Progressing     Problem: NEUROLOGICAL - ADULT  Goal: Achieves stable or improved neurological status  Description: INTERVENTIONS  - Assess for and report changes in neurological status  - Initiate measures to prevent increased intracranial pressure  - Maintain blood pressure and fluid volume within ordered parameters to optimize cerebral perfusion and minimize risk of hemorrhage  - Monitor temperature, glucose, and sodium. Initiate appropriate interventions as ordered  Outcome: Progressing  Goal: Absence of seizures  Description: INTERVENTIONS  - Monitor for seizure activity  - Administer anti-seizure medications as ordered  - Monitor neurological status  Outcome: Progressing  Goal: Remains free of injury related to seizure activity  Description: INTERVENTIONS:  - Maintain airway, patient safety  and administer oxygen as ordered  - Monitor patient for seizure activity, document and report duration and description of seizure to MD/LIP  - If seizure occurs, turn patient to side and suction secretions as needed  - Reorient patient post seizure  - Seizure pads on all 4 side rails  - Instruct patient/family to notify RN of any seizure activity  - Instruct patient/family to call for assistance with activity based on assessment  Outcome: Progressing  Goal: Achieves maximal functionality and self care  Description: INTERVENTIONS  - Monitor swallowing and airway patency with patient fatigue and changes in neurological status  - Encourage and assist patient to increase activity and self care with guidance from PT/OT  - Encourage visually impaired, hearing impaired and aphasic patients to use assistive/communication devices  Outcome: Progressing     Problem: Impaired Functional Mobility  Goal: Achieve highest/safest level of mobility/gait  Description: Interventions:  -  Assess patient's functional ability and stability  - Promote increasing activity/tolerance for mobility and gait  - Educate and engage patient/family in tolerated activity level and precautions    Outcome: Progressing

## 2024-04-08 LAB
GLUCOSE BLDC GLUCOMTR-MCNC: 102 MG/DL (ref 70–99)
GLUCOSE BLDC GLUCOMTR-MCNC: 130 MG/DL (ref 70–99)
GLUCOSE BLDC GLUCOMTR-MCNC: 172 MG/DL (ref 70–99)
GLUCOSE BLDC GLUCOMTR-MCNC: 178 MG/DL (ref 70–99)
GLUCOSE BLDC GLUCOMTR-MCNC: 187 MG/DL (ref 70–99)

## 2024-04-08 PROCEDURE — 3008F BODY MASS INDEX DOCD: CPT | Performed by: HOSPITALIST

## 2024-04-08 PROCEDURE — 3074F SYST BP LT 130 MM HG: CPT | Performed by: HOSPITALIST

## 2024-04-08 PROCEDURE — 3078F DIAST BP <80 MM HG: CPT | Performed by: HOSPITALIST

## 2024-04-08 PROCEDURE — 99233 SBSQ HOSP IP/OBS HIGH 50: CPT | Performed by: HOSPITALIST

## 2024-04-08 RX ORDER — TRAZODONE HYDROCHLORIDE 50 MG/1
25 TABLET ORAL NIGHTLY
Status: ON HOLD | COMMUNITY
Start: 2024-04-08

## 2024-04-08 RX ORDER — ESCITALOPRAM OXALATE 10 MG/1
10 TABLET ORAL NIGHTLY
Status: ON HOLD | COMMUNITY
Start: 2024-04-08

## 2024-04-08 RX ORDER — CHLORDIAZEPOXIDE HYDROCHLORIDE 5 MG/1
5 CAPSULE, GELATIN COATED ORAL 2 TIMES DAILY
Status: SHIPPED | COMMUNITY
Start: 2024-04-08 | End: 2024-06-03

## 2024-04-08 RX ORDER — MULTIPLE VITAMINS W/ MINERALS TAB 9MG-400MCG
1 TAB ORAL DAILY
Status: SHIPPED | COMMUNITY
Start: 2024-04-09 | End: 2024-06-03

## 2024-04-08 RX ORDER — FOLIC ACID 1 MG/1
1 TABLET ORAL DAILY
Status: ON HOLD | COMMUNITY
Start: 2024-04-09

## 2024-04-08 RX ORDER — LAMOTRIGINE 25 MG/1
25 TABLET ORAL 2 TIMES DAILY
Status: ON HOLD | COMMUNITY
Start: 2024-04-08

## 2024-04-08 NOTE — TELEPHONE ENCOUNTER
Pt is still admitted room 303    Called pt's room phone, discussed GI appt, stated she will speak w/ her family first.     Will call back.

## 2024-04-08 NOTE — PLAN OF CARE
Pt is alert and oriented times 4. Q shift neuro check completed, no change from baseline. Patient up with PT max assist to chair. Tolerated chair for 2 hours. Pt in bed with call light within reach.       Problem: Patient Centered Care  Goal: Patient preferences are identified and integrated in the patient's plan of care  Description: Interventions:  - What would you like us to know as we care for you? From home alone, just moved closer to kids  - Provide timely, complete, and accurate information to patient/family  - Incorporate patient and family knowledge, values, beliefs, and cultural backgrounds into the planning and delivery of care  - Encourage patient/family to participate in care and decision-making at the level they choose  - Honor patient and family perspectives and choices  Outcome: Progressing     Problem: Diabetes/Glucose Control  Goal: Glucose maintained within prescribed range  Description: INTERVENTIONS:  - Monitor Blood Glucose as ordered  - Assess for signs and symptoms of hyperglycemia and hypoglycemia  - Administer ordered medications to maintain glucose within target range  - Assess barriers to adequate nutritional intake and initiate nutrition consult as needed  - Instruct patient on self management of diabetes  Outcome: Progressing     Problem: Patient/Family Goals  Goal: Patient/Family Long Term Goal  Description: Patient's Long Term Goal: to return home    Interventions:  - PT/OT eval  -monitor labs  -monitor CIWA  -monitor neuro status    - See additional Care Plan goals for specific interventions  Outcome: Progressing  Goal: Patient/Family Short Term Goal  Description: Patient's Short Term Goal: improved strength    Interventions:   - PT/OT eval and treat  -monitor labs  -dietary consult    - See additional Care Plan goals for specific interventions  Outcome: Progressing     Problem: PAIN - ADULT  Goal: Verbalizes/displays adequate comfort level or patient's stated pain goal  Description:  INTERVENTIONS:  - Encourage pt to monitor pain and request assistance  - Assess pain using appropriate pain scale  - Administer analgesics based on type and severity of pain and evaluate response  - Implement non-pharmacological measures as appropriate and evaluate response  - Consider cultural and social influences on pain and pain management  - Manage/alleviate anxiety  - Utilize distraction and/or relaxation techniques  - Monitor for opioid side effects  - Notify MD/LIP if interventions unsuccessful or patient reports new pain  - Anticipate increased pain with activity and pre-medicate as appropriate  Outcome: Progressing     Problem: SAFETY ADULT - FALL  Goal: Free from fall injury  Description: INTERVENTIONS:  - Assess pt frequently for physical needs  - Identify cognitive and physical deficits and behaviors that affect risk of falls.  - [place] fall precautions as indicated by assessment.  - Educate pt/family on patient safety including physical limitations  - Instruct pt to call for assistance with activity based on assessment  - Modify environment to reduce risk of injury  - Provide assistive devices as appropriate  - Consider OT/PT consult to assist with strengthening/mobility  - Encourage toileting schedule  Outcome: Progressing     Problem: DISCHARGE PLANNING  Goal: Discharge to home or other facility with appropriate resources  Description: INTERVENTIONS:  - Identify barriers to discharge w/pt and caregiver  - Include patient/family/discharge partner in discharge planning  - Arrange for needed discharge resources and transportation as appropriate  - Identify discharge learning needs (meds, wound care, etc)  - Arrange for interpreters to assist at discharge as needed  - Consider post-discharge preferences of patient/family/discharge partner  - Complete POLST form as appropriate  - Assess patient's ability to be responsible for managing their own health  - Refer to Case Management Department for  coordinating discharge planning if the patient needs post-hospital services based on physician/LIP order or complex needs related to functional status, cognitive ability or social support system  Outcome: Progressing     Problem: NEUROLOGICAL - ADULT  Goal: Achieves stable or improved neurological status  Description: INTERVENTIONS  - Assess for and report changes in neurological status  - Initiate measures to prevent increased intracranial pressure  - Maintain blood pressure and fluid volume within ordered parameters to optimize cerebral perfusion and minimize risk of hemorrhage  - Monitor temperature, glucose, and sodium. Initiate appropriate interventions as ordered  Outcome: Progressing  Goal: Absence of seizures  Description: INTERVENTIONS  - Monitor for seizure activity  - Administer anti-seizure medications as ordered  - Monitor neurological status  Outcome: Progressing  Goal: Remains free of injury related to seizure activity  Description: INTERVENTIONS:  - Maintain airway, patient safety  and administer oxygen as ordered  - Monitor patient for seizure activity, document and report duration and description of seizure to MD/LIP  - If seizure occurs, turn patient to side and suction secretions as needed  - Reorient patient post seizure  - Seizure pads on all 4 side rails  - Instruct patient/family to notify RN of any seizure activity  - Instruct patient/family to call for assistance with activity based on assessment  Outcome: Progressing  Goal: Achieves maximal functionality and self care  Description: INTERVENTIONS  - Monitor swallowing and airway patency with patient fatigue and changes in neurological status  - Encourage and assist patient to increase activity and self care with guidance from PT/OT  - Encourage visually impaired, hearing impaired and aphasic patients to use assistive/communication devices  Outcome: Progressing     Problem: Impaired Functional Mobility  Goal: Achieve highest/safest level of  mobility/gait  Description: Interventions:  - Assess patient's functional ability and stability  - Promote increasing activity/tolerance for mobility and gait  - Educate and engage patient/family in tolerated activity level and precautions  - Recommend use of total lift for transfers  Outcome: Progressing

## 2024-04-08 NOTE — PHYSICAL THERAPY NOTE
PHYSICAL THERAPY TREATMENT NOTE - INPATIENT     Room Number: 303/303-A       Presenting Problem: LE weakness, R head trauma & s/p falls       Problem List  Principal Problem:    Fall, initial encounter  Active Problems:    Fall    Cerebellar hemorrhage (HCC)    Major depressive disorder, recurrent episode, moderate degree (HCC)    Alcohol dependence, continuous (HCC)    Alcohol withdrawal syndrome, uncomplicated (HCC)      PHYSICAL THERAPY ASSESSMENT   Patient demonstrates good  progress this session, goals  remain in progress.    Patient continues to function below baseline with bed mobility, transfers, and gait.  Contributing factors to remaining limitations include decreased functional strength, decreased endurance/aerobic capacity, and pain.  Next session anticipate patient to progress bed mobility, transfers, and gait.  Physical Therapy will continue to follow patient for duration of hospitalization.    Patient continues to benefit from continued skilled PT services: to facilitate return to prior level of function as patient demonstrates high motivation with excellent tolerance to an intensive therapy program .    PLAN  PT Treatment Plan: Body mechanics;Bed mobility;Patient education;Family education;Energy conservation  Frequency (Obs): 5x/week    SUBJECTIVE  Limited participation    OBJECTIVE  Precautions: Bed/chair alarm    WEIGHT BEARING RESTRICTION                PAIN ASSESSMENT   Ratin          BALANCE  Static Sitting: Poor +  Dynamic Sitting: Poor  Static Standing: Poor -  Dynamic Standing: Poor +    ACTIVITY TOLERANCE                          O2 WALK       AM-PAC '6-Clicks' INPATIENT SHORT FORM - BASIC MOBILITY  How much difficulty does the patient currently have...  Patient Difficulty: Turning over in bed (including adjusting bedclothes, sheets and blankets)?: A Lot   Patient Difficulty: Sitting down on and standing up from a chair with arms (e.g., wheelchair, bedside commode, etc.): A Lot    Patient Difficulty: Moving from lying on back to sitting on the side of the bed?: A Lot   How much help from another person does the patient currently need...   Help from Another: Moving to and from a bed to a chair (including a wheelchair)?: A Lot   Help from Another: Need to walk in hospital room?: A Lot   Help from Another: Climbing 3-5 steps with a railing?: Total     AM-PAC Score:  Raw Score: 11   Approx Degree of Impairment: 72.57%   Standardized Score (AM-PAC Scale): 33.86   CMS Modifier (G-Code): CL    FUNCTIONAL ABILITY STATUS  Functional Mobility/Gait Assessment  Gait Assistance: Not tested  Distance (ft): 10ft  Assistive Device: Rolling walker  Pattern: Shuffle (decreased lali speed, unsteady, posterior lean, narrow base of support. Cues for upright posture and increasing step length. Distance ambulated limited by c/o dizziness.)  Rolling: maximum assist  Supine to Sit: maximum assist  Sit to Supine: maximum assist  Sit to Stand:     Additional information: Pt seen daily    The patient's Approx Degree of Impairment: 72.57% has been calculated based on documentation in the [facility] '6 clicks' Inpatient Daily Activity Short Form.  Research supports that patients with this level of impairment may benefit from Acute Rehabilitation.  Final disposition will be made by interdisciplinary medical team.    THERAPEUTIC EXERCISES  Lower Extremity Ankle pumps  Glut sets  Quad sets     Position Supine       Patient End of Session: Up in chair;Call light within reach;RN aware of session/findings;All patient questions and concerns addressed    CURRENT GOALS     Patient Goal Patient's self-stated goal is: walk   Goal #1 Patient is able to demonstrate supine - sit EOB @ level: minimum assistance      Goal #1   Current Status Mod A x 1 supine>sit   Goal #2 Patient is able to demonstrate transfers Sit to/from Stand at assistance level: minimum assistance       Goal #2  Current Status NT   Goal #3 Patient is able to  ambulate 50 feet with assist device: RW at assistance level: supervision   Goal #3   Current Status NT   Goal #4 Patient will negotiate  stairs/one curb w/ assistive device and supervision   Goal #4   Current Status NT   Goal #5 Patient to demonstrate independence with home activity/exercise instructions provided to patient in preparation for discharge.   Goal #5   Current Status ongoing     Gait Training:  minutes  Therapeutic Activity: 20 minutes  Neuromuscular Re-education:  minutes  Therapeutic Exercise: 10 minutes  Canalith Repositioning:  minutes  Manual Therapy:  minutes  Can add/delete any of these

## 2024-04-08 NOTE — CONSULTS
[facility]  part of [facility]      Christi Tavarez Patient Status:  Inpatient    1960 MRN [id number]   Location [facility] 3W/SW Attending Alvaro Arvizu MD   Hosp Day # 5 PCP ÁNGEL Delgado       CC: Subacute CVA, hemorrhagic mass, complications of EtOH abuse    History of Present Illness:    63-year-old female normally independent lives by herself has been drinking heavily for the last 10 years has been having multiple falls.  Decreased p.o. intake.  Increasing difficulty ambulating.  CT of the head showed a acute to subacute infarct in the right cerebellar area with hemorrhagic transformation.  She is also found to have third and fourth right-sided rib fractures related to her fall.  By neurology in consultation focal workup initiated in addition to neuropathy/EtOH workup.  B12 levels are okay TSH was elevated however T4 is normal.  Neurology has recommended follow-up MRI in 3 months.  Inpatient course also significant for hematemesis.  Seen by GI in consultation.  Started on PPI therapy and manage conservatively.  H&H has been trending and stabilized.  Inpatient course also significant for acute kidney injury acute urinary retention.  Patient is voiding better.  Bladder scans thought to be inaccurate due to underlying ascites.  Of note patient does have underlying diabetes mellitus type 2.  Has been noncompliant with her medications.  Hemoglobin A1c was 5.5.  She is on Lexapro for anxiety.  Is also been followed by psychiatry.  Has significant proximal limb weakness underlying neuropathy and we were asked to see what the best way to approach her rehabilitation would be.      Medications Prior to Admission   Medication Sig Dispense Refill Last Dose    PARoxetine 10 MG Oral Tab Take 1 tablet (10 mg total) by mouth every morning.   Past Week    glipiZIDE 5 MG Oral Tab Take 1 tablet (5 mg total) by mouth every morning before breakfast. With food   Past Week     No Known  Allergies  History reviewed. No pertinent past medical history.  History reviewed. No pertinent surgical history.  Social History     Socioeconomic History    Marital status:      Spouse name: Not on file    Number of children: Not on file    Years of education: Not on file    Highest education level: Not on file   Occupational History    Not on file   Tobacco Use    Smoking status: Never    Smokeless tobacco: Never   Vaping Use    Vaping Use: Never used   Substance and Sexual Activity    Alcohol use: Yes     Comment: socially    Drug use: Never    Sexual activity: Not on file   Other Topics Concern    Not on file   Social History Narrative    Not on file     Social Determinants of Health     Financial Resource Strain: Not on file   Food Insecurity: No Food Insecurity (4/3/2024)    Food Insecurity     Food Insecurity: Never true   Transportation Needs: No Transportation Needs (4/3/2024)    Transportation Needs     Lack of Transportation: No   Physical Activity: Not on file   Stress: Not on file   Social Connections: Not on file   Housing Stability: Low Risk  (4/3/2024)    Housing Stability     Housing Instability: No     Housing Instability Emergency: Not on file     No family history on file.    PAIN SCALE: denies    ACP: Full code    Review of Systems  Denies any shortness of breath, chest pain except for on deep inspiration related to her rib pain she denies any nausea.  P.o. intake is slowly improving.  She would like to be able to stop drinking.  She has had multiple falls at home.  Was drinking at least two 6 ounce drinks of vodka a day.  She is having normal formed stools, has been able to urinate.  Has good support system including her 2 children are present during this exam.  All other systems and on 12 point review are negative    Physical Exam  Temp:  [97.9 °F (36.6 °C)-98.5 °F (36.9 °C)] 98.5 °F (36.9 °C)  Pulse:  [77-97] 77  Resp:  [16-18] 16  BP: (104-107)/(55-62) 107/58  SpO2:  [96 %-100 %] 96  %  96%    I/O last 3 completed shifts:  In: 400 [P.O.:400]  Out: 900 [Urine:900]  No intake/output data recorded.       S-S1-S2 RRR trace ankle edema by extremities  Extremities-warm to touch peripheral pulses are palpable  Lymph nodes-no palpable lymph nodes in neck or groin  Skin-shows ecchymosis around the right side of her head, both knees, elbows and along the thighs.  No breakdown of skin.  MSK-bilateral upper extremity strength  is a 5, elbow flexion elbow extension 4 show abduction is a 3.  Lower extremity examination DF PF 5 HF 2+  Neurological exam-alert and oriented x 3, sensation diminished to light touch vibration lower extremities joint proprioception unequivocal.  Good historian at this time    Previous Function:    Patient is independent in her own home, normally is able to drive.  Has had recurrent falls, may be related to intoxication but also feels that she has balance problems    CURRENT FUNCTION:  AM-PAC Score:  Raw Score: 11   Approx Degree of Impairment: 72.57%   Standardized Score (AM-PAC Scale): 33.86   CMS Modifier (G-Code): CL     FUNCTIONAL ABILITY STATUS  Functional Mobility/Gait Assessment  Gait Assistance: Maximum assistance (2nd assist with chair follow)  Distance (ft): 10ft  Assistive Device: Rolling walker  Pattern: Shuffle (decreased lali speed, unsteady, posterior lean, narrow base of support. Cues for upright posture and increasing step length. Distance ambulated limited by c/o dizziness.)  Supine to Sit: moderate assist. Supine>sit. Task was labored requiring increased time to complete. Assist with LE's and trunk. Sat EOB for 8 minutes requiring initial Mod for balance improving to Min A x 1 as time in sitting increased.   Sit to Stand: maximum assist. Cues for appropriate UE positioning with transitional movements. Posterior lean. Cues for anterior weight dispersion. Mod A x 1 to maintain static standing balance.     Labs  Lab Results   Component Value Date    WBC 10.6  04/07/2024    HGB 9.7 (L) 04/07/2024    HCT 26.4 (L) 04/07/2024    .8 (H) 04/07/2024    PLT 82.0 (L) 04/07/2024     No results found for: \"PTT\"  No results found for: \"PROTIME\"  Lab Results   Component Value Date     (L) 04/07/2024    K 4.3 04/07/2024    CO2 25.0 04/07/2024     04/07/2024    BUN 8 (L) 04/07/2024       Radiology:  CT UROGRAM (W+WO) (CPT=74178)    Result Date: 4/7/2024  PROCEDURE: CT UROGRAM (W+WO)  (CPT=74178)  COMPARISON: None.  INDICATIONS: Hematuria.  TECHNIQUE:   CT images of the abdomen and pelvis were obtained without and with non-ionic intravenous contrast material. Automated exposure control for dose reduction was used. Adjustment of the mA and/or kV was done based on the patient's size. Use of iterative reconstruction technique for dose reduction was used.  Dose information is transmitted to the ACR (American College of Radiology) NRDR (National Radiology Data Registry) which includes the Dose Index Registry.  FINDINGS:  KIDNEYS AND URINARY TRACT:  RIGHT: Majority of the ureter is not opacified.  The opacified collecting system is intact.  No   radiopaque renal calculus or hydronephrosis.  No enhancing renal lesion.  LEFT: Majority of the ureter is not opacified.  The opacified collecting system is intact. No   radiopaque renal calculus or hydronephrosis.  No enhancing renal lesion.  BLADDER: The bladder is decompressed from a Colmenares catheter.  There is hyperdensity seen within the bladder lumen.  Excretory phase sequences demonstrate marked bladder wall trabeculation.  No definite hematoma.  No bladder calculus is seen given limitations from hyperdensity within the bladder and decompressed state.   ADRENALS:   The adrenal glands are unremarkable. LIVER: The liver is atrophic with a nodular contour.  There are widening of the fissures. GALLBLADDER: Small amount of gallbladder sludge and stones. BILIARY: There is no intra-or extrahepatic biliary duct dilation. SPLEEN: The  spleen is unremarkable. PANCREAS: There are no pancreatic masses or pancreatic ductal dilation. AORTA/VASCULAR:   There is atherosclerotic calcification of the abdominal aorta extending into bilateral iliac arteries. RETROPERITONEUM: There are scattered subcentimeter nonspecific retroperitoneal lymph nodes. BOWEL/MESENTERY:  Mild colonic wall thickening seen involving majority of the colon.  Multiple areas of small bowel wall thickening seen within the left upper and lower quadrants.  No abnormal dilation. The appendix is normal. There are no inflammatory changes within the right lower quadrant.  There is diverticulosis involving the distal descending colon and sigmoid colon without evidence of diverticulitis.  Moderate volume of abdominal ascites with diffuse mesenteric edema.  There is no mass or loculated collection. Small hiatal hernia with wall thickening at the gastroesophageal junction. Remainder of the bowel is otherwise unremarkable without dilation or wall thickening. PELVIS: Moderate volume of pelvic ascites.  No mass or loculated collection.  Uterine fibroids are seen.  No pelvic lymphadenopathy. BONES:   There is degenerative disease of the thoracic and lumbar spine. Degenerative changes are seen within the sacroiliac joints and pubic symphysis. Degenerative changes are seen in the bilateral hips.  Soft tissue edema seen within the bilateral upper and lower quadrant subcutaneous tissues. LUNG BASES: Trace right and small left pleural effusions.  Left lower lobe atelectasis.          CONCLUSION:   Decompressed bladder which limits evaluation.  Hyperdensity seen within the bladder lumen suggestive of blood byproducts.  No renal calculus, enhancing renal lesion, hydronephrosis or filling defects within the opacified collecting systems. If there is continued hematuria, direct visualization could be performed per clinical discretion.  Cirrhosis.  Moderate volume of abdominal and pelvic ascites.  Diffuse  large and small bowel wall thickening suggestive of reactive etiology from patient's cirrhosis and presumed low protein state.  A diffuse widespread enterocolitis is felt less likely but is also within the differential.  Anasarca  No obstruction.  Diverticulosis without diverticulitis.  Cholelithiasis with gallbladder sludge without CT evidence of acute cholecystitis.  Multiple other incidental findings as described in the body of the report.    Dictated by (CST): Johnson Waterman MD on 4/07/2024 at 10:07 AM     Finalized by (CST): Johnson Waterman MD on 4/07/2024 at 10:14 AM          MRI BRAIN (W+WO) (CPT=70553)    Result Date: 4/3/2024  PROCEDURE: MRI BRAIN (W+WO) (CPT=70553)  COMPARISON: [facility], CT BRAIN OR HEAD (CPT=70450), 4/03/2024, 1:08 AM.  INDICATIONS: Recurrent falls, CT head reveals moderate acute to subacute inferior right cerebellar infarct with focal region of hemorrhagic transformation, versus mass with  TECHNIQUE: A variety of imaging planes and parameters were utilized for visualization of suspected pathology.  Images were performed without and with gadolinium contrast.   FINDINGS:  Exam quality is degraded by patient motion despite repeat imaging.  This decreases sensitivity for small-sized abnormalities. CEREBRUM: No edema, hemorrhage, mass, acute infarction, or inappropriate atrophy.  CEREBELLUM: Lobulated intrinsic T1 and T2 bright lobulated focus within the right cerebellar vermis and adjacent inferior cerebellar hemisphere measuring 33 x 25 x 24 mm (AP x transverse x CC), without significant signal changes on post-contrast phase compatible with absence of significant enhancement.  It has a few thin internal septations and a lobulated hypointense T1 central component.  Minimal FLAIR signal edema along its lateral margin.  No significant mass effect upon the adjacent structures.  No abnormal diffusion restriction. BRAINSTEM: No edema, hemorrhage, mass, acute infarction, or  inappropriate atrophy.  CSF SPACES: Ventricles, cisterns, and sulci are appropriate for age.  No hydrocephalus, subarachnoid hemorrhage, or mass.  SKULL: No mass or other significant visible lesion.  SINUSES: Limited views demonstrate no significant mucosal thickening or fluid.  ORBITS: Limited views are unremarkable.          CONCLUSION: 3 x 3 x 2 cm located focus within the right cerebellum, with characterize most compatible with a subacute hematoma.  However, because there is patient motion and given the current [place] of T1 signal changes, there is potential this finding obscures a very tiny lesion.  Exam is also limited by patient motion.  Therefore recommend follow-up in approximately 3 months to assess for changes.  Dictated by (CST): Rob Duvall MD on 4/03/2024 at 11:12 AM     Finalized by (CST): Rob Duvall MD on 4/03/2024 at 12:19 PM          US LIVER (CPT=76705)    Result Date: 4/3/2024  PROCEDURE: US LIVER (CPT=76705)  COMPARISON: None.  INDICATIONS: Elevated Liver function tests, current alcohol use  TECHNIQUE:   The liver was evaluated with gray scale and colorflow of the main vessels.   FINDINGS:  LIVER:   Liver measures 16.8 cm in length increased echogenicity..  No masses or bile duct dilatation. Color flow and Doppler imaging of portal and hepatic veins show patency and antegrade flow. BILE DUCTS:   There is cholelithiasis and gallbladder sludge.  Mild circumferential gallbladder wall thickening.  Positive sonographic Pretty sign.  Common bile duct is nondilated measures 0.5 cm OTHER:   Limited visualization pancreas due to overlying bowel.  Visualized pancreas unremarkable.  Right kidney measures 12.7 cm in length and is unremarkable.  There is mild right upper quadrant ascites.         CONCLUSION:   Hepatic steatosis.  Mild right upper quadrant ascites.  Cholelithiasis and gallbladder sludge with mild circumferential gallbladder wall thickening.  Positive sonographic Pretty sign.  Findings may  relate to acute cholecystitis although gallbladder wall thickening can be seen in patients with hepatocellular disease and or ascites.  Advise clinical follow-up and consider evaluation with nuclear medicine hepatobiliary scan.     Dictated by (CST): Henrry Garduno MD on 4/03/2024 at 11:51 AM     Finalized by (CST): Henrry Garduno MD on 4/03/2024 at 11:54 AM          XR FOREARM (2 VIEWS), LEFT (CPT=73090)    Result Date: 4/3/2024  PROCEDURE: XR FOREARM (2 VIEWS), LEFT (CPT=73090)  COMPARISON: None.  INDICATIONS: Multiple falls within past 2 weeks.  TECHNIQUE: 2 views were obtained.   FINDINGS:  BONES: No significant arthropathy, fracture or acute abnormality. SOFT TISSUES: No visible soft tissue swelling. EFFUSION: None visible. OTHER: Negative.         CONCLUSION: No acute osseous abnormality of the left forearm.  [facility] provided a preliminary report for this examination. This final report agrees with their preliminary findings.   Dictated by (CST): Rob Duvall MD on 4/03/2024 at 10:02 AM     Finalized by (CST): Rob Duvall MD on 4/03/2024 at 10:02 AM          XR ELBOW, COMPLETE (MIN 3 VIEWS), RIGHT (CPT=73080)    Result Date: 4/3/2024  PROCEDURE: XR ELBOW, COMPLETE (MIN 3 VIEWS), RIGHT (CPT=73080)  COMPARISON: None.  INDICATIONS: Multiple falls within past 2 weeks.  TECHNIQUE: 4 views were obtained.   FINDINGS:  BONES: Enthesophyte along the peripheral aspect of the lateral femoral condyle.  Otherwise, no significant arthropathy, fracture or acute abnormality. SOFT TISSUES: Peripheral IV over the lateral aspect of the proximal forearm. No visible soft tissue swelling. EFFUSION: None visible. OTHER: Negative.         CONCLUSION: No acute osseous abnormality of the right elbow.  [facility] provided a preliminary report for this examination. This final report agrees with their preliminary findings.   Dictated by (CST): Rob Duvall MD on 4/03/2024 at 9:28 AM     Finalized by (CST): Jadiel  MD Rob on 4/03/2024 at 9:29 AM          XR CHEST AP PORTABLE  (CPT=71045)    Result Date: 4/3/2024  PROCEDURE: XR CHEST AP PORTABLE  (CPT=71045) TIME: 0045 hours  COMPARISON: None.  INDICATIONS: Multiple falls within past 2 weeks.  TECHNIQUE:   Single view.   FINDINGS:  CARDIAC/VASC: No cardiac silhouette abnormality or cardiomegaly.  Unremarkable pulmonary vasculature.  MEDIAST/YASMIN:   Atherosclerotic aorta with no visible aneurysm.  LUNGS/PLEURA: No pneumothorax.  No significant pulmonary parenchymal abnormalities.  No effusion or pleural thickening. BONES: Mild endplate change within the spine.  Subtle cortical deformity in the anterior right 3rd and 4th ribs, which may represent nondisplaced fractures. OTHER: Negative.          CONCLUSION: Findings suspicious for acute nondisplaced fractures of the anterior right 3rd and 4th ribs.  Correlate for pain at this site.  No pneumothorax.  Otherwise no acute cardiopulmonary abnormality.   [facility] provided a preliminary report for this examination. This final report agrees with their preliminary findings.   Dictated by (CST): Rob Duvall MD on 4/03/2024 at 9:26 AM     Finalized by (CST): Rob Duvall MD on 4/03/2024 at 9:28 AM          XR KNEE (1 OR 2 VIEWS), LEFT (CPT=73560)    Result Date: 4/3/2024  PROCEDURE: XR KNEE (1 OR 2 VIEWS), LEFT (CPT=73560)  COMPARISON: None.  INDICATIONS: Multiple falls within past 2 weeks.  TECHNIQUE: 2 views were obtained.   FINDINGS:  BONES: Fabella is present. No significant arthropathy, fracture or acute abnormality. SOFT TISSUES: No visible soft tissue swelling. EFFUSION: None visible. OTHER: Negative.         CONCLUSION: No acute osseous abnormality of the left knee.  [facility] provided a preliminary report for this examination. This final report agrees with their preliminary findings.   Dictated by (CST): Rob Duvall MD on 4/03/2024 at 9:25 AM     Finalized by (CST): Rob Duvall MD on 4/03/2024 at 9:26 AM           XR KNEE (1 OR 2 VIEWS), RIGHT (CPT=73560)    Result Date: 4/3/2024  PROCEDURE: XR KNEE (1 OR 2 VIEWS), RIGHT (CPT=73560)  COMPARISON: None.  INDICATIONS: Multiple falls within past 2 weeks.  TECHNIQUE: 2 views were obtained.   FINDINGS:  BONES: Fabella is present. No significant arthropathy, fracture or acute abnormality. SOFT TISSUES: Soft tissue inflammation ventral to the patella and patellar tendon EFFUSION: None visible. OTHER: Negative.         CONCLUSION: Soft tissue inflammation ventral to the patella and patellar tendon suggesting soft tissue injury.  No acute osseous abnormality of the right knee.  [email or web address] provided a preliminary report for this examination. This final report agrees with their preliminary findings.    Dictated by (CST): Rob Duvall MD on 4/03/2024 at 9:24 AM     Finalized by (CST): Rob Duvall MD on 4/03/2024 at 9:25 AM          XR PELVIS (1 VIEW) (CPT=72170)    Result Date: 4/3/2024  PROCEDURE: XR PELVIS (1 VIEW) (CPT=72170)  COMPARISON: None.  INDICATIONS: Multiple falls within past 2 weeks.  TECHNIQUE: 1 AP view was obtained.   FINDINGS:  BONES: Mild degenerative change at the lumbosacral junction.  Pseudoarthrosis of the right L5 transverse process with the sacral ala.  No acute fracture of the bony pelvis.  10 mm hypodense focus within the right femoral neck which could represent a bone  cyst or lytic lesion. SOFT TISSUES: No visible soft tissue swelling. EFFUSION: None visible. OTHER: Negative.         CONCLUSION:  1.  Hypodense focus within the right femoral neck which could represent a bone cyst or lytic lesion.  Consider bone scan or other follow up imaging. 2.  Mild degenerative change in the lower lumbar spine.   [facility] provided a preliminary report for this examination. This final report agrees with their preliminary findings.   Dictated by (CST): Rob Duvall MD on 4/03/2024 at 9:23 AM     Finalized by (CST): Rob Duvall MD on 4/03/2024 at 9:24 AM           XR FEMUR MIN 2 VIEWS RIGHT (CPT=73552)    Result Date: 4/3/2024  PROCEDURE: XR FEMUR MIN 2 VIEWS RIGHT (CPT=73552)  COMPARISON: [facility], XR PELVIS (1 VIEW) (CPT=72170), 4/03/2024, 0:54 AM.  INDICATIONS: Multiple falls within past 2 weeks.  TECHNIQUE: 2 views were obtained.   FINDINGS:  BONES: Ovoid 8-9 mm diameter lucencies within the right femoral neck, which could represent bone cysts or lytic foci.  Femoral head otherwise has normal articulation within the acetabulum and articulation at the knee is otherwise anatomic.  No acute fracture. SOFT TISSUES: No visible soft tissue swelling. EFFUSION: None visible. OTHER: Negative.         CONCLUSION: Subcentimeter ovoid lucencies within the right femoral neck which could represent bone cysts or lytic foci.  This could be further assessed with bone scan.   [facility] provided a preliminary report for this examination. This final report agrees with their preliminary findings.   Dictated by (CST): Rob Duvall MD on 4/03/2024 at 9:21 AM     Finalized by (CST): Rob Duvall MD on 4/03/2024 at 9:22 AM          CT BRAIN OR HEAD (15006)    Result Date: 4/3/2024  PROCEDURE: CT BRAIN OR HEAD (CPT=70450)  COMPARISON: [facility], XR PELVIS (1 VIEW) (CPT=72170), 4/03/2024, 0:54 AM.  INDICATIONS: fall.  Post strike to head.  TECHNIQUE: CT images were obtained without contrast material.  Automated exposure control for dose reduction was used.  Dose information is transmitted to the ACR (American College of Radiology) NRDR (National Radiology Data Registry) which includes the Dose Index Registry.  FINDINGS:  CSF SPACES: Ventricles, cisterns, and sulci are appropriate for age.  No hydrocephalus, subarachnoid hemorrhage, or mass.  No midline shift.  CEREBRUM: No edema, hemorrhage, mass, acute infarction, or significant atrophy.  WHITE MATTER: Mild chronic white matter ischemic changes.  CEREBELLUM: Mildly hyperdense ovoid shaped 28 x 14 x  18-mm focus within the right cerebellar vermis and adjacent hemisphere with surrounding vasogenic edema.  No definite mass effect.  No midline shift or intraventricular extension. BRAINSTEM: No edema, hemorrhage, mass, acute infarction, or significant atrophy.  CALVARIUM: No apparent fracture, mass, or other significant visible lesion.  SINUSES: Limited views demonstrate no significant mucosal thickening or fluid.  ORBITS: Limited views are unremarkable.   OTHER: Negative.          CONCLUSION: 3 x 1 x 2 cm for ovoid soft tissue density in the right cerebellar vermis and adjacent hemisphere with surrounding edema.  It could represent an acute/subacute/recent site of hemorrhage/hematoma versus hemorrhagic lesion (such as primary malignancy versus metastatic focus).  Recommend further characterization with MRI brain with without contrast.   [facility] provided a preliminary report for this examination. This final report agrees with their preliminary findings.   Dictated by (CST): Rob Duvall MD on 4/03/2024 at 8:43 AM     Finalized by (CST): Rob Duvall MD on 4/03/2024 at 8:50 AM              Assessment    Patient Active Problem List   Diagnosis    Fall    Fall, initial encounter    Cerebellar hemorrhage (HCC)    Major depressive disorder, recurrent episode, moderate degree (HCC)    Alcohol dependence, continuous (HCC)    Alcohol withdrawal syndrome, uncomplicated (HCC)       Plan    63-year-old female self-care mobility deficits secondary right cerebellar infarct, hematoma with versus mass with hemorrhage, hematemesis, EtOH abuse, EtOH neuropathy, UTI, urinary retention, thrombocytopenia, diabetes mellitus type 2, anxiety     Continue PT OT SLP  PVRs as per urology, primary service  Hematemesis-H&H being trended  Diabetes mellitus-SSI, being monitored  Neuropathy-TSH high T4 normal, B12 in normal range, may be in part related to diabetes as well as EtOH neuropathy    Patient with significant functional  deficits mild cognitive deficits poor nutritional state.  However good rehab potential appropriate for acute inpatient rehabilitation once hemodynamically ready from a medical standpoint    Thank you for this  consultation and allowing  me to participate in this patient's care        HECTOR SOTO MD    4/8/2024    8:47 AM

## 2024-04-08 NOTE — PROGRESS NOTES
[facility]  Progress Note     Christi Tavarez  : 1960    Status: Inpatient  Day #: 5    Attending: Alvaro Arvizu MD  PCP: ÁNGEL Delgado     Assessment and Plan:    Generalized weakness  Recurrent falls  Moderate acute to subacute inferior right cerebellar hematoma vs infarct vs mass with hemorrhage  -MRI brain results reviewed - rec repeat MRI 3 months  -neuro consult appreciated  -Neurochecks stable  -TSH elevated, normal free T4  -vit B12 ok  -PT/OT eval  -PMR consulted - rec acute rehab     Hematemesis  -resolved  -GI consult appreciated  -monitor hgb  -cont PPI  -no endoscopy rec as hgb now stable     Current alcohol use  -Last alcohol use 2 days prior to presentation  -CIWA scores low. Ativan prn.     Questionable Acute UTI  -no culture sent unfortunately. UA without pyuria  -was put on ceftriaxone on admission, now off    Acute urinary retention   Hematuria - likely lucas trauma  -appreciate urology consult  -bladder scans likely false positive due to ascites  -CT urogram reviewed  -lucas removed    Acute kidney injury - resolved  Mild hyponatremia - resolved  Hypochloremia- resolved  Metabolic acidosis - resolved  -Suspecting secondary to decreased oral intake as well as worsened by alcohol use.  -off IVF     Thrombocytopenia  -Suspecting secondary to alcohol use  -downtrend likely dilutional, stable now     NIDDM type II  -Patient stopped taking metformin months ago.  -Last hemoglobin A1c  5.5  -ISS  -monitor BG     H/o hyperlipidemia  -Patient stopped taking statin months ago.     H/o anxiety  -cont lexapro     DVT Mechanical Prophylaxis:   SCDs,    DVT Pharmacologic Prophylaxis   Medication   None               Subjective:      Initial Chief Complaint:  weakness    No CP, no SOB, no abd pain, no n/v.       Objective:      Temp:  [97.9 °F (36.6 °C)-98.5 °F (36.9 °C)] 98.3 °F (36.8 °C)  Pulse:  [77-97] 93  Resp:  [16-18] 18  BP: (106-108)/(58-62) 108/59  SpO2:  [93 %-100 %] 93  %  General:  Alert, no distress  HEENT:  Normocephalic, atraumatic  Cardiac:  Regular rate, regular rhythm  Pulmonary:  Clear to auscultation bilaterally, respirations unlabored  Gastrointestinal:  Soft, non-tender, normal bowel sounds  Musculoskeletal:  No joint swelling  Extremities:  No edema, no cyanosis, no clubbing  Neurologic:  nonfocal  Psychiatric:  Normal affect, calm and appropriate    Intake/Output Summary (Last 24 hours) at 4/8/2024 1404  Last data filed at 4/8/2024 1120  Gross per 24 hour   Intake 120 ml   Output 175 ml   Net -55 ml         Recent Labs   Lab 04/04/24  0907 04/05/24  0426 04/06/24  0334 04/07/24  0857   WBC 8.7 8.1 9.6 10.6   HGB 10.1* 9.9* 9.6* 9.7*   HCT 27.9* 27.2* 26.8* 26.4*   PLT 83.0* 78.0* 82.0* 82.0*   RBC 2.62* 2.60* 2.51* 2.45*   .5* 104.6* 106.8* 107.8*   MCH 38.5* 38.1* 38.2* 39.6*   MCHC 36.2 36.4 35.8 36.7   RDW 15.8* 15.8* 16.3* 16.7*   NEPRELIM 6.42 5.09 5.12  --      Recent Labs   Lab 04/03/24  0023 04/03/24  0539 04/03/24  1309 04/03/24  1434 04/04/24  0906 04/04/24  0907 04/05/24  0426 04/06/24  0334 04/06/24  1015 04/07/24  0857   BUN 13  --   --   --   --  8* 7* 7*  --  8*   CREATSERUM 1.10*  --   --   --   --  0.65 0.50* 0.54*  --  0.63   CA 8.3*  --   --   --   --  7.7* 7.6* 7.8*  --  8.4*   ALB 2.8*  --   --   --   --  2.2* 2.1* 2.1*  --   --    *  --   --   --   --  136 137 135*  --  135*   K 3.9  --   --   --   --  3.5 3.4* 4.0  4.0  --  4.3   CL 97*  --   --   --   --  105 108 106  --  106   CO2 20.0*  --   --   --   --  26.0 26.0 26.0  --  25.0   *  --   --   --   --  195* 86 99  --  104*   MG 1.6  --   --   --   --  2.0  --   --   --   --    BILT 5.9*  --   --   --   --  6.7* 5.7* 5.0*  --   --    *  --   --   --   --  95* 71* 73*  --   --    ALT 47  --   --   --   --  33 36 26  --   --    ALKPHO 248*  --   --   --   --  191* 186* 185*  --   --    TP 8.3*  --   --   --   --  6.7 6.3 6.4  --   --    INR  --   --    < >  --   --   2.68* 2.20*  --  1.90* 2.00*   TSH 8.140*  --   --   --   --   --   --   --   --   --    T4F 1.0  --   --   --   --   --   --   --   --   --    CK  --  138  --   --   --   --   --   --   --   --    B12  --   --   --  >2,000* >2,000*  --   --   --   --   --    ETOH  --  <3  --   --   --   --   --   --   --   --     < > = values in this interval not displayed.       CT UROGRAM (W+WO) (CPT=74178)    Result Date: 4/7/2024  CONCLUSION:   Decompressed bladder which limits evaluation.  Hyperdensity seen within the bladder lumen suggestive of blood byproducts.  No renal calculus, enhancing renal lesion, hydronephrosis or filling defects within the opacified collecting systems. If there is continued hematuria, direct visualization could be performed per clinical discretion.  Cirrhosis.  Moderate volume of abdominal and pelvic ascites.  Diffuse large and small bowel wall thickening suggestive of reactive etiology from patient's cirrhosis and presumed low protein state.  A diffuse widespread enterocolitis is felt less likely but is also within the differential.  Anasarca  No obstruction.  Diverticulosis without diverticulitis.  Cholelithiasis with gallbladder sludge without CT evidence of acute cholecystitis.  Multiple other incidental findings as described in the body of the report.    Dictated by (CST): Johnson Waterman MD on 4/07/2024 at 10:07 AM     Finalized by (CST): Johnson Waterman MD on 4/07/2024 at 10:14 AM             Medications:   traZODone  25 mg Oral Nightly    tamsulosin  0.4 mg Oral Daily @ 0700    pantoprazole  40 mg Intravenous Q12H    insulin aspart  1-5 Units Subcutaneous TID CC and HS    thiamine  100 mg Oral Daily    multivitamin with minerals  1 tablet Oral Daily    folic acid  1 mg Oral Daily    chlordiazePOXIDE  5 mg Oral BID    lamoTRIgine  25 mg Oral BID    escitalopram  10 mg Oral Nightly      PRN Meds: polyethylene glycol (PEG 3350), sennosides, bisacodyl, fleet enema, ondansetron, prochlorperazine,  glucose **OR** glucose **OR** glucose-vitamin C **OR** dextrose **OR** glucose **OR** glucose **OR** glucose-vitamin C    Supplementary Documentation:        MDM High. Time spent on chart/note review, review of labs/imaging, discussion with patient, physical exam, discussion with staff, consultants, coordinating care, writing progress note, discussion of plan of care.      Alvaro Arvizu MD

## 2024-04-08 NOTE — PLAN OF CARE
Patient alert x4. Vitals and neuro status stable. Fall precautions in place. Call light within reach. Possible discharge to rehab in the AM.   Problem: Patient Centered Care  Goal: Patient preferences are identified and integrated in the patient's plan of care  Description: Interventions:  - What would you like us to know as we care for you? From home alone, just moved closer to kids  - Provide timely, complete, and accurate information to patient/family  - Incorporate patient and family knowledge, values, beliefs, and cultural backgrounds into the planning and delivery of care  - Encourage patient/family to participate in care and decision-making at the level they choose  - Honor patient and family perspectives and choices  Outcome: Progressing     Problem: Diabetes/Glucose Control  Goal: Glucose maintained within prescribed range  Description: INTERVENTIONS:  - Monitor Blood Glucose as ordered  - Assess for signs and symptoms of hyperglycemia and hypoglycemia  - Administer ordered medications to maintain glucose within target range  - Assess barriers to adequate nutritional intake and initiate nutrition consult as needed  - Instruct patient on self management of diabetes  Outcome: Progressing     Problem: Patient/Family Goals  Goal: Patient/Family Long Term Goal  Description: Patient's Long Term Goal: to return home    Interventions:  - PT/OT eval  -monitor labs  -monitor CIWA  -monitor neuro status    - See additional Care Plan goals for specific interventions  Outcome: Progressing  Goal: Patient/Family Short Term Goal  Description: Patient's Short Term Goal: improved strength    Interventions:   - PT/OT eval and treat  -monitor labs  -dietary consult    - See additional Care Plan goals for specific interventions  Outcome: Progressing     Problem: PAIN - ADULT  Goal: Verbalizes/displays adequate comfort level or patient's stated pain goal  Description: INTERVENTIONS:  - Encourage pt to monitor pain and request  assistance  - Assess pain using appropriate pain scale  - Administer analgesics based on type and severity of pain and evaluate response  - Implement non-pharmacological measures as appropriate and evaluate response  - Consider cultural and social influences on pain and pain management  - Manage/alleviate anxiety  - Utilize distraction and/or relaxation techniques  - Monitor for opioid side effects  - Notify MD/LIP if interventions unsuccessful or patient reports new pain  - Anticipate increased pain with activity and pre-medicate as appropriate  Outcome: Progressing     Problem: SAFETY ADULT - FALL  Goal: Free from fall injury  Description: INTERVENTIONS:  - Assess pt frequently for physical needs  - Identify cognitive and physical deficits and behaviors that affect risk of falls.  - [place] fall precautions as indicated by assessment.  - Educate pt/family on patient safety including physical limitations  - Instruct pt to call for assistance with activity based on assessment  - Modify environment to reduce risk of injury  - Provide assistive devices as appropriate  - Consider OT/PT consult to assist with strengthening/mobility  - Encourage toileting schedule  Outcome: Progressing     Problem: DISCHARGE PLANNING  Goal: Discharge to home or other facility with appropriate resources  Description: INTERVENTIONS:  - Identify barriers to discharge w/pt and caregiver  - Include patient/family/discharge partner in discharge planning  - Arrange for needed discharge resources and transportation as appropriate  - Identify discharge learning needs (meds, wound care, etc)  - Arrange for interpreters to assist at discharge as needed  - Consider post-discharge preferences of patient/family/discharge partner  - Complete POLST form as appropriate  - Assess patient's ability to be responsible for managing their own health  - Refer to Case Management Department for coordinating discharge planning if the patient needs post-hospital  services based on physician/LIP order or complex needs related to functional status, cognitive ability or social support system  Outcome: Progressing     Problem: NEUROLOGICAL - ADULT  Goal: Achieves stable or improved neurological status  Description: INTERVENTIONS  - Assess for and report changes in neurological status  - Initiate measures to prevent increased intracranial pressure  - Maintain blood pressure and fluid volume within ordered parameters to optimize cerebral perfusion and minimize risk of hemorrhage  - Monitor temperature, glucose, and sodium. Initiate appropriate interventions as ordered  Outcome: Progressing  Goal: Absence of seizures  Description: INTERVENTIONS  - Monitor for seizure activity  - Administer anti-seizure medications as ordered  - Monitor neurological status  Outcome: Progressing  Goal: Remains free of injury related to seizure activity  Description: INTERVENTIONS:  - Maintain airway, patient safety  and administer oxygen as ordered  - Monitor patient for seizure activity, document and report duration and description of seizure to MD/LIP  - If seizure occurs, turn patient to side and suction secretions as needed  - Reorient patient post seizure  - Seizure pads on all 4 side rails  - Instruct patient/family to notify RN of any seizure activity  - Instruct patient/family to call for assistance with activity based on assessment  Outcome: Progressing  Goal: Achieves maximal functionality and self care  Description: INTERVENTIONS  - Monitor swallowing and airway patency with patient fatigue and changes in neurological status  - Encourage and assist patient to increase activity and self care with guidance from PT/OT  - Encourage visually impaired, hearing impaired and aphasic patients to use assistive/communication devices  Outcome: Progressing     Problem: Impaired Functional Mobility  Goal: Achieve highest/safest level of mobility/gait  Description: Interventions:  - Assess patient's  functional ability and stability  - Promote increasing activity/tolerance for mobility and gait  - Educate and engage patient/family in tolerated activity level and precautions    Outcome: Progressing

## 2024-04-08 NOTE — CM/SW NOTE
04/08/24 1029   Choice of Post-Acute Provider   Informed patient of right to choose their preferred provider Yes   List of appropriate post-acute services provided to patient/family with quality data Yes   Patient/family choice formerly [facility] Acute   Information given to Patient;Daughter     Confirmed w/ Dr. Dean - recommendation for Acute Rehab.    SW met w/ pt and pt's dtr Nancy at bedside to discuss. Explained referral process, PMR consult/rec, and next steps.    Pt and dtr confirmed choice is [facility] - to stay w/in [place] network. They are aware that pt will need prior authorization before DC.    [facility] reserved in [place] and notified of choice. Requested they submit auth. Clinical updates sent this AM.    UPDATE:    SW spoke to Janene w/ [facility] (max assist, unable to maintain sitting balance) set on WILL CALL through 4/12. PCS completed and will need date added day of actual DC.    PLAN: [facility] Acute, Ambulance on WC, PCS needs date - pending ins auth & med clear      SW/CM to remain available for support and/or discharge planning.         Marian Farmer, MSW, LSW [phone]

## 2024-04-09 VITALS
DIASTOLIC BLOOD PRESSURE: 60 MMHG | HEIGHT: 58 IN | HEART RATE: 108 BPM | OXYGEN SATURATION: 95 % | RESPIRATION RATE: 17 BRPM | BODY MASS INDEX: 32.26 KG/M2 | TEMPERATURE: 98 F | WEIGHT: 153.69 LBS | SYSTOLIC BLOOD PRESSURE: 107 MMHG

## 2024-04-09 LAB — GLUCOSE BLDC GLUCOMTR-MCNC: 97 MG/DL (ref 70–99)

## 2024-04-09 PROCEDURE — 99239 HOSP IP/OBS DSCHRG MGMT >30: CPT | Performed by: HOSPITALIST

## 2024-04-09 NOTE — PLAN OF CARE
Patient is alert and oriented times 4, denies pain. Pt up with PT this morning with moderate assist. Pt cleared for discharge to [facility] acute care, report given to Hiral GARCIA.     Problem: Patient Centered Care  Goal: Patient preferences are identified and integrated in the patient's plan of care  Description: Interventions:  - What would you like us to know as we care for you? From home alone, just moved closer to kids  - Provide timely, complete, and accurate information to patient/family  - Incorporate patient and family knowledge, values, beliefs, and cultural backgrounds into the planning and delivery of care  - Encourage patient/family to participate in care and decision-making at the level they choose  - Honor patient and family perspectives and choices  Outcome: Adequate for Discharge     Problem: Diabetes/Glucose Control  Goal: Glucose maintained within prescribed range  Description: INTERVENTIONS:  - Monitor Blood Glucose as ordered  - Assess for signs and symptoms of hyperglycemia and hypoglycemia  - Administer ordered medications to maintain glucose within target range  - Assess barriers to adequate nutritional intake and initiate nutrition consult as needed  - Instruct patient on self management of diabetes  Outcome: Adequate for Discharge     Problem: Patient/Family Goals  Goal: Patient/Family Long Term Goal  Description: Patient's Long Term Goal: to return home    Interventions:  - PT/OT eval  -monitor labs  -monitor CIWA  -monitor neuro status    - See additional Care Plan goals for specific interventions  Outcome: Adequate for Discharge  Goal: Patient/Family Short Term Goal  Description: Patient's Short Term Goal: improved strength    Interventions:   - PT/OT eval and treat  -monitor labs  -dietary consult    - See additional Care Plan goals for specific interventions  Outcome: Adequate for Discharge     Problem: PAIN - ADULT  Goal: Verbalizes/displays adequate comfort level or patient's stated  pain goal  Description: INTERVENTIONS:  - Encourage pt to monitor pain and request assistance  - Assess pain using appropriate pain scale  - Administer analgesics based on type and severity of pain and evaluate response  - Implement non-pharmacological measures as appropriate and evaluate response  - Consider cultural and social influences on pain and pain management  - Manage/alleviate anxiety  - Utilize distraction and/or relaxation techniques  - Monitor for opioid side effects  - Notify MD/LIP if interventions unsuccessful or patient reports new pain  - Anticipate increased pain with activity and pre-medicate as appropriate  Outcome: Adequate for Discharge     Problem: SAFETY ADULT - FALL  Goal: Free from fall injury  Description: INTERVENTIONS:  - Assess pt frequently for physical needs  - Identify cognitive and physical deficits and behaviors that affect risk of falls.  - [place] fall precautions as indicated by assessment.  - Educate pt/family on patient safety including physical limitations  - Instruct pt to call for assistance with activity based on assessment  - Modify environment to reduce risk of injury  - Provide assistive devices as appropriate  - Consider OT/PT consult to assist with strengthening/mobility  - Encourage toileting schedule  Outcome: Adequate for Discharge     Problem: DISCHARGE PLANNING  Goal: Discharge to home or other facility with appropriate resources  Description: INTERVENTIONS:  - Identify barriers to discharge w/pt and caregiver  - Include patient/family/discharge partner in discharge planning  - Arrange for needed discharge resources and transportation as appropriate  - Identify discharge learning needs (meds, wound care, etc)  - Arrange for interpreters to assist at discharge as needed  - Consider post-discharge preferences of patient/family/discharge partner  - Complete POLST form as appropriate  - Assess patient's ability to be responsible for managing their own health  -  Refer to Case Management Department for coordinating discharge planning if the patient needs post-hospital services based on physician/LIP order or complex needs related to functional status, cognitive ability or social support system  Outcome: Adequate for Discharge     Problem: NEUROLOGICAL - ADULT  Goal: Achieves stable or improved neurological status  Description: INTERVENTIONS  - Assess for and report changes in neurological status  - Initiate measures to prevent increased intracranial pressure  - Maintain blood pressure and fluid volume within ordered parameters to optimize cerebral perfusion and minimize risk of hemorrhage  - Monitor temperature, glucose, and sodium. Initiate appropriate interventions as ordered  Outcome: Adequate for Discharge  Goal: Absence of seizures  Description: INTERVENTIONS  - Monitor for seizure activity  - Administer anti-seizure medications as ordered  - Monitor neurological status  Outcome: Adequate for Discharge  Goal: Remains free of injury related to seizure activity  Description: INTERVENTIONS:  - Maintain airway, patient safety  and administer oxygen as ordered  - Monitor patient for seizure activity, document and report duration and description of seizure to MD/LIP  - If seizure occurs, turn patient to side and suction secretions as needed  - Reorient patient post seizure  - Seizure pads on all 4 side rails  - Instruct patient/family to notify RN of any seizure activity  - Instruct patient/family to call for assistance with activity based on assessment  Outcome: Adequate for Discharge  Goal: Achieves maximal functionality and self care  Description: INTERVENTIONS  - Monitor swallowing and airway patency with patient fatigue and changes in neurological status  - Encourage and assist patient to increase activity and self care with guidance from PT/OT  - Encourage visually impaired, hearing impaired and aphasic patients to use assistive/communication devices  Outcome: Adequate  for Discharge     Problem: Impaired Functional Mobility  Goal: Achieve highest/safest level of mobility/gait  Description: Interventions:  - Assess patient's functional ability and stability  - Promote increasing activity/tolerance for mobility and gait  - Educate and engage patient/family in tolerated activity level and precautions  - Recommend use of total lift for transfers  Outcome: Adequate for Discharge

## 2024-04-09 NOTE — CM/SW NOTE
Received notice that insurance auth has been approved.    SW pending response in regards to bed assignment/availability today.    RN and DC RN notified.    UPDATE: Per request, MOIRA uploaded recent clinicals, labs and MAR to Natalie.    PLAN: [facility], Ambulance on WC, PCS needs date - pending bed assignment        SW/CM to remain available for support and/or discharge planning.            Marian Farmer, MSW, LSW [phone]

## 2024-04-09 NOTE — CM/SW NOTE
04/09/24 1100   Discharge disposition   Expected discharge disposition Rehab Facili   Post Acute Care Provider   ([facility])   Discharge transportation [facility]       Received notice from maria antonia martinez/ [facility] - they can accept today at 1PM.    RN Nancy and DC RN Abbie updated.    Pt's dtr Nancy notified via phone - agreeable to DC plans and time.    SW spoke to [facility] w/ [facility] (max assist) set for ETA 1PM. PCS completed in Epic - pt's RN to print w/ pt's AVS.    Per maria antonia Higuera, [facility] RN will contact [facility] JOSE Cruz directly for report.      PLAN: [facility] Acute, Ambulance ETA 1PM, PCS done           Marian Farmer, MSW, LSW [phone]

## 2024-04-09 NOTE — PLAN OF CARE
Problem: Patient Centered Care  Goal: Patient preferences are identified and integrated in the patient's plan of care  Description: Interventions:  - What would you like us to know as we care for you? From home alone, just moved closer to kids  - Provide timely, complete, and accurate information to patient/family  - Incorporate patient and family knowledge, values, beliefs, and cultural backgrounds into the planning and delivery of care  - Encourage patient/family to participate in care and decision-making at the level they choose  - Honor patient and family perspectives and choices  Outcome: Progressing     Problem: Diabetes/Glucose Control  Goal: Glucose maintained within prescribed range  Description: INTERVENTIONS:  - Monitor Blood Glucose as ordered  - Assess for signs and symptoms of hyperglycemia and hypoglycemia  - Administer ordered medications to maintain glucose within target range  - Assess barriers to adequate nutritional intake and initiate nutrition consult as needed  - Instruct patient on self management of diabetes  Outcome: Progressing     Problem: Patient/Family Goals  Goal: Patient/Family Long Term Goal  Description: Patient's Long Term Goal: to return home    Interventions:  - PT/OT eval  -monitor labs  -monitor CIWA  -monitor neuro status    - See additional Care Plan goals for specific interventions  Outcome: Progressing  Goal: Patient/Family Short Term Goal  Description: Patient's Short Term Goal: improved strength    Interventions:   - PT/OT eval and treat  -monitor labs  -dietary consult    - See additional Care Plan goals for specific interventions  Outcome: Progressing     Problem: PAIN - ADULT  Goal: Verbalizes/displays adequate comfort level or patient's stated pain goal  Description: INTERVENTIONS:  - Encourage pt to monitor pain and request assistance  - Assess pain using appropriate pain scale  - Administer analgesics based on type and severity of pain and evaluate response  -  Implement non-pharmacological measures as appropriate and evaluate response  - Consider cultural and social influences on pain and pain management  - Manage/alleviate anxiety  - Utilize distraction and/or relaxation techniques  - Monitor for opioid side effects  - Notify MD/LIP if interventions unsuccessful or patient reports new pain  - Anticipate increased pain with activity and pre-medicate as appropriate  Outcome: Progressing     Problem: SAFETY ADULT - FALL  Goal: Free from fall injury  Description: INTERVENTIONS:  - Assess pt frequently for physical needs  - Identify cognitive and physical deficits and behaviors that affect risk of falls.  - [place] fall precautions as indicated by assessment.  - Educate pt/family on patient safety including physical limitations  - Instruct pt to call for assistance with activity based on assessment  - Modify environment to reduce risk of injury  - Provide assistive devices as appropriate  - Consider OT/PT consult to assist with strengthening/mobility  - Encourage toileting schedule  Outcome: Progressing     Problem: DISCHARGE PLANNING  Goal: Discharge to home or other facility with appropriate resources  Description: INTERVENTIONS:  - Identify barriers to discharge w/pt and caregiver  - Include patient/family/discharge partner in discharge planning  - Arrange for needed discharge resources and transportation as appropriate  - Identify discharge learning needs (meds, wound care, etc)  - Arrange for interpreters to assist at discharge as needed  - Consider post-discharge preferences of patient/family/discharge partner  - Complete POLST form as appropriate  - Assess patient's ability to be responsible for managing their own health  - Refer to Case Management Department for coordinating discharge planning if the patient needs post-hospital services based on physician/LIP order or complex needs related to functional status, cognitive ability or social support system  Outcome:  Progressing     Problem: NEUROLOGICAL - ADULT  Goal: Achieves stable or improved neurological status  Description: INTERVENTIONS  - Assess for and report changes in neurological status  - Initiate measures to prevent increased intracranial pressure  - Maintain blood pressure and fluid volume within ordered parameters to optimize cerebral perfusion and minimize risk of hemorrhage  - Monitor temperature, glucose, and sodium. Initiate appropriate interventions as ordered  Outcome: Progressing  Goal: Absence of seizures  Description: INTERVENTIONS  - Monitor for seizure activity  - Administer anti-seizure medications as ordered  - Monitor neurological status  Outcome: Progressing  Goal: Remains free of injury related to seizure activity  Description: INTERVENTIONS:  - Maintain airway, patient safety  and administer oxygen as ordered  - Monitor patient for seizure activity, document and report duration and description of seizure to MD/LIP  - If seizure occurs, turn patient to side and suction secretions as needed  - Reorient patient post seizure  - Seizure pads on all 4 side rails  - Instruct patient/family to notify RN of any seizure activity  - Instruct patient/family to call for assistance with activity based on assessment  Outcome: Progressing  Goal: Achieves maximal functionality and self care  Description: INTERVENTIONS  - Monitor swallowing and airway patency with patient fatigue and changes in neurological status  - Encourage and assist patient to increase activity and self care with guidance from PT/OT  - Encourage visually impaired, hearing impaired and aphasic patients to use assistive/communication devices  Outcome: Progressing     Problem: Impaired Functional Mobility  Goal: Achieve highest/safest level of mobility/gait  Description: Interventions:  - Assess patient's functional ability and stability  - Promote increasing activity/tolerance for mobility and gait  - Educate and engage patient/family in  tolerated activity level and precautions    Outcome: Progressing

## 2024-04-09 NOTE — DIETARY NOTE
BRIEF DIETITIAN NOTE     Pt re-screened for LOS (length of stay). Found to be at no nutrition risk at this time. Good PO intakes, % at most meals. No wt loss. Please consult RD if further nutrition intervention is needed.     Percent Meals Eaten (last 6 days)       Date/Time Percent Meals Eaten (%)    04/03/24 1700 50 %    04/04/24 0800 50 %    04/04/24 1300 25 %    04/04/24 1840 50 %    04/06/24 0800 100 %    04/06/24 1200 75 %    04/06/24 1950 100 %    04/07/24 0900 90 %    04/08/24 1100 100 %    04/08/24 1417 100 %             Patient Weight(s) for the past 336 hrs:   Weight   04/09/24 0537 69.7 kg (153 lb 11.2 oz)   04/08/24 0507 71.6 kg (157 lb 12.8 oz)   04/07/24 0416 71.7 kg (158 lb)   04/06/24 0800 73.4 kg (161 lb 13.1 oz)   04/05/24 1700 70.3 kg (154 lb 15.7 oz)   04/04/24 0400 64.5 kg (142 lb 3.2 oz)   04/03/24 0523 63.4 kg (139 lb 12.4 oz)   04/02/24 2121 59 kg (130 lb)        Wt Readings from Last 6 Encounters:   04/09/24 69.7 kg (153 lb 11.2 oz)   04/03/24 63.4 kg (139 lb 12.4 oz)        F/u per protocol or as appropriate.       Rosy Barragan RD, LDN  Clinical Dietitian  P: [phone]

## 2024-04-09 NOTE — OCCUPATIONAL THERAPY NOTE
OCCUPATIONAL THERAPY TREATMENT NOTE - INPATIENT        Room Number: 303/303-A     Presenting Problem: weak, fall    Problem List  Principal Problem:    Fall, initial encounter  Active Problems:    Fall    Cerebellar hemorrhage (HCC)    Major depressive disorder, recurrent episode, moderate degree (HCC)    Alcohol dependence, continuous (HCC)    Alcohol withdrawal syndrome, uncomplicated (HCC)      OCCUPATIONAL THERAPY ASSESSMENT   Patient demonstrates limited progress this session, goals remain in progress.    Patient continues to function below baseline with ADLs and functional transfers.   Contributing factors to remaining limitations include decreased functional strength, decreased functional reach, decreased endurance, impaired standing balance, decreased muscular endurance, difficulty maintaining precautions, cognitive deficits (decreased carryover, decreased sequencing, decreased problem-solving), decreased insight to deficits, and decreased safety awareness.  Next session anticipate patient to progress ADLs and safety.  Occupational Therapy will continue to follow patient for duration of hospitalization.    Patient continues to benefit from continued skilled OT services: to facilitate return to prior level of function as patient demonstrates high motivation with excellent tolerance to an intensive therapy program .     PLAN  OT Treatment Plan: Balance activities;ADL training;Functional transfer training;Endurance training;Cognitive reorientation;UE strengthening/ROM;Patient/Family education;Patient/Family training;Equipment eval/education;Compensatory technique education     SUBJECTIVE  \"I forgot.\"    OBJECTIVE  Precautions: Bed/chair alarm          PAIN ASSESSMENT  Ratin            ACTIVITIES OF DAILY LIVING ASSESSMENT  AM-PAC ‘6-Clicks’ Inpatient Daily Activity Short Form  How much help from another person does the patient currently need…  -   Putting on and taking off regular lower body clothing?:  Total  -   Bathing (including washing, rinsing, drying)?: A Lot  -   Toileting, which includes using toilet, bedpan or urinal? : Total  -   Putting on and taking off regular upper body clothing?: A Lot  -   Taking care of personal grooming such as brushing teeth?: A Little  -   Eating meals?: A Little    AM-PAC Score:  Score: 12  Approx Degree of Impairment: 66.57%  Standardized Score (AM-PAC Scale): 30.6  CMS Modifier (G-Code): CL    FUNCTIONAL TRANSFER ASSESSMENT  Sit to Stand: Edge of Bed; Chair  Edge of Bed: Maximum Assist  Chair: Minimal Assist    BED MOBILITY     Supine to Sit : Maximum Assist             FUNCTIONAL ADL ASSESSMENT   Overall total A         The patient's Approx Degree of Impairment: 66.57% has been calculated based on documentation in the [facility] '6 clicks' Inpatient Daily Activity Short Form.  Research supports that patients with this level of impairment may benefit from acute rehab.  Final disposition will be made by interdisciplinary medical team.       OT Goals:     Patient will complete functional transfer with CGA  Comment: min-max A    Patient will complete toileting with CGA  Comment: total A    Patient will tolerate standing for 3 minutes in prep for adls with CGA   Comment: ongoing    Patient will complete UE/LE dressing with Min A 1x  Comment:NA            Goals  on: 24  Frequency: 5x/week    OT Session Time: 23 minutes  Self-Care Home Management: 23 minutes      Nina De Santiago OT  [facility]  Inpatient Rehabilitation  Occupational Therapy  [phone]

## 2024-04-09 NOTE — PHYSICAL THERAPY NOTE
PHYSICAL THERAPY TREATMENT NOTE - INPATIENT     Room Number: 303/303-A       Presenting Problem: LE weakness, R head trauma & s/p falls       Problem List  Principal Problem:    Fall, initial encounter  Active Problems:    Fall    Cerebellar hemorrhage (HCC)    Major depressive disorder, recurrent episode, moderate degree (HCC)    Alcohol dependence, continuous (HCC)    Alcohol withdrawal syndrome, uncomplicated (HCC)      PHYSICAL THERAPY ASSESSMENT   Patient demonstrates good  progress this session, goals  remain in progress.    Patient continues to function below baseline with bed mobility, transfers, and gait.  Contributing factors to remaining limitations include decreased functional strength and medical status.  Next session anticipate patient to progress bed mobility, transfers, and gait.  Physical Therapy will continue to follow patient for duration of hospitalization.    Patient continues to benefit from continued skilled PT services: to facilitate return to prior level of function as patient demonstrates high motivation with excellent tolerance to an intensive therapy program .    PLAN  PT Treatment Plan: Bed mobility;Body mechanics;Patient education;Gait training;Balance training;Transfer training  Frequency (Obs): 5x/week    SUBJECTIVE  \"I am worried about falling\"    OBJECTIVE  Precautions: Bed/chair alarm    PAIN ASSESSMENT   Rating: Unable to rate          BALANCE  Static Sitting: Fair +  Dynamic Sitting: Fair  Static Standing: Poor +  Dynamic Standing: Poor    AM-PAC '6-Clicks' INPATIENT SHORT FORM - BASIC MOBILITY  How much difficulty does the patient currently have...  Patient Difficulty: Turning over in bed (including adjusting bedclothes, sheets and blankets)?: A Lot   Patient Difficulty: Sitting down on and standing up from a chair with arms (e.g., wheelchair, bedside commode, etc.): A Lot   Patient Difficulty: Moving from lying on back to sitting on the side of the bed?: A Lot   How much help  from another person does the patient currently need...   Help from Another: Moving to and from a bed to a chair (including a wheelchair)?: A Lot   Help from Another: Need to walk in hospital room?: A Lot   Help from Another: Climbing 3-5 steps with a railing?: Total     AM-PAC Score:  Raw Score: 11   Approx Degree of Impairment: 72.57%   Standardized Score (AM-PAC Scale): 33.86   CMS Modifier (G-Code): CL    FUNCTIONAL ABILITY STATUS  Functional Mobility/Gait Assessment  Gait Assistance: Moderate assistance  Distance (ft): 10ft x 2  Assistive Device: Rolling walker  Pattern: Shuffle (decreased step length bilaterally, pushing walker in front)  Rolling:  not tested   Supine to Sit: moderate assist  Sit to Supine:  not tested   Sit to Stand: moderate assist    Additional information: Patient on room air, heart rate 109 and 119 after ambulation. Patient currently with mod A x 1-2 for mobility during the session. Stand pivot transfer mod A x 1 to bedside chair due to height of bed. Patient able to ambulate about 10ft x 2 with rolling walker with mod A x 1, chair follow, posterior lean when initially standing. Patient with shuffling gait and decreased step length bilaterally. Patient able to stand from bedside chair with min A x 1 first time then mod A x 1 for second attempt to stand. Patient with chair follow for ambulation. Patient confused during the session, good carryover for hand placement when sitting in bedside chair, cues needed for poor carryover when standing from bedside chair. The patient's Approx Degree of Impairment: 72.57% has been calculated based on documentation in the [facility] '6 clicks' Inpatient Daily Activity Short Form.  Research supports that patients with this level of impairment may benefit from rehab facility. Patient received semi-fowlers in bed, agreeable to physical therapy. Education with patient provided verbally on physical therapy plan of care and physiological benefits of out of bed  mobility. Patient with good carryover during session. Anticipated therapy needs remain appropriate based on the patient's performance, personal factors, and remaining functional impairments. Final disposition will be made by interdisciplinary medical team.    Patient End of Session: Up in chair;Needs met;Call light within reach;RN aware of session/findings;All patient questions and concerns addressed;Alarm set;Family present    CURRENT GOALS   Goals to be met by: 4/17  Patient Goal Patient's self-stated goal is: walk   Goal #1 Patient is able to demonstrate supine - sit EOB @ level: minimum assistance      Goal #1   Current Status Mod A x 1    Goal #2 Patient is able to demonstrate transfers Sit to/from Stand at assistance level: minimum assistance       Goal #2  Current Status Mod A x 1 with rolling walker    Goal #3 Patient is able to ambulate 50 feet with assist device: RW at assistance level: supervision   Goal #3   Current Status Mod A x 1 10ft x 2 with rolling walker chair follow   Goal #4 Patient will negotiate  stairs/one curb w/ assistive device and supervision   Goal #4   Current Status Not tested    Goal #5 Patient to demonstrate independence with home activity/exercise instructions provided to patient in preparation for discharge.   Goal #5   Current Status In progress     Gait Training: 10 minutes  Therapeutic Activity: 13 minutes

## 2024-04-09 NOTE — PAYOR COMM NOTE
--------------  CONTINUED STAY REVIEW    Payor: [employer] PPO  Subscriber #:  [id number]  Authorization Number: [id number]    Admit date: 4/3/24  Admit time:  5:11 AM    Admitting Physician: Marcia Meadows MD  Attending Physician:  Alvaro Arvizu MD  Primary Care Physician: Leona Edwards PA    REVIEW DOCUMENTATION:    4-8-24     Assessment and Plan:     Generalized weakness  Recurrent falls  Moderate acute to subacute inferior right cerebellar hematoma vs infarct vs mass with hemorrhage  -MRI brain results reviewed - rec repeat MRI 3 months  -neuro consult appreciated  -Neurochecks stable  -TSH elevated, normal free T4  -vit B12 ok  -PT/OT eval  -PMR consulted - rec acute rehab     Hematemesis  -resolved  -GI consult appreciated  -monitor hgb  -cont PPI  -no endoscopy rec as hgb now stable     Current alcohol use  -Last alcohol use 2 days prior to presentation  -CIWA scores low. Ativan prn.     Questionable Acute UTI  -no culture sent unfortunately. UA without pyuria  -was put on ceftriaxone on admission, now off     Acute urinary retention   Hematuria - likely lucas trauma  -appreciate urology consult  -bladder scans likely false positive due to ascites  -CT urogram reviewed  -lucas removed     Acute kidney injury - resolved  Mild hyponatremia - resolved  Hypochloremia- resolved  Metabolic acidosis - resolved  -Suspecting secondary to decreased oral intake as well as worsened by alcohol use.  -off IVF     Thrombocytopenia  -Suspecting secondary to alcohol use  -downtrend likely dilutional, stable now     NIDDM type II  -Patient stopped taking metformin months ago.  -Last hemoglobin A1c  5.5  -ISS  -monitor BG     H/o hyperlipidemia  -Patient stopped taking statin months ago.     H/o anxiety  -cont lexapro     DVT Mechanical Prophylaxis:   SCDs,      General:  Alert, no distress  HEENT:  Normocephalic, atraumatic  Cardiac:  Regular rate, regular rhythm  Pulmonary:  Clear to auscultation bilaterally,  respirations unlabored  Gastrointestinal:  Soft, non-tender, normal bowel sounds  Musculoskeletal:  No joint swelling  Extremities:  No edema, no cyanosis, no clubbing  Neurologic:  nonfocal  Psychiatric:  Normal affect, calm and appropriate              MEDICATIONS ADMINISTERED IN LAST 1 DAY:  chlordiazePOXIDE (Librium) cap 5 mg       Date Action Dose Route User    4/9/2024 0943 Given 5 mg Oral Sandeep Rendon RN    4/8/2024 2129 Given 5 mg Oral Zachary Acharya RN          escitalopram (Lexapro) tab 10 mg       Date Action Dose Route User    4/8/2024 2129 Given 10 mg Oral Zachary Acharya RN          folic acid (Folvite) tab 1 mg       Date Action Dose Route User    4/9/2024 0943 Given 1 mg Oral Sandeep Rendon RN          insulin aspart (NovoLOG) 100 Units/mL FlexPen 1-5 Units       Date Action Dose Route User    4/8/2024 2130 Given 1 Units Subcutaneous (Left Lower Arm) Zachary Acharya RN    4/8/2024 1806 Given 1 Units Subcutaneous (Right Upper Arm) Sandeep Rendon RN          lamoTRIgine (LaMICtal) tab 25 mg       Date Action Dose Route User    4/9/2024 0943 Given 25 mg Oral Sandeep Rendon RN    4/8/2024 2129 Given 25 mg Oral Zachary Acharya RN          pantoprazole (Protonix) 40 mg in sodium chloride 0.9% PF 10 mL IV push       Date Action Dose Route User    4/9/2024 0540 Given 40 mg Intravenous Zachary Acharya RN    4/8/2024 1806 Given 40 mg Intravenous Sandeep Rendon RN          tamsulosin (Flomax) cap 0.4 mg       Date Action Dose Route User    4/9/2024 0540 Given 0.4 mg Oral Zachary Acharya RN          multivitamin with minerals (Thera M Plus) tab 1 tablet       Date Action Dose Route User    4/9/2024 0943 Given 1 tablet Oral Sandeep Rendon RN          thiamine (Vitamin B1) tab 100 mg       Date Action Dose Route User    4/9/2024 0943 Given 100 mg Oral Sandeep Rendon RN          traZODone (Desyrel) tab 25 mg       Date Action Dose Route User    4/8/2024 2129 Given 25 mg Oral Zachary Acharya RN             Vitals (last day)       Date/Time Temp Pulse Resp BP SpO2 Weight O2 Device O2 Flow Rate (L/min) [facility]    04/09/24 0937 98.3 °F (36.8 °C) 108 17 107/60 95 % -- None (Room air) -- EM    04/09/24 0537 98.8 °F (37.1 °C) 99 17 105/53 95 % -- None (Room air) -- AL    04/09/24 0537 -- -- -- -- -- 153 lb 11.2 oz -- -- TP    04/08/24 2120 98.1 °F (36.7 °C) 88 20 102/60 99 % -- None (Room air) -- AL    04/08/24 2000 -- 99 -- -- -- -- -- -- KD    04/08/24 1417 97.9 °F (36.6 °C) 94 18 106/61 97 % -- None (Room air) -- EM    04/08/24 0914 98.3 °F (36.8 °C) 93 18 108/59 93 % -- None (Room air) -- EM    04/08/24 0507 -- -- -- -- -- 157 lb 12.8 oz -- -- BK    04/08/24 0507 98.5 °F (36.9 °C) 77 16 107/58 96 % -- None (Room air) -- NM

## 2024-04-09 NOTE — SLP NOTE
SPEECH DAILY NOTE - INPATIENT    ASSESSMENT & PLAN   ASSESSMENT  PPE REQUIRED. THIS THERAPIST WORE GLOVES AND MASK FOR DURATION OF EVALUATION. HANDS WASHED UPON ENTRANCE/EXIT.    SLP f/u for ongoing dysphagia tx/meal assessment per recommendations of soft bite sized/thin liquids per BSE. RN reports pt tolerates diet and medication well with no overt clinical s/s aspiration. Pt denies any swallowing challenges.     Pt positioned upright in bedside chair, alert/cooperative/family member present. Pt afebrile, tolerating room air with oxygen status 95% prior to the start of oral trials. SLP reviewed aspiration precautions and safe swallowing compensatory strategies with the patient. Safe swallow guidelines remain written on the white board in purple. Patient and family v/u. Provided min assistance, pt tolerates soft bite sized consistency and thin liquids via cup with no overt clinical signs/symptoms of aspiration. Pt presented with trials of hard solids. Pt with adequate oral acceptance and bilabial seal. Pt with intact bite, prolonged mastication 2/2 sparse dentition, and timely A/P transfer. Pharyngeal swallow appears timely with adequate laryngeal elevation/excursion. No clinical signs of aspiration (e.g., immediate/delayed throat clear, immediate/delayed cough, wet vocal quality, increased O2 effort) observed across all trials. Oxygen status remained stable t/o the entire session. Recommend upgrade to regular consistency (pick softer foods), remain on thin liquids with adherence to aspiration precautions and swallow strategies.     SLP to f/u with meal assessment x1-2, monitor  CXR, and VFSS if any overt CSA and/or decline in CXR. RN alerted with results and recommendations.     MOST RECENT CXR 4/3  CONCLUSION: Findings suspicious for acute nondisplaced fractures of the anterior right 3rd and 4th ribs.  Correlate for pain at this site.  No pneumothorax.  Otherwise no acute cardiopulmonary abnormality.       Diet  Recommendations - Solids: Regular (pick softer foods 2/2 dentition)  Diet Recommendations - Liquids: Thin Liquids    Compensatory Strategies Recommended: No straws;Slow rate;Alternate consistencies;Small bites and sips  Aspiration Precautions: Upright position;Slow rate;Small bites and sips;No straw  Medication Administration Recommendations:  (as tolerated)    Patient Experiencing Pain: No                Treatment Plan  Treatment Plan/Recommendations: Aspiration precautions    Interdisciplinary Communication: Discussed with RN            GOALS  Goal #1 The patient will tolerate regular (softer foods) consistency and thin liquids without overt signs or symptoms of aspiration with 100 % accuracy over 2 session(s).        Revised 4/9   Goal #2 The patient/family/caregiver will demonstrate understanding and implementation of aspiration precautions and swallow strategies independently over 2 session(s).    In Progress   Goal #3 SLE pending MRI results.    MRI +. Refer to eval for full report.    GOAL MET        FOLLOW UP  Follow Up Needed (Documentation Required): Yes  SLP Follow-up Date: 04/10/24  Number of Visits to Meet Established Goals: 2    Session: 2    If you have any questions, please contact ASHANTI Llanos M.S. CCC-SLP  Speech Language Pathologist  Phone Number [phone]

## 2024-04-09 NOTE — PLAN OF CARE
Patient is alert and oriented times 4, denies pain. Pt up with PT this morning with moderate assist. Pt cleared for discharge to [facility] acute care, report given to Hiral GARCIA.

## 2024-04-11 NOTE — PAYOR COMM NOTE
--------------  DISCHARGE REVIEW    Payor: [employer] [facility] P[facility]  Subscriber #:  [id number]  Authorization Number: [id number]    Admit date: 4/3/24  Admit time:   5:11 AM  Discharge Date: 2024  1:32 PM     Admitting Physician: Marcia Meadows MD  Attending Physician:  No att. providers found  Primary Care Physician: Leona Edwards PA          Discharge Summary Notes        Discharge Summary signed by Alvaro Arvizu MD at 2024 12:56 PM       Author: Alvaro Arvizu MD Specialty: HOSPITALIST, HOSPITALIST Author Type: Physician    Filed: 2024 12:56 PM Date of Service: 2024 12:56 PM Status: Signed    : Alvaro Arvizu MD (Physician)         [facility]  Discharge Summary     Christi Tavarez  : 1960    Status: Inpatient  Day #: 6    Attending: Alvaro Arvizu MD  PCP: ÁNGEL Delgado     Date of Admission:  2024  Date of Discharge:  2024     Hospital Discharge Diagnoses:     Generalized weakness  Recurrent falls  Moderate acute to subacute inferior right cerebellar hematoma vs infarct vs mass with hemorrhage  Hematemesis  Alcohol abuse with withdrawal  Hematuria from lucas trauma  RAN  Mild hyponatremia  Metabolic acidosis  Thrombocytopenia  DM2   HL  Anxiety d/o      History of Present Illness:     Copied from Admission H&P:    Christi Tavarez is a(n) 63 year old female, who presents for evaluation of multiple falls. PMHx significant for NIDDM type II, hypothyroidism, anxiety, hyperlipidemia, current alcohol use.  Patient accompanied by daughter at bedside.  History obtained mostly by patient who reports that she has been falling for the last 2 weeks.  She feels like her legs are giving out and she has been falling on the ground at home, hitting her head multiple times.  Denies lightheadedness or dizziness prior to the falls and states that her legs are just giving out and she feels very weak.  Daughter at bedside concerned that patient has had the falls as well as vomiting prior to ED  presentation.  Daughter states that she has been vomiting, no blood noted per family and patient.  She has felt nauseous.  Has had difficulty with keeping any food down.  Denies any fever or chills.  Reports the last time she went to her PCP was in October 2023 and per chart review she did see her PCP back then.  Currently she reports taking Paxil for her anxiety.  She declines taking her metformin for her diabetes and does not check her blood sugar.  Denies any blood in her stool.  No recent colonoscopy.  Denies previous EGD.  Reports drinking alcohol, about 1-2 shots of vodka every day for the last 10 years.  Denies any previous history of alcohol withdrawal.  Last alcohol drink 2 days prior to current presentation.  Denies any change in vision, headache, loss of consciousness.  Reports that she is able to move her extremities, just difficult to walk.  Denies symptoms of dysuria.  Denies sick contacts.  On presentation to the ED, vital signs remarkable for tachycardia.  Lab work remarkable for elevated blood glucose 168, mildly decreased sodium level 133, creatinine 1.10 with EGFR 56, WBC 13.8 with left shift, mild thrombocytopenia with platelet count 126.  Hemoglobin noted to be normal 12.0.   CT head reveals moderate acute to subacute inferior right cerebellar infarct with focal region of hemorrhagic transformation 2 x 1.9 cm versus mass with hemorrhage. Chest x-ray with right side anterior third/fourth rib fractures.     During my evaluation, patient noted to be have emesis with streaks of blood.  Patient is hemodynamically stable with reassuring hemoglobin of 12.  She will be admitted under hospitalist service with consultation to gastroenterology as well as neurology for further evaluation.       Hospital Course:     Generalized weakness  Recurrent falls  Moderate acute to subacute inferior right cerebellar hematoma vs infarct vs mass with hemorrhage  -MRI brain results reviewed - rec repeat MRI 3  months  -neuro consult appreciated  -Neurochecks stable  -TSH elevated, normal free T4  -vit B12 ok  -PT/OT eval  -PMR consulted - rec acute rehab     Hematemesis  -resolved  -GI consult appreciated  -monitor hgb - stable  -cont PPI  -no endoscopy rec as hgb now stable     Current alcohol use  Alcohol withdrawal - resolved  -Last alcohol use 2 days prior to presentation  -CIWA scores low. Ativan prn.     Questionable Acute UTI  -no culture sent unfortunately. UA without pyuria  -was put on ceftriaxone on admission, now off     Acute urinary retention - ruled out  Hematuria - likely lucas trauma  -appreciate urology consult  -bladder scans likely false positive due to ascites as not much came out when catheterized  -CT urogram reviewed  -lucas removed.      Acute kidney injury - resolved  Mild hyponatremia - resolved  Hypochloremia- resolved  Metabolic acidosis - resolved  -Suspecting secondary to decreased oral intake as well as worsened by alcohol use.  -off IVF     Thrombocytopenia  -Suspecting secondary to alcohol use  -stable     NIDDM type II  -Patient stopped taking metformin months ago.   -Last hemoglobin A1c  5.5  -ISS  -resume glipizide on discharge  -monitor BG     H/o hyperlipidemia  -Patient stopped taking statin months ago.     H/o anxiety  -cont lexapro     Consultants         Provider   Role Specialty     Niko Zuluaga MD  Consulting Physician UROLOGY     Kenny Dean MD  Consulting Physician Rehabilitation     Ma, Leonela Montano MD  Consulting Physician GASTROENTEROLOGY     Wallace Guillen MD  Consulting Physician Psychiatry     Lawrence Mena MD  Consulting Physician GASTROENTEROLOGY     Ryley, Pepper Caballero DO  Consulting Physician NEUROLOGY     Marcia Meadows MD  Consulting Physician Internal Medicine             Physical Exam:   Blood pressure 107/60, pulse 108, temperature 98.3 °F (36.8 °C), temperature source Oral, resp. rate 17, height 4' 10\" (1.473 m), weight 153 lb 11.2 oz (69.7 kg),  SpO2 95%.  General:  Alert, no distress  HEENT:  Normocephalic, atraumatic  Cardiac:  Regular rate, regular rhythm  Pulmonary:  Clear to auscultation bilaterally, respirations unlabored  Gastrointestinal:  Soft, non-tender, normal bowel sounds  Musculoskeletal:  No joint swelling  Extremities:  No edema, no cyanosis, no clubbing  Neurologic:  nonfocal  Psychiatric:  Normal affect, calm and appropriate         Discharge Medications        START taking these medications        Instructions Prescription details   chlordiazePOXIDE 5 MG Caps  Commonly known as: Librium      Take 1 capsule (5 mg total) by mouth in the morning and 1 capsule (5 mg total) before bedtime.   Refills: 0     escitalopram 10 MG Tabs  Commonly known as: Lexapro      Take 1 tablet (10 mg total) by mouth nightly.   Refills: 0     folic acid 1 MG Tabs  Commonly known as: Folvite      Take 1 tablet (1 mg total) by mouth daily.   Refills: 0     lamoTRIgine 25 MG Tabs  Commonly known as: LaMICtal      Take 1 tablet (25 mg total) by mouth 2 (two) times daily.   Refills: 0     multivitamin with minerals Tabs      Take 1 tablet by mouth daily.   Refills: 0     traZODone 50 MG Tabs  Commonly known as: Desyrel      Take 0.5 tablets (25 mg total) by mouth nightly.   Refills: 0            CONTINUE taking these medications        Instructions Prescription details   glipiZIDE 5 MG Tabs  Commonly known as: Glucotrol      Take 1 tablet (5 mg total) by mouth every morning before breakfast. With food   Refills: 0            STOP taking these medications      PARoxetine 10 MG Tabs  Commonly known as: Paxil                   Hospital Discharge Diagnoses:  brain hemorrhage    Lace+ Score: 56  59-90 High Risk  29-58 Medium Risk  0-28   Low Risk.    TCM Follow-Up Recommendation:  LACE 29-58: Moderate Risk of readmission after discharge from the hospital.        I spent >30 minutes on this discharge. Discussed treatment and discharge plans.       Alvaro Arvizu,  MD      Electronically signed by Alvaro Arvizu MD on 4/9/2024 12:56 PM         REVIEWER COMMENTS

## 2024-04-12 ENCOUNTER — TELEPHONE (OUTPATIENT)
Dept: NEUROLOGY | Facility: CLINIC | Age: 64
End: 2024-04-12

## 2024-04-12 NOTE — TELEPHONE ENCOUNTER
Appointment request received and pt scheduled with DR. Hedrick for 1 month follow up. Pt daughter feels like the pt is declining and wondering if pt needs to be seen sooner.    Daughter is Nancy, works here at [employer]. Her Cell is #[phone]

## 2024-04-12 NOTE — TELEPHONE ENCOUNTER
Per chart review, pt was admitted to [facility] from [facility].     Called pt's daughter, Christi, pt's name/ verified.     She confirmed above, pt is admitted there for inpt Rehab.     Has consult appt with Dr. Guardado,     Your appointments       2024 10:30 AM CDT Consult with Yobany Guardado MD [facility] ([facility])        No further action required at this time, TE closed.

## 2024-04-15 NOTE — CM/SW NOTE
Received call from pt's dtr Nancy post DC. Pt's dtr voicing concerns w/ care and follow through at [facility].    SW confirmed liaison w/ [facility] will be notified and asked to follow up with pt's dtr in regards to concerns.    MOIRA spoke to liaison Gisel via phone at [facility]. Voiced concerns for pt's decline in mentation (dilated/blank stare) and now needing someone to feed her.    Gisel confirmed they will look into the case and follow up with pt's dtr.    SW/ALISTAIR Supervisors also notified as [facility] is system facility.      Marian Farmer, MSW, LSW [id number]

## 2024-04-16 ENCOUNTER — TELEPHONE (OUTPATIENT)
Dept: PHYSICAL THERAPY | Facility: HOSPITAL | Age: 64
End: 2024-04-16

## 2024-05-27 ENCOUNTER — EXTERNAL FACILITY (OUTPATIENT)
Dept: INTERNAL MEDICINE CLINIC | Facility: CLINIC | Age: 64
End: 2024-05-27

## 2024-05-27 DIAGNOSIS — F10.930 ALCOHOL WITHDRAWAL SYNDROME, UNCOMPLICATED (HCC): ICD-10-CM

## 2024-05-27 DIAGNOSIS — F33.1 MAJOR DEPRESSIVE DISORDER, RECURRENT EPISODE, MODERATE DEGREE (HCC): ICD-10-CM

## 2024-05-27 DIAGNOSIS — F10.20 ALCOHOL DEPENDENCE, CONTINUOUS (HCC): ICD-10-CM

## 2024-05-27 DIAGNOSIS — I61.4 CEREBELLAR HEMORRHAGE (HCC): ICD-10-CM
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
## 2024-05-28 PROCEDURE — 99308 SBSQ NF CARE LOW MDM 20: CPT | Performed by: INTERNAL MEDICINE

## 2024-05-28 NOTE — H&P
pt seen 5/2124 at [facility]     No distress noted    Latest labs noted    continue with pt     vit stable       Subj  No cp  No sob  No abd pain  Vit stable  Obj  Cv s1 s2  Chest clear  Abd soft  Ext from  Neur aa0 x 3 forgetful  A/P  Generalized weakness with Recurrent falls, pt/ot  Moderate acute tosubacute inferior right cerebellar hematomavs infarct vs mass with hemorrhage  MRI brain results reviewed by Neuro- rec repeat MRI 3 months  -neuro seen  Will follow in rehab  Hematemesis resolved, will monitor  -GI seen in hospital  hgb - stable on PPI  endoscopy was note reccommended as hgb now stable  Current alcohol use  Alcohol withdrawal - resolved in hospital, will monitor  On Ct scan liver suspect for cirrhosis with moderate ascitics,  Will monitor  Acute urinary retention - ruled out  Hematuria - likely lucas trauma  urology consult done  -CT urogram reviewed by uro  -lucas was removed.  Acute kidney injury - resolved, will monitor in rehab  Thrombocytopenia  Suspecting secondary to alcohol use stable, will monitor in rehab  NIDDM type II  -Last hemoglobin A1c 5.5  On glipizide  Will monitor BG  H/o hyperlipidemia  Patient stopped taking statin months ago.  H/o anxiety  -cont lexapro, will follow

## 2024-05-28 NOTE — H&P
pt seen 5/24/24 at [facility]     doing ok, working with pt    continue with pt     vit stable       Subj  No cp  No sob  No abd pain  Vit stable  Obj  Cv s1 s2  Chest clear  Abd soft  Ext from  Neur aa0 x 3 forgetful  A/P  Generalized weakness with Recurrent falls, pt/ot  Moderate acute tosubacute inferior right cerebellar hematomavs infarct vs mass with hemorrhage  MRI brain results reviewed by Neuro- rec repeat MRI 3 months  -neuro seen  Will follow in rehab  Hematemesis resolved, will monitor  -GI seen in hospital  hgb - stable on PPI  endoscopy was note reccommended as hgb now stable  Current alcohol use  Alcohol withdrawal - resolved in hospital, will monitor  On Ct scan liver suspect for cirrhosis with moderate ascitics,  Will monitor  Acute urinary retention - ruled out  Hematuria - likely lucas trauma  urology consult done  -CT urogram reviewed by uro  -lucas was removed.  Acute kidney injury - resolved, will monitor in rehab  Thrombocytopenia  Suspecting secondary to alcohol use stable, will monitor in rehab  NIDDM type II  -Last hemoglobin A1c 5.5  On glipizide  Will monitor BG  H/o hyperlipidemia  Patient stopped taking statin months ago.  H/o anxiety  -cont lexapro, will follow

## 2024-05-28 NOTE — H&P
pt seen 5/16/24 at [facility]     seen in room, no new complaints     continue with pt     vit stable       Subj  No cp  No sob  No abd pain  Vit stable  Obj  Cv s1 s2  Chest clear  Abd soft  Ext from  Neur aa0 x 3 forgetful  A/P  Generalized weakness with Recurrent falls, pt/ot  Moderate acute tosubacute inferior right cerebellar hematomavs infarct vs mass with hemorrhage  MRI brain results reviewed by Neuro- rec repeat MRI 3 months  -neuro seen  Will follow in rehab  Hematemesis resolved, will monitor  -GI seen in hospital  hgb - stable on PPI  endoscopy was note reccommended as hgb now stable  Current alcohol use  Alcohol withdrawal - resolved in hospital, will monitor  On Ct scan liver suspect for cirrhosis with moderate ascitics,  Will monitor  Acute urinary retention - ruled out  Hematuria - likely lucas trauma  urology consult done  -CT urogram reviewed by uro  -lucas was removed.  Acute kidney injury - resolved, will monitor in rehab  Thrombocytopenia  Suspecting secondary to alcohol use stable, will monitor in rehab  NIDDM type II  -Last hemoglobin A1c 5.5  On glipizide  Will monitor BG  H/o hyperlipidemia  Patient stopped taking statin months ago.  H/o anxiety  -cont lexapro, will follow

## 2024-05-29 ENCOUNTER — HOSPITAL ENCOUNTER (OUTPATIENT)
Dept: ULTRASOUND IMAGING | Facility: HOSPITAL | Age: 64
Discharge: HOME | End: 2024-05-29
Attending: INTERNAL MEDICINE
Payer: COMMERCIAL

## 2024-05-29 DIAGNOSIS — K70.31 ALCOHOLIC CIRRHOSIS OF LIVER WITH ASCITES (HCC): ICD-10-CM

## 2024-05-29 DIAGNOSIS — R14.0 ABDOMINAL DISTENTION: ICD-10-CM

## 2024-05-29 PROCEDURE — 76700 US EXAM ABDOM COMPLETE: CPT | Performed by: INTERNAL MEDICINE

## 2024-05-30 ENCOUNTER — TELEPHONE (OUTPATIENT)
Dept: INTERNAL MEDICINE CLINIC | Facility: CLINIC | Age: 64
End: 2024-05-30

## 2024-05-30 NOTE — TELEPHONE ENCOUNTER
Dr. Rice- [facility] providers are asking if you will sign off on home health orders as patient is being discharged on Friday 5/31 from [facility]. She has an appointment with you on 6/19. I do not believe she has ever seen you before. Dr. Antony Zavala has been following her inpatient. Please advise if you will follow patient or would like Dr. Zavala to. Thank you.     *Elvira will be faxing orders to both you and Dr. Zavala      Future Appointments   Date Time Provider Department Center   6/13/2024 11:20 AM [facility]   6/18/2024 10:30 AM Yobany Guardado MD [facility]   6/19/2024 11:45 AM Bonilla Rice MD [facility] follow up, Daughter reschld appt - patient is still in rehab.  5/13Outreach unable to connect - Please try to collect on current self-pay balance or discuss payment options.     **ok to leave verbal order on which provider will be providing/following patient for home healthj

## 2024-05-30 NOTE — TELEPHONE ENCOUNTER
Spoke with Elvira at [facility]    Informed her of message and confirmed fax number    No further questions.

## 2024-05-31 ENCOUNTER — MED REC SCAN ONLY (OUTPATIENT)
Dept: INTERNAL MEDICINE CLINIC | Facility: CLINIC | Age: 64
End: 2024-05-31

## 2024-05-31 PROCEDURE — 99308 SBSQ NF CARE LOW MDM 20: CPT | Performed by: INTERNAL MEDICINE

## 2024-06-03 ENCOUNTER — HOSPITAL ENCOUNTER (INPATIENT)
Facility: HOSPITAL | Age: 64
LOS: 15 days | Discharge: NURSING, REHAB OR LONG-TERM CARE | DRG: 432 | End: 2024-06-18
Attending: EMERGENCY MEDICINE | Admitting: INTERNAL MEDICINE

## 2024-06-03 ENCOUNTER — MED REC SCAN ONLY (OUTPATIENT)
Dept: INTERNAL MEDICINE CLINIC | Facility: CLINIC | Age: 64
End: 2024-06-03

## 2024-06-03 ENCOUNTER — APPOINTMENT (OUTPATIENT)
Dept: GENERAL RADIOLOGY | Facility: HOSPITAL | Age: 64
DRG: 432 | End: 2024-06-03
Attending: EMERGENCY MEDICINE

## 2024-06-03 DIAGNOSIS — I50.9 ACUTE ON CHRONIC CONGESTIVE HEART FAILURE, UNSPECIFIED HEART FAILURE TYPE (HCC): Primary | ICD-10-CM

## 2024-06-03 DIAGNOSIS — K70.31 ALCOHOLIC CIRRHOSIS OF LIVER WITH ASCITES (HCC): ICD-10-CM

## 2024-06-03 PROBLEM — D64.9 ANEMIA: Status: ACTIVE | Noted: 2024-06-03

## 2024-06-03 PROBLEM — R73.9 HYPERGLYCEMIA: Status: ACTIVE | Noted: 2024-06-03

## 2024-06-03 LAB
ALBUMIN SERPL-MCNC: 2.8 G/DL (ref 3.2–4.8)
ALP LIVER SERPL-CCNC: 140 U/L
ALT SERPL-CCNC: 14 U/L
AMMONIA PLAS-MCNC: 21 UMOL/L (ref 11–32)
ANION GAP SERPL CALC-SCNC: 8 MMOL/L (ref 0–18)
APTT PPP: 34.8 SECONDS (ref 23–36)
AST SERPL-CCNC: 38 U/L (ref ?–34)
ATRIAL RATE: 104 BPM
BASOPHILS # BLD AUTO: 0.07 X10(3) UL (ref 0–0.2)
BASOPHILS NFR BLD AUTO: 0.6 %
BILIRUB DIRECT SERPL-MCNC: 2.6 MG/DL (ref ?–0.3)
BILIRUB SERPL-MCNC: 4.6 MG/DL (ref 0.2–1.1)
BNP SERPL-MCNC: 56 PG/ML
BUN BLD-MCNC: 10 MG/DL (ref 9–23)
BUN/CREAT SERPL: 13.3 (ref 10–20)
CALCIUM BLD-MCNC: 8.6 MG/DL (ref 8.7–10.4)
CHLORIDE SERPL-SCNC: 105 MMOL/L (ref 98–112)
CO2 SERPL-SCNC: 25 MMOL/L (ref 21–32)
CREAT BLD-MCNC: 0.75 MG/DL
DEPRECATED RDW RBC AUTO: 63.8 FL (ref 35.1–46.3)
EGFRCR SERPLBLD CKD-EPI 2021: 89 ML/MIN/1.73M2 (ref 60–?)
EOSINOPHIL # BLD AUTO: 0.03 X10(3) UL (ref 0–0.7)
EOSINOPHIL NFR BLD AUTO: 0.2 %
ERYTHROCYTE [DISTWIDTH] IN BLOOD BY AUTOMATED COUNT: 17 % (ref 11–15)
GLUCOSE BLD-MCNC: 119 MG/DL (ref 70–99)
GLUCOSE BLDC GLUCOMTR-MCNC: 111 MG/DL (ref 70–99)
GLUCOSE BLDC GLUCOMTR-MCNC: 129 MG/DL (ref 70–99)
HCT VFR BLD AUTO: 30.9 %
HGB BLD-MCNC: 11 G/DL
IMM GRANULOCYTES # BLD AUTO: 0.09 X10(3) UL (ref 0–1)
IMM GRANULOCYTES NFR BLD: 0.7 %
INR BLD: 1.72 (ref 0.8–1.2)
LIPASE SERPL-CCNC: 49 U/L (ref 13–75)
LYMPHOCYTES # BLD AUTO: 2.49 X10(3) UL (ref 1–4)
LYMPHOCYTES NFR BLD AUTO: 20.1 %
MCH RBC QN AUTO: 37.4 PG (ref 26–34)
MCHC RBC AUTO-ENTMCNC: 35.6 G/DL (ref 31–37)
MCV RBC AUTO: 105.1 FL
MONOCYTES # BLD AUTO: 0.98 X10(3) UL (ref 0.1–1)
MONOCYTES NFR BLD AUTO: 7.9 %
NEUTROPHILS # BLD AUTO: 8.71 X10 (3) UL (ref 1.5–7.7)
NEUTROPHILS # BLD AUTO: 8.71 X10(3) UL (ref 1.5–7.7)
NEUTROPHILS NFR BLD AUTO: 70.5 %
OSMOLALITY SERPL CALC.SUM OF ELEC: 286 MOSM/KG (ref 275–295)
P AXIS: 43 DEGREES
P-R INTERVAL: 146 MS
PLATELET # BLD AUTO: 153 10(3)UL (ref 150–450)
POTASSIUM SERPL-SCNC: 3.6 MMOL/L (ref 3.5–5.1)
PROT SERPL-MCNC: 7.4 G/DL (ref 5.7–8.2)
PROTHROMBIN TIME: 21.3 SECONDS (ref 11.6–14.8)
Q-T INTERVAL: 386 MS
QRS DURATION: 84 MS
QTC CALCULATION (BEZET): 507 MS
R AXIS: 24 DEGREES
RBC # BLD AUTO: 2.94 X10(6)UL
SODIUM SERPL-SCNC: 138 MMOL/L (ref 136–145)
T AXIS: 33 DEGREES
TROPONIN I SERPL HS-MCNC: <3 NG/L
VENTRICULAR RATE: 104 BPM
WBC # BLD AUTO: 12.4 X10(3) UL (ref 4–11)

## 2024-06-03 PROCEDURE — 71045 X-RAY EXAM CHEST 1 VIEW: CPT | Performed by: EMERGENCY MEDICINE

## 2024-06-03 RX ORDER — POTASSIUM CHLORIDE 1.5 G/1.58G
10 POWDER, FOR SOLUTION ORAL DAILY
COMMUNITY

## 2024-06-03 RX ORDER — POTASSIUM CHLORIDE 1.5 G/1.58G
10 POWDER, FOR SOLUTION ORAL DAILY
Status: DISCONTINUED | OUTPATIENT
Start: 2024-06-04 | End: 2024-06-18

## 2024-06-03 RX ORDER — LEVOTHYROXINE SODIUM 0.05 MG/1
50 TABLET ORAL
COMMUNITY

## 2024-06-03 RX ORDER — ERGOCALCIFEROL 1.25 MG/1
50000 CAPSULE ORAL
COMMUNITY

## 2024-06-03 RX ORDER — HEPARIN SODIUM 5000 [USP'U]/ML
5000 INJECTION, SOLUTION INTRAVENOUS; SUBCUTANEOUS EVERY 8 HOURS SCHEDULED
Status: DISCONTINUED | OUTPATIENT
Start: 2024-06-03 | End: 2024-06-04

## 2024-06-03 RX ORDER — FUROSEMIDE 10 MG/ML
40 INJECTION INTRAMUSCULAR; INTRAVENOUS ONCE
Status: COMPLETED | OUTPATIENT
Start: 2024-06-03 | End: 2024-06-03

## 2024-06-03 RX ORDER — NICOTINE POLACRILEX 4 MG
30 LOZENGE BUCCAL
Status: DISCONTINUED | OUTPATIENT
Start: 2024-06-03 | End: 2024-06-18

## 2024-06-03 RX ORDER — SPIRONOLACTONE 25 MG/1
25 TABLET ORAL DAILY
COMMUNITY
End: 2024-06-18

## 2024-06-03 RX ORDER — OMEPRAZOLE 20 MG/1
20 CAPSULE, DELAYED RELEASE ORAL
COMMUNITY

## 2024-06-03 RX ORDER — PANTOPRAZOLE SODIUM 20 MG/1
20 TABLET, DELAYED RELEASE ORAL
Status: DISCONTINUED | OUTPATIENT
Start: 2024-06-04 | End: 2024-06-18

## 2024-06-03 RX ORDER — TRAZODONE HYDROCHLORIDE 50 MG/1
25 TABLET ORAL NIGHTLY
Status: DISCONTINUED | OUTPATIENT
Start: 2024-06-03 | End: 2024-06-18

## 2024-06-03 RX ORDER — SPIRONOLACTONE 25 MG/1
25 TABLET ORAL DAILY
Status: DISCONTINUED | OUTPATIENT
Start: 2024-06-04 | End: 2024-06-11

## 2024-06-03 RX ORDER — NICOTINE POLACRILEX 4 MG
15 LOZENGE BUCCAL
Status: DISCONTINUED | OUTPATIENT
Start: 2024-06-03 | End: 2024-06-18

## 2024-06-03 RX ORDER — LACTULOSE 10 G/15ML
20 SOLUTION ORAL 2 TIMES DAILY PRN
COMMUNITY

## 2024-06-03 RX ORDER — FOLIC ACID 1 MG/1
1 TABLET ORAL DAILY
Status: DISCONTINUED | OUTPATIENT
Start: 2024-06-04 | End: 2024-06-18

## 2024-06-03 RX ORDER — LAMOTRIGINE 25 MG/1
25 TABLET ORAL 2 TIMES DAILY
Status: DISCONTINUED | OUTPATIENT
Start: 2024-06-03 | End: 2024-06-18

## 2024-06-03 RX ORDER — LEVOTHYROXINE SODIUM 0.05 MG/1
50 TABLET ORAL
Status: DISCONTINUED | OUTPATIENT
Start: 2024-06-04 | End: 2024-06-18

## 2024-06-03 RX ORDER — LACTULOSE 10 G/15ML
20 SOLUTION ORAL 2 TIMES DAILY
Status: DISCONTINUED | OUTPATIENT
Start: 2024-06-03 | End: 2024-06-18

## 2024-06-03 RX ORDER — DEXTROSE MONOHYDRATE 25 G/50ML
50 INJECTION, SOLUTION INTRAVENOUS
Status: DISCONTINUED | OUTPATIENT
Start: 2024-06-03 | End: 2024-06-18

## 2024-06-03 RX ORDER — FUROSEMIDE 40 MG/1
40 TABLET ORAL DAILY
COMMUNITY
End: 2024-06-18

## 2024-06-03 RX ORDER — ESCITALOPRAM OXALATE 10 MG/1
10 TABLET ORAL NIGHTLY
Status: DISCONTINUED | OUTPATIENT
Start: 2024-06-03 | End: 2024-06-18

## 2024-06-03 RX ORDER — FUROSEMIDE 10 MG/ML
40 INJECTION INTRAMUSCULAR; INTRAVENOUS
Status: DISCONTINUED | OUTPATIENT
Start: 2024-06-04 | End: 2024-06-07

## 2024-06-03 NOTE — ED INITIAL ASSESSMENT (HPI)
Pt c/o of SOB and and leg swelling. Per pt Dr Castillo send her to ED for fluid overload. HX of CHF and stroke. Denies CP

## 2024-06-03 NOTE — CONSULTS
Cardiology Consult Note    Christi Tavarez Patient Status:  Emergency    1960 MRN [id number]   Location [facility] EMERGENCY DEPARTMENT Attending Isrrael Orozco MD   Hosp Day # 0 [facility] ÁNGEL Edwards.  Christi Tavarez is a 63 year old female with a history of diabetes, hypertension, GERD, stroke/intracranial bleed, alcoholism who  was referred to the emergency room by her primary care for evaluation of lower extremity edema and shortness of breath.  Emergency room lab work pertinent for normal creatinine, electrolytes however bilirubin is elevated at 4.6, direct bilirubin 2.6, alkaline phosphatase 140, INR 1.7, albumin 2.8, WBC 12.4, hemoglobin 11.0, platelets 153.  EKG shows sinus tachycardia with no ischemic changes.  Chest x-ray with interstitial edema which appears mild.  Initial vitals pertinent for pulse 109, blood pressure 116/63 mmHg, SpO2 98%.    Daughter accompanies patient and provides much of the history.  Daughter states that patient was admitted in April  and diagnosed with \"stroke.\"  Patient was admitted from  through  after presenting with recurrent falls and generalized weakness.  Brain imaging suggested moderate size subacute inferior right cerebellar hematoma versus infarct versus mass with hemorrhage.  She was also treated for alcohol withdrawal during the admission.  Patient eventually recovered and was transferred to rehab. Daughter states that more recently, she has had a extremity edema and shortness of breath with activity over the past few days.      There is no recent prior cardiac workup available in care everywhere or in our hospital.        --------------------------------------------------------------------------------------------------------------------------------  ROS 10 systems reviewed, pertinent findings above.  ROS    History:  Past Medical History:    CHF (congestive heart failure) (HCC)    Diabetes (HCC)    Stroke (HCC)     No past  surgical history on file.  No family history on file.   reports that she has never smoked. She has never used smokeless tobacco. She reports current alcohol use. She reports that she does not use drugs.    Objective:   Temp: 98.1 °F (36.7 °C)  Pulse: 95  Resp: 20  BP: 101/53    Intake/Output:   No intake or output data in the 24 hours ending 06/03/24 1633    Physical Exam:     General: Alert and oriented x 3  HEENT: Normocephalic, anicteric sclera, neck supple.  Neck: No JVD, carotids 2+, no bruits.  Cardiac: Regular rate and rhythm. S1, S2 normal.  2/6 systolic murmur, no pericardial rub, S3.  Lungs: Clear without wheezes, rales, rhonchi or dullness.  Normal excursions and effort.  Abdomen: Soft, non-tender. BS-present.  Extremities: Without clubbing, cyanosis. +2 edema.  Peripheral pulses are 2+.  Neurologic: Non-focal  Skin: Warm and dry.       Assessment:    Volume overload, lower extreme edema  Shortness of breath  Presumed heart failure  Alcoholism  Alcoholic liver disease  Diabetes  Hypertension  Recent cerebellar stroke versus mass versus intracranial bleed 4/2024      Plan:  63-year-old female with multiple cardiac risk factors presenting with worsening shortness of breath with activity, lower extremity edema.  Patient is on Lasix as outpatient however despite twice daily dosing, she has had fluid retention.  Exam is pertinent for 2/6 systolic murmur and lower extremity edema, JVD and HJR.  There is possibility patient has new onset heart failure however she does have diagnosis of liver disease to, elevated bilirubin could potentially be secondary to congestion  Agree with  Lasix 40 mg IV twice daily  Agree with echocardiogram  Further recommendations based on echo result    Thank you for allowing me to take part in the care of Christi Tavarez. Please call with any questions of concerns.      Level of care: C5    Dr. Topher Moran,   June 03, 2024  4:33 PM

## 2024-06-03 NOTE — ED PROVIDER NOTES
Patient Seen in: [facility] Emergency Department      History     Chief Complaint   Patient presents with    Congestive Heart Failure     Stated Complaint: edema, chf, dr. osborn referred to er    Subjective:   HPI    Patient presents emergency department with daughter who provides most of the history.  She states that she was recently admitted to the hospital and then sent to rehab following a stroke and has had progressive increase in edema and shortness of breath.  She denies fever or chills.  Daughter states that she has a history of cirrhosis and has swelling in her abdomen as well.  There is no abdominal pain.    Objective:   No pertinent past medical history.            No pertinent past surgical history.              No pertinent social history.            Review of Systems    Positive for stated complaint: edema, chf, dr. osborn referred to er  Other systems are as noted in HPI.  Constitutional and vital signs reviewed.      All other systems reviewed and negative except as noted above.    Physical Exam     ED Triage Vitals [06/03/24 1250]   /63   Pulse 109   Resp 18   Temp 98.1 °F (36.7 °C)   Temp src Oral   SpO2 98 %   O2 Device None (Room air)       Current Vitals:   Vital Signs  BP: 94/50  Pulse: 93  Resp: 16  Temp: 98.1 °F (36.7 °C)  Temp src: Oral  MAP (mmHg): 65    Oxygen Therapy  SpO2: 99 %  O2 Device: None (Room air)            Physical Exam  Vitals and nursing note reviewed.   Constitutional:       General: She is not in acute distress.     Appearance: She is well-developed.   HENT:      Head: Normocephalic.      Nose: Nose normal.      Mouth/Throat:      Mouth: Mucous membranes are moist.   Eyes:      Conjunctiva/sclera: Conjunctivae normal.   Cardiovascular:      Rate and Rhythm: Normal rate and regular rhythm.      Heart sounds: No murmur heard.  Pulmonary:      Effort: Pulmonary effort is normal. No respiratory distress.      Breath sounds: Rales present.      Comments: Faint  scattered rales at the bases of the lungs.  Abdominal:      General: There is no distension.      Palpations: Abdomen is soft.      Tenderness: There is no abdominal tenderness.   Musculoskeletal:         General: No tenderness. Normal range of motion.      Cervical back: Normal range of motion and neck supple.      Right lower leg: Edema present.      Left lower leg: Edema present.   Skin:     General: Skin is warm and dry.      Capillary Refill: Capillary refill takes less than 2 seconds.      Findings: No rash.   Neurological:      General: No focal deficit present.      Mental Status: She is alert and oriented to person, place, and time.   Psychiatric:         Mood and Affect: Mood normal.         \      ED Course     Labs Reviewed   BASIC METABOLIC PANEL (8) - Abnormal; Notable for the following components:       Result Value    Glucose 119 (*)     Calcium, Total 8.6 (*)     All other components within normal limits   HEPATIC FUNCTION PANEL (7) - Abnormal; Notable for the following components:    AST 38 (*)     Alkaline Phosphatase 140 (*)     Bilirubin, Total 4.6 (*)     Bilirubin, Direct 2.6 (*)     Albumin 2.8 (*)     All other components within normal limits   PROTHROMBIN TIME (PT) - Abnormal; Notable for the following components:    PT 21.3 (*)     INR 1.72 (*)     All other components within normal limits   CBC W/ DIFFERENTIAL - Abnormal; Notable for the following components:    WBC 12.4 (*)     RBC 2.94 (*)     HGB 11.0 (*)     HCT 30.9 (*)     .1 (*)     MCH 37.4 (*)     RDW-SD 63.8 (*)     RDW 17.0 (*)     Neutrophil Absolute Prelim 8.71 (*)     Neutrophil Absolute 8.71 (*)     All other components within normal limits   PRO BETA NATRIURETIC PEPTIDE - Normal   TROPONIN I HIGH SENSITIVITY - Normal   LIPASE - Normal   PTT, ACTIVATED - Normal   CBC WITH DIFFERENTIAL WITH PLATELET    Narrative:     The following orders were created for panel order CBC With Differential With Platelet.  Procedure                                Abnormality         Status                     ---------                               -----------         ------                     CBC W/ DIFFERENTIAL[[id number]]          Abnormal            Final result                 Please view results for these tests on the individual orders.   RAINBOW DRAW LAVENDER   RAINBOW DRAW LIGHT GREEN   RAINBOW DRAW BLUE     EKG    Rate, intervals and axes as noted on EKG Report.  Rate: 104 bpm  Rhythm: Sinus Rhythm  Reading: Sinus tachycardia, abnormal                          MDM        Admission disposition: 6/3/2024  4:13 PM                     Medical Decision Making  Differential diagnosis considered for congestive heart failure, hepatorenal syndrome, cirrhosis source of edema    Problems Addressed:  Acute on chronic congestive heart failure, unspecified heart failure type (HCC): acute illness or injury  Alcoholic cirrhosis of liver with ascites (HCC): acute illness or injury    Amount and/or Complexity of Data Reviewed  Labs: ordered. Decision-making details documented in ED Course.     Details: Labs reviewed  Radiology: ordered and independent interpretation performed. Decision-making details documented in ED Course.     Details: Chest x-ray shows pulmonary edema  ECG/medicine tests: ordered and independent interpretation performed. Decision-making details documented in ED Course.  Discussion of management or test interpretation with external provider(s): Discussed with GI, cardiology and the PMD.  Lasix given in ED    Risk  Prescription drug management.  Decision regarding hospitalization.        Disposition and Plan     Clinical Impression:  1. Acute on chronic congestive heart failure, unspecified heart failure type (HCC)    2. Alcoholic cirrhosis of liver with ascites (HCC)         Disposition:  Admit  6/3/2024  4:13 pm    Follow-up:  No follow-up provider specified.        Medications Prescribed:  Current Discharge Medication List                             Hospital Problems       Present on Admission             ICD-10-CM Noted POA    * (Principal) Acute on chronic congestive heart failure, unspecified heart failure type (HCC) I50.9 6/3/2024 Unknown    Anemia D64.9 6/3/2024 Yes    Hyperglycemia R73.9 6/3/2024 Yes

## 2024-06-03 NOTE — ED QUICK NOTES
Orders for admission, patient is aware of plan and ready to go upstairs. Any questions, please call ED RN Lina at extension [phone].     Patient Covid vaccination status: Unvaccinated     COVID Test Ordered in ED: None    COVID Suspicion at Admission: N/A    Running Infusions:  None    Mental Status/LOC at time of transport: A&Ox4    Other pertinent information:   CIWA score: N/A   NIH score:  N/A

## 2024-06-04 ENCOUNTER — APPOINTMENT (OUTPATIENT)
Dept: CV DIAGNOSTICS | Facility: HOSPITAL | Age: 64
DRG: 432 | End: 2024-06-04
Attending: INTERNAL MEDICINE

## 2024-06-04 ENCOUNTER — APPOINTMENT (OUTPATIENT)
Dept: CT IMAGING | Facility: HOSPITAL | Age: 64
DRG: 432 | End: 2024-06-04
Attending: INTERNAL MEDICINE

## 2024-06-04 PROBLEM — I62.9 INTRACRANIAL BLEEDING (HCC): Status: RESOLVED | Noted: 2024-06-04 | Resolved: 2024-06-04

## 2024-06-04 PROBLEM — E11.9 TYPE 2 DIABETES MELLITUS WITHOUT COMPLICATION, WITHOUT LONG-TERM CURRENT USE OF INSULIN (HCC): Status: ACTIVE | Noted: 2024-06-04

## 2024-06-04 PROBLEM — E87.70 FLUID OVERLOAD: Status: ACTIVE | Noted: 2024-06-04

## 2024-06-04 PROBLEM — I62.9 INTRACRANIAL BLEEDING (HCC): Status: ACTIVE | Noted: 2024-06-04

## 2024-06-04 LAB
AFP-TM SERPL-MCNC: 19.8 NG/ML
ALBUMIN SERPL-MCNC: 2.5 G/DL (ref 3.2–4.8)
ALBUMIN SERPL-MCNC: 2.5 G/DL (ref 3.2–4.8)
ALBUMIN/GLOB SERPL: 0.6 {RATIO} (ref 1–2)
ALP LIVER SERPL-CCNC: 116 U/L
ALP LIVER SERPL-CCNC: 122 U/L
ALT SERPL-CCNC: 11 U/L
ALT SERPL-CCNC: 11 U/L
ANION GAP SERPL CALC-SCNC: 6 MMOL/L (ref 0–18)
ANION GAP SERPL CALC-SCNC: 8 MMOL/L (ref 0–18)
AST SERPL-CCNC: 37 U/L (ref ?–34)
AST SERPL-CCNC: 38 U/L (ref ?–34)
BASOPHILS # BLD AUTO: 0.09 X10(3) UL (ref 0–0.2)
BASOPHILS NFR BLD AUTO: 0.9 %
BILIRUB DIRECT SERPL-MCNC: 2.5 MG/DL (ref ?–0.3)
BILIRUB SERPL-MCNC: 3.9 MG/DL (ref 0.2–1.1)
BILIRUB SERPL-MCNC: 4.6 MG/DL (ref 0.2–1.1)
BUN BLD-MCNC: 7 MG/DL (ref 9–23)
BUN BLD-MCNC: 7 MG/DL (ref 9–23)
BUN/CREAT SERPL: 10 (ref 10–20)
BUN/CREAT SERPL: 10.4 (ref 10–20)
CALCIUM BLD-MCNC: 8.1 MG/DL (ref 8.7–10.4)
CALCIUM BLD-MCNC: 8.4 MG/DL (ref 8.7–10.4)
CHLORIDE SERPL-SCNC: 105 MMOL/L (ref 98–112)
CHLORIDE SERPL-SCNC: 106 MMOL/L (ref 98–112)
CHOLEST SERPL-MCNC: 163 MG/DL (ref ?–200)
CO2 SERPL-SCNC: 27 MMOL/L (ref 21–32)
CO2 SERPL-SCNC: 27 MMOL/L (ref 21–32)
CREAT BLD-MCNC: 0.67 MG/DL
CREAT BLD-MCNC: 0.7 MG/DL
DEPRECATED RDW RBC AUTO: 62 FL (ref 35.1–46.3)
EGFRCR SERPLBLD CKD-EPI 2021: 97 ML/MIN/1.73M2 (ref 60–?)
EGFRCR SERPLBLD CKD-EPI 2021: 98 ML/MIN/1.73M2 (ref 60–?)
EOSINOPHIL # BLD AUTO: 0.09 X10(3) UL (ref 0–0.7)
EOSINOPHIL NFR BLD AUTO: 0.9 %
ERYTHROCYTE [DISTWIDTH] IN BLOOD BY AUTOMATED COUNT: 16.6 % (ref 11–15)
GLOBULIN PLAS-MCNC: 4.1 G/DL (ref 2–3.5)
GLUCOSE BLD-MCNC: 113 MG/DL (ref 70–99)
GLUCOSE BLD-MCNC: 89 MG/DL (ref 70–99)
GLUCOSE BLDC GLUCOMTR-MCNC: 104 MG/DL (ref 70–99)
GLUCOSE BLDC GLUCOMTR-MCNC: 139 MG/DL (ref 70–99)
GLUCOSE BLDC GLUCOMTR-MCNC: 145 MG/DL (ref 70–99)
GLUCOSE BLDC GLUCOMTR-MCNC: 149 MG/DL (ref 70–99)
GLUCOSE BLDC GLUCOMTR-MCNC: 94 MG/DL (ref 70–99)
HCT VFR BLD AUTO: 27.9 %
HDLC SERPL-MCNC: 11 MG/DL (ref 40–59)
HGB BLD-MCNC: 10.3 G/DL
IMM GRANULOCYTES # BLD AUTO: 0.1 X10(3) UL (ref 0–1)
IMM GRANULOCYTES NFR BLD: 1 %
INR BLD: 1.79 (ref 0.8–1.2)
LDLC SERPL CALC-MCNC: 135 MG/DL (ref ?–100)
LYMPHOCYTES # BLD AUTO: 2.86 X10(3) UL (ref 1–4)
LYMPHOCYTES NFR BLD AUTO: 29.5 %
MCH RBC QN AUTO: 38.1 PG (ref 26–34)
MCHC RBC AUTO-ENTMCNC: 36.9 G/DL (ref 31–37)
MCV RBC AUTO: 103.3 FL
MONOCYTES # BLD AUTO: 0.93 X10(3) UL (ref 0.1–1)
MONOCYTES NFR BLD AUTO: 9.6 %
NEUTROPHILS # BLD AUTO: 5.61 X10 (3) UL (ref 1.5–7.7)
NEUTROPHILS # BLD AUTO: 5.61 X10(3) UL (ref 1.5–7.7)
NEUTROPHILS NFR BLD AUTO: 58.1 %
NONHDLC SERPL-MCNC: 152 MG/DL (ref ?–130)
OSMOLALITY SERPL CALC.SUM OF ELEC: 285 MOSM/KG (ref 275–295)
OSMOLALITY SERPL CALC.SUM OF ELEC: 289 MOSM/KG (ref 275–295)
PLATELET # BLD AUTO: 131 10(3)UL (ref 150–450)
POTASSIUM SERPL-SCNC: 3.2 MMOL/L (ref 3.5–5.1)
POTASSIUM SERPL-SCNC: 3.4 MMOL/L (ref 3.5–5.1)
PROT SERPL-MCNC: 6.6 G/DL (ref 5.7–8.2)
PROT SERPL-MCNC: 6.8 G/DL (ref 5.7–8.2)
PROTHROMBIN TIME: 21.9 SECONDS (ref 11.6–14.8)
RBC # BLD AUTO: 2.7 X10(6)UL
SODIUM SERPL-SCNC: 139 MMOL/L (ref 136–145)
SODIUM SERPL-SCNC: 140 MMOL/L (ref 136–145)
TRIGL SERPL-MCNC: 87 MG/DL (ref 30–149)
VLDLC SERPL CALC-MCNC: 16 MG/DL (ref 0–30)
WBC # BLD AUTO: 9.7 X10(3) UL (ref 4–11)

## 2024-06-04 PROCEDURE — 93306 TTE W/DOPPLER COMPLETE: CPT | Performed by: INTERNAL MEDICINE

## 2024-06-04 PROCEDURE — 99223 1ST HOSP IP/OBS HIGH 75: CPT | Performed by: INTERNAL MEDICINE

## 2024-06-04 PROCEDURE — 70496 CT ANGIOGRAPHY HEAD: CPT | Performed by: INTERNAL MEDICINE

## 2024-06-04 PROCEDURE — 99291 CRITICAL CARE FIRST HOUR: CPT | Performed by: INTERNAL MEDICINE

## 2024-06-04 PROCEDURE — 99291 CRITICAL CARE FIRST HOUR: CPT | Performed by: OTHER

## 2024-06-04 PROCEDURE — 70498 CT ANGIOGRAPHY NECK: CPT | Performed by: INTERNAL MEDICINE

## 2024-06-04 PROCEDURE — 70450 CT HEAD/BRAIN W/O DYE: CPT | Performed by: INTERNAL MEDICINE

## 2024-06-04 RX ORDER — LORAZEPAM 2 MG/ML
0.5 INJECTION INTRAMUSCULAR ONCE
Status: COMPLETED | OUTPATIENT
Start: 2024-06-04 | End: 2024-06-06

## 2024-06-04 RX ORDER — ACETAMINOPHEN 325 MG/1
650 TABLET ORAL EVERY 4 HOURS PRN
Status: DISCONTINUED | OUTPATIENT
Start: 2024-06-04 | End: 2024-06-04

## 2024-06-04 RX ORDER — ONDANSETRON 2 MG/ML
4 INJECTION INTRAMUSCULAR; INTRAVENOUS EVERY 6 HOURS PRN
Status: DISCONTINUED | OUTPATIENT
Start: 2024-06-04 | End: 2024-06-04

## 2024-06-04 RX ORDER — LABETALOL HYDROCHLORIDE 5 MG/ML
10 INJECTION, SOLUTION INTRAVENOUS EVERY 10 MIN PRN
Status: DISCONTINUED | OUTPATIENT
Start: 2024-06-04 | End: 2024-06-04

## 2024-06-04 RX ORDER — HEPARIN SODIUM 5000 [USP'U]/ML
5000 INJECTION, SOLUTION INTRAVENOUS; SUBCUTANEOUS EVERY 8 HOURS SCHEDULED
Status: DISCONTINUED | OUTPATIENT
Start: 2024-06-04 | End: 2024-06-04

## 2024-06-04 RX ORDER — ACETAMINOPHEN 650 MG/1
650 SUPPOSITORY RECTAL EVERY 4 HOURS PRN
Status: DISCONTINUED | OUTPATIENT
Start: 2024-06-04 | End: 2024-06-04

## 2024-06-04 RX ORDER — SODIUM CHLORIDE 9 MG/ML
INJECTION, SOLUTION INTRAVENOUS CONTINUOUS
Status: DISCONTINUED | OUTPATIENT
Start: 2024-06-04 | End: 2024-06-04

## 2024-06-04 RX ORDER — ASPIRIN 325 MG
325 TABLET ORAL DAILY
Status: DISCONTINUED | OUTPATIENT
Start: 2024-06-04 | End: 2024-06-04

## 2024-06-04 RX ORDER — PROCHLORPERAZINE EDISYLATE 5 MG/ML
5 INJECTION INTRAMUSCULAR; INTRAVENOUS EVERY 8 HOURS PRN
Status: DISCONTINUED | OUTPATIENT
Start: 2024-06-04 | End: 2024-06-04

## 2024-06-04 RX ORDER — HYDRALAZINE HYDROCHLORIDE 20 MG/ML
10 INJECTION INTRAMUSCULAR; INTRAVENOUS EVERY 2 HOUR PRN
Status: DISCONTINUED | OUTPATIENT
Start: 2024-06-04 | End: 2024-06-04

## 2024-06-04 RX ORDER — ATORVASTATIN CALCIUM 80 MG/1
80 TABLET, FILM COATED ORAL NIGHTLY
Status: DISCONTINUED | OUTPATIENT
Start: 2024-06-04 | End: 2024-06-04

## 2024-06-04 RX ORDER — ASPIRIN 300 MG/1
300 SUPPOSITORY RECTAL DAILY
Status: DISCONTINUED | OUTPATIENT
Start: 2024-06-04 | End: 2024-06-04

## 2024-06-04 NOTE — SPIRITUAL CARE NOTE
Spiritual Care Visit Note    Patient Name: Christi Tavarez Date of Spiritual Care Visit: 24   : 1960 Primary Dx: Acute on chronic congestive heart failure, unspecified heart failure type (HCC)       Referred By: Referral From: Nurse    Spiritual Care Taxonomy:    Intended Effects: Demonstrate caring and concern    Methods: Assist with spiritual/Yarsani practices;Demonstrate acceptance;Offer spiritual/Yarsani support    Interventions: Active listening;Ask guided questions;Ask guided questions about aries;Connect someone with their aries community/clergy;Discuss plan of care;[place]    Visit Type/Summary:     - Spiritual Care: Responded to a request for spiritual care and assessed the patient for spiritual care needs. Offered empathic listening and emotional support. Patient and family expressed appreciation for  visit. Provided support for Patient's spiritual/Yarsani requests. Coordinated Mu-ism Communion and verified NPO status. Offered prayer.  visited pt per [facility] consult for support. Pt is Mu-ism Jewish, Family at bedside. With pt and RN permission I have added pt to Mu-ism communion list and she will be served on  by the visiting [employer]  at [facility]. Ended visit praying for pt.   remains available for follow up.    Spiritual Care support can be requested via an Epic consult. For urgent/immediate needs, please contact the On Call  at: [place]: ext [phone]    Rev. Benson Shannon MDiv

## 2024-06-04 NOTE — OCCUPATIONAL THERAPY NOTE
OT orders received and reviewed. Hold OT at this time. Rapid response called due to new left sided weakness, rule out CVA. OT will continue to follow and progress as medically appropriate and MD recommendations. Thank you.    Malaika Foote MS, OTR/L

## 2024-06-04 NOTE — PROGRESS NOTES
From home with daughter  Hx fall, stroke, ETOH, DM, CHF  Pt was discharged from rehab    Came in CHF exacerbation, SOB, Edema BLE and abd  AxOx4, RA, NSR, Cont x2, x1 walker at home A[facility]S    PLAN: cards, echo

## 2024-06-04 NOTE — CONSULTS
[facility]  [address] [place] [address], [address]  [facility] [address]  [phone]          STROKE  CONSULTATION NOTE  [facility]  part of [facility]    Report of Consultation    Christi Tavarez Patient Status:  Inpatient     1960 MRN [id number]    Location [facility] 3W/SW Attending Antony Zavala MD    Hosp Day # 1 PCP ÁNGEL Delgado      Date of Admission:  6/3/2024  Date of Consult:  2024  Reason for Admission/Consultation:  stroke alert    Requested by: Antony Zavala MD  Name of Outside Hospital if Transferred: N/A  Time of patient arrival: 12:41 PM   on 6/3/24   Time of initial page: 11:15 on 24         Time of encounter:11:20  on 24  Time when imaging was reviewed by radiology:  on 2024         _________________________________________________________________________________________    Chief Complaint:   Chief Complaint   Patient presents with    Congestive Heart Failure       HPI:  Christi Tavarez is a 63 year old left handed female w/ a pmhx sig. for recent right-sided cerebellar hemorrhage in 2024, history of alcohol abuse, cognitive impairment, CHF, diabetes, seen as a stroke alert for ataxia/difficulty feeding herself that was ongoing last night, 6/3/2024 and this morning 2024.  HPI as per the patient and patient's family at bedside.  Last known well is unclear.  Sometime on 6/3/2020 4 in the evening.  Family noticed that she had difficulty feeding herself.  They report that she was \"missing\" her mouth when she was trying to feed herself this morning.  She was eating eggs and reportedly was hitting her chin instead of her mouth.  She reportedly had slurred speech.  She reportedly had increased confusion and lethargy.  Family reports that her slurred speech is now resolved.  No slurred speech yesterday.  She had a stat head CT and CTA.  There is no large vessel occlusion.  Her exam is not concerning for acute  ischemic stroke.    Explained that most likely her symptoms are due to combination of fatigue and sensory ataxia due to chronic neuropathy.    Review and summation of prior records        ROS:  Pertinent positive and negatives per HPI.  All others were reviewed and negative.    Past Medical History:    CHF (congestive heart failure) (HCC)    Diabetes (HCC)    Stroke (HCC)       No past surgical history on file.    No family history on file.    Social History     Socioeconomic History    Marital status:      Spouse name: Not on file    Number of children: Not on file    Years of education: Not on file    Highest education level: Not on file   Occupational History    Not on file   Tobacco Use    Smoking status: Never    Smokeless tobacco: Never   Vaping Use    Vaping status: Never Used   Substance and Sexual Activity    Alcohol use: Yes     Comment: socially    Drug use: Never    Sexual activity: Not on file   Other Topics Concern    Not on file   Social History Narrative    Not on file     Social Determinants of Health     Financial Resource Strain: Not on file   Food Insecurity: No Food Insecurity (6/3/2024)    Food Insecurity     Food Insecurity: Never true   Transportation Needs: No Transportation Needs (6/3/2024)    Transportation Needs     Lack of Transportation: No     Car Seat: Not on file   Physical Activity: Not on file   Stress: Not on file   Social Connections: Not on file   Housing Stability: Low Risk  (6/3/2024)    Housing Stability     Housing Instability: No     Housing Instability Emergency: Not on file     Crib or Bassinette: Not on file       Outpatient Medications  Current Outpatient Medications   Medication Instructions    ergocalciferol (VITAMIN D2) 50,000 Units, Oral, Every 7 days, Sundays    escitalopram (LEXAPRO) 10 mg, Oral, Nightly    folic acid (FOLVITE) 1 mg, Oral, Daily    furosemide (LASIX) 40 mg, Oral, Daily    glipiZIDE (GLUCOTROL) 5 mg, Oral, Every morning before breakfast,  With food    insulin aspart (NOVOLOG) 1-5 Units, Subcutaneous, 3 times daily before meals, Per sliding scale    lactulose (CHRONULAC) 20 g, Oral, 2 times daily PRN    lamoTRIgine (LAMICTAL) 25 mg, Oral, 2 times daily    levothyroxine (SYNTHROID) 50 mcg, Oral, Before breakfast    omeprazole (PRILOSEC) 20 mg, Oral, Every morning before breakfast    potassium chloride 20 MEQ Oral Powd Pack 10 mEq, Oral, Daily    spironolactone (ALDACTONE) 25 mg, Oral, Daily    traZODone (DESYREL) 25 mg, Oral, Nightly        Inpatient Medications  No current outpatient medications on file.        aspirin  300 mg Rectal Daily    Or    aspirin  325 mg Oral Daily    heparin  5,000 Units Subcutaneous Q8H KUSH    atorvastatin  80 mg Oral Nightly    furosemide  40 mg Intravenous BID (Diuretic)    insulin aspart  1-5 Units Subcutaneous TID CC    traZODone  25 mg Oral Nightly    escitalopram  10 mg Oral Nightly    folic acid  1 mg Oral Daily    lactulose  20 g Oral BID    lamoTRIgine  25 mg Oral BID    levothyroxine  50 mcg Oral Before breakfast    pantoprazole  20 mg Oral QAM AC    potassium chloride  10 mEq Oral Daily    spironolactone  25 mg Oral Daily         acetaminophen **OR** acetaminophen    labetalol    hydrALAzine    ondansetron    prochlorperazine    glucose **OR** glucose **OR** glucose-vitamin C **OR** dextrose **OR** glucose **OR** glucose **OR** glucose-vitamin C    Objective:  Last vitals and weight :  Vitals:    06/04/24 0857   BP: 106/57   Pulse:    Resp: 20   Temp: 97.9 °F (36.6 °C)       Exam:  - General: appears stated age and no distress  - CV: symmetric pulses   Carotids:   - Pulmonary: no signs of respiratory distress. Normal excursion of the chest.   Neurologic Exam  - Mental Status: Alert and attentive.  Oriented to person, place, and time/date.  Speech is spontaneous, fluent, and prosodic. Comprehension and repetition intact. Phrase length and rate are normal. No paraphasic errors, neologisms, anomia, acalculia,  apraxia, anosognosia, or R/L confusion. No neglect.   - Cranial Nerves: No gaze preference. Visual fields:normal  Pupils are 5mm briskly constricting to 4mm and equally round and reactive to light  in a well lit room. EOMI. No nystagmus. No ptosis. V1-V3 intact B/L to light touch.No pathological facial asymmetry. No flattening of the nasolabial fold. .  Hearing grossly intact.  Tongue midline. No atrophy or fasiculations of the tongue noted. Palate and uvula elevate symmetrically.  Shoulder shrug symmetric.  - Fundoscopic exam:normal w/o hemorrhages, exudates, or papilledema.No attenuation. No pallor.  - Motor:  normal tone, normal bulk. No interosseous wasting. No flattening of hypothenar eminences.       Right Left     Motor Strength   Deltoids 5 5  Triceps 5 5  Biceps 5 5  Wrist Extensors 5 5   5 5     Knee extensors 5 5         Pronator drift: No pronator drift   Index Rolling: No orbiting.   Finger Taps: Finger taps are symmetric in rate and amplitude.    Rapid movements: Rapid/fine movements are symmetric. As expected their dominant hand is slightly faster.   Leg Drift: None   Foot Taps: Foot taps are symmetric.      Asterixis: No asterixis noted.   Tremor:     Reflexes:    C5 C6 C7  L4 S1   R 2+ 2+  2+ 2+   L 2+ 2+  2+ 2+   Adductor Spread: No adductor spread noted.    Frontal release signs:Not assessed.    Lai's sign:absent   Nonsustained clonus: Absent   Sustained clonus: Absent   - Sensory:   Gradient loss to light touch or temperature and vibration in all 4 extremities and classic stocking glove distribution.  - Cerebellum: No truncal ataxia. No titubations. No dysmetria, no dysdiadochokinesis. No overshoot.   - Gait/station: Normal gait and station. Symmetric arm swing.   - Plantar response: flexor bilaterally    NIH Stroke Scale  Person Administering Scale: Pablo Dean DO  1a  Level of consciousness: 0 = Alert keenly responsive    1b. LOC questions:  0 = Answers both questions correctly      1c. LOC commands: 0 = Performs both tasks correctly    2.  Best Gaze: 0 = Normal    3.  Visual: 0 = No visual loss     4. Facial Palsy: 0 = Normal symmetrical movement     5a.  Motor left arm: 0 = No drift; limb holds 90º(or 45º) for full 10 seconds   5b.  Motor right arm: 0 = No drift; limb holds 90º(or 45º) for full 10 seconds   6a. motor left le = No drift; leg holds 30º for full 5 seconds     6b  Motor right le = No drift; leg holds 30º for full 5 seconds     7. Limb Ataxia: 0 = Absent     8.  Sensory: 0 = Normal, no sensory loss     9. Best Language:  0 = No aphasia, normal      10. Dysarthria: 0 = Normal articulation   11. Extinction and Inattention: 0 = No abnormality     Total:   0     Modified Jose Luis Score: 3 - Moderate disability. Requires some help, but able to walk unassisted.                                Data reviewed    ECG findings by monitor or 12-lead:  Ecg:   Results for orders placed or performed during the hospital encounter of 24   EKG 12 Lead   Result Value Ref Range    Ventricular rate 104 BPM    Atrial rate 104 BPM    P-R Interval 146 ms    QRS Duration 84 ms    Q-T Interval 386 ms    QTC Calculation (Bezet) 507 ms    P Axis 43 degrees    R Axis 24 degrees    T Axis 33 degrees       Test results/Imaging:   Lab Results   Component Value Date/Time    TRIG 130 2024 12:23 AM    HDL 9 (L) 2024 12:23 AM     (H) 2024 12:23 AM     Recent Labs   Lab 24  1347 24  0917   WBC 12.4* 9.7   HGB 11.0* 10.3*   HCT 30.9* 27.9*   .0 131.0*     Recent Labs   Lab 24  1347 24  0917     --    K 3.6  --      --    CO2 25.0  --    BUN 10  --    ALT 14 11   AST 38* 38*     Lab Results   Component Value Date    A1C  2024      Comment:      Hemoglobin A1C to be confirmed at [facility]    A1C 4.4 (L) 2024     Last A1c value was 4.4% done 4/3/2024.          No valid procedures specified.  No valid procedures specified.  No  valid procedures specified.  No valid procedures specified.  No valid procedures specified.  No valid procedures specified.  No valid procedures specified.   CT STROKE BRAIN (NO IV)(CPT=70450)    Result Date: 6/4/2024  CONCLUSION:  1. No acute infarct or hemorrhage. 2. Developing encephalomalacia in the right cerebellum at previous site of hemorrhage documented 04/03/2024.  This report was called immediately to patient's nurse and Nancy at 11:45 a.m.    Dictated by (CST): Bal Allan MD on 6/04/2024 at 11:41 AM     Finalized by (CST): Bal Allan MD on 6/04/2024 at 11:47 AM          CT BRAIN OR HEAD (03706)    Result Date: 4/3/2024  CONCLUSION: 3 x 1 x 2 cm for ovoid soft tissue density in the right cerebellar vermis and adjacent hemisphere with surrounding edema.  It could represent an acute/subacute/recent site of hemorrhage/hematoma versus hemorrhagic lesion (such as primary malignancy versus metastatic focus).  Recommend further characterization with MRI brain with without contrast.   [facility] provided a preliminary report for this examination. This final report agrees with their preliminary findings.   Dictated by (CST): Rob Duvall MD on 4/03/2024 at 8:43 AM     Finalized by (CST): Rob Duvall MD on 4/03/2024 at 8:50 AM           Performed an independent visualization of: MRI brain WO, CT brain WO, and CTA head/neck    Imaging revealed: Agree with radiology read.        Doubt stroke.  Doubt TIA.  Explained that most likely her symptoms are due to combination of fatigue and sensory ataxia due to chronic neuropathy.  Ordered MRI brain to confirm that this is not a stroke and very low suspicion this is stroke.  She has a sensory ataxia due to neuropathy.  Transient neurological symptoms are due to fatigue/metabolic derangements.    Past-pointing incoordination in the left hand in the setting of chronic sensory loss due to neuropathy  Differential Diagnosis:  Sensory ataxia due to chronic  neuropathy  Doubt new acute ischemic stroke; although patient's Clumsy hand dysarthria syndrome she does not have the clinical exam findings  Doubt TIA given duration of symptoms  Unrelated to cerebellar hemorrhage which was on the contralateral side          Reperfusion therapy eligibility: patient is not eligible because: out of the  time window  not an acute ischemic stroke  not a candidate for thrombectomy because no large vessel occlusion  History of previous intracranial hemorrhage Intracranial neoplasm, AVM, or an aneurysm   Agent and time of first antithrombotic administration (or contraindication):   Aspirin 325 once or rectal aspirin 300 once. Starting tomorrow Aspirin 81 mg daily or rectal aspirin 300 mg daily if still n.p.o.  BP goal:   Normotension  Additional brain and vascular imaging ordered:   MRI brain WO   Cardiac imaging: Telemetry   Additional labs ordered:   Labs: None         Education/Instructions given to: {Persons; family members:patient   Barriers to Learning:None  Content: Refer to note above.  Also provided education on recurrent stroke signs and symptoms, including but not limited to a facial droop, dysarthria, difficulty talking, word finding difficulties, weakness, falls, change in sensation. Pt should call 911 or be taken to the nearest emergency department if sx occur.  Evaluation/Outcome: Verbalized understanding    This document is not intended to support charting by exception.  Sections left blank in a completed note should be presumed not to have been done.     Total critical care time spent exceeds 35 minutes.      Disclaimer:   This record was dictated using Dragon software. There may be errors due to voice recognition problems that were not realized and corrected during the completion of the note.       Thank you.  Pablo Dean DO   Staff V[facility] Neurology

## 2024-06-04 NOTE — PROGRESS NOTES
Progress Note  Christi Tavarez Patient Status:  Inpatient    1960 MRN [id number]   Location [facility] 3W/SW Attending Antony Zavala MD   Hosp Day # 1 PCP ÁNGEL Delgado     Subjective:  Family reports she is confused this morning, she c/o leg swelling, some sob, denies chest pain  States sob worse with exertion  Stroke alert called    Objective:  /57 (BP Location: Right arm)   Pulse 86   Temp 97.9 °F (36.6 °C) (Oral)   Resp 20   Ht 4' 9\" (1.448 m)   Wt 155 lb 4.8 oz (70.4 kg)   SpO2 95%   BMI 33.61 kg/m²     Telemetry: SR 80s-90s    Intake/Output:    Intake/Output Summary (Last 24 hours) at 2024 1032  Last data filed at 2024 0500  Gross per 24 hour   Intake --   Output 450 ml   Net -450 ml     Last 3 Weights   24 0500 155 lb 4.8 oz (70.4 kg)   24 1810 162 lb 14.4 oz (73.9 kg)   24 1250 160 lb (72.6 kg)   24 0537 153 lb 11.2 oz (69.7 kg)   24 0507 157 lb 12.8 oz (71.6 kg)   24 0416 158 lb (71.7 kg)   24 0800 161 lb 13.1 oz (73.4 kg)   24 1700 154 lb 15.7 oz (70.3 kg)   24 0400 142 lb 3.2 oz (64.5 kg)   24 0523 139 lb 12.4 oz (63.4 kg)   24 2121 130 lb (59 kg)   24 0533 139 lb 12.4 oz (63.4 kg)     Labs:  Recent Labs   Lab 24  1347   *   BUN 10   CREATSERUM 0.75   EGFRCR 89   CA 8.6*      K 3.6      CO2 25.0     Recent Labs   Lab 24  1347 24  0917   RBC 2.94* 2.70*   HGB 11.0* 10.3*   HCT 30.9* 27.9*   .1* 103.3*   MCH 37.4* 38.1*   MCHC 35.6 36.9   RDW 17.0* 16.6*   NEPRELIM 8.71* 5.61   WBC 12.4* 9.7   .0 131.0*     Recent Labs   Lab 24  1347   TROPHS <3     Lab Results   Component Value Date/Time    HDL 9 (L) 2024 12:23 AM     (H) 2024 12:23 AM    TRIG 130 2024 12:23 AM     No results found for: \"DDIMER\"  Lab Results   Component Value Date    TSH 8.140 (H) 2024     Review of Systems:   Constitutional: No fevers,  chills, fatigue or night sweats.  ENT: No mouth pain, neck pain, running nose, headaches or swollen glands.  Skin: No rashes, pruritus or skin changes,  Respiratory: Denies cough, wheezing or shortness of breath.  CV: Denies chest pain, palpitations, orthopnea, PND or dizziness.  Musculoskeletal: No joint pain, stiffness or swelling.  GI: No nausea, vomiting or diarrhea. No blood in stools.  Neurologic: No seizures, tremors, weakness or numbness.     Physical Exam:  General: Alert, cooperative, no distress, appears stated age.  Neck: Supple, symmetrical, trachea midline, no adenopathy, thyroid: no enlargment/tenderness/nodules, no carotid bruit and no JVD.  Lungs: Clear to auscultation bilaterally.  Chest wall: No tenderness or deformity.  Heart: Regular rate and rhythm, S1, S2 normal, no murmur, click, rub or gallop.  Abdomen: Soft, non-tender. Bowel sounds normal. No masses,  No organomegaly.  Extremities: Extremities normal, atraumatic, no cyanosis, 1+BLE edema.  Pulses: 2+ and symmetric all extremities.  Neurologic: slight right sided facial droop, right arm held against her body, not moving freely, follows commands, upper extremity strength equal     Diagnostics:  XR CHEST AP PORTABLE  (CPT=71045)    Result Date: 6/3/2024  CONCLUSION:  1. Normal heart size with minimally prominent pulmonary vascularity and minimal interstitial edema.  I can not exclude early cardiac failure/fluid overload.  Correlate clinically.  No focal airspace consolidation or large pleural effusion.    Dictated by (CST): Daniel Moss MD on 6/03/2024 at 3:42 PM     Finalized by (CST): Daniel Moss MD on 6/03/2024 at 3:43 PM         Echo: 06/04/24  Conclusions:     1. Left ventricle: The cavity size was normal. Wall thickness was normal.      Systolic function was normal. The estimated ejection fraction was 60-65%,      by visual assessment. No diagnostic evidence for regional wall motion      abnormalities. Left ventricular diastolic  function parameters were      normal.   2. Left atrium: The left atrial volume was normal.   3. Pulmonary arteries: Systolic pressure was within the normal range,      estimated to be 24mm Hg.   4. Systemic veins: Central venous respirophasic diameter changes are blunted      (< 50%).   5. Inferior vena cava: The IVC was normal-sized.   Impressions:  No previous study was available for comparison.   *     Medications:   furosemide  40 mg Intravenous BID (Diuretic)    insulin aspart  1-5 Units Subcutaneous TID CC    traZODone  25 mg Oral Nightly    escitalopram  10 mg Oral Nightly    folic acid  1 mg Oral Daily    lactulose  20 g Oral BID    lamoTRIgine  25 mg Oral BID    levothyroxine  50 mcg Oral Before breakfast    pantoprazole  20 mg Oral QAM AC    potassium chloride  10 mEq Oral Daily    spironolactone  25 mg Oral Daily    heparin  5,000 Units Subcutaneous Q8H KUSH     Assessment:    Volume overload  Admitted with LE edema and shortness of breath, likely heart failure, however, not currently diagnosed, no echo available in care everywhere  -also could be related to liver disease/congestion  -echo normal  -was taking lasix BID at home along with aldactone  -now diuresing with IV lasix BID  -I/Os not accurately documented  -weight down 5-7# from admission weight, family states dry weight 155#, today 160  -BP stable 90s-100s, BMP pending  -GDMT: lasix, aldactone    Hx HTN  BP lower in 90s-100s  -aldactone, lasix    DMII-A1c 4.4    Alcoholic liver disease  AST elevated, albumin low, bilirubin elevated    Hx CVA  Recent cerebellar stroke vs mass vs intracranial bleed in April of this year      Plan:  -Continue IV lasix BID, aldactone  -monitor strict II/Os, daily weights, daily BMP  -CT brain pending to r/o stroke following RRT this morning    Plan of care discussed with patient, RN.      Shona Tello, DIANE  6/4/2024  10:32 AM  [phone] [place]  [phone] [place]      Cardiologist Addendum:  Christi Tavarez  was seen and examined independently and I agree with the above documentation provided by SHARRI Cervantes.  Echo shows normal EF, no valvular abnormalities.  Responding to IV diuretics.  Possibly liver/cirrhosis related fluid retention.,  GI following rapid response called later morning after my evaluation for possible stroke, left-sided weakness.  CT and CTA with no acute findings.  Brain MRI pending.    Thank you for allowing me to take part in the care of Christi Tavarez. Please call with any questions of concerns.    L3    Topher Moran DO  [facility]   Interventional Cardiac and Vascular Services      June 04, 2024  4:30 PM

## 2024-06-04 NOTE — PLAN OF CARE
Problem: Patient Centered Care  Goal: Patient preferences are identified and integrated in the patient's plan of care  Description: Interventions:  - What would you like us to know as we care for you? From home with family  - Provide timely, complete, and accurate information to patient/family  - Incorporate patient and family knowledge, values, beliefs, and cultural backgrounds into the planning and delivery of care  - Encourage patient/family to participate in care and decision-making at the level they choose  - Honor patient and family perspectives and choices  Outcome: Progressing     Problem: Diabetes/Glucose Control  Goal: Glucose maintained within prescribed range  Description: INTERVENTIONS:  - Monitor Blood Glucose as ordered  - Assess for signs and symptoms of hyperglycemia and hypoglycemia  - Administer ordered medications to maintain glucose within target range  - Assess barriers to adequate nutritional intake and initiate nutrition consult as needed  - Instruct patient on self management of diabetes  Outcome: Progressing     Problem: Patient/Family Goals  Goal: Patient/Family Long Term Goal  Description: Patient's Long Term Goal: to go home    Interventions:  - follow MD orders  - See additional Care Plan goals for specific interventions  Outcome: Progressing  Goal: Patient/Family Short Term Goal  Description: Patient's Short Term Goal: manage sob    Interventions:   - follow MD orders  - See additional Care Plan goals for specific interventions  Outcome: Progressing

## 2024-06-04 NOTE — CM/SW NOTE
06/04/24 1300   CM/SW Referral Data   Referral Source Physician;Social Work (self-referral)   Reason for Referral Discharge planning   Informant Patient;Sibling   Medical Hx   Does patient have an established PCP? Yes   Significant Past Medical/Mental Health Hx CHF, CVA, ETOH, DM   Patient Info   Patient's Current Mental Status at Time of Assessment Alert;Oriented   Patient's Home Environment House   Number of Levels in Home 2   Number of Stair in Home 5   Patient lives with Son   Patient Status Prior to Admission   Independent with ADLs and Mobility Yes   Services in place prior to admission DME/Supplies at home   Type of DME/Supplies Wheeled Walker   Discharge Needs   Anticipated D/C needs To be determined     ADDENDUM: Social work received a call stating that the patient is current with [employer] for PT/OT/ST/RN. Social work sent referral in [place] and entered face to face.      Social work received a consult for  TAMI belcher.     Social work was able to meet with the patient and her sister Kary at bedside to discuss discharge planning.    The patient lives at home with her son.  The patient is independent with all ADLs at baseline.  The patient owns a walker but just recently started using it due to her LE being swollen.    The patient had a prior CVA and was discharged to [facility].  From the Acute Rehab she was discharge to [facility] [place].    Social work will follow up after therapy evals.    The patient has no questions or concerns at this time.    SW/CM to remain available for support and/or discharge planning.     Jannette Dobson MSW, LSW  Discharge Planner [phone]

## 2024-06-04 NOTE — SPIRITUAL CARE NOTE
Spiritual Care Visit Note    Patient Name: Christi Tavarez Date of Spiritual Care Visit: 24   : 1960 Primary Dx: <principal problem not specified>       Referred By: Referral From: Nurse    Spiritual Care Taxonomy:    Intended Effects: Demonstrate caring and concern    Methods: Offer support    Interventions: Ask guided questions;Active listening;Acknowledge response to difficult experience    Visit Type/Summary:  I met with patient's family who was bedside while patient was not in the room.  They shared stories and expressed fear and sadness.  Patient returned and shared she had been waiting for communion.  I connected with the  in hopes to provide communion to Christi today.   remains available.     [facility] [phone]

## 2024-06-04 NOTE — CONSULTS
Is this a shared or split note between Advanced Practice Provider and Physician? Yes       [facility]   Gastroenterology Consultation Note    Christi Tavarez  Patient Status:    Inpatient  Date of Admission:         6/3/2024, Hospital day #1  Attending:   Antony Zavala MD  PCP:     ÁNGEL Delgado    Reason for Consultation:  Cirrhosis     History of Present Illness:  Christi Tavarez is a 63 year old female w/ PMHx of  BMI 29.21, DM2, hypothyroidism, HLD, disordered ETOH, ICH in April 2024 who presented to ER for worsening SOB and edema. Daughter assists with history. Patient was recently admitted in April and found to have ICH. She was then sent to rehab. Over the last 2 weeks has had increased swelling in both legs and shortness of breath when trying to get around. GI consulted for cirrhosis. Patient last drink was in April. Denies abdominal pain, nausea and vomiting. Having BM daily. Notes they are brown in color had one episode of bright red blood tinged stool. No diarrhea. No fevers or chills. No pruritus.     In ER, alk phos 140, AST 38, ALT 14, total bilirubin 4.6. Hemoglobin 11, platelet 153. INR 1.79, PT 21.3. BUN 10. CXR demonstrated minimal interstitial edema.     Previous admission in April for hematemesis and acute alcohol hepatitis. She had improvements in bilirubin and INR with supportive care, no prednisolone. Hemoglobin remained stable and no EGD completed due to admission fo ICH.     Pertinent Family Hx:  - No known history of esophageal, gastric or colon cancers  - No known IBD  - No known liver pathologies     Endoscopy Hx:  - No prior endoscopic evaluation     Social Hx:  - Current tobacco use, 5-10 cigarettes per day  - None since 4/2024, previously drank 2 shots of vodka per day for the past 12 years  - Denies cannabis/illicit drug use  - Takes ASA as needed, but noted daily use for the past 2-3 weeks with stomach flu  - Lives alone  - Occupation: not currently working      History:  Past Medical History:    CHF (congestive heart failure) (HCC)    Diabetes (HCC)    Stroke (HCC)     No past surgical history on file.  No family history on file.   reports that she has never smoked. She has never used smokeless tobacco. She reports current alcohol use. She reports that she does not use drugs.    Allergies:  No Known Allergies    Medications:    Current Facility-Administered Medications:     furosemide (Lasix) 10 mg/mL injection 40 mg, 40 mg, Intravenous, BID (Diuretic)    glucose (Dex4) 15 GM/59ML oral liquid 15 g, 15 g, Oral, Q15 Min PRN **OR** glucose (Glutose) 40% oral gel 15 g, 15 g, Oral, Q15 Min PRN **OR** glucose-vitamin C (Dex-4) chewable tab 4 tablet, 4 tablet, Oral, Q15 Min PRN **OR** dextrose 50% injection 50 mL, 50 mL, Intravenous, Q15 Min PRN **OR** glucose (Dex4) 15 GM/59ML oral liquid 30 g, 30 g, Oral, Q15 Min PRN **OR** glucose (Glutose) 40% oral gel 30 g, 30 g, Oral, Q15 Min PRN **OR** glucose-vitamin C (Dex-4) chewable tab 8 tablet, 8 tablet, Oral, Q15 Min PRN    insulin aspart (NovoLOG) 100 Units/mL FlexPen 1-5 Units, 1-5 Units, Subcutaneous, TID CC    traZODone (Desyrel) tab 25 mg, 25 mg, Oral, Nightly    escitalopram (Lexapro) tab 10 mg, 10 mg, Oral, Nightly    folic acid (Folvite) tab 1 mg, 1 mg, Oral, Daily    lactulose (CHRONULAC) 10 GM/15ML solution 20 g, 20 g, Oral, BID    lamoTRIgine (LaMICtal) tab 25 mg, 25 mg, Oral, BID    levothyroxine (Synthroid) tab 50 mcg, 50 mcg, Oral, Before breakfast    pantoprazole (Protonix) DR tab 20 mg, 20 mg, Oral, QAM AC    potassium chloride (Klor-Con) 20 MEQ oral powder 10 mEq, 10 mEq, Oral, Daily    spironolactone (Aldactone) tab 25 mg, 25 mg, Oral, Daily    heparin (Porcine) 5000 UNIT/ML injection 5,000 Units, 5,000 Units, Subcutaneous, Q8H KUSH    Review of Systems:   CONSTITUTIONAL:  negative for fevers, chills, unintentional weight loss   EYES:  negative for diplopia or change in vision   RESPIRATORY:  negative for severe  shortness of breath  CARDIOVASCULAR:  negative for crushing sub-sternal chest pain  GASTROINTESTINAL:  see HPI  GENITOURINARY:  negative for dysuria or gross hematuria  INTEGUMENT/BREAST:  SKIN:  negative for jaundice or new rash   ALLERGIC/IMMUNOLOGIC:  negative for hay fever   ENDOCRINE:  negative for cold intolerance and heat intolerance  MUSCULOSKELETAL:  negative for joint effusion/severe erythema  NEURO: negative for new loss of consciousness or dizziness   BEHAVIOR/PSYCH:  negative for psychotic behavior    Physical Exam:    Blood pressure 98/54, pulse 86, temperature 98 °F (36.7 °C), temperature source Oral, resp. rate 18, height 4' 9\" (1.448 m), weight 155 lb 4.8 oz (70.4 kg), SpO2 95%. Body mass index is 33.61 kg/m².    Gen: awake, alert patient, NAD  HEENT: EOMI, + sclera appears icteric, oropharynx clear, mucus membranes appear moist  CV: RRR  Lung: no conversational dyspnea   Abdomen: Distended abdomen,soft NT abdomen with NABS appreciated   Back: No CVA tenderness   Skin: dry, warm, + jaundice  Ext: + LE edema is evident  Neuro: Alert and interactive  Psych: calm, cooperative    Laboratory Data:  Lab Results   Component Value Date    WBC 12.4 06/03/2024    HGB 11.0 06/03/2024    HCT 30.9 06/03/2024    .0 06/03/2024    CREATSERUM 0.75 06/03/2024    BUN 10 06/03/2024     06/03/2024    K 3.6 06/03/2024     06/03/2024    CO2 25.0 06/03/2024     06/03/2024    CA 8.6 06/03/2024    ALB 2.8 06/03/2024    ALKPHO 140 06/03/2024    BILT 4.6 06/03/2024    TP 7.4 06/03/2024    AST 38 06/03/2024    ALT 14 06/03/2024    PTT 34.8 06/03/2024    INR 1.72 06/03/2024    LIP 49 06/03/2024       Imaging:  XR CHEST AP PORTABLE  (CPT=71045)    Result Date: 6/3/2024  CONCLUSION:  1. Normal heart size with minimally prominent pulmonary vascularity and minimal interstitial edema.  I can not exclude early cardiac failure/fluid overload.  Correlate clinically.  No focal airspace consolidation or large  pleural effusion.    Dictated by (CST): Daniel Moss MD on 6/03/2024 at 3:42 PM     Finalized by (CST): Daniel Moss MD on 6/03/2024 at 3:43 PM           Assessment & Plan   Christi Tavarez is a 63 year old female w/ PMHx of  BMI 29.21, DM2, hypothyroidism, HLD, disordered ETOH, ICH in April 2024 who presented to ER for worsening SOB and edema. Daughter assists with history. Patient was recently admitted in April and found to have ICH. She was then sent to rehab. Over the last 2 weeks has had increased swelling in both legs and shortness of breath when trying to get around. GI consulted for cirrhosis. Patient last drink was in April. Denies abdominal pain, nausea and vomiting. Having BM daily. Notes they are brown in color had one episode of bright red blood tinged stool. No diarrhea. No fevers or chills. No pruritus.     #SOB  #lower extremity weakness  -progressively worsening with associated lower extremity edema over last 2 weeks  -etiology possibly cardiac, hepatic congestion, anemia   -plan for work up through cardiology at this time  -consider endoscopic evaluation IP vs OP     #acute alcohol hepatitis- Bilirubin down trending, now total bilirubin at 4.6  # Cirrhosis- due to ETOH, chronic disease work up negative. Mild elevation in anti smooth muscle. Normal M2 antibody. Normal ceruloplasmin. Mild elevation in alpha 1 antitrypsin. MELD 3.0- 21  - PSE: none   - EV: needs to be completed, pending cardiac work up  - ascites: moderate ascites on most recent ultrasound, will plan for paracentesis tomorrow  - HCC: AFP pending, no lesion on ultrasound 5/29    #increasing left sided weakness  -patient had rapid response called around 11 AM for increased left sided weakness  -CT and CTA head ordered  -neurology now on consult    Recommend:  -trend hepatic panel  -daily PT/INR  -daily CBC  -cardiology on consult  -paracentesis scheduled for tomorrow  -consider endoscopic evaluation IP vs OP pending clinical course        Thank you for the opportunity to participate in the care of this patient.    Case discussed with Lawrence Mena MD and Danyell GARCIA.    Gita Weheler PA-C  [facility]  6/4/2024

## 2024-06-04 NOTE — PAYOR COMM NOTE
--------------  ADMISSION REVIEW     Payor: [employer]  Subscriber #:  [id number]  Authorization Number: [id number]    Admit date: 6/3/24  Admit time:  5:56 PM       REVIEW DOCUMENTATION:     ED Provider Notes          Patient Seen in: [facility] Emergency Department      History     Chief Complaint   Patient presents with    Congestive Heart Failure     Stated Complaint: edema, chf, dr. osborn referred to er    Subjective:   HPI    Patient presents emergency department with daughter who provides most of the history.  She states that she was recently admitted to the hospital and then sent to rehab following a stroke and has had progressive increase in edema and shortness of breath.  She denies fever or chills.  Daughter states that she has a history of cirrhosis and has swelling in her abdomen as well.  There is no abdominal pain.      Review of Systems    Positive for stated complaint: edema, chf, dr. osborn referred to er  Other systems are as noted in HPI.  Constitutional and vital signs reviewed.      All other systems reviewed and negative except as noted above.    Physical Exam     ED Triage Vitals [06/03/24 1250]   /63   Pulse 109   Resp 18   Temp 98.1 °F (36.7 °C)   Temp src Oral   SpO2 98 %   O2 Device None (Room air)       Current Vitals:   Vital Signs  BP: 94/50  Pulse: 93  Resp: 16  Temp: 98.1 °F (36.7 °C)  Temp src: Oral  MAP (mmHg): 65    Oxygen Therapy  SpO2: 99 %  O2 Device: None (Room air)            Physical Exam  Vitals and nursing note reviewed.   Constitutional:       General: She is not in acute distress.     Appearance: She is well-developed.   HENT:      Head: Normocephalic.      Nose: Nose normal.      Mouth/Throat:      Mouth: Mucous membranes are moist.   Eyes:      Conjunctiva/sclera: Conjunctivae normal.   Cardiovascular:      Rate and Rhythm: Normal rate and regular rhythm.      Heart sounds: No murmur heard.  Pulmonary:      Effort: Pulmonary effort is normal. No  respiratory distress.      Breath sounds: Rales present.      Comments: Faint scattered rales at the bases of the lungs.  Abdominal:      General: There is no distension.      Palpations: Abdomen is soft.      Tenderness: There is no abdominal tenderness.   Musculoskeletal:         General: No tenderness. Normal range of motion.      Cervical back: Normal range of motion and neck supple.      Right lower leg: Edema present.      Left lower leg: Edema present.   Skin:     General: Skin is warm and dry.      Capillary Refill: Capillary refill takes less than 2 seconds.      Findings: No rash.   Neurological:      General: No focal deficit present.      Mental Status: She is alert and oriented to person, place, and time.   Psychiatric:         Mood and Affect: Mood normal.         \      ED Course     Labs Reviewed   BASIC METABOLIC PANEL (8) - Abnormal; Notable for the following components:       Result Value    Glucose 119 (*)     Calcium, Total 8.6 (*)     All other components within normal limits   HEPATIC FUNCTION PANEL (7) - Abnormal; Notable for the following components:    AST 38 (*)     Alkaline Phosphatase 140 (*)     Bilirubin, Total 4.6 (*)     Bilirubin, Direct 2.6 (*)     Albumin 2.8 (*)     All other components within normal limits   PROTHROMBIN TIME (PT) - Abnormal; Notable for the following components:    PT 21.3 (*)     INR 1.72 (*)     All other components within normal limits   CBC W/ DIFFERENTIAL - Abnormal; Notable for the following components:    WBC 12.4 (*)     RBC 2.94 (*)     HGB 11.0 (*)     HCT 30.9 (*)     .1 (*)     MCH 37.4 (*)     RDW-SD 63.8 (*)     RDW 17.0 (*)     Neutrophil Absolute Prelim 8.71 (*)     Neutrophil Absolute 8.71 (*)     All other components within normal limits   PRO BETA NATRIURETIC PEPTIDE - Normal   TROPONIN I HIGH SENSITIVITY - Normal   LIPASE - Normal   PTT, ACTIVATED - Normal   CBC WITH DIFFERENTIAL WITH PLATELET    Narrative:     The following orders  were created for panel order CBC With Differential With Platelet.  Procedure                               Abnormality         Status                     ---------                               -----------         ------                     CBC W/ DIFFERENTIAL[[id number]]          Abnormal            Final result                 Please view results for these tests on the individual orders.   RAINBOW DRAW LAVENDER   RAINBOW DRAW LIGHT GREEN   RAINBOW DRAW BLUE     EKG    Rate, intervals and axes as noted on EKG Report.  Rate: 104 bpm  Rhythm: Sinus Rhythm  Reading: Sinus tachycardia, abnormal      MDM        Admission disposition: 6/3/2024  4:13 PM    Medical Decision Making  Differential diagnosis considered for congestive heart failure, hepatorenal syndrome, cirrhosis source of edema    Problems Addressed:  Acute on chronic congestive heart failure, unspecified heart failure type (HCC): acute illness or injury  Alcoholic cirrhosis of liver with ascites (HCC): acute illness or injury    Amount and/or Complexity of Data Reviewed  Labs: ordered. Decision-making details documented in ED Course.     Details: Labs reviewed  Radiology: ordered and independent interpretation performed. Decision-making details documented in ED Course.     Details: Chest x-ray shows pulmonary edema  ECG/medicine tests: ordered and independent interpretation performed. Decision-making details documented in ED Course.  Discussion of management or test interpretation with external provider(s): Discussed with GI, cardiology and the PMD.  Lasix given in ED    Risk  Prescription drug management.  Decision regarding hospitalization.        Disposition and Plan     Clinical Impression:  1. Acute on chronic congestive heart failure, unspecified heart failure type (HCC)    2. Alcoholic cirrhosis of liver with ascites (HCC)         Disposition:  Admit  6/3/2024  4:13 pm       Hospital Problems       Present on Admission             ICD-10-CM Noted POA     * (Principal) Acute on chronic congestive heart failure, unspecified heart failure type (HCC) I50.9 6/3/2024 Unknown    Anemia D64.9 6/3/2024 Yes    Hyperglycemia R73.9 6/3/2024 Yes           Signed by Isrrael Orozco MD on 6/3/2024  4:58 PM         CONSULT  [facility]   Gastroenterology Consultation Note     Christi Tavarez  Patient Status:                  Inpatient  Date of Admission:         6/3/2024, Hospital day #1  Attending:                          Antony Zavala MD  PCP:                                  ÁNGEL Delgado     Reason for Consultation:  Cirrhosis      History of Present Illness:  Christi Tavarez is a 63 year old female w/ PMHx of  BMI 29.21, DM2, hypothyroidism, HLD, disordered ETOH, ICH in April 2024 who presented to ER for worsening SOB and edema. Daughter assists with history. Patient was recently admitted in April and found to have ICH. She was then sent to rehab. Over the last 2 weeks has had increased swelling in both legs and shortness of breath when trying to get around. GI consulted for cirrhosis. Patient last drink was in April. Denies abdominal pain, nausea and vomiting. Having BM daily. Notes they are brown in color had one episode of bright red blood tinged stool. No diarrhea. No fevers or chills. No pruritus.      In ER, alk phos 140, AST 38, ALT 14, total bilirubin 4.6. Hemoglobin 11, platelet 153. INR 1.79, PT 21.3. BUN 10. CXR demonstrated minimal interstitial edema.      Previous admission in April for hematemesis and acute alcohol hepatitis. She had improvements in bilirubin and INR with supportive care, no prednisolone. Hemoglobin remained stable and no EGD completed due to admission fo ICH.      Pertinent Family Hx:  - No known history of esophageal, gastric or colon cancers  - No known IBD  - No known liver pathologies     Endoscopy Hx:  - No prior endoscopic evaluation     Social Hx:  - Current tobacco use, 5-10 cigarettes per day  - None since  4/2024, previously drank 2 shots of vodka per day for the past 12 years  - Denies cannabis/illicit drug use  - Takes ASA as needed, but noted daily use for the past 2-3 weeks with stomach flu  - Lives alone  - Occupation: not currently working     History:      Past Medical History:    CHF (congestive heart failure) (HCC)    Diabetes (HCC)    Stroke (HCC)      No past surgical history on file.  No family history on file.   reports that she has never smoked. She has never used smokeless tobacco. She reports current alcohol use. She reports that she does not use drugs.     Allergies:  No Known Allergies     Medications:     Current Facility-Administered Medications:     furosemide (Lasix) 10 mg/mL injection 40 mg, 40 mg, Intravenous, BID (Diuretic)    glucose (Dex4) 15 GM/59ML oral liquid 15 g, 15 g, Oral, Q15 Min PRN **OR** glucose (Glutose) 40% oral gel 15 g, 15 g, Oral, Q15 Min PRN **OR** glucose-vitamin C (Dex-4) chewable tab 4 tablet, 4 tablet, Oral, Q15 Min PRN **OR** dextrose 50% injection 50 mL, 50 mL, Intravenous, Q15 Min PRN **OR** glucose (Dex4) 15 GM/59ML oral liquid 30 g, 30 g, Oral, Q15 Min PRN **OR** glucose (Glutose) 40% oral gel 30 g, 30 g, Oral, Q15 Min PRN **OR** glucose-vitamin C (Dex-4) chewable tab 8 tablet, 8 tablet, Oral, Q15 Min PRN    insulin aspart (NovoLOG) 100 Units/mL FlexPen 1-5 Units, 1-5 Units, Subcutaneous, TID CC    traZODone (Desyrel) tab 25 mg, 25 mg, Oral, Nightly    escitalopram (Lexapro) tab 10 mg, 10 mg, Oral, Nightly    folic acid (Folvite) tab 1 mg, 1 mg, Oral, Daily    lactulose (CHRONULAC) 10 GM/15ML solution 20 g, 20 g, Oral, BID    lamoTRIgine (LaMICtal) tab 25 mg, 25 mg, Oral, BID    levothyroxine (Synthroid) tab 50 mcg, 50 mcg, Oral, Before breakfast    pantoprazole (Protonix) DR tab 20 mg, 20 mg, Oral, QAM AC    potassium chloride (Klor-Con) 20 MEQ oral powder 10 mEq, 10 mEq, Oral, Daily    spironolactone (Aldactone) tab 25 mg, 25 mg, Oral, Daily    heparin (Porcine)  5000 UNIT/ML injection 5,000 Units, 5,000 Units, Subcutaneous, Q8H [facility]     Review of Systems:   CONSTITUTIONAL:  negative for fevers, chills, unintentional weight loss   EYES:  negative for diplopia or change in vision   RESPIRATORY:  negative for severe shortness of breath  CARDIOVASCULAR:  negative for crushing sub-sternal chest pain  GASTROINTESTINAL:  see HPI  GENITOURINARY:  negative for dysuria or gross hematuria  INTEGUMENT/BREAST:  SKIN:  negative for jaundice or new rash   ALLERGIC/IMMUNOLOGIC:  negative for hay fever   ENDOCRINE:  negative for cold intolerance and heat intolerance  MUSCULOSKELETAL:  negative for joint effusion/severe erythema  NEURO: negative for new loss of consciousness or dizziness   BEHAVIOR/PSYCH:  negative for psychotic behavior     Physical Exam:    Blood pressure 98/54, pulse 86, temperature 98 °F (36.7 °C), temperature source Oral, resp. rate 18, height 4' 9\" (1.448 m), weight 155 lb 4.8 oz (70.4 kg), SpO2 95%. Body mass index is 33.61 kg/m².     Gen: awake, alert patient, NAD  HEENT: EOMI, + sclera appears icteric, oropharynx clear, mucus membranes appear moist  CV: RRR  Lung: no conversational dyspnea   Abdomen: Distended abdomen,soft NT abdomen with NABS appreciated   Back: No CVA tenderness   Skin: dry, warm, + jaundice  Ext: + LE edema is evident  Neuro: Alert and interactive  Psych: calm, cooperative     Laboratory Data:        Lab Results   Component Value Date     WBC 12.4 06/03/2024     HGB 11.0 06/03/2024     HCT 30.9 06/03/2024     .0 06/03/2024     CREATSERUM 0.75 06/03/2024     BUN 10 06/03/2024      06/03/2024     K 3.6 06/03/2024      06/03/2024     CO2 25.0 06/03/2024      06/03/2024     CA 8.6 06/03/2024     ALB 2.8 06/03/2024     ALKPHO 140 06/03/2024     BILT 4.6 06/03/2024     TP 7.4 06/03/2024     AST 38 06/03/2024     ALT 14 06/03/2024     PTT 34.8 06/03/2024     INR 1.72 06/03/2024     LIP 49 06/03/2024         Imaging:  XR CHEST AP  PORTABLE  (CPT=71045)     Result Date: 6/3/2024  CONCLUSION:         1. Normal heart size with minimally prominent pulmonary vascularity and minimal interstitial edema.  I can not exclude early cardiac failure/fluid overload.  Correlate clinically.  No focal airspace consolidation or large pleural effusion.    Dictated by (CST): Daniel Moss MD on 6/03/2024 at 3:42 PM     Finalized by (CST): Daniel Moss MD on 6/03/2024 at 3:43 PM            Assessment & Plan   Christi Tavarez is a 63 year old female w/ PMHx of  BMI 29.21, DM2, hypothyroidism, HLD, disordered ETOH, ICH in April 2024 who presented to ER for worsening SOB and edema. Daughter assists with history. Patient was recently admitted in April and found to have ICH. She was then sent to rehab. Over the last 2 weeks has had increased swelling in both legs and shortness of breath when trying to get around. GI consulted for cirrhosis. Patient last drink was in April. Denies abdominal pain, nausea and vomiting. Having BM daily. Notes they are brown in color had one episode of bright red blood tinged stool. No diarrhea. No fevers or chills. No pruritus.      #SOB  #lower extremity weakness  -progressively worsening with associated lower extremity edema over last 2 weeks  -etiology possibly cardiac, hepatic congestion, anemia   -plan for work up through cardiology at this time  -consider endoscopic evaluation IP vs OP      #acute alcohol hepatitis- Bilirubin down trending, now total bilirubin at 4.6  # Cirrhosis- due to ETOH, chronic disease work up negative. Mild elevation in anti smooth muscle. Normal M2 antibody. Normal ceruloplasmin. Mild elevation in alpha 1 antitrypsin. MELD 3.0- 21  - PSE: none   - EV: needs to be completed, pending cardiac work up  - ascites: moderate ascites on most recent ultrasound, will plan for paracentesis tomorrow  - HCC: AFP pending, no lesion on ultrasound 5/29     #increasing left sided weakness  -patient had rapid response  called around 11 AM for increased left sided weakness  -CT and CTA head ordered  -neurology now on consult     Recommend:  -trend hepatic panel  -daily PT/INR  -daily CBC  -cardiology on consult  -paracentesis scheduled for tomorrow  -consider endoscopic evaluation IP vs OP pending clinical course         Thank you for the opportunity to participate in the care of this patient.     Case discussed with Lawrence Mena MD and Danyell GARCIA.     Gita Wheeler PA-C  [facility]  6/4/2024     CONSULT  Assessment:    Volume overload, lower extreme edema  Shortness of breath  Presumed heart failure  Alcoholism  Alcoholic liver disease  Diabetes  Hypertension  Recent cerebellar stroke versus mass versus intracranial bleed 4/2024        Plan:  63-year-old female with multiple cardiac risk factors presenting with worsening shortness of breath with activity, lower extremity edema.  Patient is on Lasix as outpatient however despite twice daily dosing, she has had fluid retention.  Exam is pertinent for 2/6 systolic murmur and lower extremity edema, JVD and HJR.  There is possibility patient has new onset heart failure however she does have diagnosis of liver disease to, elevated bilirubin could potentially be secondary to congestion  Agree with  Lasix 40 mg IV twice daily  Agree with echocardiogram  Further recommendations based on echo result     Thank you for allowing me to take part in the care of Christi Tavarez. Please call with any questions of concerns.        Level of care: C5     Dr. Topher Moran      6/4  Rapid response called due to increased left sided weakness, inability to feed herself, leaning more towards the left.   Patient was AO x3 on my interview and stated her weakness was a new development on top of preexisting weakness due to a prior stroke in April.          6/4/2024     5:23 AM 6/4/2024     8:57 AM   Vitals History   BP 98/54 106/57   BP Location Right arm Right arm   Pulse 86      Resp 18 20   Temp 98 °F (36.7 °C) 97.9 °F (36.6 °C)   SpO2 95 % 95 %      Gen NAD AO x3  Chest good air entry CTABL  CVS normal s1 and s2 RR  Abd NABS soft NT ND  Neuro significant left sided weakness   Ext 2+ edema in bilateral LE     Accuchecks 139     A/P     -?increased left sided weakness  -Hx of CVA with left sided deficits  Echo reviewed  CT and CTA head ordered  Neuro on consult  Frequent neurochecks     Primary attending aware.   Further management per primary and consulting physicians.     Evgeny Hedrick MD                  6/4  Rapid response called due to increased left sided weakness, inability to feed herself, leaning more towards the left.   Patient was AO x3 on my interview and stated her weakness was a new development on top of preexisting weakness due to a prior stroke in April.          6/4/2024     5:23 AM 6/4/2024     8:57 AM   Vitals History   BP 98/54 106/57   BP Location Right arm Right arm   Pulse 86     Resp 18 20   Temp 98 °F (36.7 °C) 97.9 °F (36.6 °C)   SpO2 95 % 95 %      Gen NAD AO x3  Chest good air entry CTABL  CVS normal s1 and s2 RR  Abd NABS soft NT ND  Neuro significant left sided weakness   Ext 2+ edema in bilateral LE     Accuchecks 139     A/P     -?increased left sided weakness  -Hx of CVA with left sided deficits  Echo reviewed  CT and CTA head ordered  Neuro on consult  Frequent neurochecks     Primary attending aware.   Further management per primary and consulting physicians.     Evgeny Hedrick MD                        MEDICATIONS ADMINISTERED IN LAST 1 DAY:  escitalopram (Lexapro) tab 10 mg       Date Action Dose Route User    6/3/2024 2133 Given 10 mg Oral Maribel Perez, RN          folic acid (Folvite) tab 1 mg       Date Action Dose Route User    6/4/2024 0906 Given 1 mg Oral Danyell Santos RN          furosemide (Lasix) 10 mg/mL injection 40 mg       Date Action Dose Route User    6/3/2024 1613 Given 40 mg Intravenous Lina Traylor RN          furosemide  (Lasix) 10 mg/mL injection 40 mg       Date Action Dose Route User    6/4/2024 0907 Given 40 mg Intravenous Danyell Santos RN          heparin (Porcine) 5000 UNIT/ML injection 5,000 Units       Date Action Dose Route User    6/4/2024 0524 Given 5,000 Units Subcutaneous (Left Lower Abdomen) Maribel Perez RN    6/3/2024 2133 Given 5,000 Units Subcutaneous (Left Lower Abdomen) Maribel Perez RN          iopamidol 76% (ISOVUE-370) injection for power injector       Date Action Dose Route User    6/4/2024 1128 Given 80 mL Intravenous JamesMarcella          lactulose (CHRONULAC) 10 GM/15ML solution 20 g       Date Action Dose Route User    6/4/2024 0907 Given 20 g Oral Danyell Santos RN    6/3/2024 2133 Given 20 g Oral Maribel Perez RN          lamoTRIgine (LaMICtal) tab 25 mg       Date Action Dose Route User    6/4/2024 0906 Given 25 mg Oral Danyell Santos RN    6/3/2024 2133 Given 25 mg Oral Maribel Perez RN          levothyroxine (Synthroid) tab 50 mcg       Date Action Dose Route User    6/4/2024 0525 Given 50 mcg Oral Maribel Perez RN          pantoprazole (Protonix) DR tab 20 mg       Date Action Dose Route User    6/4/2024 0524 Given 20 mg Oral Maribel Perez RN          Perflutren Lipid Microsphere (DEFINITY) 6.52 MG/ML injection 1.5 mL       Date Action Dose Route User    6/4/2024 0904 Given 1.5 mL Intravenous Kev Clancy          potassium chloride (Klor-Con) 20 MEQ oral powder 10 mEq       Date Action Dose Route User    6/4/2024 1018 Given 10 mEq Oral Danyell Santos RN          spironolactone (Aldactone) tab 25 mg       Date Action Dose Route User    6/4/2024 0906 Given 25 mg Oral Danyell Santos RN          traZODone (Desyrel) tab 25 mg       Date Action Dose Route User    6/3/2024 2134 Given 25 mg Oral Maribel Perez RN            Vitals (last day)       Date/Time Temp Pulse Resp BP SpO2 Weight O2 Device O2 Flow Rate (L/min) [facility]    06/04/24 0857 97.9 °F (36.6 °C)  -- 20 106/57 95 % -- None (Room air) -- NR    06/04/24 0523 98 °F (36.7 °C) 86 18 98/54 95 % -- None (Room air) -- JH    06/04/24 0500 -- -- -- -- -- 155 lb 4.8 oz -- -- TP    06/03/24 2130 98.1 °F (36.7 °C) 86 18 93/56 95 % -- None (Room air) -- JH    06/03/24 1900 -- 99 -- -- -- -- -- -- CY    06/03/24 1810 -- -- -- -- -- 162 lb 14.4 oz -- -- NV    06/03/24 1808 98.4 °F (36.9 °C) 95 19 109/57 100 % -- None (Room air) -- NV    06/03/24 1730 -- 96 20 113/57 99 % -- None (Room air) -- KR    06/03/24 1715 -- 94 22 104/52 99 % -- None (Room air) -- KR    06/03/24 1700 -- 96 15 108/45 99 % -- None (Room air) -- GD    06/03/24 1630 -- 93 16 94/50 99 % -- None (Room air) -- KR    06/03/24 1600 -- 95 20 101/53 100 % -- None (Room air) -- KR    06/03/24 1545 -- 94 16 100/59 98 % -- None (Room air) -- KR    06/03/24 1250 98.1 °F (36.7 °C) 109 18 116/63 98 % 160 lb None (Room air) -- AN          CIWA Scores (since admission)       None

## 2024-06-04 NOTE — PROGRESS NOTES
PT am note: Hold PT eval at this time. Rapid response called due to increased left sided weakness, R/O CVA. PT will continue to follow and progress as per MD recommendations and as medical progress allows.

## 2024-06-04 NOTE — PROGRESS NOTES
Rapid response called due to increased left sided weakness, inability to feed herself, leaning more towards the left.   Patient was AO x3 on my interview and stated her weakness was a new development on top of preexisting weakness due to a prior stroke in April.        6/4/2024     5:23 AM 6/4/2024     8:57 AM   Vitals History   BP 98/54 106/57   BP Location Right arm Right arm   Pulse 86    Resp 18 20   Temp 98 °F (36.7 °C) 97.9 °F (36.6 °C)   SpO2 95 % 95 %      Gen NAD AO x3  Chest good air entry CTABL  CVS normal s1 and s2 RR  Abd NABS soft NT ND  Neuro significant left sided weakness   Ext 2+ edema in bilateral LE    Accuchecks 139    A/P    -?increased left sided weakness  -Hx of CVA with left sided deficits  Echo reviewed  CT and CTA head ordered  Neuro on consult  Frequent neurochecks    Primary attending aware.   Further management per primary and consulting physicians.    Evgeny Hedrick MD

## 2024-06-05 ENCOUNTER — APPOINTMENT (OUTPATIENT)
Dept: ULTRASOUND IMAGING | Facility: HOSPITAL | Age: 64
DRG: 432 | End: 2024-06-05
Attending: PHYSICIAN ASSISTANT

## 2024-06-05 PROBLEM — R27.8 SENSORY ATAXIA: Status: ACTIVE | Noted: 2024-06-05

## 2024-06-05 LAB
ALBUMIN FLD-MCNC: 0.4 G/DL
ALBUMIN SERPL-MCNC: 2.3 G/DL (ref 3.2–4.8)
ALBUMIN SERPL-MCNC: 2.5 G/DL (ref 3.2–4.8)
ALP LIVER SERPL-CCNC: 120 U/L
ALT SERPL-CCNC: 10 U/L
AMMONIA PLAS-MCNC: 14 UMOL/L (ref 11–32)
ANION GAP SERPL CALC-SCNC: 7 MMOL/L (ref 0–18)
AST SERPL-CCNC: 31 U/L (ref ?–34)
BASOPHILS # BLD AUTO: 0.07 X10(3) UL (ref 0–0.2)
BASOPHILS NFR BLD AUTO: 0.6 %
BASOPHILS NFR PRT: 0 %
BILIRUB DIRECT SERPL-MCNC: 2.4 MG/DL (ref ?–0.3)
BILIRUB SERPL-MCNC: 4 MG/DL (ref 0.2–1.1)
BUN BLD-MCNC: 6 MG/DL (ref 9–23)
BUN/CREAT SERPL: 9 (ref 10–20)
CALCIUM BLD-MCNC: 8.2 MG/DL (ref 8.7–10.4)
CHLORIDE SERPL-SCNC: 104 MMOL/L (ref 98–112)
CO2 SERPL-SCNC: 27 MMOL/L (ref 21–32)
COLOR FLD: YELLOW
CREAT BLD-MCNC: 0.67 MG/DL
DEPRECATED RDW RBC AUTO: 63.4 FL (ref 35.1–46.3)
EGFRCR SERPLBLD CKD-EPI 2021: 98 ML/MIN/1.73M2 (ref 60–?)
EOSINOPHIL # BLD AUTO: 0.14 X10(3) UL (ref 0–0.7)
EOSINOPHIL NFR BLD AUTO: 1.3 %
EOSINOPHIL NFR PRT: 0 %
ERYTHROCYTE [DISTWIDTH] IN BLOOD BY AUTOMATED COUNT: 17 % (ref 11–15)
GLUCOSE BLD-MCNC: 85 MG/DL (ref 70–99)
GLUCOSE BLDC GLUCOMTR-MCNC: 113 MG/DL (ref 70–99)
GLUCOSE BLDC GLUCOMTR-MCNC: 122 MG/DL (ref 70–99)
GLUCOSE BLDC GLUCOMTR-MCNC: 127 MG/DL (ref 70–99)
HCT VFR BLD AUTO: 27.5 %
HGB BLD-MCNC: 9.8 G/DL
IMM GRANULOCYTES # BLD AUTO: 0.04 X10(3) UL (ref 0–1)
IMM GRANULOCYTES NFR BLD: 0.4 %
INR BLD: 1.73 (ref 0.8–1.2)
LYMPHOCYTES # BLD AUTO: 3.35 X10(3) UL (ref 1–4)
LYMPHOCYTES NFR BLD AUTO: 31 %
LYMPHOCYTES NFR PRT: 22 %
MCH RBC QN AUTO: 37 PG (ref 26–34)
MCHC RBC AUTO-ENTMCNC: 35.6 G/DL (ref 31–37)
MCV RBC AUTO: 103.8 FL
MONOCYTES # BLD AUTO: 1.1 X10(3) UL (ref 0.1–1)
MONOCYTES NFR BLD AUTO: 10.2 %
MONOS+MACROS NFR PRT: 54 %
NEUTROPHILS # BLD AUTO: 6.1 X10 (3) UL (ref 1.5–7.7)
NEUTROPHILS # BLD AUTO: 6.1 X10(3) UL (ref 1.5–7.7)
NEUTROPHILS NFR BLD AUTO: 56.5 %
NEUTROPHILS NFR FLD: 24 %
OSMOLALITY SERPL CALC.SUM OF ELEC: 283 MOSM/KG (ref 275–295)
PLATELET # BLD AUTO: 131 10(3)UL (ref 150–450)
POTASSIUM SERPL-SCNC: 2.9 MMOL/L (ref 3.5–5.1)
POTASSIUM SERPL-SCNC: 3.4 MMOL/L (ref 3.5–5.1)
PROT PRT-MCNC: <2 G/DL
PROT SERPL-MCNC: 6.8 G/DL (ref 5.7–8.2)
PROTHROMBIN TIME: 21.4 SECONDS (ref 11.6–14.8)
RBC # BLD AUTO: 2.65 X10(6)UL
RBC # FLD: 121 /CUMM (ref ?–1)
SODIUM SERPL-SCNC: 138 MMOL/L (ref 136–145)
TOTAL CELLS COUNTED FLD: 100
TOTAL CELLS COUNTED PRT: 103 /CUMM (ref ?–1)
TURBIDITY CSF QL: CLEAR
WBC # BLD AUTO: 10.8 X10(3) UL (ref 4–11)
WBC # PRT: 103 /CUMM

## 2024-06-05 PROCEDURE — 99232 SBSQ HOSP IP/OBS MODERATE 35: CPT | Performed by: REGISTERED NURSE

## 2024-06-05 PROCEDURE — 0W9G3ZZ DRAINAGE OF PERITONEAL CAVITY, PERCUTANEOUS APPROACH: ICD-10-PCS | Performed by: CLINICAL NURSE SPECIALIST

## 2024-06-05 PROCEDURE — 99232 SBSQ HOSP IP/OBS MODERATE 35: CPT | Performed by: INTERNAL MEDICINE

## 2024-06-05 PROCEDURE — 49083 ABD PARACENTESIS W/IMAGING: CPT | Performed by: PHYSICIAN ASSISTANT

## 2024-06-05 RX ORDER — POTASSIUM CHLORIDE 20 MEQ/1
40 TABLET, EXTENDED RELEASE ORAL EVERY 4 HOURS
Status: COMPLETED | OUTPATIENT
Start: 2024-06-05 | End: 2024-06-05

## 2024-06-05 RX ORDER — POTASSIUM CHLORIDE 20 MEQ/1
40 TABLET, EXTENDED RELEASE ORAL EVERY 4 HOURS
Status: COMPLETED | OUTPATIENT
Start: 2024-06-05 | End: 2024-06-06

## 2024-06-05 NOTE — H&P
[facility]  part of [facility]    History and Physical    Christi Tavarez Patient Status:  Inpatient    1960 MRN [id number]   Location [facility] 3W/SW Attending Antony Zavala MD   Hosp Day # 1 PCP ÁNGEL Delgado     Date:  2024  Date of Admission:  6/3/2024    History provided by:patient and daughter  HPI:     Chief Complaint   Patient presents with    Congestive Heart Failure     62 yo female  who I know wron recent rehab admition with recent hospital admitting due to etoh Withdrawal, liver cirrhosis , cerebellar bleed, transferred to rehab was dc'd home last week but noticed to have more sob with exertion and increased edema . Pt had us recently at rehab which showed moderate ascites            History     Past Medical History:    CHF (congestive heart failure) (HCC)    Diabetes (HCC)    Stroke (HCC)     No past surgical history on file.  No family history on file.  Social History:  Social History     Socioeconomic History    Marital status:    Tobacco Use    Smoking status: Never    Smokeless tobacco: Never   Vaping Use    Vaping status: Never Used   Substance and Sexual Activity    Alcohol use: Yes     Comment: socially    Drug use: Never     Social Determinants of Health     Food Insecurity: No Food Insecurity (6/3/2024)    Food Insecurity     Food Insecurity: Never true   Transportation Needs: No Transportation Needs (6/3/2024)    Transportation Needs     Lack of Transportation: No   Housing Stability: Low Risk  (6/3/2024)    Housing Stability     Housing Instability: No     Allergies/Medications:   Allergies: No Known Allergies  Medications Prior to Admission   Medication Sig    furosemide 40 MG Oral Tab Take 1 tablet (40 mg total) by mouth daily.    omeprazole 20 MG Oral Capsule Delayed Release Take 1 capsule (20 mg total) by mouth every morning before breakfast.    potassium chloride 20 MEQ Oral Powd Pack Take 10 mEq by mouth daily.    spironolactone 25 MG  Oral Tab Take 1 tablet (25 mg total) by mouth daily.    insulin aspart 100 Units/mL Subcutaneous Solution Pen-injector Inject 1-5 Units into the skin 3 (three) times daily before meals. Per sliding scale    levothyroxine 50 MCG Oral Tab Take 1 tablet (50 mcg total) by mouth before breakfast.    lactulose 10 GM/15ML Oral Solution Take 30 mL (20 g total) by mouth 2 (two) times daily as needed.    ergocalciferol 1.25 MG (72455 UT) Oral Cap Take 1 capsule (50,000 Units total) by mouth every 7 days. Sundays    escitalopram 10 MG Oral Tab Take 1 tablet (10 mg total) by mouth nightly.    folic acid 1 MG Oral Tab Take 1 tablet (1 mg total) by mouth daily.    lamoTRIgine 25 MG Oral Tab Take 1 tablet (25 mg total) by mouth 2 (two) times daily.    traZODone 50 MG Oral Tab Take 0.5 tablets (25 mg total) by mouth nightly.    glipiZIDE 5 MG Oral Tab Take 1 tablet (5 mg total) by mouth every morning before breakfast. With food       Review of Systems:     Constitutional:  Positive for fatigue.   HENT: Negative.     Eyes: Negative.    Respiratory:  Positive for shortness of breath.    Cardiovascular:  Positive for leg swelling.   Gastrointestinal:  Positive for abdominal distention.   Endocrine: Negative.    Genitourinary: Negative.    Musculoskeletal: Negative.    Skin: Negative.    Neurological: Negative.    Psychiatric/Behavioral: Negative.         Physical Exam:   Vital Signs:  Blood pressure 105/58, pulse 93, temperature 98.9 °F (37.2 °C), temperature source Oral, resp. rate 20, height 4' 9\" (1.448 m), weight 155 lb 4.8 oz (70.4 kg), SpO2 98%.  Physical Exam  Vitals and nursing note reviewed.   HENT:      Head: Normocephalic.   Cardiovascular:      Rate and Rhythm: Normal rate.   Pulmonary:      Effort: Pulmonary effort is normal.      Breath sounds: Normal breath sounds.   Abdominal:      General: Abdomen is flat. There is distension.      Palpations: Abdomen is soft.   Musculoskeletal:         General: Swelling present.  Normal range of motion.      Cervical back: Normal range of motion.      Comments: 2 + edema up to thighes   Skin:     Capillary Refill: Capillary refill takes more than 3 seconds.   Neurological:      Mental Status: She is alert and oriented to person, place, and time.   Psychiatric:         Mood and Affect: Mood normal.       Cervical Papanicolaou to be done in MD's office    Results:     Lab Results   Component Value Date    WBC 9.7 06/04/2024    HGB 10.3 (L) 06/04/2024    HCT 27.9 (L) 06/04/2024    .0 (L) 06/04/2024    CREATSERUM 0.75 06/03/2024    BUN 10 06/03/2024     06/03/2024    K 3.6 06/03/2024     06/03/2024    CO2 25.0 06/03/2024     (H) 06/03/2024    CA 8.6 (L) 06/03/2024    ALB 2.5 (L) 06/04/2024    ALKPHO 122 06/04/2024    BILT 4.6 (H) 06/04/2024    TP 6.8 06/04/2024    AST 38 (H) 06/04/2024    ALT 11 06/04/2024    PTT 34.8 06/03/2024    INR 1.79 (H) 06/04/2024    T4F 1.0 04/03/2024    TSH 8.140 (H) 04/03/2024    LIP 49 06/03/2024    MG 2.0 04/04/2024    TROPHS <3 06/03/2024     04/03/2024    B12 >2,000 (H) 04/04/2024    ETOH <3 04/03/2024     CT STROKE CTA BRAIN/CTA NECK (W IV)(CPT=70496/27607)    Result Date: 6/4/2024  CONCLUSION:  1. No major vessel occlusion, hemodynamically significant stenosis, dissection, AVM or aneurysm. 2. Developing encephalomalacia right cerebellum at site of previous hemorrhage.  No acute hemorrhage. 3. 11 mm mean diameter right apical pulmonary nodule.  Follow-up complete chest CT recommended.   This report was called to patient's nurse Audrey 11:59 a.m..    Dictated by (CST): Bal Allan MD on 6/04/2024 at 11:50 AM     Finalized by (CST): Bal Allan MD on 6/04/2024 at 12:00 PM          CT STROKE BRAIN (NO IV)(CPT=70450)    Result Date: 6/4/2024  CONCLUSION:  1. No acute infarct or hemorrhage. 2. Developing encephalomalacia in the right cerebellum at previous site of hemorrhage documented 04/03/2024.  This report was called  immediately to patient's nurse and Nancy at 11:45 a.m.    Dictated by (CST): Bal Allan MD on 6/04/2024 at 11:41 AM     Finalized by (CST): Bal Allan MD on 6/04/2024 at 11:47 AM          XR CHEST AP PORTABLE  (CPT=71045)    Result Date: 6/3/2024  CONCLUSION:  1. Normal heart size with minimally prominent pulmonary vascularity and minimal interstitial edema.  I can not exclude early cardiac failure/fluid overload.  Correlate clinically.  No focal airspace consolidation or large pleural effusion.    Dictated by (CST): Daniel Moss MD on 6/03/2024 at 3:42 PM     Finalized by (CST): Daniel Moss MD on 6/03/2024 at 3:43 PM         EKG 12 Lead    Result Date: 6/3/2024  Sinus tachycardia Otherwise normal ECG When compared with ECG of 03-APR-2024 00:30, No significant change was found Confirmed by Topher Moran ([id number]) on 6/3/2024 4:12:01 PM     Assessment/Plan:         Addendum- Pt had rapid response due to ? Weakness as per family member, ct and MRi with no acute findings, neuro consulted       Fluid overload- ? Cardiac, echo pending, cardiology on board , continue with diuresing    Also component of liver cirrhosis         Alcoholic cirrhosis of liver with ascites (HCC)- abd distention, gi on board for abd paracentesis tomorrow, katherine follow labs                Anemia- of chronic disease stable , will follow                Type 2 diabetes mellitus without complication, without long-term current use of insulin (HCC)- will monitor bs        Cerebellar hemorrhage (HCC)- repeat mri ct head today \" Developing encephalomalacia right cerebellum at site of previous hemorrhage.  No acute hemorrhage     Gen weakness- pt/ot     Lung nodule incidentally noted on ct head, will order ct lungs       Antony Zavala MD  6/4/2024

## 2024-06-05 NOTE — OCCUPATIONAL THERAPY NOTE
OCCUPATIONAL THERAPY EVALUATION - INPATIENT     Room Number: 329/329-A  Evaluation Date: 6/5/2024  Type of Evaluation: Initial       Physician Order: IP Consult to Occupational Therapy  Reason for Therapy: ADL/IADL Dysfunction and Discharge Planning    OCCUPATIONAL THERAPY ASSESSMENT   Patient is a 63 year old female admitted 6/3/2024 for LE edema and SOB.  Pt recently admitted for etoh w/d, liver cirrhosis, and cerebellar bleed. She discharged to Acute rehab-> [facility]-> home. Prior to admission, patient's baseline is assist with ambulating short distances with RW, assist with ADLs.  Patient is currently functioning below baseline with  self care/ADLs .  Patient is requiring maximum assist as a result of the following impairments: decreased functional strength, decreased functional reach, decreased endurance, pain, and impaired standing balance. Occupational Therapy will continue to follow for duration of hospitalization.    Patient will benefit from continued skilled OT Services to promote return to prior level of function and safety with continuous assistance and gradual rehabilitative therapy     PLAN  OT Treatment Plan: Balance activities;Energy conservation/work simplification techniques;ADL training;Functional transfer training;Endurance training;Patient/Family education;Compensatory technique education  OT Device Recommendations: TBD    OCCUPATIONAL THERAPY MEDICAL/SOCIAL HISTORY   Problem List  Active Problems:    Cerebellar hemorrhage (HCC)    Anemia    Alcoholic cirrhosis of liver with ascites (HCC)    Fluid overload    Type 2 diabetes mellitus without complication, without long-term current use of insulin (HCC)    HOME SITUATION  Type of Home: House  Home Layout: One level  Lives With: Son  Other Equipment: -- (rw, w/c)  Drives: No    Stairs in Home: 5 stairs  Use of Assistive Device(s): RW, w/c    Prior Level of [place]: Pt was living with her daughter but will now live with her son.     SUBJECTIVE  \"I  am scared to fall\"    OCCUPATIONAL THERAPY EXAMINATION    OBJECTIVE  Precautions: Bed/chair alarm  Fall Risk: High fall risk    PAIN ASSESSMENT  Rating: Unable to rate  Location: BLE pain  Management Techniques: Relaxation; Repositioning    ACTIVITY TOLERANCE  Pulse: 92                      O2 SATURATIONS       COGNITION  Pt appears fearful of falling. She needs repeated cues to follow simple instructions    VISION  Pt wears glasses        Communication: wfl    Behavioral/Emotional/Social: wfl    RANGE OF MOTION   Upper extremity ROM is within functional limits     STRENGTH ASSESSMENT  Upper extremity strength is within functional limits     COORDINATION  Gross Motor: WFL   Fine Motor: WFL     ACTIVITIES OF DAILY LIVING ASSESSMENT  AM-PAC ‘6-Clicks’ Inpatient Daily Activity Short Form  How much help from another person does the patient currently need…  -   Putting on and taking off regular lower body clothing?: A Lot  -   Bathing (including washing, rinsing, drying)?: A Lot  -   Toileting, which includes using toilet, bedpan or urinal? : A Lot  -   Putting on and taking off regular upper body clothing?: A Little  -   Taking care of personal grooming such as brushing teeth?: A Little  -   Eating meals?: A Little    AM-PAC Score:  Score: 15  Approx Degree of Impairment: 56.46%  Standardized Score (AM-PAC Scale): 34.69  CMS Modifier (G-Code): CK    FUNCTIONAL TRANSFER ASSESSMENT  Sit to Stand: Edge of Bed; Chair  Edge of Bed: Moderate Assist  Chair: Moderate Assist    BED MOBILITY  Supine to Sit : Maximum Assist    BALANCE ASSESSMENT  Static Sitting: Minimal Assist  Sitting Unilateral: Maximum Assist  Static Standing: Moderate Assist (posterior LOB)  Standing Unilateral: Maximum Assist    FUNCTIONAL ADL ASSESSMENT  Bathing Seated: Maximum Assist  LB Dressing Seated: Maximum Assist (Max a to thread Depends, Max a to don socks. Pt limited by BLE swelling and weakness)  Toileting Standing: Maximum Assist (Pt stood x1  minute requiring Max A for posterior pericare in standing)    THERAPEUTIC EXERCISE     Skilled Therapy Provided: RN approved session. Pt received in the bed, agreeable to tx. Pt reports \"unrated\" pain in BLE. Pt presents with increased swelling in BLE. Pt required Max A for LB dressing due to weakness and swelling in BLE. Pt requires Max A for bed mobility. She required Min to Mod A for static sitting balance at EoB due to posterior LOB. Pt completed SPT from bed to chair with Mod A x2. Mod A x2 for sit to stand transfer from the chair. Pt was soiled and required Max to Dep A for posterior mohan-care in standing. Pt tolerated standing x90 seconds with mod A. Pt ambulated short distance in the room with RW and Mod A with chair follow. She had multiple LOB and is unsteady. HR stable  during activity. Pt remains in the chair with all needs in reach, chair alarm in place, and daughter present.     EDUCATION PROVIDED  Patient: Role of Occupational Therapy; Plan of Care; Discharge Recommendations; Functional Transfer Techniques; Compensatory ADL Techniques; Energy Conservation  Patient's Response to Education: Verbalized Understanding    The patient's Approx Degree of Impairment: 56.46% has been calculated based on documentation in the [facility] '6 clicks' Inpatient Daily Activity Short Form.  Research supports that patients with this level of impairment may benefit from GR.  Final disposition will be made by interdisciplinary medical team.     Patient End of Session: Up in chair;Needs met;Call light within reach;RN aware of session/findings;All patient questions and concerns addressed;Family present;Alarm set    OT Goals  Patients self stated goal is: unstated     Patient will complete functional transfer with CGA  Comment:     Patient will complete toileting with CGA  Comment:     Patient will tolerate standing for 3 minutes in prep for adls with CGA   Comment:    Patient will sit EOB in prep for ADLs with SBA   Comment:          Goals  on: 24  Frequency: 3-5x/week    Patient Evaluation Complexity Level:   Occupational Profile/Medical History MODERATE - Expanded review of history including review of medical or therapy record   Specific performance deficits impacting engagement in ADL/IADL MODERATE  3 - 5 performance deficits   Client Assessment/Performance Deficits MODERATE - Comorbidities and min to mod modifications of tasks    Clinical Decision Making MODERATE - Analysis of occupational profile, detailed assessments, several treatment options    Overall Complexity MODERATE     OT Session  Self Care: 10 minutes  Therapeutic Activity: 20 minutes

## 2024-06-05 NOTE — IMAGING NOTE
0910 Pt to ultrasound room scouts taken by VINH HENNING    0913 PATIENT ARRIVED WITH NEED FOR PARTIAL BATH, FLOOR RN NOTIFIED FOR ASSISTANCE    Hx taken procedure explained questions answered     0913 Patient consented.    /52 HR 92    Pt to get albumin 25% 25 grams radha FARIAS  Here to access- scanning completed and reviewed.      PLATELETS = 131     PT=  21.9   INR= 1.79     0955 Timeout taken    0955 Chloro prep  as skin prep sterile drape applied lidocaine 1% 10 milligrams per ml from kit was given for anesthetic affect 5 ml total given.  Incision made with scalpel.    5 Latvian  10 Egyptian 10 cm yueh catheter placed RIGHT  abdomen    Fluid aspirated for labs  YES - cytology, cell count, cultures, albumin, protein    (IF CYTOLOGY FLUID NEEDED --COLLECT 1 LITER OF FLUID IN VACUUM BOTTLE) PER PATHOLOGY    1000 ---106/57 HR 91 EVAC BOTTLE #1 FOR CYTOLOGY- 1300 ML      1005 BOTTLE #2 STARTED /55 HR 93    1009 BOTTLE #3 STARTED  /55     1017  Draining completed.     1017 /56 HR 92      1020 /52 HR 92     Patient re scanned. total amount drained 4000 ml.  Drain was dc'd.       Pressure to site for 5 minutes.  Area was cleaned steri strips to site.      Post instructions was given verbally provided to patient.    1033 Discharged

## 2024-06-05 NOTE — PAYOR COMM NOTE
--------------  CONTINUED STAY REVIEW----REQUESTING ADDITIONAL DAY 6/5      Payor: [employer] P[employer]  Subscriber #:  [id number]  Authorization Number: [id number]    Admit date: 6/3/24  Admit time:  5:56 PM    Subjective:   Pt post paracentesis this am  Pressure much improved with mental status at baseline, feels she is improving today  Denies ETOH since April but reports 2 Bms per day and shaking in hands with intermittent brain fog at home  No fever, chills  No chest pain, SOB  Objective:   Blood pressure 123/66, pulse 88, temperature 98.2 °F (36.8 °C), temperature source Oral, resp. rate 18, height 4' 9\" (1.448 m), weight 155 lb 6.4 oz (70.5 kg), SpO2 93%. Body mass index is 33.63 kg/m².     Gen: awake, alert patient, NAD  HEENT: EOMI, the sclera appears icteric, oropharynx clear, mucus membranes appear moist  CV: RRR  Lung: no conversational dyspnea   Abdomen: soft NTND abdomen with NABS appreciated   Skin: dry, warm, + jaundice  Ext: +2 BLE edema is evident  Neuro: Alert, oriented x4 and interactive  Psych: calm, cooperative     Assessment and Plan:   Christi Tavarez is a 63 year old female w/ PMHx of  BMI 29.21, DM2, hypothyroidism, HLD, disordered ETOH, ICH in April 2024 who presented to ER for worsening SOB and edema. Daughter assists with history. Patient was recently admitted in April and found to have ICH. She was then sent to rehab. Over the last 2 weeks has had increased swelling in both legs and shortness of breath when trying to get around. GI consulted for cirrhosis. Patient last drink was in April. Denies abdominal pain, nausea and vomiting. Having BM daily. Notes they are brown in color had one episode of bright red blood tinged stool. No diarrhea. No fevers or chills. No pruritus.      #SOB  #lower extremity weakness  -progressively worsening with associated lower extremity edema over last 2 weeks  -etiology possibly cardiac, hepatic congestion, anemia   -plan for work up through cardiology at this  time  -consider endoscopic evaluation IP vs OP      # Cirrhosis- due to ETOH, last drink 4/2024, chronic disease work up negative. Mild elevation in anti smooth muscle. Normal M2 antibody. Normal ceruloplasmin. Mild elevation in alpha 1 antitrypsin. MELD 21  - PSE: Reports some \"brain fog\", noted asterixis, 2 Bms per day, nonbloody   - EV: needs to be completed, pending cardiac work up  - ascites: moderate ascites on most recent ultrasound as well as large amount of sludge, s/p paracentesis 6/5 with 4L off.  Fluid studies sent to r/o SBP.  Continue lasix/aldactone as well as low sodium diet.  - HCC: AFP 19.8, no lesion on ultrasound 5/29 but given elevated AFP would recommend further imaging with triple phase liver protocol.     #increasing left sided weakness  -patient had rapid response called around 11 AM for increased left sided weakness  -CT and CTA head ordered  -neurology now on consult     Recommend:  -trend hepatic panel  -daily PT/INR  -daily CBC  -cardiology on consult  -Fluid studies pending  -consider endoscopic evaluation IP vs OP pending clinical course   -Will need follow up on elevated AFP, further imaging with triple phase liver protocol.       Case discussed with Lawrence Mena MD and Maria Elena GARCIA.     Sarah Lim DNP, FNP-BC  [facility]  6/5/2024        Results:            Lab Results   Component Value Date     WBC 10.8 06/05/2024     HGB 9.8 (L) 06/05/2024     HCT 27.5 (L) 06/05/2024     .0 (L) 06/05/2024     CREATSERUM 0.67 06/05/2024     BUN 6 (L) 06/05/2024      06/05/2024     K 2.9 (LL) 06/05/2024      06/05/2024     CO2 27.0 06/05/2024     GLU 85 06/05/2024     CA 8.2 (L) 06/05/2024     ALB 2.5 (L) 06/04/2024     ALKPHO 116 06/04/2024     BILT 3.9 (H) 06/04/2024     TP 6.6 06/04/2024     AST 37 (H) 06/04/2024     ALT 11 06/04/2024     PTT 34.8 06/03/2024     INR 1.79 (H) 06/04/2024     T4F 1.0 04/03/2024     TSH 8.140 (H) 04/03/2024     LIP 49 06/03/2024      MG 2.0 04/04/2024      04/03/2024     B12 >2,000 (H) 04/04/2024     ETOH <3 04/03/2024         CT STROKE CTA BRAIN/CTA NECK (W IV)(CPT=70496/19159)     Result Date: 6/4/2024  CONCLUSION:         1. No major vessel occlusion, hemodynamically significant stenosis, dissection, AVM or aneurysm. 2. Developing encephalomalacia right cerebellum at site of previous hemorrhage.  No acute hemorrhage. 3. 11 mm mean diameter right apical pulmonary nodule.  Follow-up complete chest CT recommended.   This report was called to patient's nurse Audrey 11:59 a.m..    Dictated by (CST): Bal Allan MD on 6/04/2024 at 11:50 AM     Finalized by (CST): Bal Allan MD on 6/04/2024 at 12:00 PM           CT STROKE BRAIN (NO IV)(CPT=70450)     Result Date: 6/4/2024  CONCLUSION:         1. No acute infarct or hemorrhage. 2. Developing encephalomalacia in the right cerebellum at previous site of hemorrhage documented 04/03/2024.  This report was called immediately to patient's nurse and Nancy at 11:45 a.m.    Dictated by (CST): Bal Allan MD on 6/04/2024 at 11:41 AM     Finalized by (CST): Bal Allan MD on 6/04/2024 at 11:47 AM           XR CHEST AP PORTABLE  (CPT=71045)     Result Date: 6/3/2024  CONCLUSION:         1. Normal heart size with minimally prominent pulmonary vascularity and minimal interstitial edema.  I can not exclude early cardiac failure/fluid overload.  Correlate clinically.  No focal airspace consolidation or large pleural effusion.    Dictated by (CST): Daniel Moss MD on 6/03/2024 at 3:42 PM     Finalized by (CST): Daniel Moss MD on 6/03/2024 at 3:43 PM         EKG 12 Lead     Result Date: 6/3/2024  Sinus tachycardia Otherwise normal ECG When compared with ECG of 03-APR-2024 00:30, No significant change was found Confirmed by Topher Moran ([id number]) on 6/3/2024 4:12:01 PM                MEDICATIONS ADMINISTERED IN LAST 1 DAY:  escitalopram (Lexapro) tab 10 mg       Date Action Dose Route User     6/4/2024 2110 Given 10 mg Oral Maribel Perez RN          folic acid (Folvite) tab 1 mg       Date Action Dose Route User    6/5/2024 0846 Given 1 mg Oral Maria Elena De Leon RN          furosemide (Lasix) 10 mg/mL injection 40 mg       Date Action Dose Route User    6/5/2024 0846 Given 40 mg Intravenous Maria Elena De Leon RN    6/4/2024 1733 Given 40 mg Intravenous Danyell Santos RN          lactulose (CHRONULAC) 10 GM/15ML solution 20 g       Date Action Dose Route User    6/5/2024 0846 Given 20 g Oral Maria Elena De Leon RN    6/4/2024 2110 Given 20 g Oral Maribel Perez RN          lamoTRIgine (LaMICtal) tab 25 mg       Date Action Dose Route User    6/5/2024 0846 Given 25 mg Oral Maria Elena De Leon RN    6/4/2024 2110 Given 25 mg Oral Maribel Perez RN          levothyroxine (Synthroid) tab 50 mcg       Date Action Dose Route User    6/5/2024 0555 Given 50 mcg Oral Maribel Perez RN          pantoprazole (Protonix) DR tab 20 mg       Date Action Dose Route User    6/5/2024 0555 Given 20 mg Oral Maribel Perez RN          potassium chloride (Klor-Con) 20 MEQ oral powder 10 mEq       Date Action Dose Route User    6/5/2024 0930 Given 10 mEq Oral Maria Elena De Leon RN          potassium chloride (Klor-Con M20) tab 40 mEq       Date Action Dose Route User    6/5/2024 1137 Given 40 mEq Oral Maria Elena De Leon RN    6/5/2024 0846 Given 40 mEq Oral Maria Elena De Leon RN          spironolactone (Aldactone) tab 25 mg       Date Action Dose Route User    6/5/2024 0846 Given 25 mg Oral Maria Elena De Leon RN          traZODone (Desyrel) tab 25 mg       Date Action Dose Route User    6/4/2024 2110 Given 25 mg Oral Maribel Perez RN            Vitals (last day)       Date/Time Temp Pulse Resp BP SpO2 Weight O2 Device O2 Flow Rate (L/min) [facility]    06/05/24 1330 -- 92 -- -- -- -- -- -- EA    06/05/24 0843 98.2 °F (36.8 °C) -- 18 123/66 93 % -- None (Room air) -- AT    06/05/24 0555 -- -- -- -- -- 155 lb  6.4 oz -- --     06/05/24 0400 98.9 °F (37.2 °C) 88 20 108/59 95 % -- None (Room air) --     06/05/24 0000 98.9 °F (37.2 °C) 90 20 124/82 96 % -- None (Room air) --     06/04/24 2108 98.9 °F (37.2 °C) 93 20 105/58 98 % -- None (Room air) --     06/04/24 1800 98.6 °F (37 °C) -- 20 111/68 95 % -- None (Room air) --     06/04/24 1600 98.5 °F (36.9 °C) -- 20 117/65 94 % -- None (Room air) --     06/04/24 1400 97.8 °F (36.6 °C) -- 20 110/68 96 % -- None (Room air) --     06/04/24 0857 97.9 °F (36.6 °C) -- 20 106/57 95 % -- None (Room air) --     06/04/24 0523 98 °F (36.7 °C) 86 18 98/54 95 % -- None (Room air) --     06/04/24 0500 -- -- -- -- -- 155 lb 4.8 oz -- -- TP          CIWA Scores (since admission)       None

## 2024-06-05 NOTE — CM/SW NOTE
Social work was able to look at the therapy notes indicating that the Anticipated therapy need: Gradual Rehabilitative Therapy.    Social work sent a ELISABETH referral in [place].  [facility] is queued for review.    Social work submitted to [facility] for review.    Social work will follow up with patient and family after [facility] evaluates request.    SW/CM to remain available for support and/or discharge planning.     Jannette Dobson MSW, LSW  Discharge Planner [id number]

## 2024-06-05 NOTE — PROGRESS NOTES
[facility]     Gastroenterology Progress Note    Christi Tavarez Patient Status:  Inpatient    1960 MRN [id number]   Location [facility] 3W/SW Attending Antony Zavala MD   Hosp Day # 2 PCP ÁNGEL Delgado       Subjective:   Pt post paracentesis this am  Pressure much improved with mental status at baseline, feels she is improving today  Denies ETOH since April but reports 2 Bms per day and shaking in hands with intermittent brain fog at home  No fever, chills  No chest pain, SOB  Objective:   Blood pressure 123/66, pulse 88, temperature 98.2 °F (36.8 °C), temperature source Oral, resp. rate 18, height 4' 9\" (1.448 m), weight 155 lb 6.4 oz (70.5 kg), SpO2 93%. Body mass index is 33.63 kg/m².    Gen: awake, alert patient, NAD  HEENT: EOMI, the sclera appears icteric, oropharynx clear, mucus membranes appear moist  CV: RRR  Lung: no conversational dyspnea   Abdomen: soft NTND abdomen with NABS appreciated   Skin: dry, warm, + jaundice  Ext: +2 BLE edema is evident  Neuro: Alert, oriented x4 and interactive  Psych: calm, cooperative    Assessment and Plan:   Christi Tavarez is a 63 year old female w/ PMHx of  BMI 29.21, DM2, hypothyroidism, HLD, disordered ETOH, ICH in 2024 who presented to ER for worsening SOB and edema. Daughter assists with history. Patient was recently admitted in April and found to have ICH. She was then sent to rehab. Over the last 2 weeks has had increased swelling in both legs and shortness of breath when trying to get around. GI consulted for cirrhosis. Patient last drink was in April. Denies abdominal pain, nausea and vomiting. Having BM daily. Notes they are brown in color had one episode of bright red blood tinged stool. No diarrhea. No fevers or chills. No pruritus.      #SOB  #lower extremity weakness  -progressively worsening with associated lower extremity edema over last 2 weeks  -etiology possibly cardiac, hepatic congestion, anemia   -plan  for work up through cardiology at this time  -consider endoscopic evaluation IP vs OP      # Cirrhosis- due to ETOH, last drink 4/2024, chronic disease work up negative. Mild elevation in anti smooth muscle. Normal M2 antibody. Normal ceruloplasmin. Mild elevation in alpha 1 antitrypsin. MELD 21  - PSE: Reports some \"brain fog\", noted asterixis, 2 Bms per day, nonbloody   - EV: needs to be completed, pending cardiac work up  - ascites: moderate ascites on most recent ultrasound as well as large amount of sludge, s/p paracentesis 6/5 with 4L off.  Fluid studies sent to r/o SBP.  Continue lasix/aldactone as well as low sodium diet.  - HCC: AFP 19.8, no lesion on ultrasound 5/29 but given elevated AFP would recommend further imaging with triple phase liver protocol.     #increasing left sided weakness  -patient had rapid response called around 11 AM for increased left sided weakness  -CT and CTA head ordered  -neurology now on consult     Recommend:  -trend hepatic panel  -daily PT/INR  -daily CBC  -cardiology on consult  -Fluid studies pending  -consider endoscopic evaluation IP vs OP pending clinical course   -Will need follow up on elevated AFP, further imaging with triple phase liver protocol.      Case discussed with Lawrence Mena MD and Maria Elena GARCIA.    Sarah Lim DNP, FNP-BC  [facility]  6/5/2024      Results:     Lab Results   Component Value Date    WBC 10.8 06/05/2024    HGB 9.8 (L) 06/05/2024    HCT 27.5 (L) 06/05/2024    .0 (L) 06/05/2024    CREATSERUM 0.67 06/05/2024    BUN 6 (L) 06/05/2024     06/05/2024    K 2.9 (LL) 06/05/2024     06/05/2024    CO2 27.0 06/05/2024    GLU 85 06/05/2024    CA 8.2 (L) 06/05/2024    ALB 2.5 (L) 06/04/2024    ALKPHO 116 06/04/2024    BILT 3.9 (H) 06/04/2024    TP 6.6 06/04/2024    AST 37 (H) 06/04/2024    ALT 11 06/04/2024    PTT 34.8 06/03/2024    INR 1.79 (H) 06/04/2024    T4F 1.0 04/03/2024    TSH 8.140 (H) 04/03/2024    LIP 49  06/03/2024    MG 2.0 04/04/2024     04/03/2024    B12 >2,000 (H) 04/04/2024    ETOH <3 04/03/2024       CT STROKE CTA BRAIN/CTA NECK (W IV)(CPT=70496/13588)    Result Date: 6/4/2024  CONCLUSION:  1. No major vessel occlusion, hemodynamically significant stenosis, dissection, AVM or aneurysm. 2. Developing encephalomalacia right cerebellum at site of previous hemorrhage.  No acute hemorrhage. 3. 11 mm mean diameter right apical pulmonary nodule.  Follow-up complete chest CT recommended.   This report was called to patient's nurse Audrey 11:59 a.m..    Dictated by (CST): Bal Allan MD on 6/04/2024 at 11:50 AM     Finalized by (CST): Bal Allan MD on 6/04/2024 at 12:00 PM          CT STROKE BRAIN (NO IV)(CPT=70450)    Result Date: 6/4/2024  CONCLUSION:  1. No acute infarct or hemorrhage. 2. Developing encephalomalacia in the right cerebellum at previous site of hemorrhage documented 04/03/2024.  This report was called immediately to patient's nurse and Nancy at 11:45 a.m.    Dictated by (CST): Bal Allan MD on 6/04/2024 at 11:41 AM     Finalized by (CST): Bal Allan MD on 6/04/2024 at 11:47 AM          XR CHEST AP PORTABLE  (CPT=71045)    Result Date: 6/3/2024  CONCLUSION:  1. Normal heart size with minimally prominent pulmonary vascularity and minimal interstitial edema.  I can not exclude early cardiac failure/fluid overload.  Correlate clinically.  No focal airspace consolidation or large pleural effusion.    Dictated by (CST): Daniel Moss MD on 6/03/2024 at 3:42 PM     Finalized by (CST): Daniel Moss MD on 6/03/2024 at 3:43 PM         EKG 12 Lead    Result Date: 6/3/2024  Sinus tachycardia Otherwise normal ECG When compared with ECG of 03-APR-2024 00:30, No significant change was found Confirmed by Topher Moran ([id number]) on 6/3/2024 4:12:01 PM

## 2024-06-05 NOTE — SLP NOTE
ADULT SWALLOWING EVALUATION    ASSESSMENT    ASSESSMENT/OVERALL IMPRESSION:  PPE REQUIRED. THIS THERAPIST WORE GLOVES, DROPLET MASK, AND GOGGLES FOR DURATION OF EVALUATION. HANDS WASHED UPON ENTRANCE/EXIT.    SLP BSSE orders received and acknowledged. A swallow evaluation warranted as pt had difficulty with AM medication due to intermittent KAROL. Pt afebrile with clear vocal quality, on room air, with oxygen saturation at 96. Pt with prior hx of dysphagia at [facility] in which last recommended diet was bite sized/thin per BSSE in 4/2024. Pt positioned upright in bed with family at bedside. Pt with no complaints of pain, RN aware. Pt with adequate oral acceptance and bilabial seal across all trials. Pt with intact bite, prolonged mastication of solids, and increased LICHA. Pt's swallow response appears timely with reduced hyolaryngeal elevation/excursion. No clinical signs of aspiration (e.g., immediate/delayed throat clear, immediate/delayed cough, wet vocal quality, increased O2 effort) observed across all trials. Oxygen status remained >95 t/o the entire evaluation.     At this time, pt presents with mild oral dysphagia and probable pharyngeal dysfunction. Recommend an easy to chew diet and thin liquids with strict adherence to safe swallowing compensatory strategies. Results and recommendations reviewed with RN, pt, and family. Pt/pt's family v/u to all results/recommendations. Recommendations remain written on whiteboard. SLP collaborated with RN for MD diet orders.     PLAN: SLP to f/u x1-2 meal assessments, monitor CXR, and VFSS if clinically warranted.     RECOMMENDATIONS   Diet Recommendations - Solids: Soft/ Easy to chew  Diet Recommendations - Liquids: Thin Liquids      Compensatory Strategies Recommended: Slow rate;Alternate consistencies;Small bites and sips  Aspiration Precautions: Upright position;Slow rate;Small bites and sips  Medication Administration Recommendations: Crushed in puree  Treatment  Plan/Recommendations: Aspiration precautions    HISTORY   MEDICAL HISTORY  Reason for Referral: Stroke protocol    Problem List  Active Problems:    Cerebellar hemorrhage (HCC)    Anemia    Alcoholic cirrhosis of liver with ascites (HCC)    Fluid overload    Type 2 diabetes mellitus without complication, without long-term current use of insulin (HCC)      Past Medical History  Past Medical History:    CHF (congestive heart failure) (HCC)    Diabetes (HCC)    Stroke (HCC)       Prior Living Situation: Home with support  Diet Prior to Admission: Thin liquids (soft solids)  Precautions: Aspiration    Patient/Family Goals: Pt had difficulty with pills this AM    SWALLOWING HISTORY  Current Diet Consistency: Regular;Thin liquids  Dysphagia History: BSE 4/3/24: Bite sized/thin  Imaging Results:     CT BRAIN 6/4/24:  CONCLUSION:   1. No acute infarct or hemorrhage.   2. Developing encephalomalacia in the right cerebellum at previous site of hemorrhage documented 04/03/2024.      This report was called immediately to patient's nurse and Nancy at 11:45 a.m.            Dictated by (CST): Bal Allan MD on 6/04/2024 at 11:41 AM       Finalized by (CST): Bal Allan MD on 6/04/2024 at 11:47 AM       CXR 6/3/24:  CONCLUSION:   1. Normal heart size with minimally prominent pulmonary vascularity and minimal interstitial edema.  I can not exclude early cardiac failure/fluid overload.  Correlate clinically.  No focal airspace consolidation or large pleural effusion.            Dictated by (CST): Daniel Moss MD on 6/03/2024 at 3:42 PM       Finalized by (CST): Daniel Moss MD on 6/03/2024 at 3:43 PM         OBJECTIVE   ORAL MOTOR EXAMINATION  Dentition: Natural (missing upper teeth)  Symmetry: Within Functional Limits  Strength:  (reduced)     Range of Motion: Within Functional Limits  Rate of Motion: Within Functional Limits    Voice Quality: Clear  Respiratory Status: Unlabored  Consistencies Trialed: Thin  liquids;Puree;Soft solid;Hard solid  Method of Presentation: Self presentation;Spoon;Cup;Straw;Single sips  Patient Positioning: Upright;Midline    Oral Phase of Swallow: Impaired  Bolus Retrieval: Intact  Bilabial Seal: Intact  Bolus Formation: Impaired  Bolus Propulsion: Impaired  Mastication: Impaired  Retention: Intact    Pharyngeal Phase of Swallow: Impaired  Laryngeal Elevation Timing: Appears intact  Laryngeal Elevation Strength: Appears impaired     (Please note: Silent aspiration cannot be evaluated clinically. Videofluoroscopic Swallow Study is required to rule-out silent aspiration.)    Esophageal Phase of Swallow: No complaints consistent with possible esophageal involvement    GOALS  Goal #1 The patient will tolerate easy to chew consistency and thin liquids without overt signs or symptoms of aspiration with 100 % accuracy over 1-2 session(s).  In Progress   Goal #2 The patient/family/caregiver will demonstrate understanding and implementation of aspiration precautions and swallow strategies independently over 1-2 session(s).    In Progress   Goal #3 The patient will utilize compensatory strategies as outlined by  BSSE (clinical evaluation) including Slow rate, Small bites, Small sips, Alternate liquids/solids, Upright 90 degrees with minimal assistance 100 % of the time across 2 sessions.  In Progress     FOLLOW UP  Treatment Plan/Recommendations: Aspiration precautions  Number of Visits to Meet Established Goals:  (1-2)  Follow Up Needed (Documentation Required): Yes  SLP Follow-up Date: 06/06/24    Thank you for your referral.   If you have any questions, please contact ASHANTI Jc M.S. CCC-SLP  Speech Language Pathologist  Phone Number [phone]

## 2024-06-05 NOTE — PROGRESS NOTES
[facility]  part of [facility]    Progress Note    Christi Tavarez Patient Status:  Inpatient    1960 MRN [id number]   Location [facility] 3W/SW Attending Antony Zavala MD   Hosp Day # 2 PCP ÁNGEL Delgado     Chief Complaint:     Subjective:     Constitutional:  Positive for fatigue.   HENT: Negative.     Eyes: Negative.    Respiratory: Negative.     Cardiovascular: Negative.    Gastrointestinal: Negative.    Genitourinary: Negative.    Musculoskeletal: Negative.    Skin: Negative.    Neurological:  Positive for tremors.       Objective:   Blood pressure 123/66, pulse 88, temperature 98.2 °F (36.8 °C), temperature source Oral, resp. rate 18, height 4' 9\" (1.448 m), weight 155 lb 6.4 oz (70.5 kg), SpO2 93%.  Physical Exam  Vitals and nursing note reviewed.   HENT:      Head: Normocephalic and atraumatic.      Nose: Nose normal.      Mouth/Throat:      Mouth: Mucous membranes are moist.   Cardiovascular:      Rate and Rhythm: Normal rate.   Pulmonary:      Effort: Pulmonary effort is normal.      Breath sounds: Normal breath sounds.   Abdominal:      General: Abdomen is flat. Bowel sounds are normal.      Palpations: Abdomen is soft.   Musculoskeletal:         General: Normal range of motion.      Cervical back: Normal range of motion and neck supple.   Skin:     General: Skin is warm and dry.   Neurological:      Mental Status: She is alert and oriented to person, place, and time.      Comments: But at times slow with responding    Psychiatric:         Mood and Affect: Mood normal.         Results:   Lab Results   Component Value Date    WBC 10.8 2024    HGB 9.8 (L) 2024    HCT 27.5 (L) 2024    .0 (L) 2024    CREATSERUM 0.67 2024    BUN 6 (L) 2024     2024    K 2.9 (LL) 2024     2024    CO2 27.0 2024    GLU 85 2024    CA 8.2 (L) 2024    ALB 2.5 (L) 2024    ALKPHO 116 2024     BILT 3.9 (H) 06/04/2024    TP 6.6 06/04/2024    AST 37 (H) 06/04/2024    ALT 11 06/04/2024    PTT 34.8 06/03/2024    INR 1.79 (H) 06/04/2024    T4F 1.0 04/03/2024    TSH 8.140 (H) 04/03/2024    LIP 49 06/03/2024    MG 2.0 04/04/2024    TROPHS <3 06/03/2024     04/03/2024    B12 >2,000 (H) 04/04/2024    ETOH <3 04/03/2024       CT STROKE CTA BRAIN/CTA NECK (W IV)(CPT=70496/56515)    Result Date: 6/4/2024  CONCLUSION:  1. No major vessel occlusion, hemodynamically significant stenosis, dissection, AVM or aneurysm. 2. Developing encephalomalacia right cerebellum at site of previous hemorrhage.  No acute hemorrhage. 3. 11 mm mean diameter right apical pulmonary nodule.  Follow-up complete chest CT recommended.   This report was called to patient's nurse Audrey 11:59 a.m..    Dictated by (CST): Bal Allan MD on 6/04/2024 at 11:50 AM     Finalized by (CST): Bal Allan MD on 6/04/2024 at 12:00 PM          CT STROKE BRAIN (NO IV)(CPT=70450)    Result Date: 6/4/2024  CONCLUSION:  1. No acute infarct or hemorrhage. 2. Developing encephalomalacia in the right cerebellum at previous site of hemorrhage documented 04/03/2024.  This report was called immediately to patient's nurse and Nancy at 11:45 a.m.    Dictated by (CST): Bal Allan MD on 6/04/2024 at 11:41 AM     Finalized by (CST): Bal Allan MD on 6/04/2024 at 11:47 AM          XR CHEST AP PORTABLE  (CPT=71045)    Result Date: 6/3/2024  CONCLUSION:  1. Normal heart size with minimally prominent pulmonary vascularity and minimal interstitial edema.  I can not exclude early cardiac failure/fluid overload.  Correlate clinically.  No focal airspace consolidation or large pleural effusion.    Dictated by (CST): Daniel Moss MD on 6/03/2024 at 3:42 PM     Finalized by (CST): Daniel Moss MD on 6/03/2024 at 3:43 PM         EKG 12 Lead    Result Date: 6/3/2024  Sinus tachycardia Otherwise normal ECG When compared with ECG of 03-APR-2024 00:30, No  significant change was found Confirmed by Topher Moran ([id number]) on 6/3/2024 4:12:01 PM     Assessment & Plan:             MRI brain pending, neuro on baord    Low K will replace will follow     For paracentesis today          Fluid overload- improved, continue with diuretics, echo ok\    ? Cardiac, cardiology on board , continue with diuresing     Also component of liver cirrhosis           Alcoholic cirrhosis of liver with ascites (HCC)- abd distention, gi on board for abd paracentesis tomorrow, katherine follow labs                     Anemia- of chronic disease stable , will follow                    Type 2 diabetes mellitus without complication, without long-term current use of insulin (HCC)- will monitor bs           Cerebellar hemorrhage (HCC)- repeat mri ct head today \" Developing encephalomalacia right cerebellum at site of previous hemorrhage.  No acute hemorrhage      Gen weakness- pt/ot      Lung nodule incidentally noted on ct head, will order ct lungs         ? Swallow problem will get speech to see her    Pt/ot     Antony Zavala MD  6/5/2024

## 2024-06-05 NOTE — PLAN OF CARE
Alert x3 VSS. Paracentesis done 4L removed. Iv lasix bid. SLP eval done and Diet changed to easy to chew. Up with therapy today. Awaiting MRI possibly tonight.   Problem: Patient Centered Care  Goal: Patient preferences are identified and integrated in the patient's plan of care  Description: Interventions:  - What would you like us to know as we care for you? I live with my daughter  - Provide timely, complete, and accurate information to patient/family  - Incorporate patient and family knowledge, values, beliefs, and cultural backgrounds into the planning and delivery of care  - Encourage patient/family to participate in care and decision-making at the level they choose  - Honor patient and family perspectives and choices  Outcome: Progressing     Problem: Diabetes/Glucose Control  Goal: Glucose maintained within prescribed range  Description: INTERVENTIONS:  - Monitor Blood Glucose as ordered  - Assess for signs and symptoms of hyperglycemia and hypoglycemia  - Administer ordered medications to maintain glucose within target range  - Assess barriers to adequate nutritional intake and initiate nutrition consult as needed  - Instruct patient on self management of diabetes  Outcome: Progressing     Problem: Patient/Family Goals  Goal: Patient/Family Long Term Goal  Description: Patient's Long Term Goal:     Interventions:  -   - See additional Care Plan goals for specific interventions  Outcome: Progressing  Goal: Patient/Family Short Term Goal  Description: Patient's Short Term Goal:     Interventions:   -   - See additional Care Plan goals for specific interventions  Outcome: Progressing

## 2024-06-05 NOTE — PROGRESS NOTES
Progress Note  Christi Tavarez Patient Status:  Inpatient    1960 MRN [id number]   Location [facility] 3W/SW Attending Antony Zavala MD   Hosp Day # 2 PCP ÁNGEL Delgado     Subjective:  Stroke alert called yesterday, brain MRI pending, CT without acute infarct or hemorrhage  S/p paracentesis with 4L off, denies cardiac complaints, feels swelling is improving    Objective:  /66 (BP Location: Right arm)   Pulse 88   Temp 98.2 °F (36.8 °C) (Oral)   Resp 18   Ht 4' 9\" (1.448 m)   Wt 155 lb 6.4 oz (70.5 kg)   SpO2 93%   BMI 33.63 kg/m²     Telemetry: SR 80s-90s    Intake/Output:    Intake/Output Summary (Last 24 hours) at 2024 1025  Last data filed at 2024 2000  Gross per 24 hour   Intake 0 ml   Output 900 ml   Net -900 ml     Last 3 Weights   24 0555 155 lb 6.4 oz (70.5 kg)   24 0500 155 lb 4.8 oz (70.4 kg)   24 1810 162 lb 14.4 oz (73.9 kg)   24 1250 160 lb (72.6 kg)   24 0537 153 lb 11.2 oz (69.7 kg)   24 0507 157 lb 12.8 oz (71.6 kg)   24 0416 158 lb (71.7 kg)   24 0800 161 lb 13.1 oz (73.4 kg)   24 1700 154 lb 15.7 oz (70.3 kg)   24 0400 142 lb 3.2 oz (64.5 kg)   24 0523 139 lb 12.4 oz (63.4 kg)   24 2121 130 lb (59 kg)   24 0533 139 lb 12.4 oz (63.4 kg)     Labs:  Recent Labs   Lab 24  0917 24  2309 24  0543   GLU 89 113* 85   BUN 7* 7* 6*   CREATSERUM 0.70 0.67 0.67   EGFRCR 97 98 98   CA 8.4* 8.1* 8.2*    140 138   K 3.4* 3.2* 2.9*    105 104   CO2 27.0 27.0 27.0     Recent Labs   Lab 24  1347 24  0917 24  0543   RBC 2.94* 2.70* 2.65*   HGB 11.0* 10.3* 9.8*   HCT 30.9* 27.9* 27.5*   .1* 103.3* 103.8*   MCH 37.4* 38.1* 37.0*   MCHC 35.6 36.9 35.6   RDW 17.0* 16.6* 17.0*   NEPRELIM 8.71* 5.61 6.10   WBC 12.4* 9.7 10.8   .0 131.0* 131.0*     Recent Labs   Lab 24  1347   TROPHS <3     Lab Results   Component Value Date/Time    HDL  11 (L) 06/04/2024 09:17 AM     (H) 06/04/2024 09:17 AM    TRIG 87 06/04/2024 09:17 AM     No results found for: \"DDIMER\"  Lab Results   Component Value Date    TSH 8.140 (H) 04/03/2024     Review of Systems:   Constitutional: No fevers, chills, fatigue or night sweats.  ENT: No mouth pain, neck pain, running nose, headaches or swollen glands.  Skin: No rashes, pruritus or skin changes,  Respiratory: Denies cough, wheezing or shortness of breath.  CV: Denies chest pain, palpitations, orthopnea, PND or dizziness.  Musculoskeletal: No joint pain, stiffness or swelling.  GI: No nausea, vomiting or diarrhea. No blood in stools.  Neurologic: No seizures, tremors, weakness or numbness.     Physical Exam:  General: Alert, cooperative, no distress, appears stated age.  Neck: Supple, symmetrical, trachea midline, no adenopathy, thyroid: no enlargment/tenderness/nodules, no carotid bruit and no JVD.  Lungs: Clear to auscultation bilaterally.  Chest wall: No tenderness or deformity.  Heart: Regular rate and rhythm, S1, S2 normal, no murmur, click, rub or gallop.  Abdomen: Soft, non-tender. Bowel sounds normal. No masses,  No organomegaly.  Extremities: Extremities normal, atraumatic, no cyanosis, mild BLE edema.  Pulses: 2+ and symmetric all extremities.  Neurologic: slight right sided facial droop, right arm held against her body, not moving freely, follows commands, upper extremity strength equal     Diagnostics:  CT STROKE CTA BRAIN/CTA NECK (W IV)(CPT=70496/29543)    Result Date: 6/4/2024  CONCLUSION:  1. No major vessel occlusion, hemodynamically significant stenosis, dissection, AVM or aneurysm. 2. Developing encephalomalacia right cerebellum at site of previous hemorrhage.  No acute hemorrhage. 3. 11 mm mean diameter right apical pulmonary nodule.  Follow-up complete chest CT recommended.   This report was called to patient's nurse Audrey 11:59 a.m..    Dictated by (CST): Bal Allan MD on 6/04/2024 at 11:50  AM     Finalized by (CST): Bal Allan MD on 6/04/2024 at 12:00 PM          CT STROKE BRAIN (NO IV)(CPT=70450)    Result Date: 6/4/2024  CONCLUSION:  1. No acute infarct or hemorrhage. 2. Developing encephalomalacia in the right cerebellum at previous site of hemorrhage documented 04/03/2024.  This report was called immediately to patient's nurse and Nancy at 11:45 a.m.    Dictated by (CST): Bal Allan MD on 6/04/2024 at 11:41 AM     Finalized by (CST): Bal Allan MD on 6/04/2024 at 11:47 AM         Echo: 06/04/24  Conclusions:     1. Left ventricle: The cavity size was normal. Wall thickness was normal.      Systolic function was normal. The estimated ejection fraction was 60-65%,      by visual assessment. No diagnostic evidence for regional wall motion      abnormalities. Left ventricular diastolic function parameters were      normal.   2. Left atrium: The left atrial volume was normal.   3. Pulmonary arteries: Systolic pressure was within the normal range,      estimated to be 24mm Hg.   4. Systemic veins: Central venous respirophasic diameter changes are blunted      (< 50%).   5. Inferior vena cava: The IVC was normal-sized.   Impressions:  No previous study was available for comparison.   *     Medications:   potassium chloride  40 mEq Oral Q4H    LORazepam  0.5 mg Intravenous Once    furosemide  40 mg Intravenous BID (Diuretic)    insulin aspart  1-5 Units Subcutaneous TID CC    traZODone  25 mg Oral Nightly    escitalopram  10 mg Oral Nightly    folic acid  1 mg Oral Daily    lactulose  20 g Oral BID    lamoTRIgine  25 mg Oral BID    levothyroxine  50 mcg Oral Before breakfast    pantoprazole  20 mg Oral QAM AC    potassium chloride  10 mEq Oral Daily    spironolactone  25 mg Oral Daily     Assessment:    Volume overload  Admitted with LE edema and shortness of breath, likely heart failure, however, not currently diagnosed, no echo available in care everywhere  -also could be related to liver  disease/congestion  -echo normal  -was taking lasix BID at home along with aldactone  -now diuresing with IV lasix BID, stable labs, good UOP, swelling is improving  -I/Os net neg 1.2L, 900mL UOP passt 24 hours  -weight down 5-7# from admission weight, family states dry weight 160#, today  155  -BP stable 90s-100s, BMP pending  -GDMT: lasix, aldactone    Hx HTN  BP lower in 90s-100s  -aldactone, lasix    Hypokalemia  K+ 2.9,  -cardiac electrolyte replacement protocol   -aldactone    DMII-A1c 4.4    Alcoholic liver disease  AST elevated, albumin low, bilirubin elevated  -s/p paracentesis this morning    Hx CVA  Recent cerebellar stroke vs mass vs intracranial bleed in April of this year      Plan:  -Continue IV lasix BID, aldactone-swelling improving, likely transition to PO tomorrow to allow more fluid off today with stable labs  -Replace potassium per protocol plus additional daily dose  -monitor strict I/Os, daily weights, daily BMP  -further recs per neuro/GI/primary      Plan of care discussed with patient, RN.      Shona Tello, APRN  06/05/24  10:36 AM  [phone] [place]  [phone] [place]

## 2024-06-05 NOTE — PROGRESS NOTES
RRT    *See RRT Documentation Record*    Reason the RRT was called: patient daughter reported patient was leaning towards left side in bed, spilling food on self and increased confusion. Stroke alert called.  Assessment of patient leading up to RRT: patient with increased confusion, spilling food on self while eating in bed and leaning towards left side.  Interventions/Testing: STAT CTA Brain & Carotids, neuro checks per stroke protocol  Patient Outcome/Disposition: remained on   Family Notified: yes  Name of family notified: Nancy daughter at bedside.

## 2024-06-05 NOTE — PHYSICAL THERAPY NOTE
PHYSICAL THERAPY EVALUATION - INPATIENT     Room Number: 329/329-A  Evaluation Date: 2024  Type of Evaluation: Initial   Co-Morbidities : recemt admission for etoh w/d, liver cirrhosis, cerebellar CVA. She discharged to Acute rehab-> [facility]-> home.  Reason for Therapy: Mobility Dysfunction and Discharge Planning    PHYSICAL THERAPY ASSESSMENT   Patient is a 63 year old female admitted 6/3/2024 for fluid overload and ataxia.  Prior to admission, patient's baseline is assist with ambulating short distances with RW   Patient is currently functioning below baseline with bed mobility, transfers, and gait.  Patient is requiring moderate assist as a result of the following impairments: decreased functional strength and impaired   balance.  Physical Therapy will continue to follow for duration of hospitalization.    Patient will benefit from continued skilled PT Services to promote return to prior level of function and safety with continuous assistance and gradual rehabilitative therapy .    PLAN  PT Treatment Plan: Bed mobility;Gait training;Transfer training  Rehab Potential : Fair  Frequency (Obs): 5x/week    PHYSICAL THERAPY MEDICAL/SOCIAL HISTORY   History related to current admission:      Problem List  Active Problems:    Cerebellar hemorrhage (HCC)    Anemia    Alcoholic cirrhosis of liver with ascites (HCC)    Fluid overload    Type 2 diabetes mellitus without complication, without long-term current use of insulin (HCC)    Sensory ataxia      HOME SITUATION  Home Situation  Type of Home: House  Home Layout: One level  Lives With: Son  Drives: No     Prior Level of [place]: ind    SUBJECTIVE  \"I need to walk\"    PHYSICAL THERAPY EXAMINATION   OBJECTIVE  Precautions: Bed/chair alarm  Fall Risk: High fall risk    PAIN ASSESSMENT  Ratin          COGNITION  Overall Cognitive Status:  WFL - within functional limits    RANGE OF MOTION AND STRENGTH ASSESSMENT  Upper extremity ROM and strength are within  functional limits   Lower extremity ROM is within functional limits   Lower extremity strength is within functional limits     BALANCE  Static Sitting: Fair -  Dynamic Sitting: Poor +  Static Standing: Poor  Dynamic Standing: Poor    AM-PAC '6-Clicks' INPATIENT SHORT FORM - BASIC MOBILITY  How much difficulty does the patient currently have...  Patient Difficulty: Turning over in bed (including adjusting bedclothes, sheets and blankets)?: A Lot   Patient Difficulty: Sitting down on and standing up from a chair with arms (e.g., wheelchair, bedside commode, etc.): A Lot   Patient Difficulty: Moving from lying on back to sitting on the side of the bed?: A Lot   How much help from another person does the patient currently need...   Help from Another: Moving to and from a bed to a chair (including a wheelchair)?: A Lot   Help from Another: Need to walk in hospital room?: A Lot   Help from Another: Climbing 3-5 steps with a railing?: Total     AM-PAC Score:  Raw Score: 11   Approx Degree of Impairment: 72.57%   Standardized Score (AM-PAC Scale): 33.86   CMS Modifier (G-Code): CL    FUNCTIONAL ABILITY STATUS  Functional Mobility/Gait Assessment  Gait Assistance: Moderate assistance  Distance (ft):  (20,10)  Assistive Device: Rolling walker, chair follow  Rolling: mod A  Supine to Sit: Mod A  Sit to Supine: Mod A  Sit to Stand: Mod A  Sitting EOB: balance fluctuates from SBA to min A    Exercise/Education Provided:  Bed mobility  Gait training  Transfer training    The patient's Approx Degree of Impairment: 72.57% has been calculated based on documentation in the [facility] '6 clicks' Inpatient Basic Mobility Short Form.  Research supports that patients with this level of impairment may benefit from [facility]. Final disposition will be made by interdisciplinary medical team.    Patient End of Session: Up in chair    CURRENT GOALS  Goals to be met by: 6/19  Patient Goal Patient's self-stated goal is: home   Goal #1 Patient is able to  demonstrate supine - sit EOB @ level: min A     Goal #1   Current Status    Goal #2 Patient is able to demonstrate transfers Sit to/from Stand at assistance level: min A with walker - rolling     Goal #2  Current Status    Goal #3 Patient is able to ambulate 50 feet with assist device: walker - rolling at assistance level: Min A   Goal #3   Current Status    Goal #4    Goal #4   Current Status    Goal #5 Patient to demonstrate independence with home activity/exercise instructions provided to patient in preparation for discharge.   Goal #5   Current Status    Goal #6    Goal #6  Current Status      Patient Evaluation Complexity Level:  History Low - no personal factors and/or co-morbidities   Examination of body systems Low -  addressing 1-2 elements   Clinical Presentation Low- Stable   Clinical Decision Making  Low Complexity     Gait Trainin minutes

## 2024-06-06 ENCOUNTER — APPOINTMENT (OUTPATIENT)
Dept: MRI IMAGING | Facility: HOSPITAL | Age: 64
DRG: 432 | End: 2024-06-06
Attending: Other

## 2024-06-06 ENCOUNTER — APPOINTMENT (OUTPATIENT)
Dept: CT IMAGING | Facility: HOSPITAL | Age: 64
DRG: 432 | End: 2024-06-06
Attending: INTERNAL MEDICINE

## 2024-06-06 LAB
ALBUMIN SERPL-MCNC: 2.5 G/DL (ref 3.2–4.8)
ALBUMIN/GLOB SERPL: 0.6 {RATIO} (ref 1–2)
ALP LIVER SERPL-CCNC: 126 U/L
ALT SERPL-CCNC: 14 U/L
ANION GAP SERPL CALC-SCNC: 8 MMOL/L (ref 0–18)
ANION GAP SERPL CALC-SCNC: 8 MMOL/L (ref 0–18)
AST SERPL-CCNC: 35 U/L (ref ?–34)
BASOPHILS # BLD AUTO: 0.08 X10(3) UL (ref 0–0.2)
BASOPHILS NFR BLD AUTO: 0.8 %
BILIRUB SERPL-MCNC: 3.4 MG/DL (ref 0.2–1.1)
BUN BLD-MCNC: 6 MG/DL (ref 9–23)
BUN BLD-MCNC: 6 MG/DL (ref 9–23)
BUN/CREAT SERPL: 8.3 (ref 10–20)
BUN/CREAT SERPL: 8.3 (ref 10–20)
CALCIUM BLD-MCNC: 8.3 MG/DL (ref 8.7–10.4)
CALCIUM BLD-MCNC: 8.3 MG/DL (ref 8.7–10.4)
CHLORIDE SERPL-SCNC: 105 MMOL/L (ref 98–112)
CHLORIDE SERPL-SCNC: 105 MMOL/L (ref 98–112)
CO2 SERPL-SCNC: 26 MMOL/L (ref 21–32)
CO2 SERPL-SCNC: 26 MMOL/L (ref 21–32)
CREAT BLD-MCNC: 0.72 MG/DL
CREAT BLD-MCNC: 0.72 MG/DL
DEPRECATED RDW RBC AUTO: 65.1 FL (ref 35.1–46.3)
EGFRCR SERPLBLD CKD-EPI 2021: 94 ML/MIN/1.73M2 (ref 60–?)
EGFRCR SERPLBLD CKD-EPI 2021: 94 ML/MIN/1.73M2 (ref 60–?)
EOSINOPHIL # BLD AUTO: 0.18 X10(3) UL (ref 0–0.7)
EOSINOPHIL NFR BLD AUTO: 1.8 %
ERYTHROCYTE [DISTWIDTH] IN BLOOD BY AUTOMATED COUNT: 17.2 % (ref 11–15)
GLOBULIN PLAS-MCNC: 4.2 G/DL (ref 2–3.5)
GLUCOSE BLD-MCNC: 103 MG/DL (ref 70–99)
GLUCOSE BLD-MCNC: 103 MG/DL (ref 70–99)
GLUCOSE BLDC GLUCOMTR-MCNC: 118 MG/DL (ref 70–99)
GLUCOSE BLDC GLUCOMTR-MCNC: 133 MG/DL (ref 70–99)
GLUCOSE BLDC GLUCOMTR-MCNC: 91 MG/DL (ref 70–99)
GLUCOSE BLDC GLUCOMTR-MCNC: 95 MG/DL (ref 70–99)
HCT VFR BLD AUTO: 31.1 %
HGB BLD-MCNC: 10.9 G/DL
IMM GRANULOCYTES # BLD AUTO: 0.04 X10(3) UL (ref 0–1)
IMM GRANULOCYTES NFR BLD: 0.4 %
INR BLD: 1.7 (ref 0.8–1.2)
LYMPHOCYTES # BLD AUTO: 3.21 X10(3) UL (ref 1–4)
LYMPHOCYTES NFR BLD AUTO: 31.5 %
MCH RBC QN AUTO: 37.1 PG (ref 26–34)
MCHC RBC AUTO-ENTMCNC: 35 G/DL (ref 31–37)
MCV RBC AUTO: 105.8 FL
MONOCYTES # BLD AUTO: 1.08 X10(3) UL (ref 0.1–1)
MONOCYTES NFR BLD AUTO: 10.6 %
NEUTROPHILS # BLD AUTO: 5.61 X10 (3) UL (ref 1.5–7.7)
NEUTROPHILS # BLD AUTO: 5.61 X10(3) UL (ref 1.5–7.7)
NEUTROPHILS NFR BLD AUTO: 54.9 %
OSMOLALITY SERPL CALC.SUM OF ELEC: 286 MOSM/KG (ref 275–295)
OSMOLALITY SERPL CALC.SUM OF ELEC: 286 MOSM/KG (ref 275–295)
PLATELET # BLD AUTO: 151 10(3)UL (ref 150–450)
PLATELET MORPHOLOGY: NORMAL
POTASSIUM SERPL-SCNC: 3.9 MMOL/L (ref 3.5–5.1)
PROT SERPL-MCNC: 6.7 G/DL (ref 5.7–8.2)
PROTHROMBIN TIME: 21 SECONDS (ref 11.6–14.8)
RBC # BLD AUTO: 2.94 X10(6)UL
SODIUM SERPL-SCNC: 139 MMOL/L (ref 136–145)
SODIUM SERPL-SCNC: 139 MMOL/L (ref 136–145)
WBC # BLD AUTO: 10.2 X10(3) UL (ref 4–11)

## 2024-06-06 PROCEDURE — 99232 SBSQ HOSP IP/OBS MODERATE 35: CPT | Performed by: INTERNAL MEDICINE

## 2024-06-06 PROCEDURE — 99232 SBSQ HOSP IP/OBS MODERATE 35: CPT | Performed by: OTHER

## 2024-06-06 PROCEDURE — 70551 MRI BRAIN STEM W/O DYE: CPT | Performed by: OTHER

## 2024-06-06 PROCEDURE — 99232 SBSQ HOSP IP/OBS MODERATE 35: CPT | Performed by: PHYSICIAN ASSISTANT

## 2024-06-06 PROCEDURE — 71250 CT THORAX DX C-: CPT | Performed by: INTERNAL MEDICINE

## 2024-06-06 NOTE — PROGRESS NOTES
Progress Note  Christi Tavarez Patient Status:  Inpatient    1960 MRN [id number]   Location [facility] 3W/SW Attending Antony Zavala MD   Hosp Day # 3 PCP ÁNGEL Delgado     Subjective:  Pt just back from MRI. Pt denies any chest pain or SOB.     Objective:  /56 (BP Location: Right arm)   Pulse 80   Temp 98.1 °F (36.7 °C) (Oral)   Resp 18   Ht 4' 9\" (1.448 m)   Wt 139 lb 11.2 oz (63.4 kg)   SpO2 94%   BMI 30.23 kg/m²     Telemetry: NSR      Intake/Output:    Intake/Output Summary (Last 24 hours) at 2024 0821  Last data filed at 2024 0539  Gross per 24 hour   Intake 370 ml   Output 5400 ml   Net -5030 ml       Last 3 Weights   24 0538 139 lb 11.2 oz (63.4 kg)   24 0555 155 lb 6.4 oz (70.5 kg)   24 0500 155 lb 4.8 oz (70.4 kg)   24 1810 162 lb 14.4 oz (73.9 kg)   24 1250 160 lb (72.6 kg)   24 0537 153 lb 11.2 oz (69.7 kg)   24 0507 157 lb 12.8 oz (71.6 kg)   24 0416 158 lb (71.7 kg)   24 0800 161 lb 13.1 oz (73.4 kg)   24 1700 154 lb 15.7 oz (70.3 kg)   24 0400 142 lb 3.2 oz (64.5 kg)   24 0523 139 lb 12.4 oz (63.4 kg)   24 2121 130 lb (59 kg)   24 0533 139 lb 12.4 oz (63.4 kg)       Labs:  Recent Labs   Lab 24  2309 24  0543 24  1610 24  0323   * 85  --  103*  103*   BUN 7* 6*  --  6*  6*   CREATSERUM 0.67 0.67  --  0.72  0.72   EGFRCR 98 98  --  94  94   CA 8.1* 8.2*  --  8.3*  8.3*    138  --  139  139   K 3.2* 2.9* 3.4* 3.9  3.9  3.9    104  --  105  105   CO2 27.0 27.0  --  26.0  26.0     Recent Labs   Lab 24  1347 24  0917 24  0543   RBC 2.94* 2.70* 2.65*   HGB 11.0* 10.3* 9.8*   HCT 30.9* 27.9* 27.5*   .1* 103.3* 103.8*   MCH 37.4* 38.1* 37.0*   MCHC 35.6 36.9 35.6   RDW 17.0* 16.6* 17.0*   NEPRELIM 8.71* 5.61 6.10   WBC 12.4* 9.7 10.8   .0 131.0* 131.0*         Recent Labs   Lab 24  1347   TROPHS  <3     Lab Results   Component Value Date    INR 1.73 (H) 06/05/2024    INR 1.79 (H) 06/04/2024    INR 1.72 (H) 06/03/2024       Diagnostics:   CT STROKE CTA BRAIN/CTA NECK (W IV)(CPT=70496/02295)     Result Date: 6/4/2024  CONCLUSION:         1. No major vessel occlusion, hemodynamically significant stenosis, dissection, AVM or aneurysm. 2. Developing encephalomalacia right cerebellum at site of previous hemorrhage.  No acute hemorrhage. 3. 11 mm mean diameter right apical pulmonary nodule.  Follow-up complete chest CT recommended.   This report was called to patient's nurse Audrey 11:59 a.m..    Dictated by (CST): Bal Allan MD on 6/04/2024 at 11:50 AM     Finalized by (CST): Bal Allan MD on 6/04/2024 at 12:00 PM           CT STROKE BRAIN (NO IV)(CPT=70450)     Result Date: 6/4/2024  CONCLUSION:         1. No acute infarct or hemorrhage. 2. Developing encephalomalacia in the right cerebellum at previous site of hemorrhage documented 04/03/2024.  This report was called immediately to patient's nurse and Nancy at 11:45 a.m.    Dictated by (CST): Bal Allan MD on 6/04/2024 at 11:41 AM     Finalized by (CST): Bal Allan MD on 6/04/2024 at 11:47 AM     TTE 6/4/24  Conclusions:     1. Left ventricle: The cavity size was normal. Wall thickness was normal.      Systolic function was normal. The estimated ejection fraction was 60-65%,      by visual assessment. No diagnostic evidence for regional wall motion      abnormalities. Left ventricular diastolic function parameters were      normal.   2. Left atrium: The left atrial volume was normal.   3. Pulmonary arteries: Systolic pressure was within the normal range,      estimated to be 24mm Hg.   4. Systemic veins: Central venous respirophasic diameter changes are blunted      (< 50%).   5. Inferior vena cava: The IVC was normal-sized.   Impressions:  No previous study was available for comparison.   *   MRI of brain 6/6/24    CONCLUSION:      Interval  decrease in size of previously described subacute hematoma right cerebellum currently measuring 2.4 x 1.8 cm since the prior examination 2024.  No significant associated mass effect or edema.  No evidence of midline shift.  Otherwise no   evidence of acute intracranial abnormality or evidence of acute infarction.     Review of Systems   Respiratory: Negative.     Cardiovascular: Negative.        Physical Exam:    Physical Exam  Vitals reviewed.   Constitutional:       General: She is not in acute distress.  Neck:      Vascular: No JVD.   Cardiovascular:      Rate and Rhythm: Normal rate and regular rhythm.      Pulses:           Radial pulses are 2+ on the right side and 2+ on the left side.      Heart sounds: No murmur heard.     No friction rub. No gallop.      Comments: Swelling up to thigh  Pulmonary:      Effort: Pulmonary effort is normal. No respiratory distress.      Breath sounds: No stridor. No wheezing, rhonchi or rales.   Abdominal:      Palpations: Abdomen is soft.      Tenderness: There is no abdominal tenderness.   Musculoskeletal:      Right lower le+ Edema present.      Left lower le+ Edema present.   Neurological:      Mental Status: She is alert and oriented to person, place, and time.   Psychiatric:         Mood and Affect: Mood normal.         Medications:   furosemide  40 mg Intravenous BID (Diuretic)    insulin aspart  1-5 Units Subcutaneous TID CC    traZODone  25 mg Oral Nightly    escitalopram  10 mg Oral Nightly    folic acid  1 mg Oral Daily    lactulose  20 g Oral BID    lamoTRIgine  25 mg Oral BID    levothyroxine  50 mcg Oral Before breakfast    pantoprazole  20 mg Oral QAM AC    potassium chloride  10 mEq Oral Daily    spironolactone  25 mg Oral Daily         Assessment/Plan:   Pt was admitted with SOB and BLE edema.     Volume overloaded  - echo from   with LVEF 60-65%, no RWMA  - BNP 65  - pt receiving 40mg of lasix BID  - net neg of 6.2L  - wt down 14lb since  admission    HTN  - Blood pressure borderlie now  - on Aldectone and lasix    Hypokalemia  - resolved  - on aldectone  - on electrolyte replacement protocol    DMII  -A1c 4.4    Alcoholic liver disease  -s/p paracentesis from 6/5 with 4L off  - on lactulose  - Gi following    H/o CVA  - April of 2024 with inferior right cerebellar hematoma vs infarct vs mass with hemorrhage    Plan:  - continue IV lasix BID  - electrolyte replacement per protocol  - strict intake and output monitoring,  - daily weight  - further management per neuro/GI/ primary    Plan discussed with pt, daughter and RN    DIANE Dee  [facility]  6/6/2024  8:21 AM

## 2024-06-06 NOTE — PROGRESS NOTES
[facility]     Gastroenterology Progress Note    Christi Tavarez Patient Status:  Inpatient    1960 MRN [id number]   Location [facility] 3W/SW Attending Antony Zavala MD   Hosp Day # 3 PCP ÁNGEL Delgado       Subjective:   Patient sitting comfortably in bed today.     Completed MRI this am. Per daughter her weakness continues and she continues to lean to the left while in bed.   No abdominal pain, nausea or vomiting.   No CP, SOB has improved.     Objective:   Blood pressure 109/56, pulse 80, temperature 98.1 °F (36.7 °C), temperature source Oral, resp. rate 18, height 4' 9\" (1.448 m), weight 139 lb 11.2 oz (63.4 kg), SpO2 94%. Body mass index is 30.23 kg/m².    Gen: awake, alert patient, NAD  HEENT: EOMI, the sclera appears anicteric, oropharynx clear, mucus membranes appear moist  CV: RRR  Lung: no conversational dyspnea   Abdomen: soft NTND abdomen with NABS appreciated   Skin: dry, warm, + jaundice  Ext: 2 +  LE edema b/l is evident  Neuro: Alert and interactive  Psych: calm, cooperative    Assessment and Plan:   Christi Tavarez is a 63 year old female w/ PMHx of  BMI 29.21, DM2, hypothyroidism, HLD, disordered ETOH, ICH in 2024 who presented to ER for worsening SOB and edema. Daughter assists with history. Patient was recently admitted in April and found to have ICH. She was then sent to rehab. Over the last 2 weeks has had increased swelling in both legs and shortness of breath when trying to get around. GI consulted for cirrhosis. Patient last drink was in April. Denies abdominal pain, nausea and vomiting. Having BM daily. Notes they are brown in color had one episode of bright red blood tinged stool. No diarrhea. No fevers or chills. No pruritus.      #SOB  #lower extremity weakness  -progressively worsening with associated lower extremity edema over last 2 weeks  -etiology possibly cardiac, hepatic congestion, anemia   -plan for work up through cardiology at  this time  -consider endoscopic evaluation IP vs OP      # Cirrhosis- due to ETOH, last drink 4/2024, chronic disease work up negative. Mild elevation in anti smooth muscle. Normal M2 antibody. Normal ceruloplasmin. Mild elevation in alpha 1 antitrypsin. MELD 17  - PSE: Reports some \"brain fog\", noted asterixis, 2 Bms per day, nonbloody (normal ammonia on 6/5)  - EV: needs to be completed, pending cardiac work up  - ascites: moderate ascites on most recent ultrasound as well as large amount of sludge, s/p paracentesis 6/5 with 4L off.  Fluid studies sent to r/o SBP.  Continue lasix/aldactone as well as low sodium diet.  - HCC: AFP 19.8, no lesion on ultrasound 5/29 but given elevated AFP would recommend further imaging with triple phase liver protocol.     #increasing left sided weakness    -patient had rapid response called around 11 AM for increased left sided weakness  -ammonia   -CT and CTA head complete  -MRI brain completed today  -neurology now on consult       Recommend:  -trend hepatic panel  -daily PT/INR  -daily CBC  -cardiology on consult  -Fluid studies pending  -consider endoscopic evaluation IP vs OP pending clinical course   -Will need follow up on elevated AFP, further imaging with triple phase liver protocol.        Case discussed with Lawrence Mena MD and Cinthia GARCIA.    Gita Wheeler PA-C  [facility]  6/6/2024      Results:     Lab Results   Component Value Date    WBC 10.8 06/05/2024    HGB 9.8 (L) 06/05/2024    HCT 27.5 (L) 06/05/2024    .0 (L) 06/05/2024    CREATSERUM 0.72 06/06/2024    CREATSERUM 0.72 06/06/2024    BUN 6 (L) 06/06/2024    BUN 6 (L) 06/06/2024     06/06/2024     06/06/2024    K 3.9 06/06/2024    K 3.9 06/06/2024    K 3.9 06/06/2024     06/06/2024     06/06/2024    CO2 26.0 06/06/2024    CO2 26.0 06/06/2024     (H) 06/06/2024     (H) 06/06/2024    CA 8.3 (L) 06/06/2024    CA 8.3 (L) 06/06/2024    ALB 2.5  (L) 06/06/2024    ALKPHO 126 06/06/2024    BILT 3.4 (H) 06/06/2024    TP 6.7 06/06/2024    AST 35 (H) 06/06/2024    ALT 14 06/06/2024    PTT 34.8 06/03/2024    INR 1.73 (H) 06/05/2024    T4F 1.0 04/03/2024    TSH 8.140 (H) 04/03/2024    LIP 49 06/03/2024    MG 2.0 04/04/2024     04/03/2024    B12 >2,000 (H) 04/04/2024    ETOH <3 04/03/2024       US PARACENTESIS W IMAGING (CPT=49083)    Result Date: 6/5/2024  CONCLUSION: Paracentesis as described.    Dictated by (CST): Kellee Downs on 6/05/2024 at 3:20 PM     Finalized by (CST): Kellee Downs on 6/05/2024 at 3:21 PM          CT STROKE CTA BRAIN/CTA NECK (W IV)(CPT=70496/41068)    Result Date: 6/4/2024  CONCLUSION:  1. No major vessel occlusion, hemodynamically significant stenosis, dissection, AVM or aneurysm. 2. Developing encephalomalacia right cerebellum at site of previous hemorrhage.  No acute hemorrhage. 3. 11 mm mean diameter right apical pulmonary nodule.  Follow-up complete chest CT recommended.   This report was called to patient's nurse Audrey 11:59 a.m..    Dictated by (CST): Bal Allan MD on 6/04/2024 at 11:50 AM     Finalized by (CST): Bal Allan MD on 6/04/2024 at 12:00 PM          CT STROKE BRAIN (NO IV)(CPT=70450)    Result Date: 6/4/2024  CONCLUSION:  1. No acute infarct or hemorrhage. 2. Developing encephalomalacia in the right cerebellum at previous site of hemorrhage documented 04/03/2024.  This report was called immediately to patient's nurse and Nancy at 11:45 a.m.    Dictated by (CST): Bal Allan MD on 6/04/2024 at 11:41 AM     Finalized by (CST): Bal Allan MD on 6/04/2024 at 11:47 AM

## 2024-06-06 NOTE — DISCHARGE INSTRUCTIONS
HOME HEALTH:  Sometimes managing your health at home requires assistance.  The [facility] team has recognized your preference to use   [facility]  [address] [place] [address]  Phone: [phone]  Fax: [phone]  A representative from the home health agency will contact you or your family to schedule your first visit.        Going Home    In this section you will find the tools which will guide you through the first few days after you leave the hospital. Continued use of these tools will help you develop the skills necessary to keep your heart failure under control.     Heart Failure Guidelines - place this worksheet on your refrigerator or somewhere you can refer to it daily to help you decide if your symptoms are under control, and what to do if they are not.     Home Care Instructions Following Heart Failure - the most important things to do every day include:     Weigh yourself  Take your medicines as prescribed  Limit your sodium (salt) and fluid intake  Know when to call your cardiologist, primary doctor, or nurse  Know when to seek emergency care    There is also a handy weight chart on which to record your weight every day, information on measuring fluids and limiting fluid intake, and a section for recording your thoughts or questions.     Things for You to Remember:   1. An appointment has been made for you to see your doctor or healthcare provider within 7 days of hospital discharge. It is important that you attend this appointment to make sure your symptoms are under control.     2. Your recommended sodium intake is 0596-8197 mg daily    3. Limit your fluid intake to no more than 2 liters or 64 ounces per day    4. Some exercise and activity is important to help keep your heart functioning and strong. Unless instructed not to exercise, you may walk at a slow to moderate pace for 10-15 minutes 2-3 days per week to start. Pace your activity to prevent shortness  of breath or fatigue. Stop exercise if you develop chest pain, lightheadedness, or significant shortness of breath.       Call Your Cardiologist If:   You gain 2 pounds overnight or 3-4 pounds in 3-5 days  You have more difficulty breathing  You are getting more tired with normal activity  You are more short of breath lying down, or awaken at night short of breath  You have swelling of your feet or legs  You urinate less often during the day and more often at night  You have cramps in your legs  You have blurred vision or see yellowish-green halos around objects of lights    Go to the Emergency Room If:   You have pain or tightness in your chest  You are extremely short of breath  You are coughing up pink-frothy mucus  You are traveling and develop symptoms of worsening heart failure    Additional Resources:   If you have questions or concerns about heart failure management, call your cardiologist

## 2024-06-06 NOTE — PLAN OF CARE
Patient is  Aox4, had CT in morning. VSS, frequent rounding by staff, call light within reach. Will become NPO at midnight for EGD.    Problem: Patient Centered Care  Goal: Patient preferences are identified and integrated in the patient's plan of care  Description: Interventions:  - What would you like us to know as we care for you? My granddaughter is a doctor, my daughter works here  - Provide timely, complete, and accurate information to patient/family  - Incorporate patient and family knowledge, values, beliefs, and cultural backgrounds into the planning and delivery of care  - Encourage patient/family to participate in care and decision-making at the level they choose  - Honor patient and family perspectives and choices  Outcome: Progressing     Problem: Diabetes/Glucose Control  Goal: Glucose maintained within prescribed range  Description: INTERVENTIONS:  - Monitor Blood Glucose as ordered  - Assess for signs and symptoms of hyperglycemia and hypoglycemia  - Administer ordered medications to maintain glucose within target range  - Assess barriers to adequate nutritional intake and initiate nutrition consult as needed  - Instruct patient on self management of diabetes  Outcome: Progressing     Problem: Patient/Family Goals  Goal: Patient/Family Long Term Goal  Description: Patient's Long Term Goal: Be able to feed self    Interventions:  - PT/OT/SP therapy, altered diet  - See additional Care Plan goals for specific interventions  Outcome: Progressing  Goal: Patient/Family Short Term Goal  Description: Patient's Short Term Goal: Get some sleep    Interventions:   - Dim lights, keep doorway shut  - See additional Care Plan goals for specific interventions  Outcome: Progressing     Problem: CARDIOVASCULAR - ADULT  Goal: Maintains optimal cardiac output and hemodynamic stability  Description: INTERVENTIONS:  - Monitor vital signs, rhythm, and trends  - Monitor for bleeding, hypotension and signs of decreased  cardiac output  - Evaluate effectiveness of vasoactive medications to optimize hemodynamic stability  - Monitor arterial and/or venous puncture sites for bleeding and/or hematoma  - Assess quality of pulses, skin color and temperature  - Assess for signs of decreased coronary artery perfusion - ex. Angina  - Evaluate fluid balance, assess for edema, trend weights  Outcome: Progressing  Goal: Absence of cardiac arrhythmias or at baseline  Description: INTERVENTIONS:  - Continuous cardiac monitoring, monitor vital signs, obtain 12 lead EKG if indicated  - Evaluate effectiveness of antiarrhythmic and heart rate control medications as ordered  - Initiate emergency measures for life threatening arrhythmias  - Monitor electrolytes and administer replacement therapy as ordered  Outcome: Progressing     Problem: RESPIRATORY - ADULT  Goal: Achieves optimal ventilation and oxygenation  Description: INTERVENTIONS:  - Assess for changes in respiratory status  - Assess for changes in mentation and behavior  - Position to facilitate oxygenation and minimize respiratory effort  - Oxygen supplementation based on oxygen saturation or ABGs  - Provide Smoking Cessation handout, if applicable  - Encourage broncho-pulmonary hygiene including cough, deep breathe, Incentive Spirometry  - Assess the need for suctioning and perform as needed  - Assess and instruct to report SOB or any respiratory difficulty  - Respiratory Therapy support as indicated  - Manage/alleviate anxiety  - Monitor for signs/symptoms of CO2 retention  Outcome: Progressing     Problem: METABOLIC/FLUID AND ELECTROLYTES - ADULT  Goal: Electrolytes maintained within normal limits  Description: INTERVENTIONS:  - Monitor labs and rhythm and assess patient for signs and symptoms of electrolyte imbalances  - Administer electrolyte replacement as ordered  - Monitor response to electrolyte replacements, including rhythm and repeat lab results as appropriate  - Fluid  restriction as ordered  - Instruct patient on fluid and nutrition restrictions as appropriate  Outcome: Progressing     Problem: NEUROLOGICAL - ADULT  Goal: Achieves stable or improved neurological status  Description: INTERVENTIONS  - Assess for and report changes in neurological status  - Initiate measures to prevent increased intracranial pressure  - Maintain blood pressure and fluid volume within ordered parameters to optimize cerebral perfusion and minimize risk of hemorrhage  - Monitor temperature, glucose, and sodium. Initiate appropriate interventions as ordered  Outcome: Progressing

## 2024-06-06 NOTE — PROGRESS NOTES
[facility]  [address] [place] [address], [address]  [facility] [address]  [phone]        INPATIENT NEUROLOGY   FOLLOW UP PROGRESS NOTE  [facility]  part of [facility]    Christi Tavarez Patient Status:  Inpatient     1960 MRN [id number]    Location [facility] [place] 3W/SW Attending Antony Zavala MD    Hosp Day # 3 PCP ÁNGEL Delgado    Date of Admission:  6/3/2024  Date of Consult Follow Up:  2024     Assessment and Plan:   Christi Tavarez is a 63 year old  left handed female w/ a pmhx sig. for recent right-sided cerebellar hemorrhage in 2024, history of alcohol abuse, cognitive impairment, CHF, diabetes, seen as a stroke alert for ataxia/difficulty feeding herself that was ongoing last night, 6/3/2024 and this morning 2024.  HPI as per the patient and patient's family at bedside.  Last known well is unclear.  Sometime on 6/3/2020 4 in the evening.  Family noticed that she had difficulty feeding herself.  They report that she was \"missing\" her mouth when she was trying to feed herself this morning.  She was eating eggs and reportedly was hitting her chin instead of her mouth.  She reportedly had slurred speech.  She reportedly had increased confusion and lethargy.  Family reports that her slurred speech is now resolved.  No slurred speech yesterday.  She had a stat head CT and CTA.  There is no large vessel occlusion.  Her exam is not concerning for acute ischemic stroke.    Explained that most likely her symptoms are due to combination of fatigue and sensory ataxia due to chronic neuropathy.    MRI confirms she did not have a stroke.  No neurological contraindications to upper GI series.  Neurology will sign off.     Past-pointing incoordination in the left hand in the setting of chronic sensory loss due to neuropathy   Differential Diagnosis:   Sensory ataxia due to chronic neuropathy   Not a stroke  not TIA given duration of  symptoms  Unrelated to cerebellar hemorrhage which was on the contralateral side       Plan    Diagnostics:  Mri completed    Therapeutics:  BP goal is normotension   Neurochecks Q4    1. Acute on chronic congestive heart failure, unspecified heart failure type (HCC)    2. Alcoholic cirrhosis of liver with ascites (HCC)         Procedures    Pro Beta Natriuretic Peptide    Troponin I (High Sensitivity)    CBC With Differential With Platelet    Basic Metabolic Panel (8)    Hepatic Function Panel (7)    Lipase    Prothrombin Time (PT)    PTT, Activated    Ammonia, Plasma    Hepatic Function Panel (7)    CBC With Differential With Platelet    Prothrombin Time (PT)    AFP, Tumor Marker, Serum    Basic Metabolic Panel (8)    Basic Metabolic Panel (8)    Lipid Panel    Cell Count, peritoneal fluid    Total protein, peritoneal fluid    Albumin, fluid    Comp Metabolic Panel (14)    CBC With Differential With Platelet    Potassium    Albumin Serum    CELL COUNT/DIFF PERITONEAL FL    Ammonia, Plasma    Hepatic Function Panel (7)    Prothrombin Time (PT)    Potassium    Comp Metabolic Panel (14)    CBC With Differential With Platelet    Prothrombin Time (PT)    RBC Morphology Scan    Ammonia, Plasma    Prothrombin Time (PT)    Comp Metabolic Panel (14)    XR CHEST AP PORTABLE  (CPT=71045)    CT STROKE BRAIN (NO IV)(CPT=70450)    CT STROKE CTA BRAIN/CTA NECK (W IV)(CPT=70496/80529)    MRI BRAIN WO ACUTE (3) SEQUENCE (CPT=70551)    US PARACENTESIS W IMAGING (CPT=49083)    CT CHEST (CPT=71250)           INTERVAL EVENTS  06/06/24:  mri confirms she did not have  stroke       SUBJECTIVE:     Explained she did not have a stroke; explained that her symptoms were due to diabetic neuropathy and alcohol induced neuropathy.  Stressed importance of being adherent with her medications.  Also discussed long-term sequelae of hepatic encephalopathy with patient's daughter.  Pertinent positive and negatives per HPI.  All others were reviewed  and negative.       Objective   OBJECTIVE:   Last vitals and weight :  Blood pressure 101/61, pulse 91, temperature 98.1 °F (36.7 °C), temperature source Oral, resp. rate 18, height 57\", weight 146 lb 3.2 oz (66.3 kg), SpO2 96%.   Vitals:    06/06/24 0538 06/06/24 0745 06/06/24 0942 06/06/24 1506   BP: 96/63 109/56  101/61   BP Location: Right arm Right arm  Right arm   Pulse: 80 80  91   Resp:  18  18   Temp: 98.2 °F (36.8 °C) 98.1 °F (36.7 °C)  98.1 °F (36.7 °C)   TempSrc: Oral Oral  Oral   SpO2: 100% 94%  96%   Weight: 139 lb 11.2 oz (63.4 kg)  146 lb 3.2 oz (66.3 kg)    Height:          Exam:   - General: appears stated age and no distress  - CV: symmetric pulses           Carotids:   - Pulmonary: no signs of respiratory distress. Normal excursion of the chest.      Neurologic Exam  - Mental Status: Alert and attentive.  Oriented to person, place, and time/date.  Speech is spontaneous, fluent, and prosodic. Comprehension and repetition intact. Phrase length and rate are normal. No paraphasic errors, neologisms, anomia, acalculia, apraxia, anosognosia, or R/L confusion. No neglect.   - Cranial Nerves: No gaze preference. Visual fields:normal  Pupils are 5mm briskly constricting to 4mm and equally round and reactive to light  in a well lit room. EOMI. No nystagmus. No ptosis. V1-V3 intact B/L to light touch.No pathological facial asymmetry. No flattening of the nasolabial fold. .  Hearing grossly intact.  Tongue midline. No atrophy or fasiculations of the tongue noted. Palate and uvula elevate symmetrically.  Shoulder shrug symmetric.     - Motor:  normal tone, normal bulk. No interosseous wasting. No flattening of hypothenar eminences.                                                                          Pronator drift: No pronator drift                   Asterixis: No asterixis noted.             - Sensory:   Gradient loss to light touch or temperature and vibration in all 4 extremities and classic stocking  glove distribution.  Proprioception: pass pointing on the left w eyes closed   - Cerebellum: No truncal ataxia. No titubations. No dysmetria, no dysdiadochokinesis. No overshoot.   - Gait/station: Normal gait and station. Symmetric arm swing.   - Plantar response: flexor bilaterally    Medications:   furosemide  40 mg Intravenous BID (Diuretic)    insulin aspart  1-5 Units Subcutaneous TID CC    traZODone  25 mg Oral Nightly    escitalopram  10 mg Oral Nightly    folic acid  1 mg Oral Daily    lactulose  20 g Oral BID    lamoTRIgine  25 mg Oral BID    levothyroxine  50 mcg Oral Before breakfast    pantoprazole  20 mg Oral QAM AC    potassium chloride  10 mEq Oral Daily    spironolactone  25 mg Oral Daily       PRNS:   glucose **OR** glucose **OR** glucose-vitamin C **OR** dextrose **OR** glucose **OR** glucose **OR** glucose-vitamin C    Infusions:          Results:   Laboratory Data:  Lab Results   Component Value Date    WBC 10.2 06/06/2024    HGB 10.9 (L) 06/06/2024    HCT 31.1 (L) 06/06/2024    .0 06/06/2024    CREATSERUM 0.72 06/06/2024    CREATSERUM 0.72 06/06/2024    BUN 6 (L) 06/06/2024    BUN 6 (L) 06/06/2024     06/06/2024     06/06/2024    K 3.9 06/06/2024    K 3.9 06/06/2024    K 3.9 06/06/2024     06/06/2024     06/06/2024    CO2 26.0 06/06/2024    CO2 26.0 06/06/2024     (H) 06/06/2024     (H) 06/06/2024    CA 8.3 (L) 06/06/2024    CA 8.3 (L) 06/06/2024    ALB 2.5 (L) 06/06/2024    ALKPHO 126 06/06/2024    TP 6.7 06/06/2024    AST 35 (H) 06/06/2024    ALT 14 06/06/2024    PTT 34.8 06/03/2024    INR 1.70 (H) 06/06/2024    PTP 21.0 (H) 06/06/2024    T4F 1.0 04/03/2024    TSH 8.140 (H) 04/03/2024    LIP 49 06/03/2024    MG 2.0 04/04/2024     04/03/2024    B12 >2,000 (H) 04/04/2024    ETOH <3 04/03/2024     Recent Results (from the past 72 hour(s))   Ammonia, Plasma    Collection Time: 06/03/24  6:07 PM   Result Value Ref Range    Ammonia 21 11 - 32 umol/L    POCT Glucose    Collection Time: 06/03/24  6:47 PM   Result Value Ref Range    POC Glucose  111 (H) 70 - 99 mg/dL   POCT Glucose    Collection Time: 06/03/24  9:04 PM   Result Value Ref Range    POC Glucose  129 (H) 70 - 99 mg/dL   POCT Glucose    Collection Time: 06/04/24  9:00 AM   Result Value Ref Range    POC Glucose  94 70 - 99 mg/dL   Hepatic Function Panel (7)    Collection Time: 06/04/24  9:17 AM   Result Value Ref Range    AST 38 (H) <=34 U/L    ALT 11 10 - 49 U/L    Alkaline Phosphatase 122 50 - 130 U/L    Bilirubin, Total 4.6 (H) 0.2 - 1.1 mg/dL    Bilirubin, Direct 2.5 (H) <=0.3 mg/dL    Total Protein 6.8 5.7 - 8.2 g/dL    Albumin 2.5 (L) 3.2 - 4.8 g/dL   Prothrombin Time (PT)    Collection Time: 06/04/24  9:17 AM   Result Value Ref Range    PT 21.9 (H) 11.6 - 14.8 seconds    INR 1.79 (H) 0.80 - 1.20   CBC W/ DIFFERENTIAL    Collection Time: 06/04/24  9:17 AM   Result Value Ref Range    WBC 9.7 4.0 - 11.0 x10(3) uL    RBC 2.70 (L) 3.80 - 5.30 x10(6)uL    HGB 10.3 (L) 12.0 - 16.0 g/dL    HCT 27.9 (L) 35.0 - 48.0 %    .3 (H) 80.0 - 100.0 fL    MCH 38.1 (H) 26.0 - 34.0 pg    MCHC 36.9 31.0 - 37.0 g/dL    RDW-SD 62.0 (H) 35.1 - 46.3 fL    RDW 16.6 (H) 11.0 - 15.0 %    .0 (L) 150.0 - 450.0 10(3)uL    Neutrophil Absolute Prelim 5.61 1.50 - 7.70 x10 (3) uL    Neutrophil Absolute 5.61 1.50 - 7.70 x10(3) uL    Lymphocyte Absolute 2.86 1.00 - 4.00 x10(3) uL    Monocyte Absolute 0.93 0.10 - 1.00 x10(3) uL    Eosinophil Absolute 0.09 0.00 - 0.70 x10(3) uL    Basophil Absolute 0.09 0.00 - 0.20 x10(3) uL    Immature Granulocyte Absolute 0.10 0.00 - 1.00 x10(3) uL    Neutrophil % 58.1 %    Lymphocyte % 29.5 %    Monocyte % 9.6 %    Eosinophil % 0.9 %    Basophil % 0.9 %    Immature Granulocyte % 1.0 %   Lipid Panel    Collection Time: 06/04/24  9:17 AM   Result Value Ref Range    Cholesterol, Total 163 <200 mg/dL    HDL Cholesterol 11 (L) 40 - 59 mg/dL    Triglycerides 87 30 - 149 mg/dL    LDL  Cholesterol 135 (H) <100 mg/dL    VLDL 16 0 - 30 mg/dL    Non HDL Chol 152 (H) <130 mg/dL   Basic Metabolic Panel (8)    Collection Time: 06/04/24  9:17 AM   Result Value Ref Range    Glucose 89 70 - 99 mg/dL    Sodium 139 136 - 145 mmol/L    Potassium 3.4 (L) 3.5 - 5.1 mmol/L    Chloride 106 98 - 112 mmol/L    CO2 27.0 21.0 - 32.0 mmol/L    Anion Gap 6 0 - 18 mmol/L    BUN 7 (L) 9 - 23 mg/dL    Creatinine 0.70 0.55 - 1.02 mg/dL    BUN/CREA Ratio 10.0 10.0 - 20.0    Calcium, Total 8.4 (L) 8.7 - 10.4 mg/dL    Calculated Osmolality 285 275 - 295 mOsm/kg    eGFR-Cr 97 >=60 mL/min/1.73m2   POCT Glucose    Collection Time: 06/04/24 11:12 AM   Result Value Ref Range    POC Glucose  139 (H) 70 - 99 mg/dL   AFP, Tumor Marker, Serum    Collection Time: 06/04/24 12:10 PM   Result Value Ref Range    AFP Tumor Marker 19.8 (H) <8.0 ng/mL   POCT Glucose    Collection Time: 06/04/24 12:47 PM   Result Value Ref Range    POC Glucose  149 (H) 70 - 99 mg/dL   POCT Glucose    Collection Time: 06/04/24  6:16 PM   Result Value Ref Range    POC Glucose  104 (H) 70 - 99 mg/dL   POCT Glucose    Collection Time: 06/04/24  8:42 PM   Result Value Ref Range    POC Glucose  145 (H) 70 - 99 mg/dL   Comp Metabolic Panel (14)    Collection Time: 06/04/24 11:09 PM   Result Value Ref Range    Glucose 113 (H) 70 - 99 mg/dL    Sodium 140 136 - 145 mmol/L    Potassium 3.2 (L) 3.5 - 5.1 mmol/L    Chloride 105 98 - 112 mmol/L    CO2 27.0 21.0 - 32.0 mmol/L    Anion Gap 8 0 - 18 mmol/L    BUN 7 (L) 9 - 23 mg/dL    Creatinine 0.67 0.55 - 1.02 mg/dL    BUN/CREA Ratio 10.4 10.0 - 20.0    Calcium, Total 8.1 (L) 8.7 - 10.4 mg/dL    Calculated Osmolality 289 275 - 295 mOsm/kg    eGFR-Cr 98 >=60 mL/min/1.73m2    ALT 11 10 - 49 U/L    AST 37 (H) <=34 U/L    Alkaline Phosphatase 116 50 - 130 U/L    Bilirubin, Total 3.9 (H) 0.2 - 1.1 mg/dL    Total Protein 6.6 5.7 - 8.2 g/dL    Albumin 2.5 (L) 3.2 - 4.8 g/dL    Globulin  4.1 (H) 2.0 - 3.5 g/dL    A/G Ratio 0.6 (L)  1.0 - 2.0   Basic Metabolic Panel (8)    Collection Time: 06/05/24  5:43 AM   Result Value Ref Range    Glucose 85 70 - 99 mg/dL    Sodium 138 136 - 145 mmol/L    Potassium 2.9 (LL) 3.5 - 5.1 mmol/L    Chloride 104 98 - 112 mmol/L    CO2 27.0 21.0 - 32.0 mmol/L    Anion Gap 7 0 - 18 mmol/L    BUN 6 (L) 9 - 23 mg/dL    Creatinine 0.67 0.55 - 1.02 mg/dL    BUN/CREA Ratio 9.0 (L) 10.0 - 20.0    Calcium, Total 8.2 (L) 8.7 - 10.4 mg/dL    Calculated Osmolality 283 275 - 295 mOsm/kg    eGFR-Cr 98 >=60 mL/min/1.73m2   CBC W/ DIFFERENTIAL    Collection Time: 06/05/24  5:43 AM   Result Value Ref Range    WBC 10.8 4.0 - 11.0 x10(3) uL    RBC 2.65 (L) 3.80 - 5.30 x10(6)uL    HGB 9.8 (L) 12.0 - 16.0 g/dL    HCT 27.5 (L) 35.0 - 48.0 %    .8 (H) 80.0 - 100.0 fL    MCH 37.0 (H) 26.0 - 34.0 pg    MCHC 35.6 31.0 - 37.0 g/dL    RDW-SD 63.4 (H) 35.1 - 46.3 fL    RDW 17.0 (H) 11.0 - 15.0 %    .0 (L) 150.0 - 450.0 10(3)uL    Neutrophil Absolute Prelim 6.10 1.50 - 7.70 x10 (3) uL    Neutrophil Absolute 6.10 1.50 - 7.70 x10(3) uL    Lymphocyte Absolute 3.35 1.00 - 4.00 x10(3) uL    Monocyte Absolute 1.10 (H) 0.10 - 1.00 x10(3) uL    Eosinophil Absolute 0.14 0.00 - 0.70 x10(3) uL    Basophil Absolute 0.07 0.00 - 0.20 x10(3) uL    Immature Granulocyte Absolute 0.04 0.00 - 1.00 x10(3) uL    Neutrophil % 56.5 %    Lymphocyte % 31.0 %    Monocyte % 10.2 %    Eosinophil % 1.3 %    Basophil % 0.6 %    Immature Granulocyte % 0.4 %   Albumin Serum    Collection Time: 06/05/24  5:43 AM   Result Value Ref Range    Albumin 2.3 (L) 3.2 - 4.8 g/dL   Cell Count, peritoneal fluid    Collection Time: 06/05/24 10:31 AM   Result Value Ref Range    Body Fluid Color Yellow     Body Fluid Clarity Clear     TNC, Peritoneal Fluid 103 (H) <1 /CUMM    RBC Peritoneal 121 (H) <1 /CUMM   Total protein, peritoneal fluid    Collection Time: 06/05/24 10:31 AM   Result Value Ref Range    Total Protein Peritoneal Fld <2.0 g/dL   Albumin, fluid    Collection  Time: 06/05/24 10:31 AM   Result Value Ref Range    Albumin, Other Body Fld 0.4 g/dL    Source, Other Body Fld Peritoneal Fluid    Body Fluid Cult Aerobic and Anaerobic    Collection Time: 06/05/24 10:31 AM    Specimen: Ascites fluid; Body fluid, unspecified   Result Value Ref Range    Body Fluid Smear 1+ WBCs seen     Body Fluid Smear No organisms seen     Body Fluid Smear This is a cytocentrifuged smear.    Cytology fluids    Collection Time: 06/05/24 10:31 AM   Result Value Ref Range    Case Report       Non-Gynecologic Cytology                          Case: [id number]                                   Authorizing Provider:  Gita Wheeler PA-C  Collected:           06/05/2024 10:31 AM          Ordering Location:     [address]    Received:            06/06/2024 07:22 AM          Pathologist:           Cyn Arce MD                                                        Specimen:    Ascites fluid                                                                              General Categorization Benign       Final Diagnosis:         Ascites fluid; ultrasound guided paracentesis:  Adequacy: Satisfactory for evaluation  General Category: Benign, inflammatory, reactive, or reparative changes  Diagnosis:  Benign mesothelial cells present  Lymphocytes present, many  Histiocytes present, many  No malignant cells identified        Embedded Images      Procedure       Specimen A  Monolayers:  1  Cytospins:     2  S      Clinical Information       Cirrhosis.        Non Gyne Interpretation  Benign      Gross Description       Specimen A  Volume (ml): 1200    Color: Straw     CELL COUNT/DIFF PERITONEAL FL    Collection Time: 06/05/24 10:31 AM   Result Value Ref Range    WBC Calculated, Peritoneal Fluid 103 /CUMM    Neutrophils Peritoneal 24 %    Lymphocyte Peritoneal 22 %    Mono/Mac/Histio Peritoneal 54 %    Eosinophils Peritoneal 0 %    Basophil Peritoneal 0 %    Total Cells Counted 100    POCT  Glucose    Collection Time: 06/05/24 12:28 PM   Result Value Ref Range    POC Glucose  127 (H) 70 - 99 mg/dL   Ammonia, Plasma    Collection Time: 06/05/24 12:41 PM   Result Value Ref Range    Ammonia 14 11 - 32 umol/L   Hepatic Function Panel (7)    Collection Time: 06/05/24 12:41 PM   Result Value Ref Range    AST 31 <=34 U/L    ALT 10 10 - 49 U/L    Alkaline Phosphatase 120 50 - 130 U/L    Bilirubin, Total 4.0 (H) 0.2 - 1.1 mg/dL    Bilirubin, Direct 2.4 (H) <=0.3 mg/dL    Total Protein 6.8 5.7 - 8.2 g/dL    Albumin 2.5 (L) 3.2 - 4.8 g/dL   Prothrombin Time (PT)    Collection Time: 06/05/24 12:41 PM   Result Value Ref Range    PT 21.4 (H) 11.6 - 14.8 seconds    INR 1.73 (H) 0.80 - 1.20   Potassium    Collection Time: 06/05/24  4:10 PM   Result Value Ref Range    Potassium 3.4 (L) 3.5 - 5.1 mmol/L   POCT Glucose    Collection Time: 06/05/24  6:36 PM   Result Value Ref Range    POC Glucose  122 (H) 70 - 99 mg/dL   POCT Glucose    Collection Time: 06/05/24  8:43 PM   Result Value Ref Range    POC Glucose  113 (H) 70 - 99 mg/dL   Basic Metabolic Panel (8)    Collection Time: 06/06/24  3:23 AM   Result Value Ref Range    Glucose 103 (H) 70 - 99 mg/dL    Sodium 139 136 - 145 mmol/L    Potassium 3.9 3.5 - 5.1 mmol/L    Chloride 105 98 - 112 mmol/L    CO2 26.0 21.0 - 32.0 mmol/L    Anion Gap 8 0 - 18 mmol/L    BUN 6 (L) 9 - 23 mg/dL    Creatinine 0.72 0.55 - 1.02 mg/dL    BUN/CREA Ratio 8.3 (L) 10.0 - 20.0    Calcium, Total 8.3 (L) 8.7 - 10.4 mg/dL    Calculated Osmolality 286 275 - 295 mOsm/kg    eGFR-Cr 94 >=60 mL/min/1.73m2   Potassium    Collection Time: 06/06/24  3:23 AM   Result Value Ref Range    Potassium 3.9 3.5 - 5.1 mmol/L   Comp Metabolic Panel (14)    Collection Time: 06/06/24  3:23 AM   Result Value Ref Range    Glucose 103 (H) 70 - 99 mg/dL    Sodium 139 136 - 145 mmol/L    Potassium 3.9 3.5 - 5.1 mmol/L    Chloride 105 98 - 112 mmol/L    CO2 26.0 21.0 - 32.0 mmol/L    Anion Gap 8 0 - 18 mmol/L    BUN 6  (L) 9 - 23 mg/dL    Creatinine 0.72 0.55 - 1.02 mg/dL    BUN/CREA Ratio 8.3 (L) 10.0 - 20.0    Calcium, Total 8.3 (L) 8.7 - 10.4 mg/dL    Calculated Osmolality 286 275 - 295 mOsm/kg    eGFR-Cr 94 >=60 mL/min/1.73m2    ALT 14 10 - 49 U/L    AST 35 (H) <=34 U/L    Alkaline Phosphatase 126 50 - 130 U/L    Bilirubin, Total 3.4 (H) 0.2 - 1.1 mg/dL    Total Protein 6.7 5.7 - 8.2 g/dL    Albumin 2.5 (L) 3.2 - 4.8 g/dL    Globulin  4.2 (H) 2.0 - 3.5 g/dL    A/G Ratio 0.6 (L) 1.0 - 2.0   RAINBOW DRAW LAVENDER    Collection Time: 06/06/24  4:16 AM   Result Value Ref Range    Hold Lavender Auto Resulted    CBC W/ DIFFERENTIAL    Collection Time: 06/06/24  4:16 AM   Result Value Ref Range    WBC 10.2 4.0 - 11.0 x10(3) uL    RBC 2.94 (L) 3.80 - 5.30 x10(6)uL    HGB 10.9 (L) 12.0 - 16.0 g/dL    HCT 31.1 (L) 35.0 - 48.0 %    .8 (H) 80.0 - 100.0 fL    MCH 37.1 (H) 26.0 - 34.0 pg    MCHC 35.0 31.0 - 37.0 g/dL    RDW-SD 65.1 (H) 35.1 - 46.3 fL    RDW 17.2 (H) 11.0 - 15.0 %    .0 150.0 - 450.0 10(3)uL    Neutrophil Absolute Prelim 5.61 1.50 - 7.70 x10 (3) uL    Neutrophil Absolute 5.61 1.50 - 7.70 x10(3) uL    Lymphocyte Absolute 3.21 1.00 - 4.00 x10(3) uL    Monocyte Absolute 1.08 (H) 0.10 - 1.00 x10(3) uL    Eosinophil Absolute 0.18 0.00 - 0.70 x10(3) uL    Basophil Absolute 0.08 0.00 - 0.20 x10(3) uL    Immature Granulocyte Absolute 0.04 0.00 - 1.00 x10(3) uL    Neutrophil % 54.9 %    Lymphocyte % 31.5 %    Monocyte % 10.6 %    Eosinophil % 1.8 %    Basophil % 0.8 %    Immature Granulocyte % 0.4 %   RBC Morphology Scan    Collection Time: 06/06/24  4:16 AM   Result Value Ref Range    RBC Morphology See morphology below (A) Normal, Slide reviewed, see previous RBC morphology.    Platelet Morphology Normal Normal    Macrocytosis 1+      Ovalocytes 1+     POCT Glucose    Collection Time: 06/06/24  9:26 AM   Result Value Ref Range    POC Glucose  91 70 - 99 mg/dL   Prothrombin Time (PT)    Collection Time: 06/06/24  9:48  AM   Result Value Ref Range    PT 21.0 (H) 11.6 - 14.8 seconds    INR 1.70 (H) 0.80 - 1.20   POCT Glucose    Collection Time: 06/06/24  1:18 PM   Result Value Ref Range    POC Glucose  133 (H) 70 - 99 mg/dL         Test results/Imaging:   CT STROKE CTA BRAIN/CTA NECK (W IV)(CPT=70496/94592)    Result Date: 6/4/2024  CONCLUSION:  1. No major vessel occlusion, hemodynamically significant stenosis, dissection, AVM or aneurysm. 2. Developing encephalomalacia right cerebellum at site of previous hemorrhage.  No acute hemorrhage. 3. 11 mm mean diameter right apical pulmonary nodule.  Follow-up complete chest CT recommended.   This report was called to patient's nurse Audrey 11:59 a.m..    Dictated by (CST): Bal Allan MD on 6/04/2024 at 11:50 AM     Finalized by (CST): Bal Allan MD on 6/04/2024 at 12:00 PM          CT STROKE BRAIN (NO IV)(CPT=70450)    Result Date: 6/4/2024  CONCLUSION:  1. No acute infarct or hemorrhage. 2. Developing encephalomalacia in the right cerebellum at previous site of hemorrhage documented 04/03/2024.  This report was called immediately to patient's nurse and Nancy at 11:45 a.m.    Dictated by (CST): Bal Allan MD on 6/04/2024 at 11:41 AM     Finalized by (CST): Bal Allan MD on 6/04/2024 at 11:47 AM          CT BRAIN OR HEAD (09335)    Result Date: 4/3/2024  CONCLUSION: 3 x 1 x 2 cm for ovoid soft tissue density in the right cerebellar vermis and adjacent hemisphere with surrounding edema.  It could represent an acute/subacute/recent site of hemorrhage/hematoma versus hemorrhagic lesion (such as primary malignancy versus metastatic focus).  Recommend further characterization with MRI brain with without contrast.   [facility] provided a preliminary report for this examination. This final report agrees with their preliminary findings.   Dictated by (CST): Rob Duvall MD on 4/03/2024 at 8:43 AM     Finalized by (CST): Rob Duvall MD on 4/03/2024 at 8:50 AM                 Performed an independent visualization of:  MRI brain WO   Imaging revealed: Agree with radiology read.         Disclaimer:   This record was dictated using Dragon software. There may be errors due to voice recognition problems that were not realized and corrected during the completion of the note.      This document is not intended to support charting by exception.  Sections left blank in a completed note should be presumed not to have been done.     Total face to face time was 35 minutes, more than 50% of the time was spent in counseling and/or coordination of care related to  sensory ataxia .    Thank you.  Pablo Dean D.O.   Vascular & General Neurology    6/6/2024  3:28 PM

## 2024-06-06 NOTE — PROGRESS NOTES
[facility]  part of [facility]    Progress Note    Christi Tavarez Patient Status:  Inpatient    1960 MRN [id number]   Location [facility] 3W/SW Attending Antony Zavala MD   Hosp Day # 3 PCP ÁNGEL Delgado     Chief Complaint:     Subjective:     Constitutional:  Positive for fatigue.   HENT: Negative.     Eyes: Negative.    Respiratory: Negative.     Cardiovascular: Negative.    Gastrointestinal: Negative.    Genitourinary: Negative.    Musculoskeletal: Negative.    Skin: Negative.    Neurological: Negative.        Objective:   Blood pressure 109/56, pulse 80, temperature 98.1 °F (36.7 °C), temperature source Oral, resp. rate 18, height 4' 9\" (1.448 m), weight 146 lb 3.2 oz (66.3 kg), SpO2 94%.  Physical Exam  Vitals and nursing note reviewed.   HENT:      Head: Normocephalic and atraumatic.      Nose: Nose normal.      Mouth/Throat:      Mouth: Mucous membranes are moist.   Cardiovascular:      Rate and Rhythm: Normal rate.   Pulmonary:      Effort: Pulmonary effort is normal.      Breath sounds: Normal breath sounds.   Abdominal:      General: Abdomen is flat. Bowel sounds are normal.      Palpations: Abdomen is soft.   Musculoskeletal:         General: Normal range of motion.      Cervical back: Normal range of motion and neck supple.   Skin:     General: Skin is warm and dry.   Neurological:      Mental Status: She is alert and oriented to person, place, and time.      Comments: But at times slow with responding    Psychiatric:         Mood and Affect: Mood normal.         Results:   Lab Results   Component Value Date    WBC 10.2 2024    HGB 10.9 (L) 2024    HCT 31.1 (L) 2024    .0 2024    CREATSERUM 0.72 2024    CREATSERUM 0.72 2024    BUN 6 (L) 2024    BUN 6 (L) 2024     2024     2024    K 3.9 2024    K 3.9 2024    K 3.9 2024     2024     2024    CO2  26.0 06/06/2024    CO2 26.0 06/06/2024     (H) 06/06/2024     (H) 06/06/2024    CA 8.3 (L) 06/06/2024    CA 8.3 (L) 06/06/2024    ALB 2.5 (L) 06/06/2024    ALKPHO 126 06/06/2024    BILT 3.4 (H) 06/06/2024    TP 6.7 06/06/2024    AST 35 (H) 06/06/2024    ALT 14 06/06/2024    PTT 34.8 06/03/2024    INR 1.70 (H) 06/06/2024    T4F 1.0 04/03/2024    TSH 8.140 (H) 04/03/2024    LIP 49 06/03/2024    MG 2.0 04/04/2024    TROPHS <3 06/03/2024     04/03/2024    B12 >2,000 (H) 04/04/2024    ETOH <3 04/03/2024       MRI BRAIN WO ACUTE (3) SEQUENCE (CPT=70551)    Result Date: 6/6/2024  CONCLUSION:   Interval decrease in size of previously described subacute hematoma right cerebellum currently measuring 2.4 x 1.8 cm since the prior examination 04/03/2024.  No significant associated mass effect or edema.  No evidence of midline shift.  Otherwise no evidence of acute intracranial abnormality or evidence of acute infarction.    Dictated by (CST): Henrry Garduno MD on 6/06/2024 at 8:24 AM     Finalized by (CST): Henrry Garduno MD on 6/06/2024 at 8:31 AM          CT CHEST (AEC=32623)    Result Date: 6/6/2024  CONCLUSION:   1.5 cm indeterminate nodule right upper lobe/right lung apex.  Advise Pulmonary evaluation and correlation with PET-CT when clinically appropriate.  Small left and trace right bilateral pleural effusions with probable mild adjacent bilateral lower lobe passive atelectasis.  Micro nodular hepatic contour consistent with cirrhosis.  Increased mild-to-moderate upper abdominal ascites.  Cholelithiasis and gallbladder sludge.  Lesser incidental findings as above.      Dictated by (CST): Henrry Garduno MD on 6/06/2024 at 8:10 AM     Finalized by (CST): Henrry Garduno MD on 6/06/2024 at 8:23 AM          US PARACENTESIS W IMAGING (CPT=49083)    Result Date: 6/5/2024  CONCLUSION: Paracentesis as described.    Dictated by (CST): Kellee Downs on 6/05/2024 at 3:20 PM     Finalized by (CST):  Himanshu Odalys on 6/05/2024 at 3:21 PM               Assessment & Plan:     Pt/ot eval     Continue with current care       MRI brain results noted with no acute findings     Low K resolved, will follow      paracentesis done yesterday 4 L removed          Fluid overload- improved, continue with diuretics, echo ok\     ? Cardiac, cardiology on board , continue with diuresing     Also component of liver cirrhosis           Alcoholic cirrhosis of liver with ascites (HCC)- abd distention, gi on board for abd paracentesis tomorrow, katherine follow labs                     Anemia- of chronic disease stable , will follow                    Type 2 diabetes mellitus without complication, without long-term current use of insulin (HCC)- will monitor bs           Cerebellar hemorrhage (HCC)- repeat mri ct head today \" Developing encephalomalacia right cerebellum at site of previous hemorrhage.  No acute hemorrhage      Gen weakness- pt/ot      Lung nodule incidentally noted on ct head, will order ct lungs            ? Swallow problem will get speech to see her     Pt/ot           Antony Zavala MD  6/6/2024

## 2024-06-06 NOTE — PLAN OF CARE
Problem: Patient Centered Care  Goal: Patient preferences are identified and integrated in the patient's plan of care  Description: Interventions:  - What would you like us to know as we care for you?   - Provide timely, complete, and accurate information to patient/family  - Incorporate patient and family knowledge, values, beliefs, and cultural backgrounds into the planning and delivery of care  - Encourage patient/family to participate in care and decision-making at the level they choose  - Honor patient and family perspectives and choices  Outcome: Progressing     Problem: Diabetes/Glucose Control  Goal: Glucose maintained within prescribed range  Description: INTERVENTIONS:  - Monitor Blood Glucose as ordered  - Assess for signs and symptoms of hyperglycemia and hypoglycemia  - Administer ordered medications to maintain glucose within target range  - Assess barriers to adequate nutritional intake and initiate nutrition consult as needed  - Instruct patient on self management of diabetes  Outcome: Progressing     Problem: CARDIOVASCULAR - ADULT  Goal: Maintains optimal cardiac output and hemodynamic stability  Description: INTERVENTIONS:  - Monitor vital signs, rhythm, and trends  - Monitor for bleeding, hypotension and signs of decreased cardiac output  - Evaluate effectiveness of vasoactive medications to optimize hemodynamic stability  - Monitor arterial and/or venous puncture sites for bleeding and/or hematoma  - Assess quality of pulses, skin color and temperature  - Assess for signs of decreased coronary artery perfusion - ex. Angina  - Evaluate fluid balance, assess for edema, trend weights  Outcome: Progressing  Goal: Absence of cardiac arrhythmias or at baseline  Description: INTERVENTIONS:  - Continuous cardiac monitoring, monitor vital signs, obtain 12 lead EKG if indicated  - Evaluate effectiveness of antiarrhythmic and heart rate control medications as ordered  - Initiate emergency measures for  life threatening arrhythmias  - Monitor electrolytes and administer replacement therapy as ordered  Outcome: Progressing     Problem: RESPIRATORY - ADULT  Goal: Achieves optimal ventilation and oxygenation  Description: INTERVENTIONS:  - Assess for changes in respiratory status  - Assess for changes in mentation and behavior  - Position to facilitate oxygenation and minimize respiratory effort  - Oxygen supplementation based on oxygen saturation or ABGs  - Provide Smoking Cessation handout, if applicable  - Encourage broncho-pulmonary hygiene including cough, deep breathe, Incentive Spirometry  - Assess the need for suctioning and perform as needed  - Assess and instruct to report SOB or any respiratory difficulty  - Respiratory Therapy support as indicated  - Manage/alleviate anxiety  - Monitor for signs/symptoms of CO2 retention  Outcome: Progressing     Problem: METABOLIC/FLUID AND ELECTROLYTES - ADULT  Goal: Electrolytes maintained within normal limits  Description: INTERVENTIONS:  - Monitor labs and rhythm and assess patient for signs and symptoms of electrolyte imbalances  - Administer electrolyte replacement as ordered  - Monitor response to electrolyte replacements, including rhythm and repeat lab results as appropriate  - Fluid restriction as ordered  - Instruct patient on fluid and nutrition restrictions as appropriate  Outcome: Progressing     Problem: NEUROLOGICAL - ADULT  Goal: Achieves stable or improved neurological status  Description: INTERVENTIONS  - Assess for and report changes in neurological status  - Initiate measures to prevent increased intracranial pressure  - Maintain blood pressure and fluid volume within ordered parameters to optimize cerebral perfusion and minimize risk of hemorrhage  - Monitor temperature, glucose, and sodium. Initiate appropriate interventions as ordered  Outcome: Progressing     Patient alert and oriented x3-4.  Call light and belongings within reach.  Bed locked and  in lowest position, bed alarm on.  Plan for CT chest.

## 2024-06-06 NOTE — CM/SW NOTE
Social work was able to meet with the patient and her daughter at bedside regarding discharge planning.    The Anticipated therapy need: Gradual Rehabilitative Therapy.  The patient and her daughter are in agreement, however the patient's insurance benefits only cover 30 days per therapy per year and she has exhausted those days this year.    Social work called the previous SNF ([facility]) and confirmed this to be true.    Social work gave the [facility] list at bedside to the patient's daughter with respite rates.    The patient's daughter will go tour the [facility] as their respite rate is $241.50 per day.    The patient's daughter will give me a call if they would like to proceed with one of the facilities after the tour.    If the patient and family decided against a facility, [facility] is set up for PT/OT/RN/  Social work to call Tita at [facility] when ready for dc [phone].    SW/CM to remain available for support and/or discharge planning.     Jannette Dobson MSW, LSW  Discharge Planner [id number]

## 2024-06-06 NOTE — CONGREGATE LIVING REVIEW
Critical access hospital Living Authorization    The [facility] Review Committee has reviewed this case and the patient IS APPROVED for discharge to a facility for Short Term Skilled once the following procedure is followed:     - The physician discharge instructions (contained within the YUE note for SNF) must inlcude the below appropriate and approved COVID instructions to the facility    For questions regarding CLRC approval process, please contact the CM assigned to the case.  For questions regarding RN discharge workflow, please contact the unit Clinical Leader.

## 2024-06-07 ENCOUNTER — APPOINTMENT (OUTPATIENT)
Dept: CT IMAGING | Facility: HOSPITAL | Age: 64
DRG: 432 | End: 2024-06-07
Attending: REGISTERED NURSE

## 2024-06-07 LAB
ALBUMIN SERPL-MCNC: 2.7 G/DL (ref 3.2–4.8)
ALBUMIN/GLOB SERPL: 0.6 {RATIO} (ref 1–2)
ALP LIVER SERPL-CCNC: 131 U/L
ALT SERPL-CCNC: 12 U/L
AMMONIA PLAS-MCNC: <10 UMOL/L (ref 11–32)
ANION GAP SERPL CALC-SCNC: 5 MMOL/L (ref 0–18)
ANION GAP SERPL CALC-SCNC: 5 MMOL/L (ref 0–18)
AST SERPL-CCNC: 38 U/L (ref ?–34)
BASOPHILS # BLD AUTO: 0.09 X10(3) UL (ref 0–0.2)
BASOPHILS NFR BLD AUTO: 1.1 %
BILIRUB SERPL-MCNC: 3.3 MG/DL (ref 0.2–1.1)
BUN BLD-MCNC: 7 MG/DL (ref 9–23)
BUN BLD-MCNC: 7 MG/DL (ref 9–23)
BUN/CREAT SERPL: 9.9 (ref 10–20)
BUN/CREAT SERPL: 9.9 (ref 10–20)
CALCIUM BLD-MCNC: 8.8 MG/DL (ref 8.7–10.4)
CALCIUM BLD-MCNC: 8.8 MG/DL (ref 8.7–10.4)
CHLORIDE SERPL-SCNC: 103 MMOL/L (ref 98–112)
CHLORIDE SERPL-SCNC: 103 MMOL/L (ref 98–112)
CO2 SERPL-SCNC: 27 MMOL/L (ref 21–32)
CO2 SERPL-SCNC: 27 MMOL/L (ref 21–32)
CREAT BLD-MCNC: 0.71 MG/DL
CREAT BLD-MCNC: 0.71 MG/DL
DEPRECATED RDW RBC AUTO: 63.7 FL (ref 35.1–46.3)
EGFRCR SERPLBLD CKD-EPI 2021: 95 ML/MIN/1.73M2 (ref 60–?)
EGFRCR SERPLBLD CKD-EPI 2021: 95 ML/MIN/1.73M2 (ref 60–?)
EOSINOPHIL # BLD AUTO: 0.14 X10(3) UL (ref 0–0.7)
EOSINOPHIL NFR BLD AUTO: 1.8 %
ERYTHROCYTE [DISTWIDTH] IN BLOOD BY AUTOMATED COUNT: 16.5 % (ref 11–15)
GLOBULIN PLAS-MCNC: 4.5 G/DL (ref 2–3.5)
GLUCOSE BLD-MCNC: 90 MG/DL (ref 70–99)
GLUCOSE BLD-MCNC: 90 MG/DL (ref 70–99)
GLUCOSE BLDC GLUCOMTR-MCNC: 110 MG/DL (ref 70–99)
GLUCOSE BLDC GLUCOMTR-MCNC: 146 MG/DL (ref 70–99)
GLUCOSE BLDC GLUCOMTR-MCNC: 85 MG/DL (ref 70–99)
GLUCOSE BLDC GLUCOMTR-MCNC: 87 MG/DL (ref 70–99)
HCT VFR BLD AUTO: 33.8 %
HGB BLD-MCNC: 11.9 G/DL
IMM GRANULOCYTES # BLD AUTO: 0.03 X10(3) UL (ref 0–1)
IMM GRANULOCYTES NFR BLD: 0.4 %
INR BLD: 1.59 (ref 0.8–1.2)
LYMPHOCYTES # BLD AUTO: 2.38 X10(3) UL (ref 1–4)
LYMPHOCYTES NFR BLD AUTO: 29.8 %
MCH RBC QN AUTO: 37.5 PG (ref 26–34)
MCHC RBC AUTO-ENTMCNC: 35.2 G/DL (ref 31–37)
MCV RBC AUTO: 106.6 FL
MONOCYTES # BLD AUTO: 0.94 X10(3) UL (ref 0.1–1)
MONOCYTES NFR BLD AUTO: 11.8 %
NEUTROPHILS # BLD AUTO: 4.41 X10 (3) UL (ref 1.5–7.7)
NEUTROPHILS # BLD AUTO: 4.41 X10(3) UL (ref 1.5–7.7)
NEUTROPHILS NFR BLD AUTO: 55.1 %
OSMOLALITY SERPL CALC.SUM OF ELEC: 278 MOSM/KG (ref 275–295)
OSMOLALITY SERPL CALC.SUM OF ELEC: 278 MOSM/KG (ref 275–295)
PLATELET # BLD AUTO: 143 10(3)UL (ref 150–450)
POTASSIUM SERPL-SCNC: 3.8 MMOL/L (ref 3.5–5.1)
POTASSIUM SERPL-SCNC: 3.8 MMOL/L (ref 3.5–5.1)
PROT SERPL-MCNC: 7.2 G/DL (ref 5.7–8.2)
PROTHROMBIN TIME: 19.9 SECONDS (ref 11.6–14.8)
RBC # BLD AUTO: 3.17 X10(6)UL
SODIUM SERPL-SCNC: 135 MMOL/L (ref 136–145)
SODIUM SERPL-SCNC: 135 MMOL/L (ref 136–145)
WBC # BLD AUTO: 8 X10(3) UL (ref 4–11)

## 2024-06-07 PROCEDURE — 99232 SBSQ HOSP IP/OBS MODERATE 35: CPT | Performed by: REGISTERED NURSE

## 2024-06-07 PROCEDURE — 74170 CT ABD WO CNTRST FLWD CNTRST: CPT | Performed by: REGISTERED NURSE

## 2024-06-07 PROCEDURE — 99232 SBSQ HOSP IP/OBS MODERATE 35: CPT | Performed by: INTERNAL MEDICINE

## 2024-06-07 RX ORDER — POTASSIUM CHLORIDE 1.5 G/1.58G
40 POWDER, FOR SOLUTION ORAL ONCE
Status: COMPLETED | OUTPATIENT
Start: 2024-06-07 | End: 2024-06-07

## 2024-06-07 RX ORDER — FUROSEMIDE 10 MG/ML
40 INJECTION INTRAMUSCULAR; INTRAVENOUS 3 TIMES DAILY
Status: DISCONTINUED | OUTPATIENT
Start: 2024-06-07 | End: 2024-06-11

## 2024-06-07 NOTE — CDS QUERY
.How to answer this Query:     1.) DON'T CLICK COSIGN BUTTON FIRST  2.) Click \"3 dots...\" to the right of cosign button and click EDIT on the toolbar.  2.) Type an \"X\" in the bracket for the diagnosis that applies. (You may also add additional clinical details as you feel necessary to substantiate your response).   3.) Finally click \"Sign\" to complete response.  Thank You      CLINICAL DOCUMENTATION CLARIFICATION FORM  Dear Doctor Lucas,   Clinical information (provided below) includes a history of CHF. Please specify type.  .  PLEASE (X) DIAGNOSIS THAT APPLIES.          (    ) Chronic diastolic Heart Failure    (  x  ) Other - please specify: ___no heart failure, ____________________________        Clinical Indicators: Echo 6/4/24 - ejection fraction was 60-65%, BNP 56,      Cardiology note 6/4 - Volume overload,  Admitted with LE edema and shortness of breath, likely heart failure, however, not currently diagnosed     Risk Factor: 63 year old w/ a pmhx sig. for recent right-sided cerebellar hemorrhage in April 2024, history of alcohol abuse, cognitive impairment, CHF, diabetes      Treatment: Cardiology consult, IV Lasix, Aldactone       If you have any questions, please contact Clinical :  Bennett OLIVIER at [phone]     Thank You!     THIS FORM IS A PERMANENT PART OF THE MEDICAL RECORD

## 2024-06-07 NOTE — PLAN OF CARE
Aox3-4, forgetful. Room air. 2 assist with walker. IV lasix. NPO for potential EGD today. Discharge to [facility] pending insurance and choice.     Problem: CARDIOVASCULAR - ADULT  Goal: Maintains optimal cardiac output and hemodynamic stability  Description: INTERVENTIONS:  - Monitor vital signs, rhythm, and trends  - Monitor for bleeding, hypotension and signs of decreased cardiac output  - Evaluate effectiveness of vasoactive medications to optimize hemodynamic stability  - Monitor arterial and/or venous puncture sites for bleeding and/or hematoma  - Assess quality of pulses, skin color and temperature  - Assess for signs of decreased coronary artery perfusion - ex. Angina  - Evaluate fluid balance, assess for edema, trend weights  Outcome: Progressing

## 2024-06-07 NOTE — PROGRESS NOTES
[facility]  part of [facility]    Progress Note    Christi Tavarez Patient Status:  Inpatient    1960 MRN [id number]   Location [facility] 3W/SW Attending Antony Zavala MD   Hosp Day # 4 PCP ÁNGEL Delgado     Chief Complaint:     Subjective:     Constitutional:  Positive for fatigue.   HENT: Negative.     Eyes: Negative.    Respiratory: Negative.     Cardiovascular:  Positive for leg swelling.        Improved    Gastrointestinal: Negative.    Genitourinary: Negative.    Musculoskeletal: Negative.    Skin: Negative.    Neurological: Negative.        Objective:   Blood pressure 104/54, pulse 74, temperature 98.2 °F (36.8 °C), temperature source Oral, resp. rate 18, height 4' 9\" (1.448 m), weight 143 lb (64.9 kg), SpO2 98%.  Physical Exam  Vitals and nursing note reviewed.   HENT:      Head: Normocephalic and atraumatic.      Nose: Nose normal.      Mouth/Throat:      Mouth: Mucous membranes are moist.   Cardiovascular:      Rate and Rhythm: Normal rate.   Pulmonary:      Effort: Pulmonary effort is normal.      Breath sounds: Normal breath sounds.   Abdominal:      General: Abdomen is flat. Bowel sounds are normal.      Palpations: Abdomen is soft.   Musculoskeletal:         General: Normal range of motion.      Cervical back: Normal range of motion and neck supple.   Skin:     General: Skin is warm and dry.   Neurological:      Mental Status: She is alert and oriented to person, place, and time.      Comments: But at times slow with responding    Psychiatric:         Mood and Affect: Mood normal.         Results:   Lab Results   Component Value Date    WBC 10.2 2024    HGB 10.9 (L) 2024    HCT 31.1 (L) 2024    .0 2024    CREATSERUM 0.72 2024    CREATSERUM 0.72 2024    BUN 6 (L) 2024    BUN 6 (L) 2024     2024     2024    K 3.9 2024    K 3.9 2024    K 3.9 2024     2024      06/06/2024    CO2 26.0 06/06/2024    CO2 26.0 06/06/2024     (H) 06/06/2024     (H) 06/06/2024    CA 8.3 (L) 06/06/2024    CA 8.3 (L) 06/06/2024    ALB 2.5 (L) 06/06/2024    ALKPHO 126 06/06/2024    BILT 3.4 (H) 06/06/2024    TP 6.7 06/06/2024    AST 35 (H) 06/06/2024    ALT 14 06/06/2024    PTT 34.8 06/03/2024    INR 1.70 (H) 06/06/2024    T4F 1.0 04/03/2024    TSH 8.140 (H) 04/03/2024    LIP 49 06/03/2024    MG 2.0 04/04/2024    TROPHS <3 06/03/2024     04/03/2024    B12 >2,000 (H) 04/04/2024    ETOH <3 04/03/2024       MRI BRAIN WO ACUTE (3) SEQUENCE (CPT=70551)    Result Date: 6/6/2024  CONCLUSION:   Interval decrease in size of previously described subacute hematoma right cerebellum currently measuring 2.4 x 1.8 cm since the prior examination 04/03/2024.  No significant associated mass effect or edema.  No evidence of midline shift.  Otherwise no evidence of acute intracranial abnormality or evidence of acute infarction.    Dictated by (CST): Henrry Garduno MD on 6/06/2024 at 8:24 AM     Finalized by (CST): Henrry Garduno MD on 6/06/2024 at 8:31 AM          CT CHEST (KWX=82107)    Result Date: 6/6/2024  CONCLUSION:   1.5 cm indeterminate nodule right upper lobe/right lung apex.  Advise Pulmonary evaluation and correlation with PET-CT when clinically appropriate.  Small left and trace right bilateral pleural effusions with probable mild adjacent bilateral lower lobe passive atelectasis.  Micro nodular hepatic contour consistent with cirrhosis.  Increased mild-to-moderate upper abdominal ascites.  Cholelithiasis and gallbladder sludge.  Lesser incidental findings as above.      Dictated by (CST): Henrry Garduno MD on 6/06/2024 at 8:10 AM     Finalized by (CST): Henrry Garduno MD on 6/06/2024 at 8:23 AM          US PARACENTESIS W IMAGING (CPT=49083)    Result Date: 6/5/2024  CONCLUSION: Paracentesis as described.    Dictated by (CST): Kellee Downs on 6/05/2024 at 3:20  PM     Finalized by (CST): Kellee Downs on 6/05/2024 at 3:21 PM               Assessment & Plan:        Edema is down, continue with IV lasix as per cards     For possible egd today     Pt/ot eval      Continue with current care       MRI brain results noted with no acute findings     Low K resolved, will follow      paracentesis done yesterday 4 L removed          Fluid overload- improved, continue with diuretics, echo ok\     ? Cardiac, cardiology on board , continue with diuresing     Also component of liver cirrhosis           Alcoholic cirrhosis of liver with ascites (HCC)- abd distention, gi on board for abd paracentesis tomorrow, katherine follow labs                     Anemia- of chronic disease stable , will follow                    Type 2 diabetes mellitus without complication, without long-term current use of insulin (HCC)- will monitor bs           Cerebellar hemorrhage (HCC)- repeat mri ct head today \" Developing encephalomalacia right cerebellum at site of previous hemorrhage.  No acute hemorrhage      Gen weakness- pt/ot      Lung nodule incidentally noted on ct head, will order ct lungs            ? Swallow problem will get speech to see her           Antony Zavala MD  6/7/2024

## 2024-06-07 NOTE — PROGRESS NOTES
Progress Note  Christi Tavarez Patient Status:  Inpatient    1960 MRN [id number]   Location [facility] 3W/SW Attending Antony Zavala MD   Hosp Day # 4 PCP ÁNGEL Delgado     SUBJECTIVE:    Denies chest pain or dyspnea. Reports that the swelling in her extremities feels the same as on admission. Appetite is improving.     VITALS:  /54 (BP Location: Right arm)   Pulse 74   Temp 98.2 °F (36.8 °C) (Oral)   Resp 18   Ht 4' 9\" (1.448 m)   Wt 143 lb (64.9 kg)   SpO2 98%   BMI 30.94 kg/m²     INTAKE/OUTPUT:    Intake/Output Summary (Last 24 hours) at 2024 0906  Last data filed at 2024 2100  Gross per 24 hour   Intake 480 ml   Output 1350 ml   Net -870 ml     Last 3 Weights   24 0433 143 lb (64.9 kg)   24 0942 146 lb 3.2 oz (66.3 kg)   24 0538 139 lb 11.2 oz (63.4 kg)   24 0555 155 lb 6.4 oz (70.5 kg)   24 0500 155 lb 4.8 oz (70.4 kg)   24 1810 162 lb 14.4 oz (73.9 kg)   24 1250 160 lb (72.6 kg)   24 0537 153 lb 11.2 oz (69.7 kg)   24 0507 157 lb 12.8 oz (71.6 kg)   24 0416 158 lb (71.7 kg)   24 0800 161 lb 13.1 oz (73.4 kg)   24 1700 154 lb 15.7 oz (70.3 kg)   24 0400 142 lb 3.2 oz (64.5 kg)   24 0523 139 lb 12.4 oz (63.4 kg)   24 2121 130 lb (59 kg)   24 0533 139 lb 12.4 oz (63.4 kg)     LABS:  Recent Labs   Lab 24  2309 24  0543 24  1610 24  0323   * 85  --  103*  103*   BUN 7* 6*  --  6*  6*   CREATSERUM 0.67 0.67  --  0.72  0.72   EGFRCR 98 98  --  94  94   CA 8.1* 8.2*  --  8.3*  8.3*    138  --  139  139   K 3.2* 2.9* 3.4* 3.9  3.9  3.9    104  --  105  105   CO2 27.0 27.0  --  26.0  26.0     Recent Labs   Lab 24  0917 24  0543 24  0416   RBC 2.70* 2.65* 2.94*   HGB 10.3* 9.8* 10.9*   HCT 27.9* 27.5* 31.1*   .3* 103.8* 105.8*   MCH 38.1* 37.0* 37.1*   MCHC 36.9 35.6 35.0   RDW 16.6* 17.0* 17.2*   NEPRELIM  5.61 6.10 5.61   WBC 9.7 10.8 10.2   .0* 131.0* 151.0     No results for input(s): \"TROP\", \"CK\" in the last 168 hours.    DIAGNOSTICS:    TELEMETRY: SR    ECHO 6/4/2024:  Echo: 06/04/24  Conclusions:   1. Left ventricle: The cavity size was normal. Wall thickness was normal.      Systolic function was normal. The estimated ejection fraction was 60-65%,      by visual assessment. No diagnostic evidence for regional wall motion      abnormalities. Left ventricular diastolic function parameters were      normal.   2. Left atrium: The left atrial volume was normal.   3. Pulmonary arteries: Systolic pressure was within the normal range,      estimated to be 24mm Hg.   4. Systemic veins: Central venous respirophasic diameter changes are blunted      (< 50%).   5. Inferior vena cava: The IVC was normal-sized.   Impressions:  No previous study was available for comparison.     ROS: Negative unless noted above     PHYSICAL EXAM:  General: Alert and oriented x 3. No apparent distress.  HEENT: Normocephalic, sclera are nonicteric. Hearing appropriate bilaterally.  Neck: No JVD or Carotid bruits. Trachea midline.   Cardiac: Regular rate and rhythm. S1, S2 auscultated. No murmurs, rubs, or gallops appreciated.   Lungs: Clear without wheezes, rales, rhonchi or dullness. Chest expansion symmetrical. Regular effort.  Abdomen: Soft, rounded, non-tender, +BS.   Extremities: Without clubbing, cyanosis. +BLE edema +3 that extends from feet to mid thigh, +2 that extends from mid thigh bilaterally to lower buttocks. +1 to sacrum  Neurologic: Motor and sensory nerves grossly intact.   Psych: Appropriate affect   Skin: Warm and dry. +jaundice    MEDICATIONS:   furosemide  40 mg Intravenous BID (Diuretic)    insulin aspart  1-5 Units Subcutaneous TID CC    traZODone  25 mg Oral Nightly    escitalopram  10 mg Oral Nightly    folic acid  1 mg Oral Daily    lactulose  20 g Oral BID    lamoTRIgine  25 mg Oral BID    levothyroxine  50 mcg  Oral Before breakfast    pantoprazole  20 mg Oral QAM AC    potassium chloride  10 mEq Oral Daily    spironolactone  25 mg Oral Daily     ASSESSMENT:    Volume Overload  Anasarca  - BNP 56, Chest CT 6/6 with small left and trace rt pleural effusions   - ECHO 6/4/2024: LVEF 60-65%, no WMA, normal IVC   - Volume expansion appears to be more related to liver disease than cardiac in etiology   - IV Lasix BID, Net -7L, Net wt loss 20 lbs  - Also on Aldactone  - Appears volume overloaded; secondary to hypoalbuminemia and Liver disease    Alcoholic Cirrhosis   - GI following   - Elevated Bilirubin, Albumin 2.5, AST has normalized   - s/p Paracentesis on 6/5 with 4L removed     AMS  Hx Right Sided Cerebellar Hemorrhage April 2024  - RRT on 6/4 AM due to stroke like symptoms, CT brain without acute findings, CTA without large vessel occlusion. MRI negative. Evaluated by neurology, more likely sensory ataxia due to neuropathy     HTN- Controlled   HLD-   Type II DM- A1c 4.4%  Chronic Anemia- Stable yesterday, labs pending today     PLAN:  - NPO for EGD today   - Significant pitting edema that extends to her sacral area. Increase Lasix to 40 mg TID, labs pending  - Increase activity as tolerated     Plan of care discussed with patient and RN.     Adwoa Ceja, APRN  6/7/2024  9:06 AM  [phone] ([place])  [phone] ([place])

## 2024-06-07 NOTE — PROGRESS NOTES
06/07/24 1127   VISIT TYPE   SLP Inpatient Visit Type (Documentation Required) Attempted Treatment  (SLP attempted swallow f/u however pt currently NPO for EGD. Will f/u at a later time as schedule allows.)   FOLLOW UP/PLAN   Follow Up Needed (Documentation Required) Yes   SLP Follow-up Date 06/08/24     Lili Hooper M.S. CCC-SLP  Speech Language Pathologist  Phone Number Ext. [phone]

## 2024-06-07 NOTE — CM/SW NOTE
Social work follow up with the patient's daughter this morning regarding [facility]/Respite choice.    The patient's daughter toured [facility] yesterday.    The patient's daughter was not aware that the respite rate did not include therapy or medication.    The daughter is still weighing options between [facility]/Respite Versus Home w/HH and Caregiver.    Social work will follow up.    SW/CM to remain available for support and/or discharge planning.     Jannette Dobson MSW, LSW  Discharge Planner [id number]

## 2024-06-07 NOTE — PLAN OF CARE
PtVeronica Barraza is A&Ox 4. Lives at home with daughter. Patient is ambulating using 1-2 assist with walker. Patient isn't experiencing pain. Incontinent of bowel and bladder. IV lasix given. CT pending. Neuro q4 completed. Call light within reach, fall precautions maintained.     Plan is Neuro Q4, IV lasix TID, [facility] vs [facility] .    Problem: Patient Centered Care  Goal: Patient preferences are identified and integrated in the patient's plan of care  Description: Interventions:  - What would you like us to know as we care for you? I live at home with my daughter with [facility] services  - Provide timely, complete, and accurate information to patient/family  - Incorporate patient and family knowledge, values, beliefs, and cultural backgrounds into the planning and delivery of care  - Encourage patient/family to participate in care and decision-making at the level they choose  - Honor patient and family perspectives and choices  Outcome: Progressing     Problem: Diabetes/Glucose Control  Goal: Glucose maintained within prescribed range  Description: INTERVENTIONS:  - Monitor Blood Glucose as ordered  - Assess for signs and symptoms of hyperglycemia and hypoglycemia  - Administer ordered medications to maintain glucose within target range  - Assess barriers to adequate nutritional intake and initiate nutrition consult as needed  - Instruct patient on self management of diabetes  Outcome: Progressing     Problem: CARDIOVASCULAR - ADULT  Goal: Maintains optimal cardiac output and hemodynamic stability  Description: INTERVENTIONS:  - Monitor vital signs, rhythm, and trends  - Monitor for bleeding, hypotension and signs of decreased cardiac output  - Evaluate effectiveness of vasoactive medications to optimize hemodynamic stability  - Monitor arterial and/or venous puncture sites for bleeding and/or hematoma  - Assess quality of pulses, skin color and temperature  - Assess for signs of decreased coronary artery perfusion - ex. Angina  -  Evaluate fluid balance, assess for edema, trend weights  Outcome: Progressing  Goal: Absence of cardiac arrhythmias or at baseline  Description: INTERVENTIONS:  - Continuous cardiac monitoring, monitor vital signs, obtain 12 lead EKG if indicated  - Evaluate effectiveness of antiarrhythmic and heart rate control medications as ordered  - Initiate emergency measures for life threatening arrhythmias  - Monitor electrolytes and administer replacement therapy as ordered  Outcome: Progressing     Problem: METABOLIC/FLUID AND ELECTROLYTES - ADULT  Goal: Electrolytes maintained within normal limits  Description: INTERVENTIONS:  - Monitor labs and rhythm and assess patient for signs and symptoms of electrolyte imbalances  - Administer electrolyte replacement as ordered  - Monitor response to electrolyte replacements, including rhythm and repeat lab results as appropriate  - Fluid restriction as ordered  - Instruct patient on fluid and nutrition restrictions as appropriate  Outcome: Progressing

## 2024-06-07 NOTE — PROGRESS NOTES
[facility]     Gastroenterology Progress Note    Christi Tavarez Patient Status:  Inpatient    1960 MRN [id number]   Location [facility] 3W/SW Attending Antony Zavala MD   Hosp Day # 4 PCP ÁNGEL Delgado       Subjective:   Pt states she feels well, no ABD pain, N/V  NPO for possible procedure  Notes intermittent brain fog though stooling 2-3 times per day with normal ammonia level.  No fever, chills  No chest pain, SOB  Objective:   Blood pressure 104/54, pulse 74, temperature 98.2 °F (36.8 °C), temperature source Oral, resp. rate 18, height 4' 9\" (1.448 m), weight 143 lb (64.9 kg), SpO2 98%. Body mass index is 30.94 kg/m².    Gen: awake, alert patient, NAD  HEENT: EOMI, the sclera appears icteric, oropharynx clear, mucus membranes appear moist  CV: RRR  Lung: no conversational dyspnea   Abdomen: soft NTND abdomen with NABS appreciated   Skin: dry, warm, + jaundice  Ext: +2 BLE edema is evident  Neuro: Alert, oriented x3 and interactive  Psych: calm, cooperative    Assessment and Plan:   Christi Tavarez is a 63 year old female w/ PMHx of  BMI 29.21, DM2, hypothyroidism, HLD, disordered ETOH, ICH in 2024 who presented to ER for worsening SOB and edema. Daughter assists with history. Patient was recently admitted in April and found to have ICH. She was then sent to rehab. Over the last 2 weeks has had increased swelling in both legs and shortness of breath when trying to get around. GI consulted for cirrhosis. Patient last drink was in April. Denies abdominal pain, nausea and vomiting. Having BM daily. Notes they are brown in color had one episode of bright red blood tinged stool. No diarrhea. No fevers or chills. No pruritus.      #SOB  #lower extremity weakness  -progressively worsening with associated lower extremity edema over last 2 weeks  -etiology possibly cardiac, hepatic congestion, anemia   -plan for work up through cardiology at this time  -consider endoscopic  evaluation IP vs OP      # Cirrhosis- due to ETOH, last drink 4/2024, chronic disease work up negative. Mild elevation in anti smooth muscle. Normal M2 antibody. Normal ceruloplasmin. Mild elevation in alpha 1 antitrypsin. MELD 17  - PSE: Reports some \"brain fog\", noted asterixis, 2 Bms per day, nonbloody (normal ammonia on 6/7).  Possible portosystemic shunt contributing to poor blood flow to liver.  Will order CT to further assess.  - EV: Needs screening EGD as outpatient  - ascites: moderate ascites on most recent ultrasound as well as large amount of sludge, s/p paracentesis 6/5 with 4L off.  No SBP, low total protein and SAAG >1.1 confirms liver as source.  Continue lasix/aldactone as well as low sodium diet.  Albumin 2.7.  - HCC: AFP 19.8, no lesion on ultrasound 5/29 but given elevated AFP would recommend further imaging with triple phase liver protocol.     #Increasing left sided weakness  -patient had rapid response called 6/4 for increased left sided weakness  -ammonia WNL  -CT and CTA head negative for vessel occlusion  -MRI brain negative, confirming no acute CVA  -Neurology note reviewed, cleared for endoscopic procedures     Recommend:  -Low sodium diet   -Triple phase CT liver to assess for portosystemic shunt and liver lesion  -Trend hepatic panel  -Daily PT/INR  -Daily CBC  -Endoscopic evaluation for EV as OP given no overt GIB and Hgb stable.     Case discussed with Yobany Guardado MD and Dameon GARCIA.    Sarah Lim DNP, FNP-[facility] Gastroenterology  6/7/2024      Results:     Lab Results   Component Value Date    WBC 10.2 06/06/2024    HGB 10.9 (L) 06/06/2024    HCT 31.1 (L) 06/06/2024    .0 06/06/2024    CREATSERUM 0.72 06/06/2024    CREATSERUM 0.72 06/06/2024    BUN 6 (L) 06/06/2024    BUN 6 (L) 06/06/2024     06/06/2024     06/06/2024    K 3.9 06/06/2024    K 3.9 06/06/2024    K 3.9 06/06/2024     06/06/2024     06/06/2024    CO2 26.0 06/06/2024    CO2 26.0  06/06/2024     (H) 06/06/2024     (H) 06/06/2024    CA 8.3 (L) 06/06/2024    CA 8.3 (L) 06/06/2024    ALB 2.5 (L) 06/06/2024    ALKPHO 126 06/06/2024    BILT 3.4 (H) 06/06/2024    TP 6.7 06/06/2024    AST 35 (H) 06/06/2024    ALT 14 06/06/2024    PTT 34.8 06/03/2024    INR 1.70 (H) 06/06/2024    T4F 1.0 04/03/2024    TSH 8.140 (H) 04/03/2024    LIP 49 06/03/2024    MG 2.0 04/04/2024     04/03/2024    B12 >2,000 (H) 04/04/2024    ETOH <3 04/03/2024       MRI BRAIN WO ACUTE (3) SEQUENCE (CPT=70551)    Result Date: 6/6/2024  CONCLUSION:   Interval decrease in size of previously described subacute hematoma right cerebellum currently measuring 2.4 x 1.8 cm since the prior examination 04/03/2024.  No significant associated mass effect or edema.  No evidence of midline shift.  Otherwise no evidence of acute intracranial abnormality or evidence of acute infarction.    Dictated by (CST): Henrry Garduno MD on 6/06/2024 at 8:24 AM     Finalized by (CST): Henrry Garduno MD on 6/06/2024 at 8:31 AM          CT CHEST (LPV=29566)    Result Date: 6/6/2024  CONCLUSION:   1.5 cm indeterminate nodule right upper lobe/right lung apex.  Advise Pulmonary evaluation and correlation with PET-CT when clinically appropriate.  Small left and trace right bilateral pleural effusions with probable mild adjacent bilateral lower lobe passive atelectasis.  Micro nodular hepatic contour consistent with cirrhosis.  Increased mild-to-moderate upper abdominal ascites.  Cholelithiasis and gallbladder sludge.  Lesser incidental findings as above.      Dictated by (CST): Henrry Garduno MD on 6/06/2024 at 8:10 AM     Finalized by (CST): Henrry Garduno MD on 6/06/2024 at 8:23 AM          US PARACENTESIS W IMAGING (CPT=49083)    Result Date: 6/5/2024  CONCLUSION: Paracentesis as described.    Dictated by (CST): Kellee Downs on 6/05/2024 at 3:20 PM     Finalized by (CST): Kellee Downs on 6/05/2024 at 3:21 PM

## 2024-06-07 NOTE — PAYOR COMM NOTE
[facility] 6/7  CONTINUED STAY REVIEW    Payor: BLUE CROSS [facility] PPO  Subscriber #:  [id number]  Authorization Number: [id number]    Admit date: 6/3/24  Admit time:  5:56 PM      MEDICATIONS ADMINISTERED IN LAST 1 DAY:  escitalopram (Lexapro) tab 10 mg       Date Action Dose Route User    6/6/2024 2133 Given 10 mg Oral Melanie Paul RN          folic acid (Folvite) tab 1 mg       Date Action Dose Route User    6/7/2024 0855 Given 1 mg Oral Dameon Mirza RN          furosemide (Lasix) 10 mg/mL injection 40 mg       Date Action Dose Route User    6/7/2024 0855 Given 40 mg Intravenous Dameon Mirza RN    6/6/2024 1715 Given 40 mg Intravenous Lorna Robison RN          lactulose (CHRONULAC) 10 GM/15ML solution 20 g       Date Action Dose Route User    6/6/2024 2133 Given 20 g Oral Melanie Paul RN          lamoTRIgine (LaMICtal) tab 25 mg       Date Action Dose Route User    6/7/2024 0855 Given 25 mg Oral Dameon Mirza RN    6/6/2024 2133 Given 25 mg Oral Melanie Paul RN          levothyroxine (Synthroid) tab 50 mcg       Date Action Dose Route User    6/7/2024 0447 Given 50 mcg Oral Melanie Paul RN    6/7/2024 0432 Given 50 mcg Oral Melanie Paul RN          potassium chloride (Klor-Con) 20 MEQ oral powder 10 mEq       Date Action Dose Route User    6/7/2024 0853 Given 10 mEq Oral Dameon Mirza RN          potassium chloride (Klor-Con) 20 MEQ oral powder 40 mEq       Date Action Dose Route User    6/7/2024 1240 Given 40 mEq Oral Dameon Mirza RN          spironolactone (Aldactone) tab 25 mg       Date Action Dose Route User    6/7/2024 0855 Given 25 mg Oral Dameon Mirza RN          traZODone (Desyrel) tab 25 mg       Date Action Dose Route User    6/6/2024 2140 Given 25 mg Oral Melanie Paul RN            Vitals (last day)       Date/Time Temp Pulse Resp BP SpO2 Weight O2 Device O2 Flow Rate (L/min) [facility]    06/07/24 0800 98.2 °F (36.8 °C) 74 18 104/54 98 % -- None (Room air) -- RS    06/07/24 0433 98 °F (36.7  °C) 89 16 103/62 94 % -- None (Room air) -- ML    06/07/24 0433 -- -- -- -- -- 143 lb -- -- LK    06/07/24 0000 98.1 °F (36.7 °C) 91 18 127/69 93 % -- None (Room air) -- ML    06/06/24 2000 97.7 °F (36.5 °C) 91 18 90/55 98 % -- None (Room air) -- ML    06/06/24 1506 98.1 °F (36.7 °C) 91 18 101/61 96 % -- None (Room air) -- PO    06/06/24 0942 -- -- -- -- -- 146 lb 3.2 oz -- -- MY    06/06/24 0745 98.1 °F (36.7 °C) 80 18 109/56 94 % -- None (Room air) -- PO    06/06/24 0538 -- -- -- -- -- 139 lb 11.2 oz -- -- EL    06/06/24 0538 98.2 °F (36.8 °C) 80 -- 96/63 100 % -- None (Room air) -- CB           6/7 CARDIOLOGY NOTE     SUBJECTIVE:     Denies chest pain or dyspnea. Reports that the swelling in her extremities feels the same as on admission. Appetite is improving.      VITALS:  /54 (BP Location: Right arm)   Pulse 74   Temp 98.2 °F (36.8 °C) (Oral)   Resp 18   Ht 4' 9\" (1.448 m)   Wt 143 lb (64.9 kg)   SpO2 98%   BMI 30.94 kg/m²      INTAKE/OUTPUT:     Intake/Output Summary (Last 24 hours) at 6/7/2024 0906  Last data filed at 6/6/2024 2100      Gross per 24 hour   Intake 480 ml   Output 1350 ml   Net -870 ml       LABS:         Recent Labs   Lab 06/04/24  2309 06/05/24  0543 06/05/24  1610 06/06/24  0323   * 85  --  103*  103*   BUN 7* 6*  --  6*  6*   CREATSERUM 0.67 0.67  --  0.72  0.72   EGFRCR 98 98  --  94  94   CA 8.1* 8.2*  --  8.3*  8.3*    138  --  139  139   K 3.2* 2.9* 3.4* 3.9  3.9  3.9    104  --  105  105   CO2 27.0 27.0  --  26.0  26.0            Recent Labs   Lab 06/04/24  0917 06/05/24  0543 06/06/24  0416   RBC 2.70* 2.65* 2.94*   HGB 10.3* 9.8* 10.9*   HCT 27.9* 27.5* 31.1*   .3* 103.8* 105.8*   MCH 38.1* 37.0* 37.1*   MCHC 36.9 35.6 35.0   RDW 16.6* 17.0* 17.2*   NEPRELIM 5.61 6.10 5.61   WBC 9.7 10.8 10.2   .0* 131.0* 151.0     ASSESSMENT:     Volume Overload  Anasarca  - BNP 56, Chest CT 6/6 with small left and trace rt pleural effusions    - ECHO 6/4/2024: LVEF 60-65%, no WMA, normal IVC   - Volume expansion appears to be more related to liver disease than cardiac in etiology   - IV Lasix BID, Net -7L, Net wt loss 20 lbs  - Also on Aldactone  - Appears volume overloaded; secondary to hypoalbuminemia and Liver disease     Alcoholic Cirrhosis   - GI following   - Elevated Bilirubin, Albumin 2.5, AST has normalized   - s/p Paracentesis on 6/5 with 4L removed      AMS  Hx Right Sided Cerebellar Hemorrhage April 2024  - RRT on 6/4 AM due to stroke like symptoms, CT brain without acute findings, CTA without large vessel occlusion. MRI negative. Evaluated by neurology, more likely sensory ataxia due to neuropathy      HTN- Controlled   HLD-   Type II DM- A1c 4.4%  Chronic Anemia- Stable yesterday, labs pending today      PLAN:  - NPO for EGD today   - Significant pitting edema that extends to her sacral area. Increase Lasix to 40 mg TID, labs pending  - Increase activity as tolerated      Plan of care discussed with patient and RN.     6/7 GI NOTE       Subjective:   Pt states she feels well, no ABD pain, N/V  NPO for possible procedure  Notes intermittent brain fog though stooling 2-3 times per day with normal ammonia level.  No fever, chills  No chest pain, SOB  Objective:   Blood pressure 104/54, pulse 74, temperature 98.2 °F (36.8 °C), temperature source Oral, resp. rate 18, height 4' 9\" (1.448 m), weight 143 lb (64.9 kg), SpO2 98%. Body mass index is 30.94 kg/m².     Gen: awake, alert patient, NAD  HEENT: EOMI, the sclera appears icteric, oropharynx clear, mucus membranes appear moist  CV: RRR  Lung: no conversational dyspnea   Abdomen: soft NTND abdomen with NABS appreciated   Skin: dry, warm, + jaundice  Ext: +2 BLE edema is evident  Neuro: Alert, oriented x3 and interactive  Psych: calm, cooperative     Assessment and Plan:   Christi Tavarez is a 63 year old female w/ PMHx of  BMI 29.21, DM2, hypothyroidism, HLD, disordered ETOH, ICH in April  2024 who presented to ER for worsening SOB and edema. Daughter assists with history. Patient was recently admitted in April and found to have ICH. She was then sent to rehab. Over the last 2 weeks has had increased swelling in both legs and shortness of breath when trying to get around. GI consulted for cirrhosis. Patient last drink was in April. Denies abdominal pain, nausea and vomiting. Having BM daily. Notes they are brown in color had one episode of bright red blood tinged stool. No diarrhea. No fevers or chills. No pruritus.      #SOB  #lower extremity weakness  -progressively worsening with associated lower extremity edema over last 2 weeks  -etiology possibly cardiac, hepatic congestion, anemia   -plan for work up through cardiology at this time  -consider endoscopic evaluation IP vs OP      # Cirrhosis- due to ETOH, last drink 4/2024, chronic disease work up negative. Mild elevation in anti smooth muscle. Normal M2 antibody. Normal ceruloplasmin. Mild elevation in alpha 1 antitrypsin. MELD 17  - PSE: Reports some \"brain fog\", noted asterixis, 2 Bms per day, nonbloody (normal ammonia on 6/7).  Possible portosystemic shunt contributing to poor blood flow to liver.  Will order CT to further assess.  - EV: Needs screening EGD as outpatient  - ascites: moderate ascites on most recent ultrasound as well as large amount of sludge, s/p paracentesis 6/5 with 4L off.  No SBP, low total protein and SAAG >1.1 confirms liver as source.  Continue lasix/aldactone as well as low sodium diet.  Albumin 2.7.  - HCC: AFP 19.8, no lesion on ultrasound 5/29 but given elevated AFP would recommend further imaging with triple phase liver protocol.     #Increasing left sided weakness  -patient had rapid response called 6/4 for increased left sided weakness  -ammonia WNL  -CT and CTA head negative for vessel occlusion  -MRI brain negative, confirming no acute CVA  -Neurology note reviewed, cleared for endoscopic procedures      Recommend:  -Low sodium diet   -Triple phase CT liver to assess for portosystemic shunt and liver lesion  -Trend hepatic panel  -Daily PT/INR  -Daily CBC  -Endoscopic evaluation for EV as OP given no overt GIB and Hgb stable.     6/7 SW NOTE    Social work follow up with the patient's daughter this morning regarding [facility]/Respite choice.     The patient's daughter toured [facility] [place] yesterday.     The patient's daughter was not aware that the respite rate did not include therapy or medication.     The daughter is still weighing options between [facility]/Respite Versus Home w/HH and Caregiver.     Social work will follow up.     SW/CM to remain available for support and/or discharge planning.

## 2024-06-08 LAB
ANION GAP SERPL CALC-SCNC: 4 MMOL/L (ref 0–18)
BASOPHILS # BLD AUTO: 0.05 X10(3) UL (ref 0–0.2)
BASOPHILS NFR BLD AUTO: 0.7 %
BILIRUB UR QL: NEGATIVE
BUN BLD-MCNC: 7 MG/DL (ref 9–23)
BUN/CREAT SERPL: 9.7 (ref 10–20)
CALCIUM BLD-MCNC: 8.4 MG/DL (ref 8.7–10.4)
CHLORIDE SERPL-SCNC: 104 MMOL/L (ref 98–112)
CLARITY UR: CLEAR
CO2 SERPL-SCNC: 30 MMOL/L (ref 21–32)
CREAT BLD-MCNC: 0.72 MG/DL
DEPRECATED RDW RBC AUTO: 63.5 FL (ref 35.1–46.3)
EGFRCR SERPLBLD CKD-EPI 2021: 94 ML/MIN/1.73M2 (ref 60–?)
EOSINOPHIL # BLD AUTO: 0.18 X10(3) UL (ref 0–0.7)
EOSINOPHIL NFR BLD AUTO: 2.4 %
ERYTHROCYTE [DISTWIDTH] IN BLOOD BY AUTOMATED COUNT: 16.6 % (ref 11–15)
GLUCOSE BLD-MCNC: 116 MG/DL (ref 70–99)
GLUCOSE BLDC GLUCOMTR-MCNC: 117 MG/DL (ref 70–99)
GLUCOSE BLDC GLUCOMTR-MCNC: 119 MG/DL (ref 70–99)
GLUCOSE BLDC GLUCOMTR-MCNC: 131 MG/DL (ref 70–99)
GLUCOSE BLDC GLUCOMTR-MCNC: 86 MG/DL (ref 70–99)
GLUCOSE UR-MCNC: NORMAL MG/DL
HCT VFR BLD AUTO: 27.4 %
HGB BLD-MCNC: 9.8 G/DL
IMM GRANULOCYTES # BLD AUTO: 0.02 X10(3) UL (ref 0–1)
IMM GRANULOCYTES NFR BLD: 0.3 %
KETONES UR-MCNC: NEGATIVE MG/DL
LEUKOCYTE ESTERASE UR QL STRIP.AUTO: NEGATIVE
LYMPHOCYTES # BLD AUTO: 2.64 X10(3) UL (ref 1–4)
LYMPHOCYTES NFR BLD AUTO: 34.9 %
MCH RBC QN AUTO: 37.3 PG (ref 26–34)
MCHC RBC AUTO-ENTMCNC: 35.8 G/DL (ref 31–37)
MCV RBC AUTO: 104.2 FL
MONOCYTES # BLD AUTO: 0.9 X10(3) UL (ref 0.1–1)
MONOCYTES NFR BLD AUTO: 11.9 %
NEUTROPHILS # BLD AUTO: 3.77 X10 (3) UL (ref 1.5–7.7)
NEUTROPHILS # BLD AUTO: 3.77 X10(3) UL (ref 1.5–7.7)
NEUTROPHILS NFR BLD AUTO: 49.8 %
NITRITE UR QL STRIP.AUTO: NEGATIVE
OSMOLALITY SERPL CALC.SUM OF ELEC: 285 MOSM/KG (ref 275–295)
PH UR: 7.5 [PH] (ref 5–8)
PLATELET # BLD AUTO: 134 10(3)UL (ref 150–450)
POTASSIUM SERPL-SCNC: 3.2 MMOL/L (ref 3.5–5.1)
POTASSIUM SERPL-SCNC: 3.5 MMOL/L (ref 3.5–5.1)
POTASSIUM SERPL-SCNC: 3.7 MMOL/L (ref 3.5–5.1)
PROT UR-MCNC: NEGATIVE MG/DL
RBC # BLD AUTO: 2.63 X10(6)UL
RBC #/AREA URNS AUTO: >10 /HPF
SODIUM SERPL-SCNC: 138 MMOL/L (ref 136–145)
SP GR UR STRIP: 1.01 (ref 1–1.03)
UROBILINOGEN UR STRIP-ACNC: NORMAL
WBC # BLD AUTO: 7.6 X10(3) UL (ref 4–11)

## 2024-06-08 PROCEDURE — 99233 SBSQ HOSP IP/OBS HIGH 50: CPT | Performed by: STUDENT IN AN ORGANIZED HEALTH CARE EDUCATION/TRAINING PROGRAM

## 2024-06-08 RX ORDER — POTASSIUM CHLORIDE 20 MEQ/1
40 TABLET, EXTENDED RELEASE ORAL EVERY 4 HOURS
Status: COMPLETED | OUTPATIENT
Start: 2024-06-08 | End: 2024-06-08

## 2024-06-08 RX ORDER — POTASSIUM CHLORIDE 20 MEQ/1
40 TABLET, EXTENDED RELEASE ORAL ONCE
Status: COMPLETED | OUTPATIENT
Start: 2024-06-08 | End: 2024-06-08

## 2024-06-08 NOTE — PLAN OF CARE
No complaints overnight. Neuro check q4 with no acute changes.   Problem: Patient Centered Care  Goal: Patient preferences are identified and integrated in the patient's plan of care  Description: Interventions:  - What would you like us to know as we care for you?   - Provide timely, complete, and accurate information to patient/family  - Incorporate patient and family knowledge, values, beliefs, and cultural backgrounds into the planning and delivery of care  - Encourage patient/family to participate in care and decision-making at the level they choose  - Honor patient and family perspectives and choices  Outcome: Progressing     Problem: Diabetes/Glucose Control  Goal: Glucose maintained within prescribed range  Description: INTERVENTIONS:  - Monitor Blood Glucose as ordered  - Assess for signs and symptoms of hyperglycemia and hypoglycemia  - Administer ordered medications to maintain glucose within target range  - Assess barriers to adequate nutritional intake and initiate nutrition consult as needed  - Instruct patient on self management of diabetes  Outcome: Progressing     Problem: Patient/Family Goals  Goal: Patient/Family Long Term Goal  Description: Patient's Long Term Goal:     Interventions:    - See additional Care Plan goals for specific interventions  Outcome: Progressing  Goal: Patient/Family Short Term Goal  Description: Patient's Short Term Goal:  Interventions:     - See additional Care Plan goals for specific interventions  Outcome: Progressing     Problem: CARDIOVASCULAR - ADULT  Goal: Maintains optimal cardiac output and hemodynamic stability  Description: INTERVENTIONS:  - Monitor vital signs, rhythm, and trends  - Monitor for bleeding, hypotension and signs of decreased cardiac output  - Evaluate effectiveness of vasoactive medications to optimize hemodynamic stability  - Monitor arterial and/or venous puncture sites for bleeding and/or hematoma  - Assess quality of pulses, skin color and  temperature  - Assess for signs of decreased coronary artery perfusion - ex. Angina  - Evaluate fluid balance, assess for edema, trend weights  Outcome: Progressing  Goal: Absence of cardiac arrhythmias or at baseline  Description: INTERVENTIONS:  - Continuous cardiac monitoring, monitor vital signs, obtain 12 lead EKG if indicated  - Evaluate effectiveness of antiarrhythmic and heart rate control medications as ordered  - Initiate emergency measures for life threatening arrhythmias  - Monitor electrolytes and administer replacement therapy as ordered  Outcome: Progressing     Problem: METABOLIC/FLUID AND ELECTROLYTES - ADULT  Goal: Electrolytes maintained within normal limits  Description: INTERVENTIONS:  - Monitor labs and rhythm and assess patient for signs and symptoms of electrolyte imbalances  - Administer electrolyte replacement as ordered  - Monitor response to electrolyte replacements, including rhythm and repeat lab results as appropriate  - Fluid restriction as ordered  - Instruct patient on fluid and nutrition restrictions as appropriate  Outcome: Progressing

## 2024-06-08 NOTE — PROGRESS NOTES
[facility]     Gastroenterology Progress Note    Christi Tavarez Patient Status:  Inpatient    1960 MRN [id number]   Location [facility] 3W/SW Attending Antony Zavala MD   Hosp Day # 5 PCP ÁNGEL Delgado       Subjective:   Had 2 brown, formed stools yesterday. No abdominal pain/nausea/vomiting.    CT triple phase abdomen with small, sub-cm right hepatic lobe lesion unable to be further characterized (MRI recommended).    No large portosystemic shunt noted on imaging.  Objective:   Blood pressure 105/60, pulse 94, temperature 98.6 °F (37 °C), temperature source Oral, resp. rate 18, height 4' 9\" (1.448 m), weight 143 lb (64.9 kg), SpO2 95%. Body mass index is 30.94 kg/m².    Gen: no distress  CV: RRR  Lung: no conversational dyspnea   Abdomen: soft, non-tender  Skin: dry, warm  Ext: mild bilateral lower extremity edema is evident  Neuro: Alert, oriented x3 and interactive. NO asterixis  Psych: calm, cooperative    Assessment and Plan:   Christi Tavarez is a 63 year old female w/ PMHx of  BMI 29.21, DM2, hypothyroidism, HLD, disordered ETOH, ICH in 2024 who presented to ER for worsening SOB and edema. GI consulted given history of cirrhosis.    Hospital course c/b altered mentation -- extensive neurologic workup unremarkable. This is improving    Has ongoing edema related to cirrhosis as cardiac workup negative.     #volume overload (improving)  -Cardiac workup negative and peritoneal fluid studies consistent with portal HTN as etiology.  -Due to hypoalbuminemia and cirrhosis.  -SAAG > 1.1, fluid protein < 2. 24 neutrophils noted (TNC was 100, 24% of which were neutrophils)    Recommendations:  -Continue diuresis with lasix/aldactone.  -Will start albumin 25% 12.5g prior to lasix if serum albumin falls below 2.    #Altered mentation (resolved)  #Intermittent L sided weakness  -Unclear etiology but overall seems to be improving.  -Neurologic workup negative (MRI brain, CT  and CTA head). Neurology signed off  -Ammonia normal and having 2-3 brown stools per day.   -No asterixis.  -CT triple phase negative for large, portosystemic shunt  -CXR negative for pneumonia. Paracentesis negative for SBP (24 neutrophils noted).    Recommendations:  -Target 2-3 bowel movements per day.  -Continue lactulose.  -Start rifaximin  -Check urinalysis.     # Cirrhosis- due to ETOH, last drink 4/2024, chronic disease work up negative.   MELD 18  - PSE: Ammonia normal, no asterixis. On lactulose and now rifaximin.  - EV: Needs screening EGD as outpatient  - ascites: moderate ascites on most recent ultrasound as well as large amount of sludge, s/p paracentesis 6/5 with 4L off.  No SBP, low total protein and SAAG >1.1 confirms liver as source.  On lasix/aldactone as well as low sodium diet.    - HCC: AFP 19.8, no lesion on ultrasound 5/29. CT triple phase with small right hepatic lobe lesion (unable to further characterize). MRI recommended.    Recommendation:  -Start rfiaximin 550mg BID. Continue lactulose.  -Continue lasix/aldactone.  -MRI Liver w/wo to further characterize hepatic lesion (can be done as outpatient). If still here Monday, can order then.  -EGD outpatient given brown stool/no overt GI bleeding.     Yobany Guardado MD      Results:     Lab Results   Component Value Date    WBC 7.6 06/08/2024    HGB 9.8 (L) 06/08/2024    HCT 27.4 (L) 06/08/2024    .0 (L) 06/08/2024    CREATSERUM 0.71 06/07/2024    CREATSERUM 0.71 06/07/2024    BUN 7 (L) 06/07/2024    BUN 7 (L) 06/07/2024     (L) 06/07/2024     (L) 06/07/2024    K 3.2 (L) 06/08/2024     06/07/2024     06/07/2024    CO2 27.0 06/07/2024    CO2 27.0 06/07/2024    GLU 90 06/07/2024    GLU 90 06/07/2024    CA 8.8 06/07/2024    CA 8.8 06/07/2024    ALB 2.7 (L) 06/07/2024    ALKPHO 131 (H) 06/07/2024    BILT 3.3 (H) 06/07/2024    TP 7.2 06/07/2024    AST 38 (H) 06/07/2024    ALT 12 06/07/2024    PTT 34.8  06/03/2024    INR 1.59 (H) 06/07/2024    T4F 1.0 04/03/2024    TSH 8.140 (H) 04/03/2024    LIP 49 06/03/2024    MG 2.0 04/04/2024     04/03/2024    B12 >2,000 (H) 04/04/2024    ETOH <3 04/03/2024       CT ABDOMEN TRIPHASIC LIVER (W+WO) (CPT=74170)    Result Date: 6/7/2024  CONCLUSION:   Hepatic steatosis and cirrhosis.  Nonspecific subcentimeter hypodensity right hepatic lobe seen on the portal venous phase post-contrast images.  The size of this lesion makes it difficult to adequately characterize and further evaluation with MRI of the  abdomen without with contrast is advised.  Mild-to-moderate ascites with mild peritoneal enhancement concerning for peritonitis.  Please correlate with recent paracentesis.  Findings which appear to reflect portal hypertension as detailed above.  Circumferential wall thickening of visualized colon and small bowel loops which in part could relate to lack of distension with portal enterocolopathy in the differential.  Lesser incidental findings as above.    Dictated by (CST): Henrry Garduno MD on 6/07/2024 at 4:28 PM     Finalized by (CST): Henrry Garduno MD on 6/07/2024 at 4:49 PM

## 2024-06-08 NOTE — PROGRESS NOTES
Progress Note  Christi Tavarez Patient Status:  Inpatient    1960 MRN [id number]   Location [facility] 3W/SW Attending Antony Zavala MD   Hosp Day # 5 PCP ÁNGEL Delgado     Subjective:  Pt alert, Denies any chest pain or SOB, stated she feels little better than when she came in    Objective:  /60 (BP Location: Right arm)   Pulse 94   Temp 98.6 °F (37 °C) (Oral)   Resp 18   Ht 4' 9\" (1.448 m)   Wt 143 lb (64.9 kg)   SpO2 95%   BMI 30.94 kg/m²     Telemetry: NSR      Intake/Output:    Intake/Output Summary (Last 24 hours) at 2024 1217  Last data filed at 2024 0430  Gross per 24 hour   Intake 1002 ml   Output 1350 ml   Net -348 ml       Last 3 Weights   24 0433 143 lb (64.9 kg)   24 0942 146 lb 3.2 oz (66.3 kg)   24 0538 139 lb 11.2 oz (63.4 kg)   24 0555 155 lb 6.4 oz (70.5 kg)   24 0500 155 lb 4.8 oz (70.4 kg)   24 1810 162 lb 14.4 oz (73.9 kg)   24 1250 160 lb (72.6 kg)   24 0537 153 lb 11.2 oz (69.7 kg)   24 0507 157 lb 12.8 oz (71.6 kg)   24 0416 158 lb (71.7 kg)   24 0800 161 lb 13.1 oz (73.4 kg)   24 1700 154 lb 15.7 oz (70.3 kg)   24 0400 142 lb 3.2 oz (64.5 kg)   24 0523 139 lb 12.4 oz (63.4 kg)   24 2121 130 lb (59 kg)   24 0533 139 lb 12.4 oz (63.4 kg)       Labs:  Recent Labs   Lab 24  0543 24  1610 24  0323 24  0958 24  0640   GLU 85  --  103*  103* 90  90  --    BUN 6*  --  6*  6* 7*  7*  --    CREATSERUM 0.67  --  0.72  0.72 0.71  0.71  --    EGFRCR 98  --  94  94 95  95  --    CA 8.2*  --  8.3*  8.3* 8.8  8.8  --      --  139  139 135*  135*  --    K 2.9*   < > 3.9  3.9  3.9 3.8  3.8 3.2*     --  105  105 103  103  --    CO2 27.0  --  26.0  26.0 27.0  27.0  --     < > = values in this interval not displayed.     Recent Labs   Lab 24  0416 24  0958 24  0640   RBC 2.94* 3.17* 2.63*   HGB  10.9* 11.9* 9.8*   HCT 31.1* 33.8* 27.4*   .8* 106.6* 104.2*   MCH 37.1* 37.5* 37.3*   MCHC 35.0 35.2 35.8   RDW 17.2* 16.5* 16.6*   NEPRELIM 5.61 4.41 3.77   WBC 10.2 8.0 7.6   .0 143.0* 134.0*         Recent Labs   Lab 24  1347   TROPHS <3     Lab Results   Component Value Date    INR 1.59 (H) 2024    INR 1.70 (H) 2024    INR 1.73 (H) 2024       Diagnostics:       ECHO 2024:  Echo: 24  Conclusions:   1. Left ventricle: The cavity size was normal. Wall thickness was normal.      Systolic function was normal. The estimated ejection fraction was 60-65%,      by visual assessment. No diagnostic evidence for regional wall motion      abnormalities. Left ventricular diastolic function parameters were      normal.   2. Left atrium: The left atrial volume was normal.   3. Pulmonary arteries: Systolic pressure was within the normal range,      estimated to be 24mm Hg.   4. Systemic veins: Central venous respirophasic diameter changes are blunted      (< 50%).   5. Inferior vena cava: The IVC was normal-sized.   Impressions:  No previous study was available for comparison.   Review of Systems   Respiratory: Negative.     Cardiovascular: Negative.        Physical Exam:    Physical Exam  Vitals reviewed.   Constitutional:       General: She is not in acute distress.  Neck:      Vascular: No JVD.   Cardiovascular:      Rate and Rhythm: Normal rate and regular rhythm.      Pulses:           Radial pulses are 2+ on the right side and 2+ on the left side.      Heart sounds: No murmur heard.     No friction rub. No gallop.      Comments: Swelling up to thigh  Pulmonary:      Effort: Pulmonary effort is normal. No respiratory distress.      Breath sounds: No stridor. No wheezing, rhonchi or rales.   Abdominal:      Palpations: Abdomen is soft.      Tenderness: There is no abdominal tenderness.   Musculoskeletal:      Right lower le+ Edema present.      Left lower le+ Edema  present.      Comments: Swelling up to thigh   Neurological:      Mental Status: She is alert and oriented to person, place, and time.   Psychiatric:         Mood and Affect: Mood normal.         Medications:   potassium chloride  40 mEq Oral Q4H    rifAXIMin  550 mg Oral BID    furosemide  40 mg Intravenous TID    insulin aspart  1-5 Units Subcutaneous TID CC    traZODone  25 mg Oral Nightly    escitalopram  10 mg Oral Nightly    folic acid  1 mg Oral Daily    lactulose  20 g Oral BID    lamoTRIgine  25 mg Oral BID    levothyroxine  50 mcg Oral Before breakfast    pantoprazole  20 mg Oral QAM AC    potassium chloride  10 mEq Oral Daily    spironolactone  25 mg Oral Daily         Assessment/Plan:    Pt was admitted with SOB and BLE edema.      Volume overloaded  - echo from 6/4  with LVEF 60-65%, no RWMA  - BNP 65  - pt receiving 40mg of lasix BID  - net neg of 7.4L  - wt down 17lb since admission     HTN  - Blood pressure borderlie now  - on Aldectone and lasix     Hypokalemia  - K of 3.2 today  - on aldectone  - on electrolyte replacement protocol     DMII  -A1c 4.4     Alcoholic liver disease  -s/p paracentesis from 6/5 with 4L off  - on lactulose  - Gi following, endoscopic evaluation as OP     H/o CVA  - April of 2024 with inferior right cerebellar hematoma vs infarct vs mass with hemorrhage    Chronic anemia  - stable hgb    Plan:  -  still fluid overloaded on exam  - continue IV lasix 40mg  TID  - electrolyte replacement per protocol  - strict intake and output monitoring,  - daily weight  - further management per neuro/GI/ primary    Plan discussed with pt and RN    DIANE Dee  [facility]  6/8/2024  12:17 PM    Cardiology Attending:  Note reviewed and Independent exam, assessment and plan developed. Labs and tests reviewed.  Note is my recommendations with assessment and plan as I have made changes and additions within note and merged.    S/no change in symptoms  Physical  Exam:  General: Alert and oriented. No apparent distress. No respiratory or constitutional distress.  HEENT: Normocephalic, anicteric sclera, neck supple  Neck: elevated JVD, carotids 2+,  Cardiac: irregular/Regular rate and rhythm. No pathologic murmur.  Lungs: Rales with normal effort.  Normal excursions and effort.  Abdomen: Soft, non-tender. BS-present.  Extremities: Without clubbing, cyanosis.  Edema, Peripheral pulses present.  Neurologic: Alert and oriented, normal affect. Motor ok.  Skin: Warm and dry.   A/P:f/u labs  Agree plan above  Lamberto Schultz MD  [facility]  Cardiac Electrophysiology  6/8/2024  3v

## 2024-06-08 NOTE — PROGRESS NOTES
[facility]  part of [facility]    Progress Note    Christi Tavarez Patient Status:  Inpatient    1960 MRN [id number]   Location [facility] 3W/SW Attending Antony Zavala MD   Hosp Day # 5 PCP ÁNGEL Delgado       Subjective:   Subjective:  Pt seen today resting in bed. No c/o SOB or N/V/D.     Objective:   Blood pressure 105/60, pulse 94, temperature 98.6 °F (37 °C), temperature source Oral, resp. rate 18, height 4' 9\" (1.448 m), weight 143 lb (64.9 kg), SpO2 95%.  Physical Exam  Vitals and nursing note reviewed.   Constitutional:       Appearance: Normal appearance.   HENT:      Head: Normocephalic and atraumatic.   Eyes:      Conjunctiva/sclera: Conjunctivae normal.   Cardiovascular:      Rate and Rhythm: Normal rate and regular rhythm.      Pulses: Normal pulses.      Heart sounds: Normal heart sounds.   Pulmonary:      Effort: Pulmonary effort is normal.      Breath sounds: Normal breath sounds.   Abdominal:      General: Bowel sounds are normal.      Palpations: Abdomen is soft.   Musculoskeletal:         General: Normal range of motion.      Cervical back: Normal range of motion.      Right lower leg: Edema present.      Left lower leg: Edema present.   Skin:     General: Skin is warm and dry.   Neurological:      Mental Status: She is alert and oriented to person, place, and time.   Psychiatric:         Mood and Affect: Mood normal.         Behavior: Behavior normal.         Results:   Lab Results   Component Value Date    WBC 7.6 2024    HGB 9.8 (L) 2024    HCT 27.4 (L) 2024    .0 (L) 2024    CREATSERUM 0.72 2024    BUN 7 (L) 2024     2024    K 3.5 2024     2024    CO2 30.0 2024     (H) 2024    CA 8.4 (L) 2024    ALB 2.7 (L) 2024    ALKPHO 131 (H) 2024    BILT 3.3 (H) 2024    TP 7.2 2024    AST 38 (H) 2024    ALT 12 2024    PTT 34.8  06/03/2024    INR 1.59 (H) 06/07/2024    T4F 1.0 04/03/2024    TSH 8.140 (H) 04/03/2024    LIP 49 06/03/2024    MG 2.0 04/04/2024    TROPHS <3 06/03/2024     04/03/2024    B12 >2,000 (H) 04/04/2024    ETOH <3 04/03/2024       CT ABDOMEN TRIPHASIC LIVER (W+WO) (CPT=74170)    Result Date: 6/7/2024  CONCLUSION:   Hepatic steatosis and cirrhosis.  Nonspecific subcentimeter hypodensity right hepatic lobe seen on the portal venous phase post-contrast images.  The size of this lesion makes it difficult to adequately characterize and further evaluation with MRI of the  abdomen without with contrast is advised.  Mild-to-moderate ascites with mild peritoneal enhancement concerning for peritonitis.  Please correlate with recent paracentesis.  Findings which appear to reflect portal hypertension as detailed above.  Circumferential wall thickening of visualized colon and small bowel loops which in part could relate to lack of distension with portal enterocolopathy in the differential.  Lesser incidental findings as above.    Dictated by (CST): Henrry Garduno MD on 6/07/2024 at 4:28 PM     Finalized by (CST): Henrry Garduno MD on 6/07/2024 at 4:49 PM               Assessment & Plan:   *AMS/Intermittent left sided weakness  Unclear etiology  MRI: negative for stroke   Ammonia normal  Neurology signed off     *Alcoholic cirrhosis of liver with ascites (HCC)  Secondary to alcohol - last drink 4/2024  Cont lactulose and rifaximin   Liver US: Moderate ascities w/ large amount of sludge  S/P paracentesis 6/5: 4L out   Cont lasix/aldactone     *Fluid overload  Improving   Cardiac workup negative  Peritoneal fluid consistent with portal HTN  Secondary to hypoalbuminemia and cirrhosis   Cont diuresis     *Cerebella hemorrhage  MRI: no acute hemorrhage   Monitor      *HTN  Cont aldactone and lasix     *Hypokalemia   K 3.2 today  Cont aldactone   Electrolyte protocol     *HLD  Cont atorvastatin    *Depression  Cont escitalopram       *Type 2 diabetes mellitus  Accu check AC/HS w/ SSI  Hypoglycemia protocol    *Hypothyroidism  Cont levothyroxine       DVT prophylaxis: SCDs  Code status: FULL CODE    LUPE MCCLENDON MD  6/8/2024

## 2024-06-09 LAB
ALBUMIN SERPL-MCNC: 2.2 G/DL (ref 3.2–4.8)
ALBUMIN/GLOB SERPL: 0.6 {RATIO} (ref 1–2)
ALP LIVER SERPL-CCNC: 105 U/L
ALT SERPL-CCNC: 8 U/L
ANION GAP SERPL CALC-SCNC: 6 MMOL/L (ref 0–18)
AST SERPL-CCNC: 43 U/L (ref ?–34)
BASOPHILS # BLD AUTO: 0.06 X10(3) UL (ref 0–0.2)
BASOPHILS NFR BLD AUTO: 0.9 %
BILIRUB SERPL-MCNC: 2.6 MG/DL (ref 0.2–1.1)
BUN BLD-MCNC: 7 MG/DL (ref 9–23)
BUN/CREAT SERPL: 10.6 (ref 10–20)
CALCIUM BLD-MCNC: 8.1 MG/DL (ref 8.7–10.4)
CHLORIDE SERPL-SCNC: 104 MMOL/L (ref 98–112)
CO2 SERPL-SCNC: 28 MMOL/L (ref 21–32)
CREAT BLD-MCNC: 0.66 MG/DL
DEPRECATED RDW RBC AUTO: 64.3 FL (ref 35.1–46.3)
EGFRCR SERPLBLD CKD-EPI 2021: 99 ML/MIN/1.73M2 (ref 60–?)
EOSINOPHIL # BLD AUTO: 0.11 X10(3) UL (ref 0–0.7)
EOSINOPHIL NFR BLD AUTO: 1.6 %
ERYTHROCYTE [DISTWIDTH] IN BLOOD BY AUTOMATED COUNT: 16.9 % (ref 11–15)
GLOBULIN PLAS-MCNC: 4 G/DL (ref 2–3.5)
GLUCOSE BLD-MCNC: 82 MG/DL (ref 70–99)
GLUCOSE BLDC GLUCOMTR-MCNC: 114 MG/DL (ref 70–99)
GLUCOSE BLDC GLUCOMTR-MCNC: 124 MG/DL (ref 70–99)
GLUCOSE BLDC GLUCOMTR-MCNC: 167 MG/DL (ref 70–99)
GLUCOSE BLDC GLUCOMTR-MCNC: 191 MG/DL (ref 70–99)
GLUCOSE BLDC GLUCOMTR-MCNC: 95 MG/DL (ref 70–99)
HCT VFR BLD AUTO: 29.2 %
HGB BLD-MCNC: 10.2 G/DL
IMM GRANULOCYTES # BLD AUTO: 0.03 X10(3) UL (ref 0–1)
IMM GRANULOCYTES NFR BLD: 0.4 %
INR BLD: 1.89 (ref 0.8–1.2)
LYMPHOCYTES # BLD AUTO: 2.41 X10(3) UL (ref 1–4)
LYMPHOCYTES NFR BLD AUTO: 35.4 %
MCH RBC QN AUTO: 36.6 PG (ref 26–34)
MCHC RBC AUTO-ENTMCNC: 34.9 G/DL (ref 31–37)
MCV RBC AUTO: 104.7 FL
MONOCYTES # BLD AUTO: 1.04 X10(3) UL (ref 0.1–1)
MONOCYTES NFR BLD AUTO: 15.3 %
NEUTROPHILS # BLD AUTO: 3.16 X10 (3) UL (ref 1.5–7.7)
NEUTROPHILS # BLD AUTO: 3.16 X10(3) UL (ref 1.5–7.7)
NEUTROPHILS NFR BLD AUTO: 46.4 %
OSMOLALITY SERPL CALC.SUM OF ELEC: 283 MOSM/KG (ref 275–295)
PLATELET # BLD AUTO: 142 10(3)UL (ref 150–450)
POTASSIUM SERPL-SCNC: 3.7 MMOL/L (ref 3.5–5.1)
POTASSIUM SERPL-SCNC: 3.7 MMOL/L (ref 3.5–5.1)
PROT SERPL-MCNC: 6.2 G/DL (ref 5.7–8.2)
PROTHROMBIN TIME: 22.9 SECONDS (ref 11.6–14.8)
RBC # BLD AUTO: 2.79 X10(6)UL
SODIUM SERPL-SCNC: 138 MMOL/L (ref 136–145)
WBC # BLD AUTO: 6.8 X10(3) UL (ref 4–11)

## 2024-06-09 PROCEDURE — 99233 SBSQ HOSP IP/OBS HIGH 50: CPT | Performed by: STUDENT IN AN ORGANIZED HEALTH CARE EDUCATION/TRAINING PROGRAM

## 2024-06-09 RX ORDER — ALBUMIN (HUMAN) 12.5 G/50ML
25 SOLUTION INTRAVENOUS ONCE
Status: COMPLETED | OUTPATIENT
Start: 2024-06-09 | End: 2024-06-09

## 2024-06-09 RX ORDER — POTASSIUM CHLORIDE 20 MEQ/1
40 TABLET, EXTENDED RELEASE ORAL ONCE
Status: COMPLETED | OUTPATIENT
Start: 2024-06-09 | End: 2024-06-09

## 2024-06-09 NOTE — PLAN OF CARE
Problem: Patient Centered Care  Goal: Patient preferences are identified and integrated in the patient's plan of care  Description: Interventions:  - What would you like us to know as we care for you?   - Provide timely, complete, and accurate information to patient/family  - Incorporate patient and family knowledge, values, beliefs, and cultural backgrounds into the planning and delivery of care  - Encourage patient/family to participate in care and decision-making at the level they choose  - Honor patient and family perspectives and choices  Outcome: Progressing     Problem: Diabetes/Glucose Control  Goal: Glucose maintained within prescribed range  Description: INTERVENTIONS:  - Monitor Blood Glucose as ordered  - Assess for signs and symptoms of hyperglycemia and hypoglycemia  - Administer ordered medications to maintain glucose within target range  - Assess barriers to adequate nutritional intake and initiate nutrition consult as needed  - Instruct patient on self management of diabetes  Outcome: Progressing     Problem: Patient/Family Goals  Goal: Patient/Family Long Term Goal  Description: Patient's Long Term Goal: Discharge home safely    Interventions:  - medications  -labs  -vitals  - See additional Care Plan goals for specific interventions  Outcome: Progressing  Goal: Patient/Family Short Term Goal  Description: Patient's Short Term Goal: Improved swelling in legs    Interventions:   - Shukri caraballoe  - See additional Care Plan goals for specific interventions  Outcome: Progressing     Problem: CARDIOVASCULAR - ADULT  Goal: Maintains optimal cardiac output and hemodynamic stability  Description: INTERVENTIONS:  - Monitor vital signs, rhythm, and trends  - Monitor for bleeding, hypotension and signs of decreased cardiac output  - Evaluate effectiveness of vasoactive medications to optimize hemodynamic stability  - Monitor arterial and/or venous puncture sites for bleeding and/or hematoma  - Assess quality of  pulses, skin color and temperature  - Assess for signs of decreased coronary artery perfusion - ex. Angina  - Evaluate fluid balance, assess for edema, trend weights  Outcome: Progressing  Goal: Absence of cardiac arrhythmias or at baseline  Description: INTERVENTIONS:  - Continuous cardiac monitoring, monitor vital signs, obtain 12 lead EKG if indicated  - Evaluate effectiveness of antiarrhythmic and heart rate control medications as ordered  - Initiate emergency measures for life threatening arrhythmias  - Monitor electrolytes and administer replacement therapy as ordered  Outcome: Progressing     Problem: METABOLIC/FLUID AND ELECTROLYTES - ADULT  Goal: Electrolytes maintained within normal limits  Description: INTERVENTIONS:  - Monitor labs and rhythm and assess patient for signs and symptoms of electrolyte imbalances  - Administer electrolyte replacement as ordered  - Monitor response to electrolyte replacements, including rhythm and repeat lab results as appropriate  - Fluid restriction as ordered  - Instruct patient on fluid and nutrition restrictions as appropriate  Outcome: Progressing     Patient is alert and orientated x 4. Patient is on RA.

## 2024-06-09 NOTE — PROGRESS NOTES
[facility]  part of [facility]    Progress Note    Christi Tavarez Patient Status:  Inpatient    1960 MRN [id number]   Location [facility] 3W/SW Attending Antony Zavala MD   Hosp Day # 6 PCP ÁNGEL Delgado       Subjective:   Subjective:  Pt seen today resting in bed. No c/o SOB or N/V/D. Less edema/ still with leg pain    Objective:   Blood pressure 102/58, pulse 89, temperature 97.9 °F (36.6 °C), temperature source Oral, resp. rate 18, height 4' 9\" (1.448 m), weight 136 lb 12.8 oz (62.1 kg), SpO2 95%.  Physical Exam  Vitals and nursing note reviewed.   Constitutional:       Appearance: Normal appearance.   HENT:      Head: Normocephalic and atraumatic.   Eyes:      Conjunctiva/sclera: Conjunctivae normal.   Cardiovascular:      Rate and Rhythm: Normal rate and regular rhythm.      Pulses: Normal pulses.      Heart sounds: Normal heart sounds.   Pulmonary:      Effort: Pulmonary effort is normal.      Breath sounds: Normal breath sounds.   Abdominal:      General: Bowel sounds are normal.      Palpations: Abdomen is soft.   Musculoskeletal:         General: Normal range of motion.      Cervical back: Normal range of motion.      Right lower leg: Edema present.      Left lower leg: Edema present.   Skin:     General: Skin is warm and dry.   Neurological:      Mental Status: She is alert and oriented to person, place, and time.   Psychiatric:         Mood and Affect: Mood normal.         Behavior: Behavior normal.         Results:   Lab Results   Component Value Date    WBC 6.8 2024    HGB 10.2 (L) 2024    HCT 29.2 (L) 2024    .0 (L) 2024    CREATSERUM 0.66 2024    BUN 7 (L) 2024     2024    K 3.7 2024    K 3.7 2024     2024    CO2 28.0 2024    GLU 82 2024    CA 8.1 (L) 2024    ALB 2.2 (L) 2024    ALKPHO 105 2024    BILT 2.6 (H) 2024    TP 6.2 2024    AST 43  (H) 06/09/2024    ALT 8 (L) 06/09/2024    PTT 34.8 06/03/2024    INR 1.89 (H) 06/09/2024    T4F 1.0 04/03/2024    TSH 8.140 (H) 04/03/2024    LIP 49 06/03/2024    MG 2.0 04/04/2024    TROPHS <3 06/03/2024     04/03/2024    B12 >2,000 (H) 04/04/2024    ETOH <3 04/03/2024       CT ABDOMEN TRIPHASIC LIVER (W+WO) (CPT=74170)    Result Date: 6/7/2024  CONCLUSION:   Hepatic steatosis and cirrhosis.  Nonspecific subcentimeter hypodensity right hepatic lobe seen on the portal venous phase post-contrast images.  The size of this lesion makes it difficult to adequately characterize and further evaluation with MRI of the  abdomen without with contrast is advised.  Mild-to-moderate ascites with mild peritoneal enhancement concerning for peritonitis.  Please correlate with recent paracentesis.  Findings which appear to reflect portal hypertension as detailed above.  Circumferential wall thickening of visualized colon and small bowel loops which in part could relate to lack of distension with portal enterocolopathy in the differential.  Lesser incidental findings as above.    Dictated by (CST): Henrry Garduno MD on 6/07/2024 at 4:28 PM     Finalized by (CST): Henrry Garduno MD on 6/07/2024 at 4:49 PM               Assessment & Plan:   *AMS/Intermittent left sided weakness  Unclear etiology  MRI: negative for stroke   Ammonia normal  Neurology signed off   improved    *Alcoholic cirrhosis of liver with ascites (HCC)  Secondary to alcohol - last drink 4/2024  Cont lactulose and rifaximin   Liver US: Moderate ascities w/ large amount of sludge  S/P paracentesis 6/5: 4L out   Cont lasix/aldactone     *Fluid overload  Improving   Cardiac workup negative  Peritoneal fluid consistent with portal HTN  Secondary to hypoalbuminemia and cirrhosis   Cont diuresis     *Cerebella hemorrhage  MRI: no acute hemorrhage   Monitor      *HTN  Cont aldactone and lasix     *Hypokalemia   K 3.7 today  Cont aldactone   Electrolyte protocol      *HLD  Cont atorvastatin    *Depression  Cont escitalopram      *Type 2 diabetes mellitus  Accu check AC/HS w/ SSI  Hypoglycemia protocol    *Hypothyroidism  Cont levothyroxine       DVT prophylaxis: SCDs  Code status: FULL CODE  Agree with consultants    LUPE MCCLENDON MD  6/9/2024

## 2024-06-09 NOTE — PLAN OF CARE
No complaints overnight. Purewick in place.  Problem: Patient Centered Care  Goal: Patient preferences are identified and integrated in the patient's plan of care  Description: Interventions:  - What would you like us to know as we care for you?   - Provide timely, complete, and accurate information to patient/family  - Incorporate patient and family knowledge, values, beliefs, and cultural backgrounds into the planning and delivery of care  - Encourage patient/family to participate in care and decision-making at the level they choose  - Honor patient and family perspectives and choices  Outcome: Progressing     Problem: Diabetes/Glucose Control  Goal: Glucose maintained within prescribed range  Description: INTERVENTIONS:  - Monitor Blood Glucose as ordered  - Assess for signs and symptoms of hyperglycemia and hypoglycemia  - Administer ordered medications to maintain glucose within target range  - Assess barriers to adequate nutritional intake and initiate nutrition consult as needed  - Instruct patient on self management of diabetes  Outcome: Progressing     Problem: Patient/Family Goals  Goal: Patient/Family Long Term Goal  Description: Patient's Long Term Goal:   Interventions:  - See additional Care Plan goals for specific interventions  Outcome: Progressing  Goal: Patient/Family Short Term Goal  Description: Patient's Short Term Goal:   Iterventions:   - See additional Care Plan goals for specific interventions  Outcome: Progressing     Problem: CARDIOVASCULAR - ADULT  Goal: Maintains optimal cardiac output and hemodynamic stability  Description: INTERVENTIONS:  - Monitor vital signs, rhythm, and trends  - Monitor for bleeding, hypotension and signs of decreased cardiac output  - Evaluate effectiveness of vasoactive medications to optimize hemodynamic stability  - Monitor arterial and/or venous puncture sites for bleeding and/or hematoma  - Assess quality of pulses, skin color and temperature  - Assess for  signs of decreased coronary artery perfusion - ex. Angina  - Evaluate fluid balance, assess for edema, trend weights  Outcome: Progressing  Goal: Absence of cardiac arrhythmias or at baseline  Description: INTERVENTIONS:  - Continuous cardiac monitoring, monitor vital signs, obtain 12 lead EKG if indicated  - Evaluate effectiveness of antiarrhythmic and heart rate control medications as ordered  - Initiate emergency measures for life threatening arrhythmias  - Monitor electrolytes and administer replacement therapy as ordered  Outcome: Progressing     Problem: METABOLIC/FLUID AND ELECTROLYTES - ADULT  Goal: Electrolytes maintained within normal limits  Description: INTERVENTIONS:  - Monitor labs and rhythm and assess patient for signs and symptoms of electrolyte imbalances  - Administer electrolyte replacement as ordered  - Monitor response to electrolyte replacements, including rhythm and repeat lab results as appropriate  - Fluid restriction as ordered  - Instruct patient on fluid and nutrition restrictions as appropriate  Outcome: Progressing

## 2024-06-09 NOTE — PLAN OF CARE
Problem: Diabetes/Glucose Control  Goal: Glucose maintained within prescribed range  Description: INTERVENTIONS:  - Monitor Blood Glucose as ordered  - Assess for signs and symptoms of hyperglycemia and hypoglycemia  - Administer ordered medications to maintain glucose within target range  - Assess barriers to adequate nutritional intake and initiate nutrition consult as needed  - Instruct patient on self management of diabetes  Outcome: Progressing     Problem: CARDIOVASCULAR - ADULT  Goal: Maintains optimal cardiac output and hemodynamic stability  Description: INTERVENTIONS:  - Monitor vital signs, rhythm, and trends  - Monitor for bleeding, hypotension and signs of decreased cardiac output  - Evaluate effectiveness of vasoactive medications to optimize hemodynamic stability  - Monitor arterial and/or venous puncture sites for bleeding and/or hematoma  - Assess quality of pulses, skin color and temperature  - Assess for signs of decreased coronary artery perfusion - ex. Angina  - Evaluate fluid balance, assess for edema, trend weights  Outcome: Progressing  Goal: Absence of cardiac arrhythmias or at baseline  Description: INTERVENTIONS:  - Continuous cardiac monitoring, monitor vital signs, obtain 12 lead EKG if indicated  - Evaluate effectiveness of antiarrhythmic and heart rate control medications as ordered  - Initiate emergency measures for life threatening arrhythmias  - Monitor electrolytes and administer replacement therapy as ordered  Outcome: Progressing     Problem: METABOLIC/FLUID AND ELECTROLYTES - ADULT  Goal: Electrolytes maintained within normal limits  Description: INTERVENTIONS:  - Monitor labs and rhythm and assess patient for signs and symptoms of electrolyte imbalances  - Administer electrolyte replacement as ordered  - Monitor response to electrolyte replacements, including rhythm and repeat lab results as appropriate  - Fluid restriction as ordered  - Instruct patient on fluid and  nutrition restrictions as appropriate  Outcome: Progressing

## 2024-06-09 NOTE — PHYSICAL THERAPY NOTE
PHYSICAL THERAPY TREATMENT NOTE - INPATIENT     Room Number: 329/329-A       Presenting Problem: sensory ataxian and fluid overload  Co-Morbidities : recemt admission for etoh w/d, liver cirrhosis, cerebellar CVA    Problem List  Active Problems:    Cerebellar hemorrhage (HCC)    Anemia    Alcoholic cirrhosis of liver with ascites (HCC)    Fluid overload    Type 2 diabetes mellitus without complication, without long-term current use of insulin (HCC)    Sensory ataxia    PHYSICAL THERAPY ASSESSMENT   Patient demonstrates limited progress this session, goals  remain in progress.    Patient continues to function below baseline with bed mobility, transfers, gait, maintaining seated position, and standing prolonged periods.  Contributing factors to remaining limitations include decreased functional strength, decreased endurance/aerobic capacity, impaired sitting and standing balance, impaired coordination, and cognitive deficits (pt lacking insight into safety deficits/balance deficits).  Next session anticipate patient to progress bed mobility, transfers, and gait.  Physical Therapy will continue to follow patient for duration of hospitalization.    Patient continues to benefit from continued skilled PT services: to promote return to prior level of function and safety with continuous assistance and gradual rehabilitative therapy .    PLAN  PT Treatment Plan: Bed mobility;Gait training;Transfer training  Frequency (Obs): 5x/week    SUBJECTIVE  \"I'm just afraid - I don't want to fall.\"    OBJECTIVE  Precautions: Bed/chair alarm    WEIGHT BEARING RESTRICTION                PAIN ASSESSMENT   Ratin          BALANCE  Static Sitting: Fair -  Dynamic Sitting: Poor +  Static Standing: Poor  Dynamic Standing: Poor -    ACTIVITY TOLERANCE                          O2 WALK       AM-PAC '6-Clicks' INPATIENT SHORT FORM - BASIC MOBILITY  How much difficulty does the patient currently have...  Patient Difficulty: Turning over in bed  (including adjusting bedclothes, sheets and blankets)?: A Little   Patient Difficulty: Sitting down on and standing up from a chair with arms (e.g., wheelchair, bedside commode, etc.): A Lot   Patient Difficulty: Moving from lying on back to sitting on the side of the bed?: A Little   How much help from another person does the patient currently need...   Help from Another: Moving to and from a bed to a chair (including a wheelchair)?: A Lot   Help from Another: Need to walk in hospital room?: A Lot   Help from Another: Climbing 3-5 steps with a railing?: Total     AM-PAC Score:  Raw Score: 13   Approx Degree of Impairment: 64.91%   Standardized Score (AM-PAC Scale): 36.74   CMS Modifier (G-Code): CL    FUNCTIONAL ABILITY STATUS  Functional Mobility/Gait Assessment  Gait Assistance: Moderate assistance (2 people)  Distance (ft): 3 ft forward/backward  Assistive Device: Rolling walker  Pattern: Ataxic (strong posterior lean, unsteady, slow lali)  Supine to Sit: contact guard assist and minimal assist  Sit to Stand: moderate assist and maximum assist    Additional information: Pt received supine in bed, agreeable to PT treatment. Pt requiring CGA/Min A for supine > sit however once sitting at EOB pt proceeded to attempt to slide forward and began slipping off EOB - PT providing Max A and blocking pt's simona LE to prevent fall from EOB. Pt then attempting to stand to walker from EOB with Mod A of 2 people - demonstrates strong posterior lean and unable to safely progress to ambulation around bed thus recliner chair brought next to patient for bed > chair transfer with mod A x2 and walker. Pt attempting sit <> stand from recliner chair needing Mod/Max A of 2 people. In standing again have strong posterior lean - unable to wt shift forward despite verbal and tactile cues. Pt took 2-3 steps forward and backward with Mod/Max A and support of walker. Pt ended session up in chair with alarm on, all needs within reach and RN  updated.     The patient's Approx Degree of Impairment: 64.91% has been calculated based on documentation in the [facility] '6 clicks' Inpatient Daily Activity Short Form.  Research supports that patients with this level of impairment may benefit from gradual rehabilitation.  Final disposition will be made by interdisciplinary medical team.      Patient End of Session: Up in chair;Needs met;Call light within reach;RN aware of session/findings;All patient questions and concerns addressed;Alarm set    CURRENT GOALS   Goals to be met by: 6/19  Patient Goal Patient's self-stated goal is: home   Goal #1 Patient is able to demonstrate supine - sit EOB @ level: min A     Goal #1   Current Status MET 6/9/24   Goal #2 Patient is able to demonstrate transfers Sit to/from Stand at assistance level: min A with walker - rolling     Goal #2  Current Status Mod/Max A with 2 people   Goal #3 Patient is able to ambulate 50 feet with assist device: walker - rolling at assistance level: Min A   Goal #3   Current Status 3 ft with mod/max A of 2 people and RW   Goal #4    Goal #4   Current Status    Goal #5 Patient to demonstrate independence with home activity/exercise instructions provided to patient in preparation for discharge.   Goal #5   Current Status Ongoing     Therapeutic Activity: 15 minutes  Neuromuscular Re-education: 8 minutes

## 2024-06-09 NOTE — PROGRESS NOTES
Progress Note  Christi Tavarez Patient Status:  Inpatient    1960 MRN [id number]   Location [facility] 3W/SW Attending Antony Zavala MD   Hosp Day # 6 PCP ÁNGEL Delgado     Subjective:  Pt sitting up in chair, surrounded by family members. Pt denies any chest pain or SOB.     Objective:  /71 (BP Location: Right arm)   Pulse 99   Temp 98.4 °F (36.9 °C) (Oral)   Resp 18   Ht 4' 9\" (1.448 m)   Wt 136 lb 12.8 oz (62.1 kg)   SpO2 100%   BMI 29.60 kg/m²     Telemetry: NSR      Intake/Output:    Intake/Output Summary (Last 24 hours) at 2024 1535  Last data filed at 2024 1300  Gross per 24 hour   Intake 240 ml   Output 600 ml   Net -360 ml       Last 3 Weights   24 0544 136 lb 12.8 oz (62.1 kg)   24 0433 143 lb (64.9 kg)   24 0942 146 lb 3.2 oz (66.3 kg)   24 0538 139 lb 11.2 oz (63.4 kg)   24 0555 155 lb 6.4 oz (70.5 kg)   24 0500 155 lb 4.8 oz (70.4 kg)   24 1810 162 lb 14.4 oz (73.9 kg)   24 1250 160 lb (72.6 kg)   24 0537 153 lb 11.2 oz (69.7 kg)   24 0507 157 lb 12.8 oz (71.6 kg)   24 0416 158 lb (71.7 kg)   24 0800 161 lb 13.1 oz (73.4 kg)   24 1700 154 lb 15.7 oz (70.3 kg)   24 0400 142 lb 3.2 oz (64.5 kg)   24 0523 139 lb 12.4 oz (63.4 kg)   24 2121 130 lb (59 kg)   24 0533 139 lb 12.4 oz (63.4 kg)       Labs:  Recent Labs   Lab 24  0958 24  0640 24  1227 24  1714 24  0719   GLU 90  90  --  116*  --  82   BUN 7*  7*  --  7*  --  7*   CREATSERUM 0.71  0.71  --  0.72  --  0.66   EGFRCR 95  95  --  94  --  99   CA 8.8  8.8  --  8.4*  --  8.1*   *  135*  --  138  --  138   K 3.8  3.8   < > 3.5 3.7 3.7  3.7     103  --  104  --  104   CO2 27.0  27.0  --  30.0  --  28.0    < > = values in this interval not displayed.     Recent Labs   Lab 24  0958 2440 24  0719   RBC 3.17* 2.63* 2.79*   HGB 11.9* 9.8* 10.2*    HCT 33.8* 27.4* 29.2*   .6* 104.2* 104.7*   MCH 37.5* 37.3* 36.6*   MCHC 35.2 35.8 34.9   RDW 16.5* 16.6* 16.9*   NEPRELIM 4.41 3.77 3.16   WBC 8.0 7.6 6.8   .0* 134.0* 142.0*         Recent Labs   Lab 06/03/24  1347   TROPHS <3     Lab Results   Component Value Date    INR 1.89 (H) 06/09/2024    INR 1.59 (H) 06/07/2024    INR 1.70 (H) 06/06/2024       Diagnostics:     ECHO 6/4/2024:  Echo: 06/04/24  Conclusions:   1. Left ventricle: The cavity size was normal. Wall thickness was normal.      Systolic function was normal. The estimated ejection fraction was 60-65%,      by visual assessment. No diagnostic evidence for regional wall motion      abnormalities. Left ventricular diastolic function parameters were      normal.   2. Left atrium: The left atrial volume was normal.   3. Pulmonary arteries: Systolic pressure was within the normal range,      estimated to be 24mm Hg.   4. Systemic veins: Central venous respirophasic diameter changes are blunted      (< 50%).   5. Inferior vena cava: The IVC was normal-sized.   Impressions:  No previous study was available for comparison.   Review of Systems   Respiratory: Negative.     Cardiovascular: Negative.    Review of Systems   Respiratory: Negative.     Cardiovascular:  Positive for leg swelling. Negative for chest pain, palpitations, orthopnea, claudication and PND.           Physical Exam:    Physical Exam  Vitals reviewed.   Constitutional:       General: She is not in acute distress.  Neck:      Vascular: No JVD.   Cardiovascular:      Rate and Rhythm: Normal rate and regular rhythm.      Pulses:           Radial pulses are 2+ on the right side and 2+ on the left side.      Heart sounds: No murmur heard.     No friction rub. No gallop.      Comments: Swelling up to hips  Pulmonary:      Effort: Pulmonary effort is normal. No respiratory distress.      Breath sounds: No stridor. No wheezing, rhonchi or rales.   Abdominal:      Palpations: Abdomen is  soft.      Tenderness: There is no abdominal tenderness.   Musculoskeletal:      Right lower le+ Edema present.      Left lower le+ Edema present.      Comments: Swelling up to hips   Neurological:      Mental Status: She is alert and oriented to person, place, and time.   Psychiatric:         Mood and Affect: Mood normal.         Medications:   albumin human  25 g Intravenous Once    rifAXIMin  550 mg Oral BID    furosemide  40 mg Intravenous TID    insulin aspart  1-5 Units Subcutaneous TID CC    traZODone  25 mg Oral Nightly    escitalopram  10 mg Oral Nightly    folic acid  1 mg Oral Daily    lactulose  20 g Oral BID    lamoTRIgine  25 mg Oral BID    levothyroxine  50 mcg Oral Before breakfast    pantoprazole  20 mg Oral QAM AC    potassium chloride  10 mEq Oral Daily    spironolactone  25 mg Oral Daily         Assessment/Plan:  Pt was admitted with SOB and BLE edema.      Volume overloaded  - possibly secondary to liver disease and hypoalbuminemia   - echo from   with LVEF 60-65%, no RWMA  - BNP 65  - pt receiving 40mg of lasix TID  - net neg of 7.4L, minimal urine output over past 24 hours  - wt down 24lb since admission  - creatinine stable      HTN  - Blood pressure borderlie now  - on Aldectone and lasix     Hypokalemia  - K of 3.7 today  - on aldectone  - on electrolyte replacement protocol     DMII  -A1c 4.4     Alcoholic liver disease  -s/p paracentesis from  with 4L off  - on lactulose  - Gi following, endoscopic evaluation as OP     H/o CVA  - 2024 with inferior right cerebellar hematoma vs infarct vs mass with hemorrhage     Chronic anemia  - stable hgb    Hypoalbuminemia  - serum albumin of 2.2  - will give her a dose of albumin with low albumin level and 3rd spacing     Plan:  -  still fluid overloaded on exam  - continue IV lasix 40mg  TID  - will give her a dose of albumin x1   - electrolyte replacement per protocol  - strict intake and output monitoring,  - daily weight  -  further management per neuro/GI/ primary     Plan discussed with pt and RN           DIANE Dee  [facility]  6/9/2024  3:35 PM

## 2024-06-09 NOTE — PROGRESS NOTES
[facility], [place]     Gastroenterology Progress Note    Christi Tavarez Patient Status:  Inpatient    1960 MRN [id number]   Location [facility] 3W/SW Attending Antony Zavala MD   Hosp Day # 6 PCP ÁNGEL Delgado       Subjective:   Overall feels better. Having formed brown stool.  No longer feeling abdominal discomfort.    Leg edema is improving.    Objective:   Blood pressure 124/71, pulse 99, temperature 98.4 °F (36.9 °C), temperature source Oral, resp. rate 18, height 4' 9\" (1.448 m), weight 136 lb 12.8 oz (62.1 kg), SpO2 100%. Body mass index is 29.6 kg/m².    Gen: no distress  CV: RRR  Lung: no conversational dyspnea   Abdomen: soft, non-tender  Skin: dry, warm  Ext: mild bilateral lower extremity edema is evident  Neuro: Alert, oriented x3 and interactive. NO asterixis  Psych: calm, cooperative    Assessment and Plan:   Christi Tavarez is a 63 year old female w/ PMHx of  BMI 29.21, DM2, hypothyroidism, HLD, disordered ETOH, ICH in 2024 who presented to ER for worsening SOB and edema. GI consulted given history of cirrhosis.     #volume overload (improving)  -Cardiac workup negative and peritoneal fluid studies consistent with portal HTN as etiology.  -Due to hypoalbuminemia and cirrhosis.  -SAAG > 1.1, fluid protein < 2. 24 neutrophils noted (TNC was 100, 24% of which were neutrophils)    Recommendations:  -Continue diuresis with lasix/aldactone.  -Will need to transition to oral lasix in the next 1-2 days as discharge nears.    #Altered mentation (resolved)  #Intermittent L sided weakness  -Unclear etiology but overall seems to be improving.  -Neurologic workup negative (MRI brain, CT and CTA head). Neurology signed off  -Ammonia normal and having 2-3 brown stools per day.   -No asterixis.  -CT triple phase negative for large, portosystemic shunt  -CXR negative for pneumonia. Paracentesis negative for SBP (24 neutrophils noted).  -UA  unremarkable.    Recommendations:  -Target 2-3 bowel movements per day.  -Continue lactulose and rifaximin     # Cirrhosis- due to ETOH, last drink 4/2024, chronic disease work up negative.   MELD 18  - PSE: Ammonia normal, no asterixis. On lactulose and now rifaximin.  - EV: Needs screening EGD as outpatient  - ascites: moderate ascites on most recent ultrasound as well as large amount of sludge, s/p paracentesis 6/5 with 4L off.  No SBP, low total protein and SAAG >1.1 confirms liver as source.  On lasix/aldactone as well as low sodium diet.    - HCC: AFP 19.8, no lesion on ultrasound 5/29. CT triple phase with small right hepatic lobe lesion (unable to further characterize). MRI recommended.    Recommendation:  -Continue rifaximin and lactulose  -Continue lasix/aldactone.  -MRI Liver w/wo to further characterize hepatic lesion (can be done as outpatient).   -Consider EGD outpatient    From GI/hep standpoint okay for discharge once transitioned to oral diuretic therapy.     Yobany Guardado MD      Results:     Lab Results   Component Value Date    WBC 6.8 06/09/2024    HGB 10.2 (L) 06/09/2024    HCT 29.2 (L) 06/09/2024    .0 (L) 06/09/2024    CREATSERUM 0.66 06/09/2024    BUN 7 (L) 06/09/2024     06/09/2024    K 3.7 06/09/2024    K 3.7 06/09/2024     06/09/2024    CO2 28.0 06/09/2024    GLU 82 06/09/2024    CA 8.1 (L) 06/09/2024    ALB 2.2 (L) 06/09/2024    ALKPHO 105 06/09/2024    BILT 2.6 (H) 06/09/2024    TP 6.2 06/09/2024    AST 43 (H) 06/09/2024    ALT 8 (L) 06/09/2024    PTT 34.8 06/03/2024    INR 1.89 (H) 06/09/2024    T4F 1.0 04/03/2024    TSH 8.140 (H) 04/03/2024    LIP 49 06/03/2024    MG 2.0 04/04/2024     04/03/2024    B12 >2,000 (H) 04/04/2024    ETOH <3 04/03/2024       CT ABDOMEN TRIPHASIC LIVER (W+WO) (CPT=74170)    Result Date: 6/7/2024  CONCLUSION:   Hepatic steatosis and cirrhosis.  Nonspecific subcentimeter hypodensity right hepatic lobe seen on the portal venous  phase post-contrast images.  The size of this lesion makes it difficult to adequately characterize and further evaluation with MRI of the  abdomen without with contrast is advised.  Mild-to-moderate ascites with mild peritoneal enhancement concerning for peritonitis.  Please correlate with recent paracentesis.  Findings which appear to reflect portal hypertension as detailed above.  Circumferential wall thickening of visualized colon and small bowel loops which in part could relate to lack of distension with portal enterocolopathy in the differential.  Lesser incidental findings as above.    Dictated by (CST): Henrry Garduno MD on 6/07/2024 at 4:28 PM     Finalized by (CST): Henrry Garduno MD on 6/07/2024 at 4:49 PM

## 2024-06-10 ENCOUNTER — APPOINTMENT (OUTPATIENT)
Dept: ULTRASOUND IMAGING | Facility: HOSPITAL | Age: 64
DRG: 432 | End: 2024-06-10
Attending: REGISTERED NURSE

## 2024-06-10 LAB
ALBUMIN SERPL-MCNC: 2.5 G/DL (ref 3.2–4.8)
ANION GAP SERPL CALC-SCNC: 5 MMOL/L (ref 0–18)
BUN BLD-MCNC: 8 MG/DL (ref 9–23)
BUN/CREAT SERPL: 11 (ref 10–20)
CALCIUM BLD-MCNC: 8.5 MG/DL (ref 8.7–10.4)
CHLORIDE SERPL-SCNC: 104 MMOL/L (ref 98–112)
CO2 SERPL-SCNC: 29 MMOL/L (ref 21–32)
CREAT BLD-MCNC: 0.73 MG/DL
EGFRCR SERPLBLD CKD-EPI 2021: 92 ML/MIN/1.73M2 (ref 60–?)
GLUCOSE BLD-MCNC: 110 MG/DL (ref 70–99)
GLUCOSE BLDC GLUCOMTR-MCNC: 108 MG/DL (ref 70–99)
GLUCOSE BLDC GLUCOMTR-MCNC: 123 MG/DL (ref 70–99)
GLUCOSE BLDC GLUCOMTR-MCNC: 131 MG/DL (ref 70–99)
GLUCOSE BLDC GLUCOMTR-MCNC: 143 MG/DL (ref 70–99)
OSMOLALITY SERPL CALC.SUM OF ELEC: 285 MOSM/KG (ref 275–295)
POTASSIUM SERPL-SCNC: 3.2 MMOL/L (ref 3.5–5.1)
POTASSIUM SERPL-SCNC: 3.5 MMOL/L (ref 3.5–5.1)
POTASSIUM SERPL-SCNC: 3.5 MMOL/L (ref 3.5–5.1)
SODIUM SERPL-SCNC: 138 MMOL/L (ref 136–145)

## 2024-06-10 PROCEDURE — 93970 EXTREMITY STUDY: CPT | Performed by: REGISTERED NURSE

## 2024-06-10 PROCEDURE — 99232 SBSQ HOSP IP/OBS MODERATE 35: CPT | Performed by: INTERNAL MEDICINE

## 2024-06-10 PROCEDURE — 99232 SBSQ HOSP IP/OBS MODERATE 35: CPT | Performed by: REGISTERED NURSE

## 2024-06-10 RX ORDER — POTASSIUM CHLORIDE 20 MEQ/1
40 TABLET, EXTENDED RELEASE ORAL EVERY 4 HOURS
Status: COMPLETED | OUTPATIENT
Start: 2024-06-10 | End: 2024-06-10

## 2024-06-10 NOTE — DIETARY NOTE
BRIEF DIETITIAN NOTE     Pt re-screened for LOS (length of stay). Found to be at no nutrition risk at this time. Fairly good PO intake. Weight coming down as expected with diuresis during admission. No other significant weight changes. Please consult RD if further nutrition intervention is needed.     Percent Meals Eaten (last 6 days)       Date/Time Percent Meals Eaten (%)    06/04/24 1000 85 %    06/04/24 1400 25 %    06/05/24 1500 30 %    06/06/24 1035 90 %    06/06/24 1506 20 %    06/06/24 2000 50 %    06/07/24 1530 100 %    06/07/24 1935 25 %    06/07/24 1951 75 %    06/09/24 1300 90 %             Patient Weight(s) for the past 336 hrs:   Weight   06/10/24 0300 60.6 kg (133 lb 8 oz)   06/09/24 0544 62.1 kg (136 lb 12.8 oz)   06/07/24 0433 64.9 kg (143 lb)   06/06/24 0942 66.3 kg (146 lb 3.2 oz)   06/06/24 0538 63.4 kg (139 lb 11.2 oz)   06/05/24 0555 70.5 kg (155 lb 6.4 oz)   06/04/24 0500 70.4 kg (155 lb 4.8 oz)   06/03/24 1810 73.9 kg (162 lb 14.4 oz)   06/03/24 1250 72.6 kg (160 lb)        Wt Readings from Last 6 Encounters:   06/10/24 60.6 kg (133 lb 8 oz)   04/09/24 69.7 kg (153 lb 11.2 oz)   04/03/24 63.4 kg (139 lb 12.4 oz)        F/u per protocol or as appropriate.       Rosy Barragan RD, LDN  Clinical Dietitian  P: [phone]

## 2024-06-10 NOTE — PLAN OF CARE
Pt is a/o x4, RA. No acute changes  Problem: Patient Centered Care  Goal: Patient preferences are identified and integrated in the patient's plan of care  Description: Interventions:  - What would you like us to know as we care for you?   - Provide timely, complete, and accurate information to patient/family  - Incorporate patient and family knowledge, values, beliefs, and cultural backgrounds into the planning and delivery of care  - Encourage patient/family to participate in care and decision-making at the level they choose  - Honor patient and family perspectives and choices  Outcome: Progressing     Problem: Diabetes/Glucose Control  Goal: Glucose maintained within prescribed range  Description: INTERVENTIONS:  - Monitor Blood Glucose as ordered  - Assess for signs and symptoms of hyperglycemia and hypoglycemia  - Administer ordered medications to maintain glucose within target range  - Assess barriers to adequate nutritional intake and initiate nutrition consult as needed  - Instruct patient on self management of diabetes  Outcome: Progressing     Problem: Patient/Family Goals  Goal: Patient/Family Long Term Goal  Description: Patient's Long Term Goal:     Interventions:  -   - See additional Care Plan goals for specific interventions  Outcome: Progressing  Goal: Patient/Family Short Term Goal  Description: Patient's Short Term Goal:     Interventions:   - See additional Care Plan goals for specific interventions  Outcome: Progressing     Problem: CARDIOVASCULAR - ADULT  Goal: Maintains optimal cardiac output and hemodynamic stability  Description: INTERVENTIONS:  - Monitor vital signs, rhythm, and trends  - Monitor for bleeding, hypotension and signs of decreased cardiac output  - Evaluate effectiveness of vasoactive medications to optimize hemodynamic stability  - Monitor arterial and/or venous puncture sites for bleeding and/or hematoma  - Assess quality of pulses, skin color and temperature  - Assess for  signs of decreased coronary artery perfusion - ex. Angina  - Evaluate fluid balance, assess for edema, trend weights  Outcome: Progressing  Goal: Absence of cardiac arrhythmias or at baseline  Description: INTERVENTIONS:  - Continuous cardiac monitoring, monitor vital signs, obtain 12 lead EKG if indicated  - Evaluate effectiveness of antiarrhythmic and heart rate control medications as ordered  - Initiate emergency measures for life threatening arrhythmias  - Monitor electrolytes and administer replacement therapy as ordered  Outcome: Progressing     Problem: METABOLIC/FLUID AND ELECTROLYTES - ADULT  Goal: Electrolytes maintained within normal limits  Description: INTERVENTIONS:  - Monitor labs and rhythm and assess patient for signs and symptoms of electrolyte imbalances  - Administer electrolyte replacement as ordered  - Monitor response to electrolyte replacements, including rhythm and repeat lab results as appropriate  - Fluid restriction as ordered  - Instruct patient on fluid and nutrition restrictions as appropriate  Outcome: Progressing

## 2024-06-10 NOTE — PROGRESS NOTES
[facility]  part of [facility]    Progress Note    Christi Tavarez Patient Status:  Inpatient    1960 MRN [id number]   Location [facility] 3W/SW Attending Antony Zavala MD   Hosp Day # 7 PCP ÁNGEL Delgado     Chief Complaint:     Subjective:     Constitutional:  Positive for fatigue.   HENT: Negative.     Eyes: Negative.    Respiratory: Negative.     Cardiovascular:  Positive for leg swelling.        Improved    Gastrointestinal: Negative.    Genitourinary: Negative.    Musculoskeletal: Negative.    Skin: Negative.    Neurological: Negative.        Objective:   Blood pressure 109/47, pulse 90, temperature 98.2 °F (36.8 °C), temperature source Oral, resp. rate 18, height 4' 9\" (1.448 m), weight 133 lb 8 oz (60.6 kg), SpO2 98%.  Physical Exam  Vitals and nursing note reviewed.   HENT:      Head: Normocephalic and atraumatic.      Nose: Nose normal.      Mouth/Throat:      Mouth: Mucous membranes are moist.   Cardiovascular:      Rate and Rhythm: Normal rate.   Pulmonary:      Effort: Pulmonary effort is normal.      Breath sounds: Normal breath sounds.   Abdominal:      General: Abdomen is flat. Bowel sounds are normal.      Palpations: Abdomen is soft.   Musculoskeletal:         General: Normal range of motion.      Cervical back: Normal range of motion and neck supple.   Skin:     General: Skin is warm and dry.   Neurological:      Mental Status: She is alert and oriented to person, place, and time.      Comments: But at times slow with responding    Psychiatric:         Mood and Affect: Mood normal.         Results:   Lab Results   Component Value Date    WBC 6.8 2024    HGB 10.2 (L) 2024    HCT 29.2 (L) 2024    .0 (L) 2024    CREATSERUM 0.66 2024    BUN 7 (L) 2024     2024    K 3.2 (L) 06/10/2024     2024    CO2 28.0 2024    GLU 82 2024    CA 8.1 (L) 2024    ALB 2.2 (L) 2024     ALKPHO 105 06/09/2024    BILT 2.6 (H) 06/09/2024    TP 6.2 06/09/2024    AST 43 (H) 06/09/2024    ALT 8 (L) 06/09/2024    PTT 34.8 06/03/2024    INR 1.89 (H) 06/09/2024    T4F 1.0 04/03/2024    TSH 8.140 (H) 04/03/2024    LIP 49 06/03/2024    MG 2.0 04/04/2024    TROPHS <3 06/03/2024     04/03/2024    B12 >2,000 (H) 04/04/2024    ETOH <3 04/03/2024       No results found.        Assessment & Plan:     Continue with current care     Daughter is looking in to another facility at  for respite care and some pt    Edema is down, continue with IV lasix as per cards      GI seen pt , egd out opt, also out pt MRI liver         Pt/ot eval      Continue with current care       MRI brain results noted with no acute findings     Low K resolved, will follow      paracentesis done yesterday 4 L removed          Fluid overload- improved, continue with diuretics, echo ok\     Cardiac, cardiology on board , continue with diuresing      component of liver cirrhosis           Alcoholic cirrhosis of liver with ascites (HCC)- abd distention, gi on board for abd paracentesis tomorrow, katherine follow labs                     Anemia- of chronic disease stable , will follow                    Type 2 diabetes mellitus without complication, without long-term current use of insulin (HCC)- will monitor bs           Cerebellar hemorrhage (HCC)- repeat mri ct head today \" Developing encephalomalacia right cerebellum at site of previous hemorrhage.  No acute hemorrhage      Gen weakness- pt/ot      Lung nodule incidentally  ct lungs done recommending  out pt pet ct    Will order                    Antony Zavala MD  6/10/2024

## 2024-06-10 NOTE — CM/SW NOTE
Social work was able to follow up with the patient's daughter regarding final discharge plan.    Family is still weighting options of Respite vs Home w/HH & Caregiver.    The patient's daughter has been in contact with [facility] and rehab to see if the can bill for PT/OT under the patient's insurance and they pay room and board separately.     [facility]ty at [place] will call social work with an update.    Social work will follow up.    SW/CM to remain available for support and/or discharge planning.     Jannette Dobson MSW, LSW  Discharge Planner [id number]

## 2024-06-10 NOTE — PROGRESS NOTES
[facility]     Gastroenterology Progress Note    Christi Tavarez Patient Status:  Inpatient    1960 MRN [id number]   Location [facility] 3W/SW Attending Antony Zavala MD   Hosp Day # 7 PCP ÁNGEL Delgado       Subjective:   Daughter at bedside  Brain fog improved  No ABD pain  No N/V, tolerating diet  Had 2 soft, non-bloody stools yesterday  No fever, chills  Feels legs are sore from swelling  Objective:   Blood pressure 114/58, pulse 99, temperature 98 °F (36.7 °C), temperature source Oral, resp. rate 16, height 4' 9\" (1.448 m), weight 133 lb 8 oz (60.6 kg), SpO2 94%. Body mass index is 28.89 kg/m².    Gen: awake, alert patient, NAD  HEENT: EOMI, the sclera appears anicteric, oropharynx clear, mucus membranes appear moist  CV: RRR  Lung: no conversational dyspnea   Abdomen: soft NTND abdomen with NABS appreciated   Skin: dry, warm, no jaundice  Ext: +2 BLE edema is evident  Neuro: Alert, oriented x3 and interactive  Psych: calm, cooperative    Assessment and Plan:   Christi Tavarez is a 63 year old female w/ PMHx of  BMI 29.21, DM2, hypothyroidism, HLD, disordered ETOH, ICH in 2024 who presented to ER for worsening SOB and edema. GI consulted given history of cirrhosis.     #volume overload (improving)  -Cardiac workup negative and peritoneal fluid studies consistent with portal HTN as etiology.  -Due to hypoalbuminemia and cirrhosis.  -SAAG > 1.1, fluid protein < 2. 24 neutrophils noted (TNC was 100, 24% of which were neutrophils)     Recommendations:  -Continue diuresis with lasix/aldactone.  -Will need to transition to oral lasix in the next 1-2 days as discharge nears.     #Altered mentation (resolved)  #Intermittent L sided weakness  -Unclear etiology but overall seems to be improving.  -Neurologic workup negative (MRI brain, CT and CTA head). Neurology signed off  -Ammonia normal and having 2-3 brown stools per day.   -No asterixis.  -CT triple phase negative for  large, portosystemic shunt  -CXR negative for pneumonia. Paracentesis negative for SBP (24 neutrophils noted).  -UA unremarkable.     Recommendations:  -Target 2-3 bowel movements per day.  -Continue lactulose and rifaximin     # Cirrhosis- due to ETOH, last drink 4/2024, chronic disease work up negative.   MELD 18  - PSE: Ammonia normal, no asterixis. On lactulose and now rifaximin.  - EV: Needs screening EGD as outpatient  - ascites: moderate ascites on most recent ultrasound as well as large amount of sludge, s/p paracentesis 6/5 with 4L off.  No SBP, low total protein and SAAG >1.1 confirms liver as source.  On lasix/aldactone as well as low sodium diet.    - HCC: AFP 19.8, no lesion on ultrasound 5/29. CT triple phase with small right hepatic lobe lesion (unable to further characterize). MRI recommended.     Recommendation:  -Continue rifaximin and lactulose  -Continue lasix/aldactone.  -MRI Liver w/wo to further characterize hepatic lesion (can be done as outpatient).   -Consider EGD outpatient     From GI/hep standpoint okay for discharge once transitioned to oral diuretic therapy.    Case discussed with Moira Lizarraga MD and Ema GARCIA.    Sarah Lim DNP, FNP-[facility] Gastroenter[facility]  6/10/2024      Results:     Lab Results   Component Value Date    WBC 6.8 06/09/2024    HGB 10.2 (L) 06/09/2024    HCT 29.2 (L) 06/09/2024    .0 (L) 06/09/2024    CREATSERUM 0.66 06/09/2024    BUN 7 (L) 06/09/2024     06/09/2024    K 3.2 (L) 06/10/2024     06/09/2024    CO2 28.0 06/09/2024    GLU 82 06/09/2024    CA 8.1 (L) 06/09/2024    ALB 2.2 (L) 06/09/2024    ALKPHO 105 06/09/2024    BILT 2.6 (H) 06/09/2024    TP 6.2 06/09/2024    AST 43 (H) 06/09/2024    ALT 8 (L) 06/09/2024    PTT 34.8 06/03/2024    INR 1.89 (H) 06/09/2024    T4F 1.0 04/03/2024    TSH 8.140 (H) 04/03/2024    LIP 49 06/03/2024    MG 2.0 04/04/2024     04/03/2024    B12 >2,000 (H) 04/04/2024    ETOH <3 04/03/2024       No  results found.

## 2024-06-10 NOTE — PHYSICAL THERAPY NOTE
PHYSICAL THERAPY TREATMENT NOTE - INPATIENT     Room Number: 329/329-A       Presenting Problem: sensory ataxian and fluid overload  Co-Morbidities : recemt admission for etoh w/d, liver cirrhosis, cerebellar CVA    Problem List  Active Problems:    Cerebellar hemorrhage (HCC)    Anemia    Alcoholic cirrhosis of liver with ascites (HCC)    Fluid overload    Type 2 diabetes mellitus without complication, without long-term current use of insulin (HCC)    Sensory ataxia      PHYSICAL THERAPY ASSESSMENT   Patient demonstrates fair progress this session, goals  remain in progress.    Patient continues to function below baseline with bed mobility, transfers, gait, maintaining seated position, and standing prolonged periods.  Contributing factors to remaining limitations include decreased functional strength, impaired static sitting and dynamic standing balance, decreased muscular endurance, and medical status.  Next session anticipate patient to progress bed mobility, transfers, and gait.  Physical Therapy will continue to follow patient for duration of hospitalization.    Patient continues to benefit from continued skilled PT services: to promote return to prior level of function and safety with continuous assistance and gradual rehabilitative therapy .    PLAN  PT Treatment Plan: Bed mobility;Gait training;Transfer training  Frequency (Obs): 5x/week    SUBJECTIVE  Agreeable to activity.    OBJECTIVE  Precautions: Bed/chair alarm    WEIGHT BEARING RESTRICTION  none    PAIN ASSESSMENT   Ratin    BALANCE  Static Sitting: Good  Dynamic Sitting: Fair +  Static Standing: Poor +  Dynamic Standing: Poor    AM-PAC '6-Clicks' INPATIENT SHORT FORM - BASIC MOBILITY  How much difficulty does the patient currently have...  Patient Difficulty: Turning over in bed (including adjusting bedclothes, sheets and blankets)?: A Lot   Patient Difficulty: Sitting down on and standing up from a chair with arms (e.g., wheelchair, bedside  commode, etc.): A Lot   Patient Difficulty: Moving from lying on back to sitting on the side of the bed?: A Lot   How much help from another person does the patient currently need...   Help from Another: Moving to and from a bed to a chair (including a wheelchair)?: A Lot   Help from Another: Need to walk in hospital room?: A Lot   Help from Another: Climbing 3-5 steps with a railing?: Total     AM-PAC Score:  Raw Score: 11   Approx Degree of Impairment: 72.57%   Standardized Score (AM-PAC Scale): 33.86   CMS Modifier (G-Code): CL    FUNCTIONAL ABILITY STATUS  Functional Mobility/Gait Assessment  Gait Assistance: Moderate assistance  Distance (ft): 3 + 8  Assistive Device: Rolling walker  Pattern: Shuffle;Ataxic (narrow ANNA, significant posterior lean, unsteady, slow pace)  Supine to Sit: moderate assist  Sit to Stand: moderate assist    Additional information: pt received resting in bed with dtr at bedside. Introduced self and role, goals for today. Pt verbalized understanding and agreeable to activity. No c/o pain. Mod A to transition supine>sit at EOB. Posterior and L sided lateral lean noted in sitting. VC to correct, pt unable and required mod A, with both UE on bed rails. Therapist assisted to don shoes while sitting at EOB. Mod A for STS transfer with RW, VC and assist to place hand on RW appropriately. Mod A for steps to chair, and subsequent transfer from chair for short distance gait. No LOB but overall very ataxic and unsteady with heavy posterior lean. Pt was left sitting in chair with needs within reach, handoff to RN complete.     The patient's Approx Degree of Impairment: 72.57% has been calculated based on documentation in the [facility] '6 clicks' Inpatient Daily Activity Short Form.  Research supports that patients with this level of impairment may benefit from LTAC however anticipate benefit from a rehab facility.  Final disposition will be made by interdisciplinary medical team.    Patient End of  Session: Up in chair;Needs met;Call light within reach;RN aware of session/findings;All patient questions and concerns addressed;Family present    CURRENT GOALS   Goals to be met by:   Patient Goal Patient's self-stated goal is: home   Goal #1 Patient is able to demonstrate supine - sit EOB @ level: min A      Goal #1   Current Status Mod A    Goal #2 Patient is able to demonstrate transfers Sit to/from Stand at assistance level: min A with walker - rolling      Goal #2  Current Status Mod A with RW    Goal #3 Patient is able to ambulate 50 feet with assist device: walker - rolling at assistance level: Min A   Goal #3   Current Status 3 + 8 ft with RW and mod A    Goal #4     Goal #4   Current Status     Goal #5 Patient to demonstrate independence with home activity/exercise instructions provided to patient in preparation for discharge.   Goal #5   Current Status Ongoing      Gait Trainin minutes

## 2024-06-10 NOTE — PROGRESS NOTES
Progress Note  Christi Tavarez Patient Status:  Inpatient    1960 MRN [id number]   Location [facility] 3W/SW Attending Antony Zavala MD   Hosp Day # 7 ÁNGEL Orozco     Subjective:  Still c/o swelling in her legs    Objective:  /58 (BP Location: Right arm)   Pulse 99   Temp 98 °F (36.7 °C) (Oral)   Resp 16   Ht 4' 9\" (1.448 m)   Wt 133 lb 8 oz (60.6 kg)   SpO2 94%   BMI 28.89 kg/m²     Telemetry: SR 80s-90s    Intake/Output:    Intake/Output Summary (Last 24 hours) at 6/10/2024 0909  Last data filed at 6/10/2024 0300  Gross per 24 hour   Intake 120 ml   Output 870 ml   Net -750 ml     Last 3 Weights   06/10/24 0300 133 lb 8 oz (60.6 kg)   24 0544 136 lb 12.8 oz (62.1 kg)   24 0433 143 lb (64.9 kg)   24 0942 146 lb 3.2 oz (66.3 kg)   24 0538 139 lb 11.2 oz (63.4 kg)   24 0555 155 lb 6.4 oz (70.5 kg)   24 0500 155 lb 4.8 oz (70.4 kg)   24 1810 162 lb 14.4 oz (73.9 kg)   24 1250 160 lb (72.6 kg)   24 0537 153 lb 11.2 oz (69.7 kg)   24 0507 157 lb 12.8 oz (71.6 kg)   24 0416 158 lb (71.7 kg)   24 0800 161 lb 13.1 oz (73.4 kg)   24 1700 154 lb 15.7 oz (70.3 kg)   24 0400 142 lb 3.2 oz (64.5 kg)   24 0523 139 lb 12.4 oz (63.4 kg)   24 2121 130 lb (59 kg)   24 0533 139 lb 12.4 oz (63.4 kg)     Labs:  Recent Labs   Lab 24  0958 24  0640 24  1227 24  1714 24  0719 06/10/24  0701   GLU 90  90  --  116*  --  82  --    BUN 7*  7*  --  7*  --  7*  --    CREATSERUM 0.71  0.71  --  0.72  --  0.66  --    EGFRCR 95  95  --  94  --  99  --    CA 8.8  8.8  --  8.4*  --  8.1*  --    *  135*  --  138  --  138  --    K 3.8  3.8   < > 3.5 3.7 3.7  3.7 3.2*     103  --  104  --  104  --    CO2 27.0  27.0  --  30.0  --  28.0  --     < > = values in this interval not displayed.     Recent Labs   Lab 24  0958 2440 24  0719   RBC  3.17* 2.63* 2.79*   HGB 11.9* 9.8* 10.2*   HCT 33.8* 27.4* 29.2*   .6* 104.2* 104.7*   MCH 37.5* 37.3* 36.6*   MCHC 35.2 35.8 34.9   RDW 16.5* 16.6* 16.9*   NEPRELIM 4.41 3.77 3.16   WBC 8.0 7.6 6.8   .0* 134.0* 142.0*     Recent Labs   Lab 06/03/24  1347   TROPHS <3     Lab Results   Component Value Date/Time    HDL 11 (L) 06/04/2024 09:17 AM     (H) 06/04/2024 09:17 AM    TRIG 87 06/04/2024 09:17 AM     No results found for: \"DDIMER\"  Lab Results   Component Value Date    TSH 8.140 (H) 04/03/2024     Review of Systems:   Constitutional: No fevers, chills, fatigue or night sweats.  ENT: No mouth pain, neck pain, running nose, headaches or swollen glands.  Skin: No rashes, pruritus or skin changes,  Respiratory: Denies cough, wheezing or shortness of breath.  CV: Denies chest pain, palpitations, orthopnea, PND or dizziness.  Musculoskeletal: No joint pain, stiffness or swelling.  GI: No nausea, vomiting or diarrhea. No blood in stools.  Neurologic: No seizures, tremors, weakness or numbness.     Physical Exam:  General: Alert, cooperative, no distress, appears stated age.  Neck: Supple, symmetrical, trachea midline, no adenopathy, thyroid: no enlargment/tenderness/nodules, no carotid bruit and no JVD.  Lungs:  bibasilar fine crackles  Chest wall: No tenderness or deformity.  Heart: Regular rate and rhythm, S1, S2 normal, no murmur, click, rub or gallop.  Abdomen: Soft, non-tender. Bowel sounds normal. No masses,  No organomegaly.  Extremities: Extremities normal, atraumatic, no cyanosis, 1+ BLE edema to knees  Pulses: 2+ and symmetric all extremities.  Neurologic: slight right sided facial droop, right arm held against her body, not moving freely, follows commands, upper extremity strength equal     Diagnostics:  Echo: 06/04/24  Conclusions:     1. Left ventricle: The cavity size was normal. Wall thickness was normal.      Systolic function was normal. The estimated ejection fraction was 60-65%,       by visual assessment. No diagnostic evidence for regional wall motion      abnormalities. Left ventricular diastolic function parameters were      normal.   2. Left atrium: The left atrial volume was normal.   3. Pulmonary arteries: Systolic pressure was within the normal range,      estimated to be 24mm Hg.   4. Systemic veins: Central venous respirophasic diameter changes are blunted      (< 50%).   5. Inferior vena cava: The IVC was normal-sized.   Impressions:  No previous study was available for comparison.   *   Medications:   rifAXIMin  550 mg Oral BID    furosemide  40 mg Intravenous TID    insulin aspart  1-5 Units Subcutaneous TID CC    traZODone  25 mg Oral Nightly    escitalopram  10 mg Oral Nightly    folic acid  1 mg Oral Daily    lactulose  20 g Oral BID    lamoTRIgine  25 mg Oral BID    levothyroxine  50 mcg Oral Before breakfast    pantoprazole  20 mg Oral QAM AC    potassium chloride  10 mEq Oral Daily    spironolactone  25 mg Oral Daily     Assessment:    Volume overload  Admitted with LE edema and shortness of breath  -2/2 liver disease/congestion, portal htn  -echo normal with LVEF 60-65%, no RWMA, normal diastolic function, no valvular dsyfuctions  -was taking lasix BID at home along with aldactone  -now diuresing with IV lasix TID, stable labs, good UOP  -I/Os net neg 8.7L, ~900mL UOP past 24 hours  -weight down 29# from admission weight, family states dry weight 160#, today 133  -BP stable 90s-100s, BMP pending  -GDMT: lasix, aldactone  -albumin given yesterday for third spacing, albumin level pending today    Hx HTN  BP lower in 100-110s  -aldactone, lasix    Hypokalemia  K+ 3.2  -cardiac electrolyte replacement protocol   -aldactone, daily KCl    DMII-A1c 4.4    Alcoholic liver disease  AST elevated, albumin low, bilirubin elevated  -s/p paracentesis 6/5 with 4L off  -normal ammonia levels after getting lactulose, total protein low  -portal htn on US, hypoalbuminemia-albumin  given  -third spacing component to her swelling, down ~30# from admission    Hx CVA  Recent cerebellar stroke vs mass vs intracranial bleed in April of this year      Plan:  -Check BMP  -Continue TID IV lasix, consider transition to PO tomorrow as swelling may not be able to completely resolve  -Replace potassium per protocol as needed, continue additional daily dose  -monitor strict I/Os, daily weights, daily BMP  -further recs per GI/primary    Plan of care discussed with patient, RN.      Shona Tello, DIANE  06/10/24  9:09 AM  [phone] [place]  [phone] [facility]

## 2024-06-11 ENCOUNTER — APPOINTMENT (OUTPATIENT)
Dept: GENERAL RADIOLOGY | Facility: HOSPITAL | Age: 64
DRG: 432 | End: 2024-06-11
Attending: INTERNAL MEDICINE

## 2024-06-11 ENCOUNTER — TELEPHONE (OUTPATIENT)
Dept: NEUROLOGY | Facility: CLINIC | Age: 64
End: 2024-06-11

## 2024-06-11 LAB
ALBUMIN SERPL-MCNC: 2.7 G/DL (ref 3.2–4.8)
ALP LIVER SERPL-CCNC: 110 U/L
ALT SERPL-CCNC: 10 U/L
ANION GAP SERPL CALC-SCNC: 6 MMOL/L (ref 0–18)
AST SERPL-CCNC: 35 U/L (ref ?–34)
BASOPHILS # BLD AUTO: 0.07 X10(3) UL (ref 0–0.2)
BASOPHILS NFR BLD AUTO: 0.9 %
BILIRUB DIRECT SERPL-MCNC: 1.5 MG/DL (ref ?–0.3)
BILIRUB SERPL-MCNC: 2.5 MG/DL (ref 0.2–1.1)
BUN BLD-MCNC: 7 MG/DL (ref 9–23)
BUN/CREAT SERPL: 10.3 (ref 10–20)
CALCIUM BLD-MCNC: 8.4 MG/DL (ref 8.7–10.4)
CHLORIDE SERPL-SCNC: 105 MMOL/L (ref 98–112)
CO2 SERPL-SCNC: 28 MMOL/L (ref 21–32)
CREAT BLD-MCNC: 0.68 MG/DL
DEPRECATED RDW RBC AUTO: 64.2 FL (ref 35.1–46.3)
EGFRCR SERPLBLD CKD-EPI 2021: 98 ML/MIN/1.73M2 (ref 60–?)
EOSINOPHIL # BLD AUTO: 0.13 X10(3) UL (ref 0–0.7)
EOSINOPHIL NFR BLD AUTO: 1.7 %
ERYTHROCYTE [DISTWIDTH] IN BLOOD BY AUTOMATED COUNT: 16.4 % (ref 11–15)
GLUCOSE BLD-MCNC: 107 MG/DL (ref 70–99)
GLUCOSE BLDC GLUCOMTR-MCNC: 125 MG/DL (ref 70–99)
GLUCOSE BLDC GLUCOMTR-MCNC: 152 MG/DL (ref 70–99)
GLUCOSE BLDC GLUCOMTR-MCNC: 85 MG/DL (ref 70–99)
GLUCOSE BLDC GLUCOMTR-MCNC: 90 MG/DL (ref 70–99)
HCT VFR BLD AUTO: 31.6 %
HGB BLD-MCNC: 11 G/DL
IMM GRANULOCYTES # BLD AUTO: 0.03 X10(3) UL (ref 0–1)
IMM GRANULOCYTES NFR BLD: 0.4 %
INR BLD: 1.69 (ref 0.8–1.2)
LYMPHOCYTES # BLD AUTO: 2.28 X10(3) UL (ref 1–4)
LYMPHOCYTES NFR BLD AUTO: 29.9 %
MCH RBC QN AUTO: 36.9 PG (ref 26–34)
MCHC RBC AUTO-ENTMCNC: 34.8 G/DL (ref 31–37)
MCV RBC AUTO: 106 FL
MONOCYTES # BLD AUTO: 0.89 X10(3) UL (ref 0.1–1)
MONOCYTES NFR BLD AUTO: 11.7 %
NEUTROPHILS # BLD AUTO: 4.22 X10 (3) UL (ref 1.5–7.7)
NEUTROPHILS # BLD AUTO: 4.22 X10(3) UL (ref 1.5–7.7)
NEUTROPHILS NFR BLD AUTO: 55.4 %
OSMOLALITY SERPL CALC.SUM OF ELEC: 286 MOSM/KG (ref 275–295)
PLATELET # BLD AUTO: 145 10(3)UL (ref 150–450)
POTASSIUM SERPL-SCNC: 3.1 MMOL/L (ref 3.5–5.1)
POTASSIUM SERPL-SCNC: 3.5 MMOL/L (ref 3.5–5.1)
PROT SERPL-MCNC: 6.6 G/DL (ref 5.7–8.2)
PROTHROMBIN TIME: 21 SECONDS (ref 11.6–14.8)
RBC # BLD AUTO: 2.98 X10(6)UL
SARS-COV-2 RNA RESP QL NAA+PROBE: DETECTED
SODIUM SERPL-SCNC: 139 MMOL/L (ref 136–145)
WBC # BLD AUTO: 7.6 X10(3) UL (ref 4–11)

## 2024-06-11 PROCEDURE — 99232 SBSQ HOSP IP/OBS MODERATE 35: CPT | Performed by: PHYSICIAN ASSISTANT

## 2024-06-11 PROCEDURE — 99232 SBSQ HOSP IP/OBS MODERATE 35: CPT | Performed by: INTERNAL MEDICINE

## 2024-06-11 PROCEDURE — 71045 X-RAY EXAM CHEST 1 VIEW: CPT | Performed by: INTERNAL MEDICINE

## 2024-06-11 RX ORDER — POTASSIUM CHLORIDE 1.5 G/1.58G
POWDER, FOR SOLUTION ORAL
Status: COMPLETED
Start: 2024-06-11 | End: 2024-06-11

## 2024-06-11 RX ORDER — POTASSIUM CHLORIDE 1.5 G/1.58G
40 POWDER, FOR SOLUTION ORAL EVERY 4 HOURS
Status: COMPLETED | OUTPATIENT
Start: 2024-06-11 | End: 2024-06-11

## 2024-06-11 RX ORDER — MIDODRINE HYDROCHLORIDE 2.5 MG/1
2.5 TABLET ORAL 3 TIMES DAILY
Status: DISCONTINUED | OUTPATIENT
Start: 2024-06-11 | End: 2024-06-18

## 2024-06-11 RX ORDER — POTASSIUM CHLORIDE 1.5 G/1.58G
40 POWDER, FOR SOLUTION ORAL EVERY 4 HOURS
Status: COMPLETED | OUTPATIENT
Start: 2024-06-11 | End: 2024-06-12

## 2024-06-11 RX ORDER — BUMETANIDE 1 MG/1
2 TABLET ORAL
Status: DISCONTINUED | OUTPATIENT
Start: 2024-06-11 | End: 2024-06-18

## 2024-06-11 RX ORDER — SPIRONOLACTONE 25 MG/1
50 TABLET ORAL DAILY
Status: DISCONTINUED | OUTPATIENT
Start: 2024-06-12 | End: 2024-06-18

## 2024-06-11 NOTE — TELEPHONE ENCOUNTER
Pt daughter called in. Pt is currently hospitalized at the moment had appt 6/13 but rescheduled to dr laird next available August 29th. Pls advise if this is fine or where to schedule pt. This was a hfu from 4/9.

## 2024-06-11 NOTE — OCCUPATIONAL THERAPY NOTE
OCCUPATIONAL THERAPY TREATMENT NOTE - INPATIENT        Room Number: 329/329-A          Problem List  Active Problems:    Cerebellar hemorrhage (HCC)    Anemia    Alcoholic cirrhosis of liver with ascites (HCC)    Fluid overload    Type 2 diabetes mellitus without complication, without long-term current use of insulin (HCC)    Sensory ataxia      OCCUPATIONAL THERAPY ASSESSMENT   Patient demonstrates limited progress this session, goals remain in progress.    Patient continues to function below baseline with ADLs and functional mobility.   Contributing factors to remaining limitations include decreased functional strength, decreased endurance, impaired sit and stand balance, impaired coordination, impaired motor planning, decreased muscular endurance, cognitive deficits ( ), decreased insight to deficits, and decreased safety awareness. Occupational Therapy will continue to follow patient for duration of hospitalization.    Patient continues to benefit from continued skilled OT services: to promote return to prior level of function and safety with continuous assistance and gradual rehabilitative therapy .     PLAN  OT Treatment Plan: Balance activities;Energy conservation/work simplification techniques;ADL training;Functional transfer training;Endurance training;Patient/Family education;Compensatory technique education  OT Device Recommendations: TBD    SUBJECTIVE  \"What can I do to practice?!\"    OBJECTIVE  Precautions: Bed/chair alarm    WEIGHT BEARING RESTRICTION     PAIN ASSESSMENT  Ratin  Location: BLE pain  Management Techniques: Relaxation; Repositioning    ACTIVITY TOLERANCE  Performs all requested tasks without c/o pain or SOB    ACTIVITIES OF DAILY LIVING ASSESSMENT  AM-PAC ‘6-Clicks’ Inpatient Daily Activity Short Form  How much help from another person does the patient currently need…  -   Putting on and taking off regular lower body clothing?: A Lot  -   Bathing (including washing, rinsing, drying)?:  A Lot  -   Toileting, which includes using toilet, bedpan or urinal? : A Lot  -   Putting on and taking off regular upper body clothing?: A Lot  -   Taking care of personal grooming such as brushing teeth?: A Little  -   Eating meals?: A Little    AM-PAC Score:  Score: 14  Approx Degree of Impairment: 59.67%  Standardized Score (AM-PAC Scale): 33.39  CMS Modifier (G-Code): CK    FUNCTIONAL TRANSFER ASSESSMENT  Sit to Stand: Edge of Bed; Chair  Edge of Bed: Maximum Assist  Chair: Maximum Assist    BED MOBILITY  Rolling: Maximum Assist  Supine to Sit : Maximum Assist  Sit to Supine (OT): Not Tested    BALANCE ASSESSMENT  Static Sitting: Minimal Assist  Sitting Unilateral: Maximum Assist  Static Standing: Moderate Assist (posterior LOB)  Standing Unilateral: Maximum Assist    FUNCTIONAL ADL ASSESSMENT  Bathing Seated: Maximum Assist  LB Dressing Seated: Maximum Assist (Max a to thread Depends, Max a to don socks. Pt limited by BLE swelling and weakness)  Toileting Standing: Maximum Assist (Pt stood x1 minute requiring Max A for posterior pericare in standing)    Skilled Therapy Provided: Pt seen in cooperation with PT. Pt received reclined in bed, struggling to manage lidded drink with significant spillage. Pt's dtr remained at bed side.   Pt requires Max A for ADLs and functional mobility with significant balance deficits. Pt maintains posterior and LT lateral balance deficits; unable to correct.   Pt presents with kyphotic posture, unable to maintain head in neutral. Pt maintains head in  LT Lateral lean.   Pt demo difficulty with forward flexion.   Ataxia demo with functional xfers  Pt with poor insight into deficits    EDUCATION PROVIDED  Patient: Plan of Care; Functional Transfer Techniques; Fall Prevention  Patient's Response to Education: Verbalized Understanding; Demonstrates Poor Carry Over to Information  Family/Caregiver's Response to Education: Verbalized Understanding    The patient's Approx Degree of  Impairment: 59.67% has been calculated based on documentation in the [facility] '6 clicks' Inpatient Daily Activity Short Form.  Research supports that patients with this level of impairment may benefit from ELISABETH.  Final disposition will be made by interdisciplinary medical team.    Patient End of Session: Up in chair;Needs met;Call light within reach;All patient questions and concerns addressed;Alarm set;Family present    OT Goals:   Patient will complete functional transfer with CGA  Comment: Not met with significant balance deficits, ataxia    Patient will complete toileting with CGA  Comment: NT this session    Patient will tolerate standing for 3 minutes in prep for adls with CGA   Comment: progressing    Patient will sit EOB in prep for ADLs with SBA  Comment:Not Met with impaired sit balance          Goals  on: 24  Frequency: 3-5x/week      Self-Care Home Management: 15 minutes  Therapeutic Activity: 15 minutes

## 2024-06-11 NOTE — PROGRESS NOTES
[facility]     Gastroenterology Progress Note    Christi Tavarez Patient Status:  Inpatient    1960 MRN [id number]   Location [facility] 3W/SW Attending Antony Zavala MD   Hosp Day # 8 PCP ÁNGEL Delgado       Subjective:   Patient seen with Daughter.   Patient feels like her energy is improving.   Had 1 BM this am. Had 2 BM yesterday. No melena or bright red blood.   No abdominal pain, nausea or vomiting  No fevers or chills.     Continues to complain of left pain. Ultrasound negative for DVT.   Objective:   Blood pressure 100/61, pulse 80, temperature 97.9 °F (36.6 °C), temperature source Axillary, resp. rate 18, height 4' 9\" (1.448 m), weight 134 lb 8 oz (61 kg), SpO2 95%. Body mass index is 29.11 kg/m².    Gen: awake, alert patient, NAD  HEENT: EOMI, the sclera appears anicteric, oropharynx clear, mucus membranes appear moist  CV: RRR  Lung: no conversational dyspnea   Abdomen: soft NTND abdomen with NABS appreciated   Skin: dry, warm, no jaundice  Ext: + LE edema is evident  Neuro: Alert and interactive  Psych: calm, cooperative    Assessment and Plan:   Christi Tavarez is a 63 year old female w/ PMHx of  BMI 29.21, DM2, hypothyroidism, HLD, disordered ETOH, ICH in 2024 who presented to ER for worsening SOB and edema. GI consulted given history of cirrhosis.     #volume overload (improving)  -Cardiac workup negative and peritoneal fluid studies consistent with portal HTN as etiology.  -Due to hypoalbuminemia and cirrhosis.  -SAAG > 1.1, fluid protein < 2. 24 neutrophils noted (TNC was 100, 24% of which were neutrophils)     Recommendations:  -Continue diuresis with lasix/aldactone.  -Plan per cardiology increase aldactone to 50 and transition to oral bumex 2 mg BID     #Altered mentation (resolved)  #Intermittent L sided weakness  -Unclear etiology but overall seems to be improving.  -Neurologic workup negative (MRI brain, CT and CTA head). Neurology signed  off  -Ammonia normal and having 2-3 brown stools per day.   -No asterixis.  -CT triple phase negative for large, portosystemic shunt  -CXR negative for pneumonia. Paracentesis negative for SBP (24 neutrophils noted).  -UA unremarkable.     Recommendations:  -Target 2-3 bowel movements per day.  -Continue lactulose and rifaximin     # Cirrhosis- due to ETOH, last drink 4/2024, chronic disease work up negative.   MELD 18  - PSE: Ammonia normal, no asterixis. On lactulose and now rifaximin.  - EV: Needs screening EGD as outpatient  - ascites: moderate ascites on most recent ultrasound as well as large amount of sludge, s/p paracentesis 6/5 with 4L off.  No SBP, low total protein and SAAG >1.1 confirms liver as source.  On lasix/aldactone as well as low sodium diet.    - HCC: AFP 19.8, no lesion on ultrasound 5/29. CT triple phase with small right hepatic lobe lesion (unable to further characterize). MRI recommended.     Recommendation:  -Continue rifaximin and lactulose  -Continue lasix/aldactone.  -MRI Liver w/wo to further characterize hepatic lesion (can be done as outpatient).   -Consider EGD outpatient     From GI/hep standpoint okay for discharge once transitioned to oral diuretic therapy.  Patient has follow up on 6/18. Will plan to recheck CMP and monitor fluid status.     Pt is stable from a GI standpoint.  GI will sign off at this time.  Please, reach out to our team if new GI concerns arise.  Thank you for the opportunity to participate in this patient's care.        Case discussed with Moira Lizarraga MD.     Gita Wheeler PA-C  [facility]  6/11/2024      Results:     Lab Results   Component Value Date    WBC 6.8 06/09/2024    HGB 10.2 (L) 06/09/2024    HCT 29.2 (L) 06/09/2024    .0 (L) 06/09/2024    CREATSERUM 0.73 06/10/2024    BUN 8 (L) 06/10/2024     06/10/2024    K 3.5 06/10/2024    K 3.5 06/10/2024     06/10/2024    CO2 29.0 06/10/2024     (H)  06/10/2024    CA 8.5 (L) 06/10/2024    ALB 2.5 (L) 06/10/2024    ALKPHO 105 06/09/2024    BILT 2.6 (H) 06/09/2024    TP 6.2 06/09/2024    AST 43 (H) 06/09/2024    ALT 8 (L) 06/09/2024    PTT 34.8 06/03/2024    INR 1.89 (H) 06/09/2024    T4F 1.0 04/03/2024    TSH 8.140 (H) 04/03/2024    LIP 49 06/03/2024    MG 2.0 04/04/2024     04/03/2024    B12 >2,000 (H) 04/04/2024    ETOH <3 04/03/2024       US VENOUS DOPPLER LEG BILAT - DIAG IMG (CPT=93970)    Result Date: 6/10/2024  CONCLUSION: Normal examination.     Dictated by (CST): Daniel Moss MD on 6/10/2024 at 3:08 PM     Finalized by (CST): Daniel Moss MD on 6/10/2024 at 3:09 PM

## 2024-06-11 NOTE — PLAN OF CARE
Patient had an uneventful shift. Midodrine 2.5 started PO. First dose education complete.    Problem: Patient Centered Care  Goal: Patient preferences are identified and integrated in the patient's plan of care  Description: Interventions:  - What would you like us to know as we care for you? I live with my daughter.  - Provide timely, complete, and accurate information to patient/family  - Incorporate patient and family knowledge, values, beliefs, and cultural backgrounds into the planning and delivery of care  - Encourage patient/family to participate in care and decision-making at the level they choose  - Honor patient and family perspectives and choices  Outcome: Progressing     Problem: Diabetes/Glucose Control  Goal: Glucose maintained within prescribed range  Description: INTERVENTIONS:  - Monitor Blood Glucose as ordered  - Assess for signs and symptoms of hyperglycemia and hypoglycemia  - Administer ordered medications to maintain glucose within target range  - Assess barriers to adequate nutritional intake and initiate nutrition consult as needed  - Instruct patient on self management of diabetes  Outcome: Progressing     Problem: CARDIOVASCULAR - ADULT  Goal: Maintains optimal cardiac output and hemodynamic stability  Description: INTERVENTIONS:  - Monitor vital signs, rhythm, and trends  - Monitor for bleeding, hypotension and signs of decreased cardiac output  - Evaluate effectiveness of vasoactive medications to optimize hemodynamic stability  - Monitor arterial and/or venous puncture sites for bleeding and/or hematoma  - Assess quality of pulses, skin color and temperature  - Assess for signs of decreased coronary artery perfusion - ex. Angina  - Evaluate fluid balance, assess for edema, trend weights  Outcome: Progressing  Goal: Absence of cardiac arrhythmias or at baseline  Description: INTERVENTIONS:  - Continuous cardiac monitoring, monitor vital signs, obtain 12 lead EKG if indicated  - Evaluate  effectiveness of antiarrhythmic and heart rate control medications as ordered  - Initiate emergency measures for life threatening arrhythmias  - Monitor electrolytes and administer replacement therapy as ordered  Outcome: Progressing     Problem: METABOLIC/FLUID AND ELECTROLYTES - ADULT  Goal: Electrolytes maintained within normal limits  Description: INTERVENTIONS:  - Monitor labs and rhythm and assess patient for signs and symptoms of electrolyte imbalances  - Administer electrolyte replacement as ordered  - Monitor response to electrolyte replacements, including rhythm and repeat lab results as appropriate  - Fluid restriction as ordered  - Instruct patient on fluid and nutrition restrictions as appropriate  Outcome: Progressing

## 2024-06-11 NOTE — CM/SW NOTE
Social work followed up with the patient's [facility] insurance plan to inquire about SNF days.    Per [facility] the patient is only allotted 30 SNF days per year.    The patient has exhausted all SNF days as she was at [facility] for 30 days.    This information was relayed to the patient's daughter.    The patient's daughter said she would like to talk to her sibling before making a decision on ELISABETH/Respite vs Home w/HH.    Social work advised the patient's daughter that a choice needs to me made today 6/11.    Social work will follow up.    SW/CM to remain available for support and/or discharge planning.     Jannette Dobson MSW, LSW  Discharge Planner [id number]

## 2024-06-11 NOTE — PROGRESS NOTES
Progress Note  Christi Tavarez Patient Status:  Inpatient    1960 MRN [id number]   Location [facility] 3W/SW Attending Antony Zavala MD   Hosp Day # 8 ÁNGEL Orozco     Subjective:  Pt feels well  Decent diuresis  VSS    Objective:  BP 97/55 (BP Location: Right arm)   Pulse 85   Temp 98.8 °F (37.1 °C) (Axillary)   Resp 18   Ht 4' 9\" (1.448 m)   Wt 134 lb 8 oz (61 kg)   SpO2 94%   BMI 29.11 kg/m²     Telemetry: SR 80s-90s    Intake/Output:    Intake/Output Summary (Last 24 hours) at 2024 09  Last data filed at 2024 0412  Gross per 24 hour   Intake --   Output 1400 ml   Net -1400 ml     Last 3 Weights   24 0412 134 lb 8 oz (61 kg)   06/10/24 0300 133 lb 8 oz (60.6 kg)   24 0544 136 lb 12.8 oz (62.1 kg)   24 0433 143 lb (64.9 kg)   24 0942 146 lb 3.2 oz (66.3 kg)   24 0538 139 lb 11.2 oz (63.4 kg)   24 0555 155 lb 6.4 oz (70.5 kg)   24 0500 155 lb 4.8 oz (70.4 kg)   24 1810 162 lb 14.4 oz (73.9 kg)   24 1250 160 lb (72.6 kg)   24 0537 153 lb 11.2 oz (69.7 kg)   24 0507 157 lb 12.8 oz (71.6 kg)   24 0416 158 lb (71.7 kg)   24 0800 161 lb 13.1 oz (73.4 kg)   24 1700 154 lb 15.7 oz (70.3 kg)   24 0400 142 lb 3.2 oz (64.5 kg)   24 0523 139 lb 12.4 oz (63.4 kg)   24 2121 130 lb (59 kg)   24 0533 139 lb 12.4 oz (63.4 kg)     Labs:  Recent Labs   Lab 24  0719 06/10/24  0701 06/10/24  1529 24  0741   GLU 82  --  110* 107*   BUN 7*  --  8* 7*   CREATSERUM 0.66  --  0.73 0.68   EGFRCR 99  --  92 98   CA 8.1*  --  8.5* 8.4*     --  138 139   K 3.7  3.7 3.2* 3.5  3.5 3.5     --  104 105   CO2 28.0  --  29.0 28.0     Recent Labs   Lab 24  0640 24  0719 24  0813   RBC 2.63* 2.79* 2.98*   HGB 9.8* 10.2* 11.0*   HCT 27.4* 29.2* 31.6*   .2* 104.7* 106.0*   MCH 37.3* 36.6* 36.9*   MCHC 35.8 34.9 34.8   RDW 16.6* 16.9* 16.4*   NEPRELIM  3.77 3.16 4.22   WBC 7.6 6.8 7.6   .0* 142.0* 145.0*     No results for input(s): \"TROP\", \"TROPHS\", \"CK\" in the last 168 hours.    Lab Results   Component Value Date/Time    HDL 11 (L) 06/04/2024 09:17 AM     (H) 06/04/2024 09:17 AM    TRIG 87 06/04/2024 09:17 AM     No results found for: \"DDIMER\"  Lab Results   Component Value Date    TSH 8.140 (H) 04/03/2024     Review of Systems:   Constitutional: No fevers, chills, fatigue or night sweats.  ENT: No mouth pain, neck pain, running nose, headaches or swollen glands.  Skin: No rashes, pruritus or skin changes,  Respiratory: Denies cough, wheezing or shortness of breath.  CV: Denies chest pain, palpitations, orthopnea, PND or dizziness.  Musculoskeletal: No joint pain, stiffness or swelling.  GI: No nausea, vomiting or diarrhea. No blood in stools.  Neurologic: No seizures, tremors, weakness or numbness.     Physical Exam:  General: Alert, cooperative, no distress, appears stated age.  Neck: Supple, symmetrical, trachea midline, no adenopathy, thyroid: no enlargment/tenderness/nodules, no carotid bruit and no JVD.  Lungs:  bibasilar fine crackles  Chest wall: No tenderness or deformity.  Heart: Regular rate and rhythm, S1, S2 normal, no murmur, click, rub or gallop.  Abdomen: Soft, non-tender. Bowel sounds normal. No masses,  No organomegaly.  Extremities: Extremities normal, atraumatic, no cyanosis, 1+ BLE edema to knees  Pulses: 2+ and symmetric all extremities.  Neurologic: slight right sided facial droop, right arm held against her body, not moving freely, follows commands, upper extremity strength equal     Diagnostics:  Echo: 06/04/24  Conclusions:     1. Left ventricle: The cavity size was normal. Wall thickness was normal.      Systolic function was normal. The estimated ejection fraction was 60-65%,      by visual assessment. No diagnostic evidence for regional wall motion      abnormalities. Left ventricular diastolic function parameters were       normal.   2. Left atrium: The left atrial volume was normal.   3. Pulmonary arteries: Systolic pressure was within the normal range,      estimated to be 24mm Hg.   4. Systemic veins: Central venous respirophasic diameter changes are blunted      (< 50%).   5. Inferior vena cava: The IVC was normal-sized.   Impressions:  No previous study was available for comparison.   *   Medications:   rifAXIMin  550 mg Oral BID    furosemide  40 mg Intravenous TID    insulin aspart  1-5 Units Subcutaneous TID CC    traZODone  25 mg Oral Nightly    escitalopram  10 mg Oral Nightly    folic acid  1 mg Oral Daily    lactulose  20 g Oral BID    lamoTRIgine  25 mg Oral BID    levothyroxine  50 mcg Oral Before breakfast    pantoprazole  20 mg Oral QAM AC    potassium chloride  10 mEq Oral Daily    spironolactone  25 mg Oral Daily     Assessment:    Volume overload  Admitted with LE edema and shortness of breath  -2/2 liver disease/congestion, portal htn  -echo normal with LVEF 60-65%, no RWMA, normal diastolic function, no valvular dsyfuctions  -was taking lasix BID at home along with aldactone  -now diuresing with IV lasix TID, stable labs, good UOP  -I/Os net neg 10l    Hx HTN  BP lower in 100-110s  -aldactone, lasix    Hypokalemia  K+ 3.2  -cardiac electrolyte replacement protocol   -aldactone, daily KCl    DMII-A1c 4.4    Alcoholic liver disease  AST elevated, albumin low, bilirubin elevated  -s/p paracentesis 6/5 with 4L off  -normal ammonia levels after getting lactulose, total protein low  -portal htn on US, hypoalbuminemia-albumin given  -third spacing component to her swelling, down ~30# from admission    Hx CVA  Recent cerebellar stroke vs mass vs intracranial bleed in April of this year      Plan:  -Transition to oral diuretics, stop IV Lasix and start Bumex 2 mg twice daily  - Increase Aldactone to 50 mg daily  - Start midodrine 2.5 mg 3 times daily to help with flecked vasoconstriction, minimize ascites and help  systemic pressures  -Close monitoring of outpatient BMP, potassium monitoring    Thank you for allowing me to take part in the care of Christi Tavarez. Please call with any questions of concerns.    L3      .Topher Moran DO  [facility]   Interventional Cardiac and Vascular Services

## 2024-06-11 NOTE — PLAN OF CARE
Problem: Patient Centered Care  Goal: Patient preferences are identified and integrated in the patient's plan of care  Description: Interventions:  - What would you like us to know as we care for you? From home  - Provide timely, complete, and accurate information to patient/family  - Incorporate patient and family knowledge, values, beliefs, and cultural backgrounds into the planning and delivery of care  - Encourage patient/family to participate in care and decision-making at the level they choose  - Honor patient and family perspectives and choices  Outcome: Progressing     Problem: CARDIOVASCULAR - ADULT  Goal: Maintains optimal cardiac output and hemodynamic stability  Description: INTERVENTIONS:  - Monitor vital signs, rhythm, and trends  - Monitor for bleeding, hypotension and signs of decreased cardiac output  - Evaluate effectiveness of vasoactive medications to optimize hemodynamic stability  - Monitor arterial and/or venous puncture sites for bleeding and/or hematoma  - Assess quality of pulses, skin color and temperature  - Assess for signs of decreased coronary artery perfusion - ex. Angina  - Evaluate fluid balance, assess for edema, trend weights  Outcome: Progressing  Goal: Absence of cardiac arrhythmias or at baseline  Description: INTERVENTIONS:  - Continuous cardiac monitoring, monitor vital signs, obtain 12 lead EKG if indicated  - Evaluate effectiveness of antiarrhythmic and heart rate control medications as ordered  - Initiate emergency measures for life threatening arrhythmias  - Monitor electrolytes and administer replacement therapy as ordered  Outcome: Progressing     Problem: METABOLIC/FLUID AND ELECTROLYTES - ADULT  Goal: Electrolytes maintained within normal limits  Description: INTERVENTIONS:  - Monitor labs and rhythm and assess patient for signs and symptoms of electrolyte imbalances  - Administer electrolyte replacement as ordered  - Monitor response to electrolyte replacements,  including rhythm and repeat lab results as appropriate  - Fluid restriction as ordered  - Instruct patient on fluid and nutrition restrictions as appropriate  Outcome: Progressing     Problem: Diabetes/Glucose Control  Goal: Glucose maintained within prescribed range  Description: INTERVENTIONS:  - Monitor Blood Glucose as ordered  - Assess for signs and symptoms of hyperglycemia and hypoglycemia  - Administer ordered medications to maintain glucose within target range  - Assess barriers to adequate nutritional intake and initiate nutrition consult as needed  - Instruct patient on self management of diabetes  Outcome: Progressing     Problem: SAFETY ADULT - FALL  Goal: Free from fall injury  Description: INTERVENTIONS:  - Assess pt frequently for physical needs  - Identify cognitive and physical deficits and behaviors that affect risk of falls.  - [place] fall precautions as indicated by assessment.  - Educate pt/family on patient safety including physical limitations  - Instruct pt to call for assistance with activity based on assessment  - Modify environment to reduce risk of injury  - Provide assistive devices as appropriate  - Consider OT/PT consult to assist with strengthening/mobility  - Encourage toileting schedule  Outcome: Progressing     Problem: DISCHARGE PLANNING  Goal: Discharge to home or other facility with appropriate resources  Description: INTERVENTIONS:  - Identify barriers to discharge w/pt and caregiver  - Include patient/family/discharge partner in discharge planning  - Arrange for needed discharge resources and transportation as appropriate  - Identify discharge learning needs (meds, wound care, etc)  - Arrange for interpreters to assist at discharge as needed  - Consider post-discharge preferences of patient/family/discharge partner  - Complete POLST form as appropriate  - Assess patient's ability to be responsible for managing their own health  - Refer to Case Management Department for  coordinating discharge planning if the patient needs post-hospital services based on physician/LIP order or complex needs related to functional status, cognitive ability or social support system  Outcome: Progressing

## 2024-06-11 NOTE — PHYSICAL THERAPY NOTE
PHYSICAL THERAPY TREATMENT NOTE - INPATIENT     Room Number: 329/329-A       Presenting Problem: sensory ataxian and fluid overload  Co-Morbidities : recemt admission for etoh w/d, liver cirrhosis, cerebellar CVA    Problem List  Active Problems:    Cerebellar hemorrhage (HCC)    Anemia    Alcoholic cirrhosis of liver with ascites (HCC)    Fluid overload    Type 2 diabetes mellitus without complication, without long-term current use of insulin (HCC)    Sensory ataxia      PHYSICAL THERAPY ASSESSMENT   Patient demonstrates fair progress this session, goals  remain in progress.    Patient continues to function below baseline with bed mobility, transfers, and gait.  Contributing factors to remaining limitations include decreased functional strength, impaired standing balance, and medical status.  Next session anticipate patient to progress bed mobility, transfers, and gait.  Physical Therapy will continue to follow patient for duration of hospitalization.    Patient continues to benefit from continued skilled PT services: to promote return to prior level of function and safety with continuous assistance and gradual rehabilitative therapy .    PLAN  PT Treatment Plan: Bed mobility;Body mechanics;Patient education;Gait training;Transfer training;Balance training  Frequency (Obs): 3-5x/week    SUBJECTIVE  \"I can't get up by myself\"    OBJECTIVE  Precautions: Bed/chair alarm    PAIN ASSESSMENT   Ratin          BALANCE  Static Sitting: Good  Dynamic Sitting: Fair +  Static Standing: Poor  Dynamic Standing: Poor    AM-PAC '6-Clicks' INPATIENT SHORT FORM - BASIC MOBILITY  How much difficulty does the patient currently have...  Patient Difficulty: Turning over in bed (including adjusting bedclothes, sheets and blankets)?: A Lot   Patient Difficulty: Sitting down on and standing up from a chair with arms (e.g., wheelchair, bedside commode, etc.): A Lot   Patient Difficulty: Moving from lying on back to sitting on the side  of the bed?: A Lot   How much help from another person does the patient currently need...   Help from Another: Moving to and from a bed to a chair (including a wheelchair)?: A Lot   Help from Another: Need to walk in hospital room?: A Lot   Help from Another: Climbing 3-5 steps with a railing?: Total     AM-PAC Score:  Raw Score: 11   Approx Degree of Impairment: 72.57%   Standardized Score (AM-PAC Scale): 33.86   CMS Modifier (G-Code): CL    FUNCTIONAL ABILITY STATUS  Functional Mobility/Gait Assessment  Gait Assistance: Moderate assistance (assist of 2)  Distance (ft): steps to bedside chair  Assistive Device: Rolling walker  Pattern: Shuffle;Ataxic  Rolling:  not tested   Supine to Sit: moderate assist  Sit to Supine:  not tested   Sit to Stand: moderate assist    Additional information: Patient on room air. Patient with mod A x 1 for bed mobility, able to stand at bedside with max A x 1 for posterior lean when standing with rolling walker. Patient able to take steps to bedside chair with rolling walker with mod A x 1. Patient able to stand from bedside chair twice, able to stand with with min A x 1 with posterior lean for about 30 seconds and then stood again for 30 seconds with SBA. Verbal cues for upright posture when standing, and for hand placement while transferring from sit to stand. The patient's Approx Degree of Impairment: 72.57% has been calculated based on documentation in the [facility] '6 clicks' Inpatient Daily Activity Short Form.  Research supports that patients with this level of impairment may benefit from rehab facility. Patient received semi-fowlers in bed, agreeable to physical therapy. Education with patient provided verbally on physical therapy plan of care and physiological benefits of out of bed mobility. Patient with good carryover during session. Anticipated therapy needs remain appropriate based on the patient's performance, personal factors, and remaining functional impairments. Final  disposition will be made by interdisciplinary medical team.    Patient End of Session: Up in chair;Needs met;Call light within reach;RN aware of session/findings;All patient questions and concerns addressed;Alarm set;Family present    CURRENT GOALS   Goals to be met by: 6/19  Patient Goal Patient's self-stated goal is: home   Goal #1 Patient is able to demonstrate supine - sit EOB @ level: min A      Goal #1   Current Status Mod A  x 1   Goal #2 Patient is able to demonstrate transfers Sit to/from Stand at assistance level: min A with walker - rolling      Goal #2  Current Status Mod A x 1 with rolling walker    Goal #3 Patient is able to ambulate 50 feet with assist device: walker - rolling at assistance level: Min A   Goal #3   Current Status Steps to bedside chair with rolling walker mod A x 2   Goal #4     Goal #4   Current Status     Goal #5 Patient to demonstrate independence with home activity/exercise instructions provided to patient in preparation for discharge.   Goal #5   Current Status Ongoing     Therapeutic Activity: 13 minutes  Therapeutic Exercise: 10 minutes

## 2024-06-11 NOTE — PLAN OF CARE
Patient Aox4, tested positive for COVID. All medications switched to oral form. Call light within reach, frequent rounding by staff.     Problem: Patient Centered Care  Goal: Patient preferences are identified and integrated in the patient's plan of care  Description: Interventions:  - What would you like us to know as we care for you? Family works for the hospital.   - Provide timely, complete, and accurate information to patient/family  - Incorporate patient and family knowledge, values, beliefs, and cultural backgrounds into the planning and delivery of care  - Encourage patient/family to participate in care and decision-making at the level they choose  - Honor patient and family perspectives and choices  Outcome: Progressing     Problem: Diabetes/Glucose Control  Goal: Glucose maintained within prescribed range  Description: INTERVENTIONS:  - Monitor Blood Glucose as ordered  - Assess for signs and symptoms of hyperglycemia and hypoglycemia  - Administer ordered medications to maintain glucose within target range  - Assess barriers to adequate nutritional intake and initiate nutrition consult as needed  - Instruct patient on self management of diabetes  Outcome: Progressing    Problem: CARDIOVASCULAR - ADULT  Goal: Maintains optimal cardiac output and hemodynamic stability  Description: INTERVENTIONS:  - Monitor vital signs, rhythm, and trends  - Monitor for bleeding, hypotension and signs of decreased cardiac output  - Evaluate effectiveness of vasoactive medications to optimize hemodynamic stability  - Monitor arterial and/or venous puncture sites for bleeding and/or hematoma  - Assess quality of pulses, skin color and temperature  - Assess for signs of decreased coronary artery perfusion - ex. Angina  - Evaluate fluid balance, assess for edema, trend weights  Outcome: Progressing  Goal: Absence of cardiac arrhythmias or at baseline  Description: INTERVENTIONS:  - Continuous cardiac monitoring, monitor vital  signs, obtain 12 lead EKG if indicated  - Evaluate effectiveness of antiarrhythmic and heart rate control medications as ordered  - Initiate emergency measures for life threatening arrhythmias  - Monitor electrolytes and administer replacement therapy as ordered  Outcome: Progressing     Problem: METABOLIC/FLUID AND ELECTROLYTES - ADULT  Goal: Electrolytes maintained within normal limits  Description: INTERVENTIONS:  - Monitor labs and rhythm and assess patient for signs and symptoms of electrolyte imbalances  - Administer electrolyte replacement as ordered  - Monitor response to electrolyte replacements, including rhythm and repeat lab results as appropriate  - Fluid restriction as ordered  - Instruct patient on fluid and nutrition restrictions as appropriate  Outcome: Progressing     Problem: SAFETY ADULT - FALL  Goal: Free from fall injury  Description: INTERVENTIONS:  - Assess pt frequently for physical needs  - Identify cognitive and physical deficits and behaviors that affect risk of falls.  - Atkins fall precautions as indicated by assessment.  - Educate pt/family on patient safety including physical limitations  - Instruct pt to call for assistance with activity based on assessment  - Modify environment to reduce risk of injury  - Provide assistive devices as appropriate  - Consider OT/PT consult to assist with strengthening/mobility  - Encourage toileting schedule  Outcome: Progressing     Problem: DISCHARGE PLANNING  Goal: Discharge to home or other facility with appropriate resources  Description: INTERVENTIONS:  - Identify barriers to discharge w/pt and caregiver  - Include patient/family/discharge partner in discharge planning  - Arrange for needed discharge resources and transportation as appropriate  - Identify discharge learning needs (meds, wound care, etc)  - Arrange for interpreters to assist at discharge as needed  - Consider post-discharge preferences of patient/family/discharge partner  -  Complete POLST form as appropriate  - Assess patient's ability to be responsible for managing their own health  - Refer to Case Management Department for coordinating discharge planning if the patient needs post-hospital services based on physician/LIP order or complex needs related to functional status, cognitive ability or social support system  Outcome: Progressing

## 2024-06-11 NOTE — PAYOR COMM NOTE
--------------  CONTINUED STAY REVIEW    Payor: [employer]  Subscriber #:  [id number]  Authorization Number: [id number]    Admit date: 6/3/24  Admit time:  5:56 PM    REVIEW DOCUMENTATION:  6/10/2024:  Gastroenterology Progress Note   Brain fog improved  No ABD pain  No N/V, tolerating diet  Had 2 soft, non-bloody stools yesterday  No fever, chills  Feels legs are sore from swelling    Blood pressure 114/58, pulse 99, temperature 98 °F (36.7 °C), temperature source Oral, resp. rate 16, height 4' 9\" (1.448 m), weight 133 lb 8 oz (60.6 kg), SpO2 94%. Body mass index is 28.89 kg/m².     K 3.2 (L) 06/10/2024     Recommendation:  -Continue rifaximin and lactulose  -Continue lasix/aldactone.  -MRI Liver w/wo to further characterize hepatic lesion (can be done as outpatient).   -Consider EGD outpatient     From GI/hep standpoint okay for discharge once transitioned to oral diuretic therapy.     Case discussed with Moira Lizarraga MD and Ema GARCIA.  Sarah Lim DNP, FNP-BC  6/10/2024    6/11/2024:  Cardiology   Decent diuresis  VSS    BP 97/55 (BP Location: Right arm)   Pulse 85   Temp 98.8 °F (37.1 °C) (Axillary)   Resp 18   Ht 4' 9\" (1.448 m)   Wt 134 lb 8 oz (61 kg)   SpO2 94%   BMI 29.11 kg/m²   Telemetry: SR 80s-90s    6/11/2024 0412      Gross per 24 hour   Intake --   Output 1400 ml   Net -1400 ml     06/11/24 0412 134 lb 8 oz (61 kg)   06/10/24 0300 133 lb 8 oz (60.6 kg)   06/09/24 0544 136 lb 12.8 oz (62.1 kg)     Lab 06/09/24  0719 06/10/24  0701 06/10/24  1529 06/11/24  0741   GLU 82  --  110* 107*   BUN 7*  --  8* 7*   CREATSERUM 0.66  --  0.73 0.68   EGFRCR 99  --  92 98   CA 8.1*  --  8.5* 8.4*     --  138 139   K 3.7  3.7 3.2* 3.5  3.5 3.5     --  104 105   CO2 28.0  --  29.0 28.0      Lab 06/08/24  0640 06/09/24  0719 06/11/24  0813   RBC 2.63* 2.79* 2.98*   HGB 9.8* 10.2* 11.0*   HCT 27.4* 29.2* 31.6*   .2* 104.7* 106.0*   MCH 37.3* 36.6* 36.9*   MCHC 35.8 34.9 34.8   RDW 16.6*  16.9* 16.4*   NEPRELIM 3.77 3.16 4.22   WBC 7.6 6.8 7.6   .0* 142.0* 145.0*     Plan:  -Transition to oral diuretics, stop IV Lasix and start Bumex 2 mg twice daily  - Increase Aldactone to 50 mg daily  - Start midodrine 2.5 mg 3 times daily to help with flecked vasoconstriction, minimize ascites and help systemic pressures  -Close monitoring of outpatient BMP, potassium monitoring    L3  Topher Moran DO  6/11/2024  9:12 AM     MEDICATIONS ADMINISTERED IN LAST 1 DAY:  bumetanide (Bumex) tab 2 mg       Date Action Dose Route User    6/11/2024 1016 Given 2 mg Oral Sandeep Rendon RN          escitalopram (Lexapro) tab 10 mg       Date Action Dose Route User    6/10/2024 2024 Given 10 mg Oral Hollie Silveira RN          folic acid (Folvite) tab 1 mg       Date Action Dose Route User    6/11/2024 0824 Given 1 mg Oral Sandeep Rendon RN          furosemide (Lasix) 10 mg/mL injection 40 mg       Date Action Dose Route User    6/11/2024 0824 Given 40 mg Intravenous Sandeep Rendon RN    6/10/2024 2030 Given 40 mg Intravenous Hollie Silveira RN    6/10/2024 1618 Given 40 mg Intravenous Ema Godoy RN          lactulose (CHRONULAC) 10 GM/15ML solution 20 g       Date Action Dose Route User    6/11/2024 0824 Given 20 g Oral Sandeep Rendon RN    6/10/2024 2024 Given 20 g Oral Hollie Silveira RN    6/10/2024 1428 Given 20 g Oral Ema Godoy RN          lamoTRIgine (LaMICtal) tab 25 mg       Date Action Dose Route User    6/11/2024 0824 Given 25 mg Oral Sandeep Rendon RN    6/10/2024 2024 Given 25 mg Oral Hollie Silveira RN          levothyroxine (Synthroid) tab 50 mcg       Date Action Dose Route User    6/11/2024 0520 Given 50 mcg Oral Hollie Silveira RN          pantoprazole (Protonix) DR tab 20 mg       Date Action Dose Route User    6/11/2024 0520 Given 20 mg Oral Jahntz, Hollie, RN          potassium chloride (Klor-Con) 20 MEQ oral powder 10 mEq       Date Action Dose Route User    6/11/2024 0840 Given 10 mEq  Oral Sandeep Rendon RN          potassium chloride (Klor-Con) 20 MEQ oral powder       Date Action Dose Route User    6/11/2024 0845 Given (none) Oral Sandeep Rendon RN          potassium chloride (Klor-Con) 20 MEQ oral powder 40 mEq       Date Action Dose Route User    6/11/2024 0845 Given (none) Oral Sandeep Rendon RN          potassium chloride (Klor-Con M20) tab 40 mEq       Date Action Dose Route User    6/10/2024 1618 Given 40 mEq Oral Ema Godoy RN          rifAXIMin (Xifaxan) tab 550 mg       Date Action Dose Route User    6/11/2024 0824 Given 550 mg Oral Sandeep Rendon RN    6/10/2024 2024 Given 550 mg Oral Hollie Silveira RN          spironolactone (Aldactone) tab 25 mg       Date Action Dose Route User    6/11/2024 0824 Given 25 mg Oral Sandeep Rendon RN          traZODone (Desyrel) tab 25 mg       Date Action Dose Route User    6/10/2024 2121 Given 25 mg Oral Hollie Silveira RN            Vitals (last day)       Date/Time Temp Pulse Resp BP SpO2 Weight O2 Device O2 Flow Rate (L/min) [facility]    06/11/24 0820 98.8 °F (37.1 °C) 85 18 97/55 94 % -- None (Room air) -- EM    06/11/24 0412 -- -- -- -- -- 134 lb 8 oz -- -- RG    06/11/24 0150 97.9 °F (36.6 °C) -- 18 100/61 95 % -- None (Room air) --     06/10/24 2021 97.9 °F (36.6 °C) 80 20 105/64 98 % -- None (Room air) --     06/10/24 1617 98.2 °F (36.8 °C) 83 18 103/63 97 % -- None (Room air) -- LP    06/10/24 0908 98 °F (36.7 °C) 99 16 114/58 94 % -- None (Room air) -- HM    06/10/24 0643 98.2 °F (36.8 °C) -- 18 109/47 98 % -- None (Room air) -- TP    06/10/24 0300 -- -- -- -- -- 133 lb 8 oz -- -- TB     FOR CONTINUED STAY REVIEW/APPROVAL OF INPT ADMISSION

## 2024-06-12 PROBLEM — U07.1 COVID-19 VIRUS INFECTION: Status: ACTIVE | Noted: 2024-06-12

## 2024-06-12 PROBLEM — R50.9 FEVER: Status: ACTIVE | Noted: 2024-06-12

## 2024-06-12 LAB
ANION GAP SERPL CALC-SCNC: 7 MMOL/L (ref 0–18)
BASOPHILS # BLD AUTO: 0.08 X10(3) UL (ref 0–0.2)
BASOPHILS NFR BLD AUTO: 1.2 %
BUN BLD-MCNC: 6 MG/DL (ref 9–23)
BUN/CREAT SERPL: 8.7 (ref 10–20)
CALCIUM BLD-MCNC: 8.5 MG/DL (ref 8.7–10.4)
CHLORIDE SERPL-SCNC: 104 MMOL/L (ref 98–112)
CO2 SERPL-SCNC: 27 MMOL/L (ref 21–32)
CREAT BLD-MCNC: 0.69 MG/DL
DEPRECATED RDW RBC AUTO: 62.4 FL (ref 35.1–46.3)
EGFRCR SERPLBLD CKD-EPI 2021: 97 ML/MIN/1.73M2 (ref 60–?)
EOSINOPHIL # BLD AUTO: 0.08 X10(3) UL (ref 0–0.7)
EOSINOPHIL NFR BLD AUTO: 1.2 %
ERYTHROCYTE [DISTWIDTH] IN BLOOD BY AUTOMATED COUNT: 16.6 % (ref 11–15)
GLUCOSE BLD-MCNC: 94 MG/DL (ref 70–99)
GLUCOSE BLDC GLUCOMTR-MCNC: 109 MG/DL (ref 70–99)
GLUCOSE BLDC GLUCOMTR-MCNC: 146 MG/DL (ref 70–99)
GLUCOSE BLDC GLUCOMTR-MCNC: 157 MG/DL (ref 70–99)
GLUCOSE BLDC GLUCOMTR-MCNC: 91 MG/DL (ref 70–99)
HCT VFR BLD AUTO: 29.2 %
HGB BLD-MCNC: 10.1 G/DL
IMM GRANULOCYTES # BLD AUTO: 0.02 X10(3) UL (ref 0–1)
IMM GRANULOCYTES NFR BLD: 0.3 %
LYMPHOCYTES # BLD AUTO: 1.63 X10(3) UL (ref 1–4)
LYMPHOCYTES NFR BLD AUTO: 23.7 %
MCH RBC QN AUTO: 35.8 PG (ref 26–34)
MCHC RBC AUTO-ENTMCNC: 34.6 G/DL (ref 31–37)
MCV RBC AUTO: 103.5 FL
MONOCYTES # BLD AUTO: 1.24 X10(3) UL (ref 0.1–1)
MONOCYTES NFR BLD AUTO: 18 %
NEUTROPHILS # BLD AUTO: 3.82 X10 (3) UL (ref 1.5–7.7)
NEUTROPHILS # BLD AUTO: 3.82 X10(3) UL (ref 1.5–7.7)
NEUTROPHILS NFR BLD AUTO: 55.6 %
OSMOLALITY SERPL CALC.SUM OF ELEC: 283 MOSM/KG (ref 275–295)
PLATELET # BLD AUTO: 136 10(3)UL (ref 150–450)
POTASSIUM SERPL-SCNC: 3.8 MMOL/L (ref 3.5–5.1)
POTASSIUM SERPL-SCNC: 3.8 MMOL/L (ref 3.5–5.1)
RBC # BLD AUTO: 2.82 X10(6)UL
SODIUM SERPL-SCNC: 138 MMOL/L (ref 136–145)
WBC # BLD AUTO: 6.9 X10(3) UL (ref 4–11)

## 2024-06-12 PROCEDURE — 99232 SBSQ HOSP IP/OBS MODERATE 35: CPT | Performed by: INTERNAL MEDICINE

## 2024-06-12 RX ORDER — POTASSIUM CHLORIDE 20 MEQ/1
40 TABLET, EXTENDED RELEASE ORAL ONCE
Status: COMPLETED | OUTPATIENT
Start: 2024-06-12 | End: 2024-06-12

## 2024-06-12 RX ORDER — ACETAMINOPHEN 325 MG/1
TABLET ORAL
Status: DISPENSED
Start: 2024-06-12 | End: 2024-06-13

## 2024-06-12 RX ORDER — ACETAMINOPHEN 325 MG/1
650 TABLET ORAL EVERY 8 HOURS PRN
Status: DISCONTINUED | OUTPATIENT
Start: 2024-06-12 | End: 2024-06-14

## 2024-06-12 NOTE — CM/SW NOTE
Social work received a call from [facility] FEP  requesting PT/OT/ST notes.    [place] [facility]  Phone:[phone]  Fax: [phone]    Social work faxed all requested documents.    SW/CM to remain available for support and/or discharge planning.     Jannette Dobson MSW, LSW  Discharge Planner [id number]

## 2024-06-12 NOTE — CONSULTS
[facility]  part of [facility] ID CONSULT NOTE    Christi Tavarez Patient Status:  Inpatient    1960 MRN [id number]   Location [facility] 3W/SW Attending Antony Zavala MD   Hosp Day # 9 PCP ÁNGEL Delgado       Reason for Consultation:  COVID-19    ASSESSMENT:    Antibiotics: OFF    # COVID-19 infection without hypoxia with negative CXR  # ETOH cirrhosis   -S/p paracentesis  with 4L removed, cx NGTD  # Weakness  # ICH 2024  # Diabetes mellitus    PLAN:  -  No COVID-19 treatment indicated at this time.  -  Isolation per protocol.  -  Follow fever curve, wbc.  -  Reviewed labs, micro, imaging reports, available old records.  -  Case d/w patient, primary, RN.    History of Present Illness:  Christi Tavarez is a 63 year old female with a history of recent ICH 2024, diabetes mellitus, ETOH abuse, who presented to [facility] ED on 6/3 for shortness of breath and BLE edema. On arrival, afebrile, CXR with edema, seen by GI, s/p paracentesis  with 4L off, cx NGTD, hospital course c/b BLE weakness, MRI without acute changes. Had CT chest showing RUL nodule. Family noted patient with a new cough and congestions so a rapid COVID was sent that was positive. Denies body aches. ID consulted.    History:  Past Medical History:    CHF (congestive heart failure) (HCC)    Diabetes (HCC)    Stroke (HCC)     No past surgical history on file.  No family history on file.   reports that she has never smoked. She has never used smokeless tobacco. She reports current alcohol use. She reports that she does not use drugs.    Allergies:  No Known Allergies    Medications:    Current Facility-Administered Medications:     spironolactone (Aldactone) tab 50 mg, 50 mg, Oral, Daily    bumetanide (Bumex) tab 2 mg, 2 mg, Oral, BID (Diuretic)    midodrine (ProAmatine) tab 2.5 mg, 2.5 mg, Oral, TID    rifAXIMin (Xifaxan) tab 550 mg, 550 mg, Oral, BID    glucose (Dex4) 15 GM/59ML oral liquid 15 g, 15  g, Oral, Q15 Min PRN **OR** glucose (Glutose) 40% oral gel 15 g, 15 g, Oral, Q15 Min PRN **OR** glucose-vitamin C (Dex-4) chewable tab 4 tablet, 4 tablet, Oral, Q15 Min PRN **OR** dextrose 50% injection 50 mL, 50 mL, Intravenous, Q15 Min PRN **OR** glucose (Dex4) 15 GM/59ML oral liquid 30 g, 30 g, Oral, Q15 Min PRN **OR** glucose (Glutose) 40% oral gel 30 g, 30 g, Oral, Q15 Min PRN **OR** glucose-vitamin C (Dex-4) chewable tab 8 tablet, 8 tablet, Oral, Q15 Min PRN    insulin aspart (NovoLOG) 100 Units/mL FlexPen 1-5 Units, 1-5 Units, Subcutaneous, TID CC    traZODone (Desyrel) tab 25 mg, 25 mg, Oral, Nightly    escitalopram (Lexapro) tab 10 mg, 10 mg, Oral, Nightly    folic acid (Folvite) tab 1 mg, 1 mg, Oral, Daily    lactulose (CHRONULAC) 10 GM/15ML solution 20 g, 20 g, Oral, BID    lamoTRIgine (LaMICtal) tab 25 mg, 25 mg, Oral, BID    levothyroxine (Synthroid) tab 50 mcg, 50 mcg, Oral, Before breakfast    pantoprazole (Protonix) DR tab 20 mg, 20 mg, Oral, QAM AC    potassium chloride (Klor-Con) 20 MEQ oral powder 10 mEq, 10 mEq, Oral, Daily    Review of Systems:  CONSTITUTIONAL:  No weight loss, weakness or fatigue.  HEENT:  Eyes:  No visual loss, blurred vision, double vision or yellow sclerae. Ears, Nose, Throat:  No hearing loss, sneezing, congestion, runny nose or sore throat.  SKIN:  No rash or itching.  CARDIOVASCULAR:  No chest pain, chest pressure or chest discomfort  RESPIRATORY:  +cough  GASTROINTESTINAL:  No anorexia, nausea, vomiting or diarrhea. No abdominal pain or blood.  GENITOURINARY:  No Burning on urination.   NEUROLOGICAL:  No headache, dizziness, syncope, paralysis, ataxia, numbness or tingling in the extremities.  MUSCULOSKELETAL:  No muscle, back pain, joint pain or stiffness.  HEMATOLOGIC:  No anemia, bleeding or bruising.  ALLERGIES:  No history of asthma, hives, eczema or rhinitis.    Physical Exam:  Vital signs: Blood pressure 103/70, pulse 96, temperature 98.6 °F (37 °C), temperature  source Axillary, resp. rate 16, height 4' 9\" (1.448 m), weight 130 lb 6.4 oz (59.1 kg), SpO2 93%.    General: Awake, in bed  HEENT: Moist mucous membranes. EOMI   Neck: No lymphadenopathy.  Supple.  Cardiovascular: RRR  Respiratory: Clear to auscultation bilaterally.  No wheezes. No rhonchi.  Abdomen: Soft, mild distension  Musculoskeletal: No edema noted  Integument: No lesions. No erythema.  Lines: PIV+  : Purewick draining yellow urine    Laboratory Data:  Recent Labs   Lab 06/12/24  0406   RBC 2.82*   HGB 10.1*   HCT 29.2*   .5*   MCH 35.8*   MCHC 34.6   RDW 16.6*   NEPRELIM 3.82   WBC 6.9   .0*     Recent Labs   Lab 06/07/24  0958 06/08/24  0640 06/09/24  0719 06/10/24  0701 06/10/24  1529 06/11/24  0741 06/11/24  1916 06/12/24  0406   GLU 90  90   < > 82  --  110* 107*  --  94   BUN 7*  7*   < > 7*  --  8* 7*  --  6*   CREATSERUM 0.71  0.71   < > 0.66  --  0.73 0.68  --  0.69   CA 8.8  8.8   < > 8.1*  --  8.5* 8.4*  --  8.5*   ALB 2.7*  --  2.2* 2.5*  --  2.7*  --   --    *  135*   < > 138  --  138 139  --  138   K 3.8  3.8   < > 3.7  3.7 3.2* 3.5  3.5 3.5 3.1* 3.8  3.8     103   < > 104  --  104 105  --  104   CO2 27.0  27.0   < > 28.0  --  29.0 28.0  --  27.0   ALKPHO 131*  --  105  --   --  110  --   --    AST 38*  --  43*  --   --  35*  --   --    ALT 12  --  8*  --   --  10  --   --    BILT 3.3*  --  2.6*  --   --  2.5*  --   --    TP 7.2  --  6.2  --   --  6.6  --   --     < > = values in this interval not displayed.       Microbiology: Reviewed in EMR    Radiology: Reviewed    Thank you for allowing us to participate in the care of this patient. Please do not hesitate to call if you have any questions.   We will continue to follow with you and will make further recommendations based on his progress.    JANEEN [facility]  [phone]  6/12/2024

## 2024-06-12 NOTE — PROGRESS NOTES
[facility]  part of [facility]    Progress Note    Christi Tavarez Patient Status:  Inpatient    1960 MRN [id number]   Location [facility] 3W/SW Attending Antony Zavala MD   Hosp Day # 9 PCP ÁNGEL Delgado     Chief Complaint:     Subjective:     Constitutional:  Positive for fatigue.   HENT: Negative.     Eyes: Negative.    Respiratory: Negative.     Cardiovascular:  Positive for leg swelling.        Improved    Gastrointestinal: Negative.    Genitourinary: Negative.    Musculoskeletal: Negative.    Skin: Negative.    Neurological: Negative.        Objective:   Blood pressure 103/70, pulse 96, temperature 98.6 °F (37 °C), temperature source Axillary, resp. rate 16, height 4' 9\" (1.448 m), weight 130 lb 6.4 oz (59.1 kg), SpO2 93%.  Physical Exam  Vitals and nursing note reviewed.   HENT:      Head: Normocephalic and atraumatic.      Nose: Nose normal.      Mouth/Throat:      Mouth: Mucous membranes are moist.   Cardiovascular:      Rate and Rhythm: Normal rate.   Pulmonary:      Effort: Pulmonary effort is normal.      Breath sounds: Normal breath sounds.   Abdominal:      General: Abdomen is flat. Bowel sounds are normal.      Palpations: Abdomen is soft.   Musculoskeletal:         General: Normal range of motion.      Cervical back: Normal range of motion and neck supple.      Right lower leg: Edema present.      Left lower leg: Edema present.      Comments: Improved    Skin:     General: Skin is warm and dry.   Neurological:      Mental Status: She is alert and oriented to person, place, and time.      Comments: But at times slow with responding    Psychiatric:         Mood and Affect: Mood normal.         Results:   Lab Results   Component Value Date    WBC 6.9 2024    HGB 10.1 (L) 2024    HCT 29.2 (L) 2024    .0 (L) 2024    CREATSERUM 0.69 2024    BUN 6 (L) 2024     2024    K 3.8 2024    K 3.8 2024    CL  104 06/12/2024    CO2 27.0 06/12/2024    GLU 94 06/12/2024    CA 8.5 (L) 06/12/2024    ALB 2.7 (L) 06/11/2024    ALKPHO 110 06/11/2024    BILT 2.5 (H) 06/11/2024    TP 6.6 06/11/2024    AST 35 (H) 06/11/2024    ALT 10 06/11/2024    PTT 34.8 06/03/2024    INR 1.69 (H) 06/11/2024    T4F 1.0 04/03/2024    TSH 8.140 (H) 04/03/2024    LIP 49 06/03/2024    MG 2.0 04/04/2024    TROPHS <3 06/03/2024     04/03/2024    B12 >2,000 (H) 04/04/2024    ETOH <3 04/03/2024       XR CHEST AP PORTABLE  (CPT=71045)    Result Date: 6/11/2024  CONCLUSION: No evidence of acute cardiopulmonary disease.    Dictated by (CST): Henrry Garduno MD on 6/11/2024 at 4:19 PM     Finalized by (CST): Henrry Garduno MD on 6/11/2024 at 4:20 PM          US VENOUS DOPPLER LEG BILAT - DIAG IMG (CPT=93970)    Result Date: 6/10/2024  CONCLUSION: Normal examination.     Dictated by (CST): Daniel Moss MD on 6/10/2024 at 3:08 PM     Finalized by (CST): Daniel Moss MD on 6/10/2024 at 3:09 PM               Assessment & Plan:     + covid now mild cough, cxr unremarkable., ID on board    Had fever today cx done will follow ID recommendations       Anticipated dc in the next 1-2 days , daughter trying to get pt top a rehab        Continue with current care , awaiting cardiology recommendations o transitioning to oral diuretics      Will monitor electrolites       Daughter is looking in to another facility at  for respite care and some pt     Edema is down, continue with IV lasix as per cards      GI seen pt , egd out opt, also out pt MRI liver           Pt/ot eval      Continue with current care       MRI brain results noted with no acute findings     Low K resolved, will follow      paracentesis done yesterday 4 L removed          Fluid overload- improved, continue with diuretics, echo ok\     Cardiac, cardiology on board , continue with diuresing      component of liver cirrhosis           Alcoholic cirrhosis of liver with ascites (HCC)- abd  distention, gi on board for abd paracentesis tomorrow, katherine follow labs                     Anemia- of chronic disease stable , will follow                    Type 2 diabetes mellitus without complication, without long-term current use of insulin (HCC)- will monitor bs           Cerebellar hemorrhage (HCC)- repeat mri ct head today \" Developing encephalomalacia right cerebellum at site of previous hemorrhage.  No acute hemorrhage      Gen weakness- pt/ot       Antony Zavala MD  6/12/2024

## 2024-06-12 NOTE — CM/SW NOTE
06/12/24 1100   Choice of Post-Acute Provider   Informed patient of right to choose their preferred provider Yes   List of appropriate post-acute services provided to patient/family with quality data Yes   Patient/family choice [facility]   Information given to Patient;Daughter     Social work was able to follow up with the patient's daughter regarding [facility] choice.    The patient and her daughter have decided on [facility].    [facility] is reserved and they have been advised to submit for insurance auth.    If [facility] stay is denied, the patients daughter might pay for respite depending on the price.    Social work will follow up.    SW/CM to remain available for support and/or discharge planning.     Jannette Dobson MSW, LSW  Discharge Planner [id number]

## 2024-06-12 NOTE — PLAN OF CARE
A&OX4, can be forgetful. Up with 2 and walker but refused to get up today. Fever today. Blood cultures drawn.   Problem: Patient Centered Care  Goal: Patient preferences are identified and integrated in the patient's plan of care  Description: Interventions:  - What would you like us to know as we care for you? Recently at [facility]   - Provide timely, complete, and accurate information to patient/family  - Incorporate patient and family knowledge, values, beliefs, and cultural backgrounds into the planning and delivery of care  - Encourage patient/family to participate in care and decision-making at the level they choose  - Honor patient and family perspectives and choices  Outcome: Progressing     Problem: Diabetes/Glucose Control  Goal: Glucose maintained within prescribed range  Description: INTERVENTIONS:  - Monitor Blood Glucose as ordered  - Assess for signs and symptoms of hyperglycemia and hypoglycemia  - Administer ordered medications to maintain glucose within target range  - Assess barriers to adequate nutritional intake and initiate nutrition consult as needed  - Instruct patient on self management of diabetes  Outcome: Progressing     Problem: Patient/Family Goals  Goal: Patient/Family Long Term Goal  Description: Patient's Long Term Goal: go home    Interventions:  - follow plan of care  - See additional Care Plan goals for specific interventions  Outcome: Progressing  Goal: Patient/Family Short Term Goal  Description: Patient's Short Term Goal: get stronger    Interventions:   - work with pt/ot. Sit in chairs for meals   - See additional Care Plan goals for specific interventions  Outcome: Progressing     Problem: CARDIOVASCULAR - ADULT  Goal: Maintains optimal cardiac output and hemodynamic stability  Description: INTERVENTIONS:  - Monitor vital signs, rhythm, and trends  - Monitor for bleeding, hypotension and signs of decreased cardiac output  - Evaluate effectiveness of vasoactive medications to  optimize hemodynamic stability  - Monitor arterial and/or venous puncture sites for bleeding and/or hematoma  - Assess quality of pulses, skin color and temperature  - Assess for signs of decreased coronary artery perfusion - ex. Angina  - Evaluate fluid balance, assess for edema, trend weights  Outcome: Progressing  Goal: Absence of cardiac arrhythmias or at baseline  Description: INTERVENTIONS:  - Continuous cardiac monitoring, monitor vital signs, obtain 12 lead EKG if indicated  - Evaluate effectiveness of antiarrhythmic and heart rate control medications as ordered  - Initiate emergency measures for life threatening arrhythmias  - Monitor electrolytes and administer replacement therapy as ordered  Outcome: Progressing     Problem: METABOLIC/FLUID AND ELECTROLYTES - ADULT  Goal: Electrolytes maintained within normal limits  Description: INTERVENTIONS:  - Monitor labs and rhythm and assess patient for signs and symptoms of electrolyte imbalances  - Administer electrolyte replacement as ordered  - Monitor response to electrolyte replacements, including rhythm and repeat lab results as appropriate  - Fluid restriction as ordered  - Instruct patient on fluid and nutrition restrictions as appropriate  Outcome: Progressing     Problem: SAFETY ADULT - FALL  Goal: Free from fall injury  Description: INTERVENTIONS:  - Assess pt frequently for physical needs  - Identify cognitive and physical deficits and behaviors that affect risk of falls.  - [place] fall precautions as indicated by assessment.  - Educate pt/family on patient safety including physical limitations  - Instruct pt to call for assistance with activity based on assessment  - Modify environment to reduce risk of injury  - Provide assistive devices as appropriate  - Consider OT/PT consult to assist with strengthening/mobility  - Encourage toileting schedule  Outcome: Progressing     Problem: DISCHARGE PLANNING  Goal: Discharge to home or other facility with  appropriate resources  Description: INTERVENTIONS:  - Identify barriers to discharge w/pt and caregiver  - Include patient/family/discharge partner in discharge planning  - Arrange for needed discharge resources and transportation as appropriate  - Identify discharge learning needs (meds, wound care, etc)  - Arrange for interpreters to assist at discharge as needed  - Consider post-discharge preferences of patient/family/discharge partner  - Complete POLST form as appropriate  - Assess patient's ability to be responsible for managing their own health  - Refer to Case Management Department for coordinating discharge planning if the patient needs post-hospital services based on physician/LIP order or complex needs related to functional status, cognitive ability or social support system  Outcome: Progressing

## 2024-06-12 NOTE — PROGRESS NOTES
Progress Note  Christi Tavarez Patient Status:  Inpatient    1960 MRN [id number]   Location [facility] 3W/SW Attending Antony Zavala MD   Hosp Day # 9 PCP ÁNGEL Delgado     Subjective:  Feels slightly sob due to cough  New COVID diagnosis    Objective:  /70 (BP Location: Right arm)   Pulse 96   Temp 98.6 °F (37 °C) (Axillary)   Resp 16   Ht 4' 9\" (1.448 m)   Wt 130 lb 6.4 oz (59.1 kg)   SpO2 93%   BMI 28.22 kg/m²     Telemetry: SR 90s-100s    Intake/Output:    Intake/Output Summary (Last 24 hours) at 2024 0843  Last data filed at 2024 0420  Gross per 24 hour   Intake 490 ml   Output 1650 ml   Net -1160 ml     Last 3 Weights   24 0420 130 lb 6.4 oz (59.1 kg)   24 0412 134 lb 8 oz (61 kg)   06/10/24 0300 133 lb 8 oz (60.6 kg)   24 0544 136 lb 12.8 oz (62.1 kg)   24 0433 143 lb (64.9 kg)   24 0942 146 lb 3.2 oz (66.3 kg)   24 0538 139 lb 11.2 oz (63.4 kg)   24 0555 155 lb 6.4 oz (70.5 kg)   24 0500 155 lb 4.8 oz (70.4 kg)   24 1810 162 lb 14.4 oz (73.9 kg)   24 1250 160 lb (72.6 kg)   24 0537 153 lb 11.2 oz (69.7 kg)   24 0507 157 lb 12.8 oz (71.6 kg)   24 0416 158 lb (71.7 kg)   24 0800 161 lb 13.1 oz (73.4 kg)   24 1700 154 lb 15.7 oz (70.3 kg)   24 0400 142 lb 3.2 oz (64.5 kg)   24 0523 139 lb 12.4 oz (63.4 kg)   24 2121 130 lb (59 kg)   24 0533 139 lb 12.4 oz (63.4 kg)     Labs:  Recent Labs   Lab 06/10/24  1529 24  0741 24  1916 24  0406   * 107*  --  94   BUN 8* 7*  --  6*   CREATSERUM 0.73 0.68  --  0.69   EGFRCR 92 98  --  97   CA 8.5* 8.4*  --  8.5*    139  --  138   K 3.5  3.5 3.5 3.1* 3.8  3.8    105  --  104   CO2 29.0 28.0  --  27.0     Recent Labs   Lab 24  0719 24  0813 24  0406   RBC 2.79* 2.98* 2.82*   HGB 10.2* 11.0* 10.1*   HCT 29.2* 31.6* 29.2*   .7* 106.0* 103.5*   MCH 36.6* 36.9*  35.8*   MCHC 34.9 34.8 34.6   RDW 16.9* 16.4* 16.6*   NEPRELIM 3.16 4.22 3.82   WBC 6.8 7.6 6.9   .0* 145.0* 136.0*     No results for input(s): \"TROP\", \"TROPHS\", \"CK\" in the last 168 hours.    Lab Results   Component Value Date/Time    HDL 11 (L) 06/04/2024 09:17 AM     (H) 06/04/2024 09:17 AM    TRIG 87 06/04/2024 09:17 AM     No results found for: \"DDIMER\"  Lab Results   Component Value Date    TSH 8.140 (H) 04/03/2024     Review of Systems:   Constitutional: No fevers, chills, fatigue or night sweats.  ENT: No mouth pain, neck pain, running nose, headaches or swollen glands.  Skin: No rashes, pruritus or skin changes,  Respiratory: Denies cough, wheezing or shortness of breath.  CV: Denies chest pain, palpitations, orthopnea, PND or dizziness.  Musculoskeletal: No joint pain, stiffness or swelling.  GI: No nausea, vomiting or diarrhea. No blood in stools.  Neurologic: No seizures, tremors, weakness or numbness.     Physical Exam:  General: Alert, cooperative, no distress, appears stated age.  Neck: Supple, symmetrical, trachea midline, no adenopathy, thyroid: no enlargment/tenderness/nodules, no carotid bruit and no JVD.  Lungs:  diminished bilaterally  Chest wall: No tenderness or deformity.  Heart: Regular rate and rhythm, S1, S2 normal, no murmur, click, rub or gallop.  Abdomen: Soft, non-tender. Bowel sounds normal. No masses,  No organomegaly.  Extremities: Extremities normal, atraumatic, no cyanosis, 1+ BLE edema, improving  Pulses: 2+ and symmetric all extremities.  Neurologic: slight right sided facial droop, right arm held against her body, not moving freely, follows commands, upper extremity strength equal     Diagnostics:  Echo: 06/04/24  Conclusions:     1. Left ventricle: The cavity size was normal. Wall thickness was normal.      Systolic function was normal. The estimated ejection fraction was 60-65%,      by visual assessment. No diagnostic evidence for regional wall motion       abnormalities. Left ventricular diastolic function parameters were      normal.   2. Left atrium: The left atrial volume was normal.   3. Pulmonary arteries: Systolic pressure was within the normal range,      estimated to be 24mm Hg.   4. Systemic veins: Central venous respirophasic diameter changes are blunted      (< 50%).   5. Inferior vena cava: The IVC was normal-sized.   Impressions:  No previous study was available for comparison.   *   Medications:   potassium chloride  40 mEq Oral Once    spironolactone  50 mg Oral Daily    bumetanide  2 mg Oral BID (Diuretic)    midodrine  2.5 mg Oral TID    rifAXIMin  550 mg Oral BID    insulin aspart  1-5 Units Subcutaneous TID CC    traZODone  25 mg Oral Nightly    escitalopram  10 mg Oral Nightly    folic acid  1 mg Oral Daily    lactulose  20 g Oral BID    lamoTRIgine  25 mg Oral BID    levothyroxine  50 mcg Oral Before breakfast    pantoprazole  20 mg Oral QAM AC    potassium chloride  10 mEq Oral Daily     Assessment:    Volume overload  Admitted with LE edema and shortness of breath  -2/2 liver disease/congestion, portal htn  -echo normal with LVEF 60-65%, no RWMA, normal diastolic function, no valvular dsyfuctions  -was taking lasix BID at home along with aldactone  -now diuresing with IV lasix TID, stable labs, good UOP  -I/Os net neg 8.7L, ~900mL UOP past 24 hours  -weight down 29# from admission weight, family states dry weight 160#, today 133  -BP stable 90s-100s, BMP pending  -GDMT: lasix, aldactone  -albumin given for third spacing    Hx HTN  BP controlled in 100-120s  -aldactone, bumex, midodrine    Hypokalemia  K+ 3.2 previously,  now 3.8  -cardiac electrolyte replacement protocol   -aldactone, daily KCl    DMII-A1c 4.4    Alcoholic liver disease  AST elevated, albumin low, bilirubin elevated  -s/p paracentesis 6/5 with 4L off  -normal ammonia levels after getting lactulose, total protein low  -portal htn on US, hypoalbuminemia-albumin given  -third  spacing component to her swelling, down ~30# from admission    Hx CVA  Recent cerebellar stroke vs mass vs intracranial bleed in April of this year      Plan:  -Continue PO bumex  -Continue aldactone, midodrine  -Replace potassium per protocol as needed, continue additional daily dose  -monitor strict I/Os, daily weights, daily BMP  -further recs per GI/primary    Plan of care discussed with patient, RN.      Shona Siobhanhector Tello, APRN  06/12/24  8:43 AM  [phone] [place]  [phone] [place]      Cardiology addendum:  Pt examined and assessed independently.Agree with above.  Continue oral diuretics and monitor renal function.  Blood pressure stable with midodrine.  Bill Vance MD FACC L2

## 2024-06-12 NOTE — PLAN OF CARE
Problem: Patient Centered Care  Goal: Patient preferences are identified and integrated in the patient's plan of care  Description: Interventions:  - What would you like us to know as we care for you? From home  - Provide timely, complete, and accurate information to patient/family  - Incorporate patient and family knowledge, values, beliefs, and cultural backgrounds into the planning and delivery of care  - Encourage patient/family to participate in care and decision-making at the level they choose  - Honor patient and family perspectives and choices  Outcome: Progressing     Problem: CARDIOVASCULAR - ADULT  Goal: Maintains optimal cardiac output and hemodynamic stability  Description: INTERVENTIONS:  - Monitor vital signs, rhythm, and trends  - Monitor for bleeding, hypotension and signs of decreased cardiac output  - Evaluate effectiveness of vasoactive medications to optimize hemodynamic stability  - Monitor arterial and/or venous puncture sites for bleeding and/or hematoma  - Assess quality of pulses, skin color and temperature  - Assess for signs of decreased coronary artery perfusion - ex. Angina  - Evaluate fluid balance, assess for edema, trend weights  Outcome: Progressing  Goal: Absence of cardiac arrhythmias or at baseline  Description: INTERVENTIONS:  - Continuous cardiac monitoring, monitor vital signs, obtain 12 lead EKG if indicated  - Evaluate effectiveness of antiarrhythmic and heart rate control medications as ordered  - Initiate emergency measures for life threatening arrhythmias  - Monitor electrolytes and administer replacement therapy as ordered  Outcome: Progressing     Problem: METABOLIC/FLUID AND ELECTROLYTES - ADULT  Goal: Electrolytes maintained within normal limits  Description: INTERVENTIONS:  - Monitor labs and rhythm and assess patient for signs and symptoms of electrolyte imbalances  - Administer electrolyte replacement as ordered  - Monitor response to electrolyte replacements,  including rhythm and repeat lab results as appropriate  - Fluid restriction as ordered  - Instruct patient on fluid and nutrition restrictions as appropriate  Outcome: Progressing     Problem: Diabetes/Glucose Control  Goal: Glucose maintained within prescribed range  Description: INTERVENTIONS:  - Monitor Blood Glucose as ordered  - Assess for signs and symptoms of hyperglycemia and hypoglycemia  - Administer ordered medications to maintain glucose within target range  - Assess barriers to adequate nutritional intake and initiate nutrition consult as needed  - Instruct patient on self management of diabetes  Outcome: Progressing     Problem: SAFETY ADULT - FALL  Goal: Free from fall injury  Description: INTERVENTIONS:  - Assess pt frequently for physical needs  - Identify cognitive and physical deficits and behaviors that affect risk of falls.  - [place] fall precautions as indicated by assessment.  - Educate pt/family on patient safety including physical limitations  - Instruct pt to call for assistance with activity based on assessment  - Modify environment to reduce risk of injury  - Provide assistive devices as appropriate  - Consider OT/PT consult to assist with strengthening/mobility  - Encourage toileting schedule  Outcome: Progressing     Problem: DISCHARGE PLANNING  Goal: Discharge to home or other facility with appropriate resources  Description: INTERVENTIONS:  - Identify barriers to discharge w/pt and caregiver  - Include patient/family/discharge partner in discharge planning  - Arrange for needed discharge resources and transportation as appropriate  - Identify discharge learning needs (meds, wound care, etc)  - Arrange for interpreters to assist at discharge as needed  - Consider post-discharge preferences of patient/family/discharge partner  - Complete POLST form as appropriate  - Assess patient's ability to be responsible for managing their own health  - Refer to Case Management Department for  coordinating discharge planning if the patient needs post-hospital services based on physician/LIP order or complex needs related to functional status, cognitive ability or social support system  Outcome: Progressing

## 2024-06-13 LAB
ANION GAP SERPL CALC-SCNC: 3 MMOL/L (ref 0–18)
BASOPHILS # BLD AUTO: 0.05 X10(3) UL (ref 0–0.2)
BASOPHILS NFR BLD AUTO: 0.8 %
BILIRUB UR QL: NEGATIVE
BUN BLD-MCNC: 7 MG/DL (ref 9–23)
BUN/CREAT SERPL: 8.4 (ref 10–20)
CALCIUM BLD-MCNC: 8.6 MG/DL (ref 8.7–10.4)
CHLORIDE SERPL-SCNC: 105 MMOL/L (ref 98–112)
CLARITY UR: CLEAR
CO2 SERPL-SCNC: 30 MMOL/L (ref 21–32)
COLOR UR: YELLOW
CREAT BLD-MCNC: 0.83 MG/DL
DEPRECATED RDW RBC AUTO: 63.9 FL (ref 35.1–46.3)
EGFRCR SERPLBLD CKD-EPI 2021: 79 ML/MIN/1.73M2 (ref 60–?)
EOSINOPHIL # BLD AUTO: 0.06 X10(3) UL (ref 0–0.7)
EOSINOPHIL NFR BLD AUTO: 0.9 %
ERYTHROCYTE [DISTWIDTH] IN BLOOD BY AUTOMATED COUNT: 16.7 % (ref 11–15)
GLUCOSE BLD-MCNC: 83 MG/DL (ref 70–99)
GLUCOSE BLDC GLUCOMTR-MCNC: 181 MG/DL (ref 70–99)
GLUCOSE BLDC GLUCOMTR-MCNC: 89 MG/DL (ref 70–99)
GLUCOSE BLDC GLUCOMTR-MCNC: 93 MG/DL (ref 70–99)
GLUCOSE BLDC GLUCOMTR-MCNC: 96 MG/DL (ref 70–99)
GLUCOSE UR-MCNC: NORMAL MG/DL
HCT VFR BLD AUTO: 28.4 %
HGB BLD-MCNC: 10 G/DL
HGB UR QL STRIP.AUTO: NEGATIVE
IMM GRANULOCYTES # BLD AUTO: 0.01 X10(3) UL (ref 0–1)
IMM GRANULOCYTES NFR BLD: 0.2 %
KETONES UR-MCNC: NEGATIVE MG/DL
LEUKOCYTE ESTERASE UR QL STRIP.AUTO: 75
LYMPHOCYTES # BLD AUTO: 2.1 X10(3) UL (ref 1–4)
LYMPHOCYTES NFR BLD AUTO: 33.2 %
MCH RBC QN AUTO: 37 PG (ref 26–34)
MCHC RBC AUTO-ENTMCNC: 35.2 G/DL (ref 31–37)
MCV RBC AUTO: 105.2 FL
MONOCYTES # BLD AUTO: 1.38 X10(3) UL (ref 0.1–1)
MONOCYTES NFR BLD AUTO: 21.8 %
NEUTROPHILS # BLD AUTO: 2.72 X10 (3) UL (ref 1.5–7.7)
NEUTROPHILS # BLD AUTO: 2.72 X10(3) UL (ref 1.5–7.7)
NEUTROPHILS NFR BLD AUTO: 43.1 %
NITRITE UR QL STRIP.AUTO: NEGATIVE
OSMOLALITY SERPL CALC.SUM OF ELEC: 283 MOSM/KG (ref 275–295)
PH UR: 5.5 [PH] (ref 5–8)
PLATELET # BLD AUTO: 129 10(3)UL (ref 150–450)
POTASSIUM SERPL-SCNC: 3 MMOL/L (ref 3.5–5.1)
POTASSIUM SERPL-SCNC: 3.8 MMOL/L (ref 3.5–5.1)
POTASSIUM SERPL-SCNC: 3.8 MMOL/L (ref 3.5–5.1)
PROT UR-MCNC: NEGATIVE MG/DL
RBC # BLD AUTO: 2.7 X10(6)UL
SODIUM SERPL-SCNC: 138 MMOL/L (ref 136–145)
SP GR UR STRIP: 1.02 (ref 1–1.03)
UROBILINOGEN UR STRIP-ACNC: 2
WBC # BLD AUTO: 6.3 X10(3) UL (ref 4–11)

## 2024-06-13 PROCEDURE — 99232 SBSQ HOSP IP/OBS MODERATE 35: CPT | Performed by: INTERNAL MEDICINE

## 2024-06-13 RX ORDER — POTASSIUM CHLORIDE 1.5 G/1.58G
40 POWDER, FOR SOLUTION ORAL EVERY 4 HOURS
Status: COMPLETED | OUTPATIENT
Start: 2024-06-13 | End: 2024-06-13

## 2024-06-13 RX ORDER — POTASSIUM CHLORIDE 1.5 G/1.58G
40 POWDER, FOR SOLUTION ORAL ONCE
Status: COMPLETED | OUTPATIENT
Start: 2024-06-13 | End: 2024-06-13

## 2024-06-13 NOTE — PLAN OF CARE
Patient is alert and oriented times 3-4. Forgetful. No complains of pain at this time. Plan to monitor any Fever changes and pending BC. Once medically cleared will be going to [facility].   Problem: Patient Centered Care  Goal: Patient preferences are identified and integrated in the patient's plan of care  Description: Interventions:  - What would you like us to know as we care for you? Recently at [facility]   - Provide timely, complete, and accurate information to patient/family  - Incorporate patient and family knowledge, values, beliefs, and cultural backgrounds into the planning and delivery of care  - Encourage patient/family to participate in care and decision-making at the level they choose  - Honor patient and family perspectives and choices  Outcome: Progressing     Problem: Patient/Family Goals  Goal: Patient/Family Short Term Goal  Description: Patient's Short Term Goal: get stronger    Interventions:   - work with pt/ot. Sit in chairs for meals   - See additional Care Plan goals for specific interventions  Outcome: Progressing     Problem: CARDIOVASCULAR - ADULT  Goal: Absence of cardiac arrhythmias or at baseline  Description: INTERVENTIONS:  - Continuous cardiac monitoring, monitor vital signs, obtain 12 lead EKG if indicated  - Evaluate effectiveness of antiarrhythmic and heart rate control medications as ordered  - Initiate emergency measures for life threatening arrhythmias  - Monitor electrolytes and administer replacement therapy as ordered  Outcome: Progressing

## 2024-06-13 NOTE — SLP NOTE
SPEECH DAILY NOTE - INPATIENT    ASSESSMENT & PLAN   ASSESSMENT  PT COVID-19 POSITIVE. CONTACT AND DROPLET ISOLATION PPE REQUIRED. THIS THERAPIST WORE GOWN, GLOVES, FACE SHIELD, AND DROPLET/N95 RESPIRATOR MASK. HANDS SANITIZED/WASHED UPON ENTRANCE/EXIT.      SLP f/u for ongoing dysphagia tx/meal assessment per recommendations of easy to chew/thin per BSE. RN reports pt tolerates diet and medication well with no overt clinical s/s aspiration. Pt denies any swallowing challenges.     Pt positioned upright in bed with lunch tray present. Pt afebrile, tolerating room air with oxygen status 95 prior to the start of oral trials. SLP reviewed aspiration precautions and safe swallowing compensatory strategies with the patient. Safe swallow guidelines remain written on the white board in purple. Diet recommendations remain on the whiteboard in the room. Patient v/u. Provided minimal assistance, pt tolerates easy to chew and thin liquids via straw with no overt clinical signs/symptoms of aspiration. Oxygen status remained >94 t/o the entire session. Oral/buccal cavities clear of residue following all trials.     MOST RECENT CXR 6/11/24:  CONCLUSION: No evidence of acute cardiopulmonary disease.      Dictated by (CST): Henrry Garduno MD on 6/11/2024 at 4:19 PM       Finalized by (CST): Henrry Garduno MD on 6/11/2024 at 4:20 PM     PLAN: SLP to sign off at this time secondary to pt tolerating least restrictive diet with no CSA.     Diet Recommendations - Solids: Soft/ Easy to chew  Diet Recommendations - Liquids: Thin Liquids    Compensatory Strategies Recommended: Slow rate;Alternate consistencies;Small bites and sips  Aspiration Precautions: Upright position;Slow rate;Small bites and sips  Medication Administration Recommendations: Crushed in puree    Patient Experiencing Pain: No      Treatment Plan  Treatment Plan/Recommendations: No further inpatient SLP service warranted    Interdisciplinary Communication: Discussed  with RN  Recommendations posted at bedside            GOALS  Goal #1 The patient will tolerate easy to chew consistency and thin liquids without overt signs or symptoms of aspiration with 100 % accuracy over 1-2 session(s).  Goal met   Goal #2 The patient/family/caregiver will demonstrate understanding and implementation of aspiration precautions and swallow strategies independently over 1-2 session(s).    Goal met   Goal #3 The patient will utilize compensatory strategies as outlined by  BSSE (clinical evaluation) including Slow rate, Small bites, Small sips, Alternate liquids/solids, Upright 90 degrees with minimal assistance 100 % of the time across 2 sessions.  Goal met     FOLLOW UP  Follow Up Needed (Documentation Required): No  SLP Follow-up Date:  (n/a)  Number of Visits to Meet Established Goals: 0    Session: 1 following BSSE    If you have any questions, please contact ASHANTI Jc M.S. CCC-SLP  Speech Language Pathologist  Phone Number Ext. [phone]

## 2024-06-13 NOTE — PROGRESS NOTES
[facility]  part of [facility]    Progress Note    Christi Tavarez Patient Status:  Inpatient    1960 MRN [id number]   Location [facility] 3W/SW Attending Antony Zavala MD   Hosp Day # 10 PCP ÁNGEL Delgado     Chief Complaint:     Subjective:     Constitutional:  Positive for fatigue.   HENT: Negative.     Eyes: Negative.    Respiratory: Negative.     Cardiovascular:  Positive for leg swelling.        Improved    Gastrointestinal: Negative.    Genitourinary: Negative.    Musculoskeletal: Negative.    Skin: Negative.    Neurological: Negative.        Objective:   Blood pressure 98/54, pulse 78, temperature 98.3 °F (36.8 °C), temperature source Oral, resp. rate 18, height 4' 9\" (1.448 m), weight 125 lb 9.6 oz (57 kg), SpO2 94%.  Physical Exam  Vitals and nursing note reviewed.   HENT:      Head: Normocephalic and atraumatic.      Nose: Nose normal.      Mouth/Throat:      Mouth: Mucous membranes are moist.   Cardiovascular:      Rate and Rhythm: Normal rate.   Pulmonary:      Effort: Pulmonary effort is normal.      Breath sounds: Normal breath sounds.   Abdominal:      General: Abdomen is flat. Bowel sounds are normal.      Palpations: Abdomen is soft.   Musculoskeletal:         General: Normal range of motion.      Cervical back: Normal range of motion and neck supple.      Right lower leg: Edema present.      Left lower leg: Edema present.      Comments: Improved    Skin:     General: Skin is warm and dry.   Neurological:      Mental Status: She is alert and oriented to person, place, and time.      Comments: But at times slow with responding    Psychiatric:         Mood and Affect: Mood normal.         Results:   Lab Results   Component Value Date    WBC 6.9 2024    HGB 10.1 (L) 2024    HCT 29.2 (L) 2024    .0 (L) 2024    CREATSERUM 0.69 2024    BUN 6 (L) 2024     2024    K 3.0 (L) 2024     2024     CO2 27.0 06/12/2024    GLU 94 06/12/2024    CA 8.5 (L) 06/12/2024    ALB 2.7 (L) 06/11/2024    ALKPHO 110 06/11/2024    BILT 2.5 (H) 06/11/2024    TP 6.6 06/11/2024    AST 35 (H) 06/11/2024    ALT 10 06/11/2024    PTT 34.8 06/03/2024    INR 1.69 (H) 06/11/2024    T4F 1.0 04/03/2024    TSH 8.140 (H) 04/03/2024    LIP 49 06/03/2024    MG 2.0 04/04/2024    TROPHS <3 06/03/2024     04/03/2024    B12 >2,000 (H) 04/04/2024    ETOH <3 04/03/2024       XR CHEST AP PORTABLE  (CPT=71045)    Result Date: 6/11/2024  CONCLUSION: No evidence of acute cardiopulmonary disease.    Dictated by (CST): Henrry Garduno MD on 6/11/2024 at 4:19 PM     Finalized by (CST): Henrry Garduno MD on 6/11/2024 at 4:20 PM               Assessment & Plan:     No fever since yesterday, urien and bcx pending    O2 nl    Will monitor 1 more day await cx, possible dc tomorrow if ok      + covid now mild cough, cxr unremarkable., ID on board     Had fever today cx done will follow ID recommendations       Anticipated dc in the next 1-2 days , daughter trying to get pt top a rehab        Continue with current care , awaiting cardiology recommendations o transitioning to oral diuretics      Will monitor electrolites       Daughter is looking in to another facility at  for respite care and some pt     Edema is down, continue with IV lasix as per cards      GI seen pt , egd out opt, also out pt MRI liver           Pt/ot eval      Continue with current care       MRI brain results noted with no acute findings     Low K resolved, will follow      paracentesis done yesterday 4 L removed          Fluid overload- improved, continue with diuretics, echo ok\     Cardiac, cardiology on board , continue with diuresing      component of liver cirrhosis           Alcoholic cirrhosis of liver with ascites (HCC)- abd distention, gi on board for abd paracentesis tomorrow, katherine follow labs                     Anemia- of chronic disease stable , will follow                     Type 2 diabetes mellitus without complication, without long-term current use of insulin (HCC)- will monitor bs           Cerebellar hemorrhage (HCC)- repeat mri ct head today \" Developing encephalomalacia right cerebellum at site of previous hemorrhage.  No acute hemorrhage      Gen weakness- pt/ot           Antony Zavala MD  6/13/2024

## 2024-06-13 NOTE — PROGRESS NOTES
INFECTIOUS DISEASE PROGRESS NOTE  [facility]  part of [facility] ID PROGRESS NOTE    Christi Tavarez Patient Status:  Inpatient    1960 MRN [id number]   Location [facility] 3W/SW Attending Antoyn Zavala MD   Hosp Day # 10 PCP ÁNGEL Delgado     Subjective:  ROS reviewed. Tmax 102.2 yesterday. On room air. Notes dry cough. Improved appetite this AM.    ASSESSMENT:    Antibiotics: OFF     # COVID-19 infection without hypoxia with negative CXR  # Acute fever 2/2 above, R/o bacteremia   -FU blood cx  # ETOH cirrhosis               -S/p paracentesis  with 4L removed, cx NGTD  # Weakness  # ICH 2024  # Diabetes mellitus     PLAN:  -  No COVID-19 treatment indicated at this time.  -  FU blood cx.  -  Isolation per protocol.  -  Follow fever curve, wbc.  -  Reviewed labs, micro, imaging reports, available old records.  -  Case d/w patient, RN.     History of Present Illness:  Christi Tavarez is a 63 year old female with a history of recent ICH 2024, diabetes mellitus, ETOH abuse, who presented to [facility] ED on 6/3 for shortness of breath and BLE edema. On arrival, afebrile, CXR with edema, seen by GI, s/p paracentesis  with 4L off, cx NGTD, hospital course c/b BLE weakness, MRI without acute changes. Had CT chest showing RUL nodule. Family noted patient with a new cough and congestions so a rapid COVID was sent that was positive. Denies body aches. ID consulted.    Physical Exam:  BP 98/54 (BP Location: Right arm)   Pulse 78   Temp 98.3 °F (36.8 °C) (Oral)   Resp 18   Ht 4' 9\" (1.448 m)   Wt 125 lb 9.6 oz (57 kg)   SpO2 94%   BMI 27.18 kg/m²     Gen:   Awake, in bed  HEENT:  EOMI, neck supple  CV/lungs:  RRR, CTAB  Abdom:  Soft, NT/ND, +BS  Skin/extrem:  No rashes, no c/c/e  Lines:  PIV+    Laboratory Data: Reviewed    Microbiology: Reviewed    Radiology: Reviewed      JANE[facility]  [phone]  2024

## 2024-06-13 NOTE — CM/SW NOTE
Social work received another call from   [facility] CM  Phone:[phone]  Fax: [phone]    Stating that they are trying to get the auth approved through a special process since the patient has exhausted her 30 allotted ELISABETH days.    The [facility]  did receive all PT/OT/ST notes.    The [facility] CM will call [facility] to obtain all other information and tell them to submit for auth so she can try and get it approved.    Social work will follow up.    SW/CM to remain available for support and/or discharge planning.     Jannette Dobson MSW, LSW  Discharge Planner [id number]

## 2024-06-14 ENCOUNTER — APPOINTMENT (OUTPATIENT)
Dept: ULTRASOUND IMAGING | Facility: HOSPITAL | Age: 64
DRG: 432 | End: 2024-06-14
Attending: INTERNAL MEDICINE

## 2024-06-14 LAB
ANION GAP SERPL CALC-SCNC: 6 MMOL/L (ref 0–18)
BUN BLD-MCNC: 7 MG/DL (ref 9–23)
BUN/CREAT SERPL: 9.5 (ref 10–20)
CALCIUM BLD-MCNC: 8.2 MG/DL (ref 8.7–10.4)
CHLORIDE SERPL-SCNC: 103 MMOL/L (ref 98–112)
CO2 SERPL-SCNC: 30 MMOL/L (ref 21–32)
CREAT BLD-MCNC: 0.74 MG/DL
EGFRCR SERPLBLD CKD-EPI 2021: 91 ML/MIN/1.73M2 (ref 60–?)
GLUCOSE BLD-MCNC: 82 MG/DL (ref 70–99)
GLUCOSE BLDC GLUCOMTR-MCNC: 114 MG/DL (ref 70–99)
GLUCOSE BLDC GLUCOMTR-MCNC: 122 MG/DL (ref 70–99)
GLUCOSE BLDC GLUCOMTR-MCNC: 127 MG/DL (ref 70–99)
GLUCOSE BLDC GLUCOMTR-MCNC: 93 MG/DL (ref 70–99)
OSMOLALITY SERPL CALC.SUM OF ELEC: 285 MOSM/KG (ref 275–295)
POTASSIUM SERPL-SCNC: 3.3 MMOL/L (ref 3.5–5.1)
POTASSIUM SERPL-SCNC: 3.3 MMOL/L (ref 3.5–5.1)
POTASSIUM SERPL-SCNC: 4.4 MMOL/L (ref 3.5–5.1)
SODIUM SERPL-SCNC: 139 MMOL/L (ref 136–145)

## 2024-06-14 PROCEDURE — 93971 EXTREMITY STUDY: CPT | Performed by: INTERNAL MEDICINE

## 2024-06-14 PROCEDURE — 99232 SBSQ HOSP IP/OBS MODERATE 35: CPT | Performed by: INTERNAL MEDICINE

## 2024-06-14 RX ORDER — MIDODRINE HYDROCHLORIDE 2.5 MG/1
2.5 TABLET ORAL 3 TIMES DAILY
Qty: 90 TABLET | Refills: 1 | Status: SHIPPED | OUTPATIENT
Start: 2024-06-14

## 2024-06-14 RX ORDER — ACETAMINOPHEN 325 MG/1
650 TABLET ORAL EVERY 8 HOURS PRN
Status: DISCONTINUED | OUTPATIENT
Start: 2024-06-14 | End: 2024-06-18

## 2024-06-14 RX ORDER — BUMETANIDE 2 MG/1
2 TABLET ORAL
Qty: 60 TABLET | Refills: 2 | Status: SHIPPED | OUTPATIENT
Start: 2024-06-14

## 2024-06-14 RX ORDER — SPIRONOLACTONE 50 MG/1
50 TABLET, FILM COATED ORAL DAILY
Qty: 90 TABLET | Refills: 1 | Status: SHIPPED | OUTPATIENT
Start: 2024-06-15

## 2024-06-14 RX ORDER — POTASSIUM CHLORIDE 1.5 G/1.58G
40 POWDER, FOR SOLUTION ORAL EVERY 4 HOURS
Status: COMPLETED | OUTPATIENT
Start: 2024-06-14 | End: 2024-06-14

## 2024-06-14 NOTE — PLAN OF CARE
Plan is for [place] re[facility] once medically cleared. Vital signs stable, pt complains of pain generalized controlled with prn meds. Call light within reach, fall precautions in place.    Problem: Patient Centered Care  Goal: Patient preferences are identified and integrated in the patient's plan of care  Description: Interventions:  - What would you like us to know as we care for you? Recently at [facility]   - Provide timely, complete, and accurate information to patient/family  - Incorporate patient and family knowledge, values, beliefs, and cultural backgrounds into the planning and delivery of care  - Encourage patient/family to participate in care and decision-making at the level they choose  - Honor patient and family perspectives and choices  Outcome: Progressing     Problem: Diabetes/Glucose Control  Goal: Glucose maintained within prescribed range  Description: INTERVENTIONS:  - Monitor Blood Glucose as ordered  - Assess for signs and symptoms of hyperglycemia and hypoglycemia  - Administer ordered medications to maintain glucose within target range  - Assess barriers to adequate nutritional intake and initiate nutrition consult as needed  - Instruct patient on self management of diabetes  Outcome: Progressing     Problem: Patient/Family Goals  Goal: Patient/Family Long Term Goal  Description: Patient's Long Term Goal: go home    Interventions:  - follow plan of care  - See additional Care Plan goals for specific interventions  Outcome: Progressing  Goal: Patient/Family Short Term Goal  Description: Patient's Short Term Goal: get stronger    Interventions:   - work with pt/ot. Sit in chairs for meals   - See additional Care Plan goals for specific interventions  Outcome: Progressing     Problem: METABOLIC/FLUID AND ELECTROLYTES - ADULT  Goal: Electrolytes maintained within normal limits  Description: INTERVENTIONS:  - Monitor labs and rhythm and assess patient for signs and symptoms of electrolyte imbalances  -  Administer electrolyte replacement as ordered  - Monitor response to electrolyte replacements, including rhythm and repeat lab results as appropriate  - Fluid restriction as ordered  - Instruct patient on fluid and nutrition restrictions as appropriate  Outcome: Progressing     Problem: SAFETY ADULT - FALL  Goal: Free from fall injury  Description: INTERVENTIONS:  - Assess pt frequently for physical needs  - Identify cognitive and physical deficits and behaviors that affect risk of falls.  - [place] fall precautions as indicated by assessment.  - Educate pt/family on patient safety including physical limitations  - Instruct pt to call for assistance with activity based on assessment  - Modify environment to reduce risk of injury  - Provide assistive devices as appropriate  - Consider OT/PT consult to assist with strengthening/mobility  - Encourage toileting schedule  Outcome: Progressing

## 2024-06-14 NOTE — PAYOR COMM NOTE
--------------  CONTINUED STAY REVIEW    Payor: [employer] PPO  Subscriber #:  [id number]  Authorization Number: [id number]    Admit date: 6/3/24  Admit time:  5:56 PM    Admitting Physician: Antony Zavala MD  Attending Physician:  Antony Zavala MD  Primary Care Physician: Leona Edwards PA    REVIEW DOCUMENTATION:    6/12/24 cardiology       Volume overload  Admitted with LE edema and shortness of breath  -2/2 liver disease/congestion, portal htn  -echo normal with LVEF 60-65%, no RWMA, normal diastolic function, no valvular dsyfuctions  -was taking lasix BID at home along with aldactone  -now diuresing with IV lasix TID, stable labs, good UOP  -I/Os net neg 8.7L, ~900mL UOP past 24 hours  -weight down 29# from admission weight, family states dry weight 160#, today 133  -BP stable 90s-100s, BMP pending  -GDMT: lasix, aldactone  -albumin given for third spacing     Hx HTN  BP controlled in 100-120s  -aldactone, bumex, midodrine     Hypokalemia  K+ 3.2 previously,  now 3.8  -cardiac electrolyte replacement protocol   -aldactone, daily KCl     DMII-A1c 4.4     Alcoholic liver disease  AST elevated, albumin low, bilirubin elevated  -s/p paracentesis 6/5 with 4L off  -normal ammonia levels after getting lactulose, total protein low  -portal htn on US, hypoalbuminemia-albumin given  -third spacing component to her swelling, down ~30# from admission     Hx CVA  Recent cerebellar stroke vs mass vs intracranial bleed in April of this year        Plan:  -Continue PO bumex  -Continue aldactone, midodrine  -Replace potassium per protocol as needed, continue additional daily dose  -monitor strict I/Os, daily weights, daily BMP  -further recs per GI/primary     6/12 Hospitalist    Constitutional:  Positive for fatigue.   HENT: Negative.     Eyes: Negative.    Respiratory: Negative.     Cardiovascular:  Positive for leg swelling.        Improved    Gastrointestinal: Negative.    Genitourinary: Negative.     Musculoskeletal: Negative.    Skin: Negative.    Neurological: Negative.                Objective:  Blood pressure 103/70, pulse 96, temperature 98.6 °F (37 °C), temperature source Axillary, resp. rate 16, height 4' 9\" (1.448 m), weight 130 lb 6.4 oz (59.1 kg), SpO2 93%.  Physical Exam  Vitals and nursing note reviewed.   HENT:      Head: Normocephalic and atraumatic.      Nose: Nose normal.      Mouth/Throat:      Mouth: Mucous membranes are moist.   Cardiovascular:      Rate and Rhythm: Normal rate.   Pulmonary:      Effort: Pulmonary effort is normal.      Breath sounds: Normal breath sounds.   Abdominal:      General: Abdomen is flat. Bowel sounds are normal.      Palpations: Abdomen is soft.   Musculoskeletal:         General: Normal range of motion.      Cervical back: Normal range of motion and neck supple.      Right lower leg: Edema present.      Left lower leg: Edema present.      Comments: Improved    Skin:     General: Skin is warm and dry.   Neurological:      Mental Status: She is alert and oriented to person, place, and time.      Comments: But at times slow with responding    Psychiatric:         Mood and Affect: Mood normal.                  Results:        Lab Results   Component Value Date     WBC 6.9 06/12/2024     HGB 10.1 (L) 06/12/2024     HCT 29.2 (L) 06/12/2024     .0 (L) 06/12/2024     CREATSERUM 0.69 06/12/2024     BUN 6 (L) 06/12/2024      06/12/2024     K 3.8 06/12/2024     K 3.8 06/12/2024      06/12/2024     CO2 27.0 06/12/2024     GLU 94 06/12/2024     CA 8.5 (L) 06/12/2024     ALB 2.7 (L) 06/11/2024     ALKPHO 110 06/11/2024     BILT 2.5 (H) 06/11/2024     TP 6.6 06/11/2024     AST 35 (H) 06/11/2024     ALT 10 06/11/2024     PTT 34.8 06/03/2024     INR 1.69 (H) 06/11/2024     T4F 1.0 04/03/2024     TSH 8.140 (H) 04/03/2024     LIP 49 06/03/2024     MG 2.0 04/04/2024     TROPHS <3 06/03/2024      04/03/2024     B12 >2,000 (H) 04/04/2024     ETOH <3  04/03/2024         XR CHEST AP PORTABLE  (CPT=71045)     Result Date: 6/11/2024  CONCLUSION:        No evidence of acute cardiopulmonary disease.    Dictated by (CST): Henrry Garduno MD on 6/11/2024 at 4:19 PM     Finalized by (CST): Henrry Garduno MD on 6/11/2024 at 4:20 PM           US VENOUS DOPPLER LEG BILAT - DIAG IMG (CPT=93970)     Result Date: 6/10/2024  CONCLUSION:        Normal examination.     Dictated by (CST): Daniel Moss MD on 6/10/2024 at 3:08 PM     Finalized by (CST): Daniel Moss MD on 6/10/2024 at 3:09 PM                      Assessment & Plan:  + covid now mild cough, cxr unremarkable., ID on board     Had fever today cx done will follow ID recommendations       Anticipated dc in the next 1-2 days , daughter trying to get pt top a rehab        Continue with current care , awaiting cardiology recommendations o transitioning to oral diuretics      Will monitor electrolites       Daughter is looking in to another facility at  for respite care and some pt     Edema is down, continue with IV lasix as per cards      GI seen pt , egd out opt, also out pt MRI liver           Pt/ot eval      Continue with current care       MRI brain results noted with no acute findings     Low K resolved, will follow      paracentesis done yesterday 4 L removed          Fluid overload- improved, continue with diuretics, echo ok\     Cardiac, cardiology on board , continue with diuresing      component of liver cirrhosis           Alcoholic cirrhosis of liver with ascites (HCC)- abd distention, gi on board for abd paracentesis tomorrow, katherine follow labs        Anemia- of chronic disease stable , will follow      Type 2 diabetes mellitus without complication, without long-term current use of insulin (HCC)- will monitor bs         Cerebellar hemorrhage (HCC)- repeat mri ct head today \" Developing encephalomalacia right cerebellum at site of previous hemorrhage.  No acute hemorrhage      Gen weakness- pt/ot          Antony Zavala MD  6/12/2024 6/13 ID    Subjective:  ROS reviewed. Tmax 102.2 yesterday. On room air. Notes dry cough. Improved appetite this AM.     ASSESSMENT:     Antibiotics: OFF     # COVID-19 infection without hypoxia with negative CXR  # Acute fever 2/2 above, R/o bacteremia               -FU blood cx      Cardiology 6/13    Volume overload  Admitted with LE edema and shortness of breath  -2/2 liver disease/congestion, portal htn  -echo normal with LVEF 60-65%, no RWMA, normal diastolic function, no valvular dsyfuctions  -was taking lasix BID at home along with aldactone  -now diuresing with bumex BID, stable labs, good UOP  -I/Os net neg 12.5L, 1.5L UOP past 24 hours  -weight down 35# from admission weight, family states dry weight 160#, today 125  -BP stable 90s-100s, BMP pending  -albumin given for third spacing this admission      Hx HTN  BP controlled in 100-120s  -aldactone, bumex, midodrine     Hypokalemia  K+ 3.0  -cardiac electrolyte replacement protocol   -aldactone, daily KCl       Collected 6/11/2024 16:54       Status: Final result    Specimen Information: Nares; Other   0 Result Notes      Component  Ref Range & Units    Rapid SARS-CoV-2 by PCR  Not Detected Detected Abnormal             MEDICATIONS ADMINISTERED IN LAST 1 DAY:  acetaminophen (Tylenol) tab 650 mg       Date Action Dose Route User    6/13/2024 2124 Given 650 mg Oral Rubi Jacobson RN          bumetanide (Bumex) tab 2 mg       Date Action Dose Route User    6/14/2024 1025 Given 2 mg Oral Shelton Up RN          escitalopram (Lexapro) tab 10 mg       Date Action Dose Route User    6/13/2024 2124 Given 10 mg Oral Rubi Jacobson RN          folic acid (Folvite) tab 1 mg       Date Action Dose Route User    6/14/2024 1026 Given 1 mg Oral Shelton Up RN          lactulose (CHRONULAC) 10 GM/15ML solution 20 g       Date Action Dose Route User    6/14/2024 1025 Given 20 g Oral Shelton Up RN    6/13/2024 2125 Given 20 g Oral  Rubi Jacobson RN          lamoTRIgine (LaMICtal) tab 25 mg       Date Action Dose Route User    6/14/2024 1025 Given 25 mg Oral Shelton Up RN    6/13/2024 2124 Given 25 mg Oral Rubi Jacobson RN          levothyroxine (Synthroid) tab 50 mcg       Date Action Dose Route User    6/14/2024 0550 Given 50 mcg Oral Rubi Jacobson RN          midodrine (ProAmatine) tab 2.5 mg       Date Action Dose Route User    6/14/2024 1025 Given 2.5 mg Oral Shelton Up RN    6/14/2024 0550 Given 2.5 mg Oral Rubi Jacobson RN          pantoprazole (Protonix) DR tab 20 mg       Date Action Dose Route User    6/14/2024 0550 Given 20 mg Oral Rubi Jacobson RN          potassium chloride (Klor-Con) 20 MEQ oral powder 40 mEq       Date Action Dose Route User    6/14/2024 1405 Given 40 mEq Oral Shelton Up RN    6/14/2024 1026 Given 40 mEq Oral Shelton Up RN          rifAXIMin (Xifaxan) tab 550 mg       Date Action Dose Route User    6/14/2024 1025 Given 550 mg Oral Shelton Up RN    6/13/2024 2124 Given 550 mg Oral Rubi Jacobson RN          spironolactone (Aldactone) tab 50 mg       Date Action Dose Route User    6/14/2024 1026 Given 50 mg Oral Shelton Up RN          traZODone (Desyrel) tab 25 mg       Date Action Dose Route User    6/13/2024 2152 Given 25 mg Oral Rubi Jacobson RN            Vitals (last day)       Date/Time Temp Pulse Resp BP SpO2 Weight O2 Device O2 Flow Rate (L/min) [facility]    06/14/24 1023 98.2 °F (36.8 °C) 89 16 116/70 97 % -- None (Room air) --     06/14/24 0539 98.2 °F (36.8 °C) 87 16 111/61 92 % 126 lb None (Room air) -- MW    06/14/24 0500 -- 87 -- -- -- -- -- --     06/13/24 2122 97.4 °F (36.3 °C) 78 18 102/57 95 % -- None (Room air) -- MW    06/13/24 2000 -- 79 -- -- -- -- -- -- KD    06/13/24 1731 98.3 °F (36.8 °C) 77 18 102/53 96 % -- None (Room air) -- OR    06/13/24 1304 98.1 °F (36.7 °C) 82 18 96/50 94 % -- None (Room air) -- OR    06/13/24 1100 -- 74 -- -- -- -- -- -- OR    06/13/24 0837  98.3 °F (36.8 °C) 78 18 98/54 94 % -- None (Room air) -- OR    06/13/24 0517 -- 74 18 90/53 95 % 125 lb 9.6 oz None (Room air) --     06/13/24 0220 97.9 °F (36.6 °C) 80 16 105/57 95 % -- None (Room air) --      6/12/24 2122 98.1 °F (36.7 °C) 88 20 116/59 95 % -- None (Room air) --    06/12/24 1726 99.1 °F (37.3 °C) 90 18 102/61 96 % -- None (Room air) -- AR   06/12/24 1441 100.4 °F (38 °C) -- -- -- -- -- -- -- AR   06/12/24 1255 102.2 °F (39 °C) Abnormal  100 16 119/63 91 % -- None (Room air) -- AR   06/12/24 0902 98.9 °F (37.2 °C) 96 20 126/53 91 % -- None (Room air) -- AR   06/12/24 0529 -- 96 16 103/70 93 % -- None (Room air) --    06/12/24 0420 -- -- -- -- -- 130 lb 6.4 oz -- -- TB   06/12/24 0128 98.6 °F (37 °C) 98 18 119/60 98 % -- None (Room air) --

## 2024-06-14 NOTE — PLAN OF CARE
Patient alert and oriented x 4. Patient's vitals stable on room air. Patient's pain has subsided throughout the day. Patient cleared for discharge once insurance Auth clears. Call light within reach.    Problem: Patient Centered Care  Goal: Patient preferences are identified and integrated in the patient's plan of care  Description: Interventions:  - What would you like us to know as we care for you? Recently at [facility]   - Provide timely, complete, and accurate information to patient/family  - Incorporate patient and family knowledge, values, beliefs, and cultural backgrounds into the planning and delivery of care  - Encourage patient/family to participate in care and decision-making at the level they choose  - Honor patient and family perspectives and choices  Outcome: Progressing     Problem: Diabetes/Glucose Control  Goal: Glucose maintained within prescribed range  Description: INTERVENTIONS:  - Monitor Blood Glucose as ordered  - Assess for signs and symptoms of hyperglycemia and hypoglycemia  - Administer ordered medications to maintain glucose within target range  - Assess barriers to adequate nutritional intake and initiate nutrition consult as needed  - Instruct patient on self management of diabetes  Outcome: Progressing     Problem: Patient/Family Goals  Goal: Patient/Family Long Term Goal  Description: Patient's Long Term Goal: go home    Interventions:  - follow plan of care  - See additional Care Plan goals for specific interventions  Outcome: Progressing  Goal: Patient/Family Short Term Goal  Description: Patient's Short Term Goal: get stronger    Interventions:   - work with pt/ot. Sit in chairs for meals   - See additional Care Plan goals for specific interventions  Outcome: Progressing     Problem: CARDIOVASCULAR - ADULT  Goal: Maintains optimal cardiac output and hemodynamic stability  Description: INTERVENTIONS:  - Monitor vital signs, rhythm, and trends  - Monitor for bleeding, hypotension and  signs of decreased cardiac output  - Evaluate effectiveness of vasoactive medications to optimize hemodynamic stability  - Monitor arterial and/or venous puncture sites for bleeding and/or hematoma  - Assess quality of pulses, skin color and temperature  - Assess for signs of decreased coronary artery perfusion - ex. Angina  - Evaluate fluid balance, assess for edema, trend weights  Outcome: Progressing  Goal: Absence of cardiac arrhythmias or at baseline  Description: INTERVENTIONS:  - Continuous cardiac monitoring, monitor vital signs, obtain 12 lead EKG if indicated  - Evaluate effectiveness of antiarrhythmic and heart rate control medications as ordered  - Initiate emergency measures for life threatening arrhythmias  - Monitor electrolytes and administer replacement therapy as ordered  Outcome: Progressing     Problem: METABOLIC/FLUID AND ELECTROLYTES - ADULT  Goal: Electrolytes maintained within normal limits  Description: INTERVENTIONS:  - Monitor labs and rhythm and assess patient for signs and symptoms of electrolyte imbalances  - Administer electrolyte replacement as ordered  - Monitor response to electrolyte replacements, including rhythm and repeat lab results as appropriate  - Fluid restriction as ordered  - Instruct patient on fluid and nutrition restrictions as appropriate  Outcome: Progressing     Problem: SAFETY ADULT - FALL  Goal: Free from fall injury  Description: INTERVENTIONS:  - Assess pt frequently for physical needs  - Identify cognitive and physical deficits and behaviors that affect risk of falls.  - [place] fall precautions as indicated by assessment.  - Educate pt/family on patient safety including physical limitations  - Instruct pt to call for assistance with activity based on assessment  - Modify environment to reduce risk of injury  - Provide assistive devices as appropriate  - Consider OT/PT consult to assist with strengthening/mobility  - Encourage toileting schedule  Outcome:  Progressing     Problem: DISCHARGE PLANNING  Goal: Discharge to home or other facility with appropriate resources  Description: INTERVENTIONS:  - Identify barriers to discharge w/pt and caregiver  - Include patient/family/discharge partner in discharge planning  - Arrange for needed discharge resources and transportation as appropriate  - Identify discharge learning needs (meds, wound care, etc)  - Arrange for interpreters to assist at discharge as needed  - Consider post-discharge preferences of patient/family/discharge partner  - Complete POLST form as appropriate  - Assess patient's ability to be responsible for managing their own health  - Refer to Case Management Department for coordinating discharge planning if the patient needs post-hospital services based on physician/LIP order or complex needs related to functional status, cognitive ability or social support system  Outcome: Progressing

## 2024-06-14 NOTE — CM/SW NOTE
Social work was able to follow up with Petty from [employer] and the auth has been started.     [facility]  is working on an override for approval.    Social work will follow up with [employer] [facility] or the [facility]  Phone:[phone]  Fax: [phone]    /ALISTAIR to remain available for support and/or discharge planning.     Jannette Dobson MSW, LSW  Discharge Planner [id number]

## 2024-06-14 NOTE — PROGRESS NOTES
PT am notes: Pt is away for an US at this time. Pt will continue to follow as medical progress and pt availability allows.

## 2024-06-15 PROBLEM — U07.1 COVID-19: Status: ACTIVE | Noted: 2024-06-15

## 2024-06-15 PROBLEM — K74.60 CIRRHOSIS OF LIVER WITH ASCITES (HCC): Status: ACTIVE | Noted: 2024-06-15

## 2024-06-15 PROBLEM — R18.8 CIRRHOSIS OF LIVER WITH ASCITES (HCC): Status: ACTIVE | Noted: 2024-06-15

## 2024-06-15 LAB
ANION GAP SERPL CALC-SCNC: 5 MMOL/L (ref 0–18)
BUN BLD-MCNC: 8 MG/DL (ref 9–23)
BUN/CREAT SERPL: 11.1 (ref 10–20)
CALCIUM BLD-MCNC: 8 MG/DL (ref 8.7–10.4)
CHLORIDE SERPL-SCNC: 103 MMOL/L (ref 98–112)
CO2 SERPL-SCNC: 29 MMOL/L (ref 21–32)
CREAT BLD-MCNC: 0.72 MG/DL
EGFRCR SERPLBLD CKD-EPI 2021: 94 ML/MIN/1.73M2 (ref 60–?)
GLUCOSE BLD-MCNC: 84 MG/DL (ref 70–99)
GLUCOSE BLDC GLUCOMTR-MCNC: 119 MG/DL (ref 70–99)
GLUCOSE BLDC GLUCOMTR-MCNC: 148 MG/DL (ref 70–99)
GLUCOSE BLDC GLUCOMTR-MCNC: 92 MG/DL (ref 70–99)
GLUCOSE BLDC GLUCOMTR-MCNC: 94 MG/DL (ref 70–99)
OSMOLALITY SERPL CALC.SUM OF ELEC: 282 MOSM/KG (ref 275–295)
POTASSIUM SERPL-SCNC: 3.4 MMOL/L (ref 3.5–5.1)
SODIUM SERPL-SCNC: 137 MMOL/L (ref 136–145)

## 2024-06-15 PROCEDURE — 99232 SBSQ HOSP IP/OBS MODERATE 35: CPT | Performed by: INTERNAL MEDICINE

## 2024-06-15 NOTE — PROGRESS NOTES
[facility]  part of [facility]    Progress Note    Christi Tavarez Patient Status:  Inpatient    1960 MRN [id number]   Location [facility] 3W/SW Attending Antony Zavala MD   Hosp Day # 12 PCP ÁNGEL Delgado     Chief Complaint: She is doing okay she denies any complaints.    Subjective:     Constitutional: Negative.    HENT: Negative.     Respiratory: Negative.     Cardiovascular: Negative.    Gastrointestinal: Negative.    Genitourinary: Negative.    Musculoskeletal: Negative.    Skin: Negative.    Neurological: Negative.    Hematological: Negative.    Psychiatric/Behavioral: Negative.         Objective:   Blood pressure 104/52, pulse 77, temperature 98.2 °F (36.8 °C), temperature source Oral, resp. rate 20, height 4' 9\" (1.448 m), weight 123 lb 3.2 oz (55.9 kg), SpO2 94%.  Physical Exam  Vitals and nursing note reviewed.   Constitutional:       Appearance: Normal appearance.   Cardiovascular:      Rate and Rhythm: Normal rate and regular rhythm.      Pulses: Normal pulses.      Heart sounds: Normal heart sounds.   Pulmonary:      Effort: Pulmonary effort is normal.      Breath sounds: Normal breath sounds.   Abdominal:      Palpations: Abdomen is soft.   Musculoskeletal:      Cervical back: Normal range of motion and neck supple.   Skin:     General: Skin is warm.   Neurological:      Mental Status: She is alert. Mental status is at baseline.         Results:   Lab Results   Component Value Date    WBC 6.3 2024    HGB 10.0 (L) 2024    HCT 28.4 (L) 2024    .0 (L) 2024    CREATSERUM 0.72 06/15/2024    BUN 8 (L) 06/15/2024     06/15/2024    K 3.4 (L) 06/15/2024     06/15/2024    CO2 29.0 06/15/2024    GLU 84 06/15/2024    CA 8.0 (L) 06/15/2024    ALB 2.7 (L) 2024    ALKPHO 110 2024    BILT 2.5 (H) 2024    TP 6.6 2024    AST 35 (H) 2024    ALT 10 2024    PTT 34.8 2024    INR 1.69 (H) 2024     T4F 1.0 04/03/2024    TSH 8.140 (H) 04/03/2024    LIP 49 06/03/2024    MG 2.0 04/04/2024    TROPHS <3 06/03/2024     04/03/2024    B12 >2,000 (H) 04/04/2024    ETOH <3 04/03/2024       US VENOUS DOPPLER LEG LEFT - DIAG IMG (CPT=93971)    Result Date: 6/14/2024  CONCLUSION: No evidence of DVT.    Dictated by (CST): Jorge Alberto Bajwa MD on 6/14/2024 at 11:56 AM     Finalized by (CST): Jorge Alberto Bajwa MD on 6/14/2024 at 11:58 AM               Assessment & Plan:   Hypokalemia replace potassium  Fever- resolved she is afebrile culture negative infectious disease following  Legt leg are of tenderness and lump, us neg for dvt     + covid cough improved  cxr unremarkable., ID on board             Fluid overload- improved, continue with diuretics, echo ok cardio is following., continue with diuresis  component of liver cirrhosis           Alcoholic cirrhosis of liver with ascites (HCC)-continues he is seen by GI outpatient MRI continue with current medication     Hypokalemia replace potassium             Anemia- of chronic disease stable , will follow                    Type 2 diabetes mellitus without complication, without long-term current use of insulin (HCC)-continue with Accu-Chek sliding scale insulin hypoglycemia           Cerebellar hemorrhage (HCC)- repeat mri ct head today \" Developing encephalomalacia right cerebellum at site of previous hemorrhage.  No acute hemorrhage      Gen weakness- pt/ot plan for rehab awaiting insurance approval            GABO GREEN MD  6/15/2024

## 2024-06-15 NOTE — CM/SW NOTE
CM rec'd msg via aidin that the case management at [facility] will need a written order stating that Christi needs therapy. They also need the last 3 days of PT/OT notes. They will need this by Monday to be able to evaluate.     PT OT notes are already attached to the ref, ALISTAIR placed a protocol order for MD to cosign stating need for inpt rehab.    6/16 0850  CM rec'd msg from SNF liaison stating-   Her plan requires notes 72 hours of pt notes. The  requested pt notes for  Friday, Saturday, and Sunday since she will be reviewing it Monday.     CM informed liaison that pts are not seen daily, there is no history of insurance requiring 72 hrs of consecutive therapy to determine auth.    ALISTAIR sent all PT notes on file and requested [facility] liaison call hospital  on Monday if insurance is giving any push back.    Auth pending until Monday.    / to remain available for support and/or discharge planning.     Kierra Easley RN    Ext [phone]

## 2024-06-15 NOTE — PLAN OF CARE
VSS overnight, no complaints of pain. Patient cleared to go pending insurance auth.    Problem: Patient Centered Care  Goal: Patient preferences are identified and integrated in the patient's plan of care  Description: Interventions:  - What would you like us to know as we care for you? Recently at [facility]   - Provide timely, complete, and accurate information to patient/family  - Incorporate patient and family knowledge, values, beliefs, and cultural backgrounds into the planning and delivery of care  - Encourage patient/family to participate in care and decision-making at the level they choose  - Honor patient and family perspectives and choices  Outcome: Progressing     Problem: Diabetes/Glucose Control  Goal: Glucose maintained within prescribed range  Description: INTERVENTIONS:  - Monitor Blood Glucose as ordered  - Assess for signs and symptoms of hyperglycemia and hypoglycemia  - Administer ordered medications to maintain glucose within target range  - Assess barriers to adequate nutritional intake and initiate nutrition consult as needed  - Instruct patient on self management of diabetes  Outcome: Progressing     Problem: Patient/Family Goals  Goal: Patient/Family Long Term Goal  Description: Patient's Long Term Goal: go home    Interventions:  - follow plan of care  - See additional Care Plan goals for specific interventions  Outcome: Progressing  Goal: Patient/Family Short Term Goal  Description: Patient's Short Term Goal: get stronger    Interventions:   - work with pt/ot. Sit in chairs for meals   - See additional Care Plan goals for specific interventions  Outcome: Progressing     Problem: CARDIOVASCULAR - ADULT  Goal: Maintains optimal cardiac output and hemodynamic stability  Description: INTERVENTIONS:  - Monitor vital signs, rhythm, and trends  - Monitor for bleeding, hypotension and signs of decreased cardiac output  - Evaluate effectiveness of vasoactive medications to optimize hemodynamic  stability  - Monitor arterial and/or venous puncture sites for bleeding and/or hematoma  - Assess quality of pulses, skin color and temperature  - Assess for signs of decreased coronary artery perfusion - ex. Angina  - Evaluate fluid balance, assess for edema, trend weights  Outcome: Progressing  Goal: Absence of cardiac arrhythmias or at baseline  Description: INTERVENTIONS:  - Continuous cardiac monitoring, monitor vital signs, obtain 12 lead EKG if indicated  - Evaluate effectiveness of antiarrhythmic and heart rate control medications as ordered  - Initiate emergency measures for life threatening arrhythmias  - Monitor electrolytes and administer replacement therapy as ordered  Outcome: Progressing     Problem: METABOLIC/FLUID AND ELECTROLYTES - ADULT  Goal: Electrolytes maintained within normal limits  Description: INTERVENTIONS:  - Monitor labs and rhythm and assess patient for signs and symptoms of electrolyte imbalances  - Administer electrolyte replacement as ordered  - Monitor response to electrolyte replacements, including rhythm and repeat lab results as appropriate  - Fluid restriction as ordered  - Instruct patient on fluid and nutrition restrictions as appropriate  Outcome: Progressing     Problem: SAFETY ADULT - FALL  Goal: Free from fall injury  Description: INTERVENTIONS:  - Assess pt frequently for physical needs  - Identify cognitive and physical deficits and behaviors that affect risk of falls.  - [place] fall precautions as indicated by assessment.  - Educate pt/family on patient safety including physical limitations  - Instruct pt to call for assistance with activity based on assessment  - Modify environment to reduce risk of injury  - Provide assistive devices as appropriate  - Consider OT/PT consult to assist with strengthening/mobility  - Encourage toileting schedule  Outcome: Progressing     Problem: DISCHARGE PLANNING  Goal: Discharge to home or other facility with appropriate  resources  Description: INTERVENTIONS:  - Identify barriers to discharge w/pt and caregiver  - Include patient/family/discharge partner in discharge planning  - Arrange for needed discharge resources and transportation as appropriate  - Identify discharge learning needs (meds, wound care, etc)  - Arrange for interpreters to assist at discharge as needed  - Consider post-discharge preferences of patient/family/discharge partner  - Complete POLST form as appropriate  - Assess patient's ability to be responsible for managing their own health  - Refer to Case Management Department for coordinating discharge planning if the patient needs post-hospital services based on physician/LIP order or complex needs related to functional status, cognitive ability or social support system  Outcome: Progressing

## 2024-06-15 NOTE — PROGRESS NOTES
[facility]  part of [facility]    Progress Note    Christi Tavarez Patient Status:  Inpatient    1960 MRN [id number]   Location [facility] 3W/SW Attending Antony Zavala MD   Hosp Day # 11 PCP ÁNGEL Delgado     Chief Complaint:     Subjective:     Constitutional:  Positive for fatigue.   HENT: Negative.     Eyes: Negative.    Respiratory: Negative.     Cardiovascular:  Positive for leg swelling.        Improved    Gastrointestinal: Negative.    Genitourinary: Negative.    Musculoskeletal: Negative.    Skin: Negative.    Neurological: Negative.        Objective:   Blood pressure 105/67, pulse 83, temperature 98.4 °F (36.9 °C), temperature source Oral, resp. rate 16, height 4' 9\" (1.448 m), weight 126 lb (57.2 kg), SpO2 98%.  Physical Exam  Vitals and nursing note reviewed.   HENT:      Head: Normocephalic and atraumatic.      Nose: Nose normal.      Mouth/Throat:      Mouth: Mucous membranes are moist.   Cardiovascular:      Rate and Rhythm: Normal rate.   Pulmonary:      Effort: Pulmonary effort is normal.      Breath sounds: Normal breath sounds.   Abdominal:      General: Abdomen is flat. Bowel sounds are normal.      Palpations: Abdomen is soft.   Musculoskeletal:         General: Normal range of motion.      Cervical back: Normal range of motion and neck supple.      Right lower leg: Edema present.      Left lower leg: Edema present.      Comments: Improved    Skin:     General: Skin is warm and dry.   Neurological:      Mental Status: She is alert and oriented to person, place, and time.      Comments: But at times slow with responding    Psychiatric:         Mood and Affect: Mood normal.         Results:   Lab Results   Component Value Date    WBC 6.3 2024    HGB 10.0 (L) 2024    HCT 28.4 (L) 2024    .0 (L) 2024    CREATSERUM 0.74 2024    BUN 7 (L) 2024     2024    K 4.4 2024     2024    CO2 30.0  06/14/2024    GLU 82 06/14/2024    CA 8.2 (L) 06/14/2024    ALB 2.7 (L) 06/11/2024    ALKPHO 110 06/11/2024    BILT 2.5 (H) 06/11/2024    TP 6.6 06/11/2024    AST 35 (H) 06/11/2024    ALT 10 06/11/2024    PTT 34.8 06/03/2024    INR 1.69 (H) 06/11/2024    T4F 1.0 04/03/2024    TSH 8.140 (H) 04/03/2024    LIP 49 06/03/2024    MG 2.0 04/04/2024    TROPHS <3 06/03/2024     04/03/2024    B12 >2,000 (H) 04/04/2024    ETOH <3 04/03/2024       US VENOUS DOPPLER LEG LEFT - DIAG IMG (CPT=93971)    Result Date: 6/14/2024  CONCLUSION: No evidence of DVT.    Dictated by (CST): Jorge Alberto Bajwa MD on 6/14/2024 at 11:56 AM     Finalized by (CST): Jorge Alberto Bajwa MD on 6/14/2024 at 11:58 AM               Assessment & Plan:     Awaiting insurance aproval for reahb     No fever cx neg    Legt leg are of tenderness and lump, us neg for dvt     O2 nl     Will monitor 1 more day await cx, possible dc tomorrow if ok      + covid now mild cough, cxr unremarkable., ID on board     Had fever today cx done will follow ID recommendations       Anticipated dc in the next 1-2 days , daughter trying to get pt top a rehab        Continue with current care , awaiting cardiology recommendations o transitioning to oral diuretics      Will monitor electrolites       Daughter is looking in to another facility at  for respite care and some pt     Edema is down, continue with IV lasix as per cards      GI seen pt , egd out opt, also out pt MRI liver           Pt/ot eval      Continue with current care       MRI brain results noted with no acute findings     Low K resolved, will follow      paracentesis done yesterday 4 L removed          Fluid overload- improved, continue with diuretics, echo ok\     Cardiac, cardiology on board , continue with diuresing      component of liver cirrhosis           Alcoholic cirrhosis of liver with ascites (HCC)- abd distention, gi on board for abd paracentesis tomorrow, katherine follow labs                     Anemia- of  chronic disease stable , will follow                    Type 2 diabetes mellitus without complication, without long-term current use of insulin (HCC)- will monitor bs           Cerebellar hemorrhage (HCC)- repeat mri ct head today \" Developing encephalomalacia right cerebellum at site of previous hemorrhage.  No acute hemorrhage      Gen weakness- pt/ot            Antony Zavala MD  6/14/2024

## 2024-06-15 NOTE — PHYSICAL THERAPY NOTE
PHYSICAL THERAPY TREATMENT NOTE - INPATIENT     Room Number: 329/329-A       Presenting Problem: sensory ataxian and fluid overload  Co-Morbidities : recemt admission for etoh w/d, liver cirrhosis, cerebellar CVA    Problem List  Active Problems:    Cerebellar hemorrhage (HCC)    Anemia    Alcoholic cirrhosis of liver with ascites (HCC)    Fluid overload    Type 2 diabetes mellitus without complication, without long-term current use of insulin (HCC)    Sensory ataxia    COVID-19 virus infection    Fever    Cirrhosis of liver with ascites (HCC)    COVID-19      PHYSICAL THERAPY ASSESSMENT   Patient demonstrates limited progress this session, goals  remain in progress.    Patient continues to function below baseline with bed mobility, transfers, and gait.  Contributing factors to remaining limitations include decreased functional strength, decreased endurance/aerobic capacity, impaired coordination, and decreased muscular endurance.  Next session anticipate patient to progress bed mobility, transfers, gait, stair negotiation, maintaining seated position, and standing prolonged periods.  Physical Therapy will continue to follow patient for duration of hospitalization.    Patient continues to benefit from continued skilled PT services: to promote return to prior level of function and safety with continuous assistance and gradual rehabilitative therapy .    PLAN  PT Treatment Plan: Bed mobility;Body mechanics;Patient education;Gait training;Strengthening;Transfer training;Balance training  Frequency (Obs): 3-5x/week    SUBJECTIVE  I like to get up    OBJECTIVE  Precautions: Bed/chair alarm    WEIGHT BEARING RESTRICTION                PAIN ASSESSMENT   Ratin          BALANCE  Static Sitting: Fair +  Dynamic Sitting: Fair  Static Standing: Fair -  Dynamic Standing: Poor +    ACTIVITY TOLERANCE                          O2 WALK       AM-PAC '6-Clicks' INPATIENT SHORT FORM - BASIC MOBILITY  How much difficulty does the  patient currently have...  Patient Difficulty: Turning over in bed (including adjusting bedclothes, sheets and blankets)?: A Little   Patient Difficulty: Sitting down on and standing up from a chair with arms (e.g., wheelchair, bedside commode, etc.): A Little   Patient Difficulty: Moving from lying on back to sitting on the side of the bed?: A Little   How much help from another person does the patient currently need...   Help from Another: Moving to and from a bed to a chair (including a wheelchair)?: A Little   Help from Another: Need to walk in hospital room?: A Little   Help from Another: Climbing 3-5 steps with a railing?: A Lot     AM-PAC Score:  Raw Score: 17   Approx Degree of Impairment: 50.57%   Standardized Score (AM-PAC Scale): 42.13   CMS Modifier (G-Code): CK    FUNCTIONAL ABILITY STATUS  Functional Mobility/Gait Assessment  Gait Assistance: Minimum assistance  Distance (ft): 3 ft and 10 ft x2  Assistive Device: Rolling walker  Pattern: Shuffle (leaning posterior)  Rolling: minimal assist  Supine to Sit: moderate assist  Sit to Supine: moderate assist  Sit to Stand: minimal assist    Additional information: RN approved PT session and therapist donned with PPE equipment due pt COVID+. Pt in supine and willing to work with therapist. Instructed on supine thera exs with ble's to promote functional ability. Supine to sit with cues mod A and pt needs lean forward. Sitting flaquito on EOB 4-5 min before sit to stand transfers. Sit to stand with RW min A from elevated bed level. Pt amb 4 ft with RW mod A due leaning posterior. Sit to stand from chair min A and using RW.Pt needs cues for leaning on RW due posterior lean. With amb pt improved amb posture. Pt amb 10 ft with RW min A and chair follow for safety. After rest cont to amb additional 10 ft with min A. Monitoring 02 on RA above 98%. Pt left sitting in chair, alarm activated and RN aware of pt progress.    The patient's Approx Degree of Impairment: 50.57%  has been calculated based on documentation in the [facility] '6 clicks' Inpatient Daily Activity Short Form.  Research supports that patients with this level of impairment may benefit from gradual rehabilitative therapy.  Final disposition will be made by interdisciplinary medical team.    THERAPEUTIC EXERCISES  Lower Extremity Alternating marching  Ankle pumps  Hip AB/AD  Heel slides  Knee extension  Leg slides     Position Sitting and Supine       Patient End of Session: Up in chair;Needs met;Call light within reach;RN aware of session/findings;All patient questions and concerns addressed;Alarm set    CURRENT GOALS     Goals to be met by: 6/19  Patient Goal Patient's self-stated goal is: home   Goal #1 Patient is able to demonstrate supine - sit EOB @ level: min A      Goal #1   Current Status Mod A    Goal #2 Patient is able to demonstrate transfers Sit to/from Stand at assistance level: min A with walker - rolling      Goal #2  Current Status Min A with RW    Goal #3 Patient is able to ambulate 50 feet with assist device: walker - rolling at assistance level: Min A   Goal #3   Current Status 4 ft and 10 ft x2 with RW and min A  and chair for safety.   Goal #4     Goal #4   Current Status     Goal #5 Patient to demonstrate independence with home activity/exercise instructions provided to patient in preparation for discharge.   Goal #5   Current Status Ongoing         Gait Training: 15 minutes  Therapeutic Activity: 14 minutes

## 2024-06-16 ENCOUNTER — EXTERNAL FACILITY (OUTPATIENT)
Dept: INTERNAL MEDICINE CLINIC | Facility: CLINIC | Age: 64
End: 2024-06-16

## 2024-06-16 DIAGNOSIS — K70.31 ALCOHOLIC CIRRHOSIS OF LIVER WITH ASCITES (HCC): ICD-10-CM

## 2024-06-16 DIAGNOSIS — R27.8 SENSORY ATAXIA: ICD-10-CM

## 2024-06-16 DIAGNOSIS — F10.930 ALCOHOL WITHDRAWAL SYNDROME, UNCOMPLICATED (HCC): ICD-10-CM

## 2024-06-16 DIAGNOSIS — E11.9 TYPE 2 DIABETES MELLITUS WITHOUT COMPLICATION, WITHOUT LONG-TERM CURRENT USE OF INSULIN (HCC): ICD-10-CM

## 2024-06-16 DIAGNOSIS — I61.4 CEREBELLAR HEMORRHAGE (HCC): ICD-10-CM

## 2024-06-16 DIAGNOSIS — E87.70 HYPERVOLEMIA, UNSPECIFIED HYPERVOLEMIA TYPE: Primary | ICD-10-CM

## 2024-06-16 LAB
ANION GAP SERPL CALC-SCNC: 6 MMOL/L (ref 0–18)
BUN BLD-MCNC: 7 MG/DL (ref 9–23)
BUN/CREAT SERPL: 9.3 (ref 10–20)
CALCIUM BLD-MCNC: 8.6 MG/DL (ref 8.7–10.4)
CHLORIDE SERPL-SCNC: 100 MMOL/L (ref 98–112)
CO2 SERPL-SCNC: 31 MMOL/L (ref 21–32)
CREAT BLD-MCNC: 0.75 MG/DL
EGFRCR SERPLBLD CKD-EPI 2021: 89 ML/MIN/1.73M2 (ref 60–?)
GLUCOSE BLD-MCNC: 119 MG/DL (ref 70–99)
GLUCOSE BLDC GLUCOMTR-MCNC: 125 MG/DL (ref 70–99)
GLUCOSE BLDC GLUCOMTR-MCNC: 143 MG/DL (ref 70–99)
GLUCOSE BLDC GLUCOMTR-MCNC: 146 MG/DL (ref 70–99)
GLUCOSE BLDC GLUCOMTR-MCNC: 90 MG/DL (ref 70–99)
OSMOLALITY SERPL CALC.SUM OF ELEC: 283 MOSM/KG (ref 275–295)
POTASSIUM SERPL-SCNC: 3.4 MMOL/L (ref 3.5–5.1)
POTASSIUM SERPL-SCNC: 3.9 MMOL/L (ref 3.5–5.1)
SODIUM SERPL-SCNC: 137 MMOL/L (ref 136–145)

## 2024-06-16 PROCEDURE — 99232 SBSQ HOSP IP/OBS MODERATE 35: CPT | Performed by: INTERNAL MEDICINE

## 2024-06-16 RX ORDER — POTASSIUM CHLORIDE 20 MEQ/1
40 TABLET, EXTENDED RELEASE ORAL EVERY 4 HOURS
Status: COMPLETED | OUTPATIENT
Start: 2024-06-16 | End: 2024-06-16

## 2024-06-16 NOTE — PLAN OF CARE
Problem: Patient Centered Care  Goal: Patient preferences are identified and integrated in the patient's plan of care  Description: Interventions:  - What would you like us to know as we care for you? Recently at [facility]   - Provide timely, complete, and accurate information to patient/family  - Incorporate patient and family knowledge, values, beliefs, and cultural backgrounds into the planning and delivery of care  - Encourage patient/family to participate in care and decision-making at the level they choose  - Honor patient and family perspectives and choices  Outcome: Progressing     Problem: Diabetes/Glucose Control  Goal: Glucose maintained within prescribed range  Description: INTERVENTIONS:  - Monitor Blood Glucose as ordered  - Assess for signs and symptoms of hyperglycemia and hypoglycemia  - Administer ordered medications to maintain glucose within target range  - Assess barriers to adequate nutritional intake and initiate nutrition consult as needed  - Instruct patient on self management of diabetes  Outcome: Progressing     Problem: Patient/Family Goals  Goal: Patient/Family Long Term Goal  Description: Patient's Long Term Goal: go home    Interventions:  - follow plan of care  - See additional Care Plan goals for specific interventions  Outcome: Progressing  Goal: Patient/Family Short Term Goal  Description: Patient's Short Term Goal: get stronger    Interventions:   - work with pt/ot. Sit in chairs for meals   - See additional Care Plan goals for specific interventions  Outcome: Progressing     Problem: CARDIOVASCULAR - ADULT  Goal: Maintains optimal cardiac output and hemodynamic stability  Description: INTERVENTIONS:  - Monitor vital signs, rhythm, and trends  - Monitor for bleeding, hypotension and signs of decreased cardiac output  - Evaluate effectiveness of vasoactive medications to optimize hemodynamic stability  - Monitor arterial and/or venous puncture sites for bleeding and/or hematoma  -  Assess quality of pulses, skin color and temperature  - Assess for signs of decreased coronary artery perfusion - ex. Angina  - Evaluate fluid balance, assess for edema, trend weights  Outcome: Progressing  Goal: Absence of cardiac arrhythmias or at baseline  Description: INTERVENTIONS:  - Continuous cardiac monitoring, monitor vital signs, obtain 12 lead EKG if indicated  - Evaluate effectiveness of antiarrhythmic and heart rate control medications as ordered  - Initiate emergency measures for life threatening arrhythmias  - Monitor electrolytes and administer replacement therapy as ordered  Outcome: Progressing     Problem: METABOLIC/FLUID AND ELECTROLYTES - ADULT  Goal: Electrolytes maintained within normal limits  Description: INTERVENTIONS:  - Monitor labs and rhythm and assess patient for signs and symptoms of electrolyte imbalances  - Administer electrolyte replacement as ordered  - Monitor response to electrolyte replacements, including rhythm and repeat lab results as appropriate  - Fluid restriction as ordered  - Instruct patient on fluid and nutrition restrictions as appropriate  Outcome: Progressing     Problem: SAFETY ADULT - FALL  Goal: Free from fall injury  Description: INTERVENTIONS:  - Assess pt frequently for physical needs  - Identify cognitive and physical deficits and behaviors that affect risk of falls.  - [place] fall precautions as indicated by assessment.  - Educate pt/family on patient safety including physical limitations  - Instruct pt to call for assistance with activity based on assessment  - Modify environment to reduce risk of injury  - Provide assistive devices as appropriate  - Consider OT/PT consult to assist with strengthening/mobility  - Encourage toileting schedule  Outcome: Progressing     Problem: DISCHARGE PLANNING  Goal: Discharge to home or other facility with appropriate resources  Description: INTERVENTIONS:  - Identify barriers to discharge w/pt and caregiver  -  Include patient/family/discharge partner in discharge planning  - Arrange for needed discharge resources and transportation as appropriate  - Identify discharge learning needs (meds, wound care, etc)  - Arrange for interpreters to assist at discharge as needed  - Consider post-discharge preferences of patient/family/discharge partner  - Complete POLST form as appropriate  - Assess patient's ability to be responsible for managing their own health  - Refer to Case Management Department for coordinating discharge planning if the patient needs post-hospital services based on physician/LIP order or complex needs related to functional status, cognitive ability or social support system  Outcome: Progressing     Patient A&Ox4 on room air. afebrile. Tylenol given prn pain. Safety precautions maintained, call light and personal belongings within reach. Plan is for [facility] when medically cleared.

## 2024-06-16 NOTE — PROGRESS NOTES
[facility]  part of [facility]    Progress Note    Christi Tavarez Patient Status:  Inpatient    1960 MRN [id number]   Location [facility] 3W/SW Attending Antony Zavala MD   Hosp Day # 13 PCP ÁNGEL Delgado     Chief Complaint: She is doing okay last night her leg was hurting , around the left but now is okay    Subjective:     Constitutional: Negative.    HENT: Negative.     Respiratory: Negative.     Cardiovascular: Negative.    Gastrointestinal: Negative.    Genitourinary: Negative.    Musculoskeletal: Negative.    Skin: Negative.    Neurological: Negative.    Hematological: Negative.    Psychiatric/Behavioral: Negative.         Objective:   Blood pressure 107/60, pulse 80, temperature 97.5 °F (36.4 °C), temperature source Axillary, resp. rate 18, height 4' 9\" (1.448 m), weight 119 lb 12.8 oz (54.3 kg), SpO2 96%.  Physical Exam  Vitals and nursing note reviewed.   Constitutional:       Appearance: Normal appearance.   Cardiovascular:      Rate and Rhythm: Normal rate and regular rhythm.      Pulses: Normal pulses.      Heart sounds: Normal heart sounds.   Pulmonary:      Effort: Pulmonary effort is normal.      Breath sounds: Normal breath sounds.   Abdominal:      Palpations: Abdomen is soft.   Musculoskeletal:      Cervical back: Normal range of motion and neck supple.   Skin:     General: Skin is warm.   Neurological:      Mental Status: She is alert. Mental status is at baseline.         Results:   Lab Results   Component Value Date    WBC 6.3 2024    HGB 10.0 (L) 2024    HCT 28.4 (L) 2024    .0 (L) 2024    CREATSERUM 0.75 2024    BUN 7 (L) 2024     2024    K 3.4 (L) 2024     2024    CO2 31.0 2024     (H) 2024    CA 8.6 (L) 2024    ALB 2.7 (L) 2024    ALKPHO 110 2024    BILT 2.5 (H) 2024    TP 6.6 2024    AST 35 (H) 2024    ALT 10 2024     PTT 34.8 06/03/2024    INR 1.69 (H) 06/11/2024    T4F 1.0 04/03/2024    TSH 8.140 (H) 04/03/2024    LIP 49 06/03/2024    MG 2.0 04/04/2024    TROPHS <3 06/03/2024     04/03/2024    B12 >2,000 (H) 04/04/2024    ETOH <3 04/03/2024       US VENOUS DOPPLER LEG LEFT - DIAG IMG (CPT=93971)    Result Date: 6/14/2024  CONCLUSION: No evidence of DVT.    Dictated by (CST): Jorge Alberto Bajwa MD on 6/14/2024 at 11:56 AM     Finalized by (CST): Jorge Alberto Bajwa MD on 6/14/2024 at 11:58 AM               Assessment & Plan:   Hypokalemia electrolyte protocol-  Fever- resolved she is afebrile culture negative infectious disease following  Legt leg are of tenderness and lump, us neg for dvt     + covid cough improved  cxr unremarkable., ID on board             Fluid overload- improved, continue with diuretics, echo ok cardio is following., continue with diuresis  component of liver cirrhosis           Alcoholic cirrhosis of liver with ascites (HCC)-continues he is seen by GI outpatient MRI continue with current medication     Hypokalemia replace potassium             Anemia- of chronic disease stable , will follow                    Type 2 diabetes mellitus without complication, without long-term current use of insulin (HCC)-continue with Accu-Chek sliding scale insulin hypoglycemia           Cerebellar hemorrhage (HCC)- repeat mri ct head today \" Developing encephalomalacia right cerebellum at site of previous hemorrhage.  No acute hemorrhage      Gen weakness- pt/ot plan for rehab awaiting insurance approval possible discharge tomorrow    Continue PT OT         GABO GREEN MD  6/16/2024

## 2024-06-16 NOTE — PLAN OF CARE
Patient is from home w/ daughter. A&Ox4. Room air. X2 assist in bed. Bed in lowest position and locked. Call light in hand. Personal belongings w/ in reach.     Problem: Patient Centered Care  Goal: Patient preferences are identified and integrated in the patient's plan of care  Description: Interventions:  - What would you like us to know as we care for you? Recently at [facility]   - Provide timely, complete, and accurate information to patient/family  - Incorporate patient and family knowledge, values, beliefs, and cultural backgrounds into the planning and delivery of care  - Encourage patient/family to participate in care and decision-making at the level they choose  - Honor patient and family perspectives and choices  Outcome: Progressing     Problem: Diabetes/Glucose Control  Goal: Glucose maintained within prescribed range  Description: INTERVENTIONS:  - Monitor Blood Glucose as ordered  - Assess for signs and symptoms of hyperglycemia and hypoglycemia  - Administer ordered medications to maintain glucose within target range  - Assess barriers to adequate nutritional intake and initiate nutrition consult as needed  - Instruct patient on self management of diabetes  Outcome: Progressing     Problem: Patient/Family Goals  Goal: Patient/Family Long Term Goal  Description: Patient's Long Term Goal: go home    Interventions:  - follow plan of care  - See additional Care Plan goals for specific interventions  Outcome: Progressing  Goal: Patient/Family Short Term Goal  Description: Patient's Short Term Goal: get stronger    Interventions:   - work with pt/ot. Sit in chairs for meals   - See additional Care Plan goals for specific interventions  Outcome: Progressing     Problem: CARDIOVASCULAR - ADULT  Goal: Maintains optimal cardiac output and hemodynamic stability  Description: INTERVENTIONS:  - Monitor vital signs, rhythm, and trends  - Monitor for bleeding, hypotension and signs of decreased cardiac output  - Evaluate  effectiveness of vasoactive medications to optimize hemodynamic stability  - Monitor arterial and/or venous puncture sites for bleeding and/or hematoma  - Assess quality of pulses, skin color and temperature  - Assess for signs of decreased coronary artery perfusion - ex. Angina  - Evaluate fluid balance, assess for edema, trend weights  Outcome: Progressing  Goal: Absence of cardiac arrhythmias or at baseline  Description: INTERVENTIONS:  - Continuous cardiac monitoring, monitor vital signs, obtain 12 lead EKG if indicated  - Evaluate effectiveness of antiarrhythmic and heart rate control medications as ordered  - Initiate emergency measures for life threatening arrhythmias  - Monitor electrolytes and administer replacement therapy as ordered  Outcome: Progressing     Problem: METABOLIC/FLUID AND ELECTROLYTES - ADULT  Goal: Electrolytes maintained within normal limits  Description: INTERVENTIONS:  - Monitor labs and rhythm and assess patient for signs and symptoms of electrolyte imbalances  - Administer electrolyte replacement as ordered  - Monitor response to electrolyte replacements, including rhythm and repeat lab results as appropriate  - Fluid restriction as ordered  - Instruct patient on fluid and nutrition restrictions as appropriate  Outcome: Progressing     Problem: SAFETY ADULT - FALL  Goal: Free from fall injury  Description: INTERVENTIONS:  - Assess pt frequently for physical needs  - Identify cognitive and physical deficits and behaviors that affect risk of falls.  - [place] fall precautions as indicated by assessment.  - Educate pt/family on patient safety including physical limitations  - Instruct pt to call for assistance with activity based on assessment  - Modify environment to reduce risk of injury  - Provide assistive devices as appropriate  - Consider OT/PT consult to assist with strengthening/mobility  - Encourage toileting schedule  Outcome: Progressing     Problem: DISCHARGE  PLANNING  Goal: Discharge to home or other facility with appropriate resources  Description: INTERVENTIONS:  - Identify barriers to discharge w/pt and caregiver  - Include patient/family/discharge partner in discharge planning  - Arrange for needed discharge resources and transportation as appropriate  - Identify discharge learning needs (meds, wound care, etc)  - Arrange for interpreters to assist at discharge as needed  - Consider post-discharge preferences of patient/family/discharge partner  - Complete POLST form as appropriate  - Assess patient's ability to be responsible for managing their own health  - Refer to Case Management Department for coordinating discharge planning if the patient needs post-hospital services based on physician/LIP order or complex needs related to functional status, cognitive ability or social support system  Outcome: Progressing

## 2024-06-16 NOTE — H&P
pt seen 5/28/24 at [facility]      No new complaints    Continue with pt    Vit stable         Subj  No cp  No sob  No abd pain  Vit stable  Obj  Cv s1 s2  Chest clear  Abd soft  Ext from  Neur aa0 x 3 forgetful  A/P  Generalized weakness with Recurrent falls, pt/ot  Moderate acute tosubacute inferior right cerebellar hematomavs infarct vs mass with hemorrhage  MRI brain results reviewed by Neuro- rec repeat MRI 3 months  -neuro seen  Will follow in rehab  Hematemesis resolved, will monitor  -GI seen in hospital  hgb - stable on PPI  endoscopy was note reccommended as hgb now stable  Current alcohol use  Alcohol withdrawal - resolved in hospital, will monitor  On Ct scan liver suspect for cirrhosis with moderate ascitics,  Will monitor  Acute urinary retention - ruled out  Hematuria - likely lucas trauma  urology consult done  -CT urogram reviewed by uro  -lucas was removed.  Acute kidney injury - resolved, will monitor in rehab  Thrombocytopenia  Suspecting secondary to alcohol use stable, will monitor in rehab  NIDDM type II  -Last hemoglobin A1c 5.5  On glipizide  Will monitor BG  H/o hyperlipidemia  Patient stopped taking statin months ago.  H/o anxiety  -cont lexapro, will follow  
pt seen 5/31/24 at [facility]      for dc this week, will follow in office    home with hh and pt    to follow wit gi    Vit stable         Subj  No cp  No sob  No abd pain  Vit stable  Obj  Cv s1 s2  Chest clear  Abd soft  Ext from  Neur aa0 x 3 forgetful  A/P  Generalized weakness with Recurrent falls, pt/ot  Moderate acute tosubacute inferior right cerebellar hematomavs infarct vs mass with hemorrhage  MRI brain results reviewed by Neuro- rec repeat MRI 3 months  -neuro seen  Will follow in rehab  Hematemesis resolved, will monitor  -GI seen in hospital  hgb - stable on PPI  endoscopy was note reccommended as hgb now stable  Current alcohol use  Alcohol withdrawal - resolved in hospital, will monitor  On Ct scan liver suspect for cirrhosis with moderate ascitics,  Will monitor  Acute urinary retention - ruled out  Hematuria - likely lucas trauma  urology consult done  -CT urogram reviewed by uro  -lucas was removed.  Acute kidney injury - resolved, will monitor in rehab  Thrombocytopenia  Suspecting secondary to alcohol use stable, will monitor in rehab  NIDDM type II  -Last hemoglobin A1c 5.5  On glipizide  Will monitor BG  H/o hyperlipidemia  Patient stopped taking statin months ago.  H/o anxiety  -cont lexapro, will follow    
Other

## 2024-06-17 LAB
BACTERIA BLD CULT: POSITIVE
GLUCOSE BLDC GLUCOMTR-MCNC: 115 MG/DL (ref 70–99)
GLUCOSE BLDC GLUCOMTR-MCNC: 126 MG/DL (ref 70–99)
GLUCOSE BLDC GLUCOMTR-MCNC: 157 MG/DL (ref 70–99)
GLUCOSE BLDC GLUCOMTR-MCNC: 94 MG/DL (ref 70–99)

## 2024-06-17 PROCEDURE — 99232 SBSQ HOSP IP/OBS MODERATE 35: CPT | Performed by: INTERNAL MEDICINE

## 2024-06-17 NOTE — PLAN OF CARE
Problem: Patient Centered Care  Goal: Patient preferences are identified and integrated in the patient's plan of care  Description: Interventions:  - What would you like us to know as we care for you? Recently at [facility]   - Provide timely, complete, and accurate information to patient/family  - Incorporate patient and family knowledge, values, beliefs, and cultural backgrounds into the planning and delivery of care  - Encourage patient/family to participate in care and decision-making at the level they choose  - Honor patient and family perspectives and choices  Outcome: Progressing     Problem: Diabetes/Glucose Control  Goal: Glucose maintained within prescribed range  Description: INTERVENTIONS:  - Monitor Blood Glucose as ordered  - Assess for signs and symptoms of hyperglycemia and hypoglycemia  - Administer ordered medications to maintain glucose within target range  - Assess barriers to adequate nutritional intake and initiate nutrition consult as needed  - Instruct patient on self management of diabetes  Outcome: Progressing     Problem: Patient/Family Goals  Goal: Patient/Family Long Term Goal  Description: Patient's Long Term Goal: go home    Interventions:  - follow plan of care  - See additional Care Plan goals for specific interventions  Outcome: Progressing  Goal: Patient/Family Short Term Goal  Description: Patient's Short Term Goal: get stronger    Interventions:   - work with pt/ot. Sit in chairs for meals   - See additional Care Plan goals for specific interventions  Outcome: Progressing     Problem: CARDIOVASCULAR - ADULT  Goal: Maintains optimal cardiac output and hemodynamic stability  Description: INTERVENTIONS:  - Monitor vital signs, rhythm, and trends  - Monitor for bleeding, hypotension and signs of decreased cardiac output  - Evaluate effectiveness of vasoactive medications to optimize hemodynamic stability  - Monitor arterial and/or venous puncture sites for bleeding and/or hematoma  -  Assess quality of pulses, skin color and temperature  - Assess for signs of decreased coronary artery perfusion - ex. Angina  - Evaluate fluid balance, assess for edema, trend weights  Outcome: Progressing  Goal: Absence of cardiac arrhythmias or at baseline  Description: INTERVENTIONS:  - Continuous cardiac monitoring, monitor vital signs, obtain 12 lead EKG if indicated  - Evaluate effectiveness of antiarrhythmic and heart rate control medications as ordered  - Initiate emergency measures for life threatening arrhythmias  - Monitor electrolytes and administer replacement therapy as ordered  Outcome: Progressing     Problem: METABOLIC/FLUID AND ELECTROLYTES - ADULT  Goal: Electrolytes maintained within normal limits  Description: INTERVENTIONS:  - Monitor labs and rhythm and assess patient for signs and symptoms of electrolyte imbalances  - Administer electrolyte replacement as ordered  - Monitor response to electrolyte replacements, including rhythm and repeat lab results as appropriate  - Fluid restriction as ordered  - Instruct patient on fluid and nutrition restrictions as appropriate  Outcome: Progressing     Problem: SAFETY ADULT - FALL  Goal: Free from fall injury  Description: INTERVENTIONS:  - Assess pt frequently for physical needs  - Identify cognitive and physical deficits and behaviors that affect risk of falls.  - [place] fall precautions as indicated by assessment.  - Educate pt/family on patient safety including physical limitations  - Instruct pt to call for assistance with activity based on assessment  - Modify environment to reduce risk of injury  - Provide assistive devices as appropriate  - Consider OT/PT consult to assist with strengthening/mobility  - Encourage toileting schedule  Outcome: Progressing     Problem: DISCHARGE PLANNING  Goal: Discharge to home or other facility with appropriate resources  Description: INTERVENTIONS:  - Identify barriers to discharge w/pt and caregiver  -  Include patient/family/discharge partner in discharge planning  - Arrange for needed discharge resources and transportation as appropriate  - Identify discharge learning needs (meds, wound care, etc)  - Arrange for interpreters to assist at discharge as needed  - Consider post-discharge preferences of patient/family/discharge partner  - Complete POLST form as appropriate  - Assess patient's ability to be responsible for managing their own health  - Refer to Case Management Department for coordinating discharge planning if the patient needs post-hospital services based on physician/LIP order or complex needs related to functional status, cognitive ability or social support system  Outcome: Progressing

## 2024-06-17 NOTE — PROGRESS NOTES
INFECTIOUS DISEASE PROGRESS NOTE  [facility]  part of [facility] ID PROGRESS NOTE    Christi Tavarez Patient Status:  Inpatient    1960 MRN [id number]   Location [facility] 3W/SW Attending Antony Zavala MD   Hosp Day # 14 PCP ÁNGEL Delgado     Subjective:  ROS reviewed. Afebrile. States cough has resolved. Has been eating.    ASSESSMENT:    Antibiotics: OFF     # GPR bacteremia 2/2 ?contaminant versus true infection  # COVID-19 infection without hypoxia with negative CXR  # Acute fever 2/2 above, R/o bacteremia   -FU blood cx  # ETOH cirrhosis               -S/p paracentesis  with 4L removed, cx NGTD  # Weakness  # ICH 2024  # Diabetes mellitus     PLAN:  -  Continue to monitor off abx. Await blood cx ID and will repeat blood cx.  -  Isolation per protocol.  -  Follow fever curve, wbc.  -  Reviewed labs, micro, imaging reports, available old records.  -  Case d/w patient, RN.     History of Present Illness:  Christi Tavarez is a 63 year old female with a history of recent ICH 2024, diabetes mellitus, ETOH abuse, who presented to [facility] ED on 6/3 for shortness of breath and BLE edema. On arrival, afebrile, CXR with edema, seen by GI, s/p paracentesis  with 4L off, cx NGTD, hospital course c/b BLE weakness, MRI without acute changes. Had CT chest showing RUL nodule. Family noted patient with a new cough and congestions so a rapid COVID was sent that was positive. Denies body aches. ID consulted.    Physical Exam:  /56 (BP Location: Right arm)   Pulse 87   Temp 98 °F (36.7 °C) (Oral)   Resp 18   Ht 4' 9\" (1.448 m)   Wt 126 lb 8 oz (57.4 kg)   SpO2 93%   BMI 27.37 kg/m²     Gen:   Awake, in bed  HEENT:  EOMI, neck supple  CV/lungs:  RRR, CTAB  Abdom:  Soft, NT/ND, +BS  Skin/extrem:  No rashes, no c/c/e  Lines:  PIV+    Laboratory Data: Reviewed    Microbiology: Reviewed    Radiology: Reviewed      [facility]  [phone]  6/17/2024

## 2024-06-17 NOTE — PROGRESS NOTES
[facility]  part of [facility]    Progress Note    Christi Tavarez Patient Status:  Inpatient    1960 MRN [id number]   Location [facility] 3W/SW Attending Antony Zavala MD   Hosp Day # 14 PCP ÁNGEL Delgado     Chief Complaint:     Subjective:     Constitutional:  Positive for fatigue.   HENT: Negative.     Eyes: Negative.    Respiratory: Negative.     Cardiovascular:  Negative for leg swelling.        Leg edema is way down on both    Gastrointestinal: Negative.    Genitourinary: Negative.    Musculoskeletal: Negative.    Skin: Negative.    Neurological: Negative.        Objective:   Blood pressure 111/53, pulse 82, temperature 97.6 °F (36.4 °C), temperature source Oral, resp. rate 18, height 4' 9\" (1.448 m), weight 126 lb 8 oz (57.4 kg), SpO2 94%.  Physical Exam  Vitals and nursing note reviewed.   HENT:      Head: Normocephalic and atraumatic.      Nose: Nose normal.      Mouth/Throat:      Mouth: Mucous membranes are moist.   Cardiovascular:      Rate and Rhythm: Normal rate.   Pulmonary:      Effort: Pulmonary effort is normal.      Breath sounds: Normal breath sounds.   Abdominal:      General: Abdomen is flat. Bowel sounds are normal.      Palpations: Abdomen is soft.   Musculoskeletal:         General: Normal range of motion.      Cervical back: Normal range of motion and neck supple.      Right lower leg: No edema.      Left lower leg: No edema.      Comments: Edema to both lower ex is way down   Skin:     General: Skin is warm and dry.   Neurological:      Mental Status: She is alert and oriented to person, place, and time.      Comments: But at times slow with responding    Psychiatric:         Mood and Affect: Mood normal.         Results:   Lab Results   Component Value Date    WBC 6.3 2024    HGB 10.0 (L) 2024    HCT 28.4 (L) 2024    .0 (L) 2024    CREATSERUM 0.75 2024    BUN 7 (L) 2024     2024    K 3.9  06/16/2024     06/16/2024    CO2 31.0 06/16/2024     (H) 06/16/2024    CA 8.6 (L) 06/16/2024    ALB 2.7 (L) 06/11/2024    ALKPHO 110 06/11/2024    BILT 2.5 (H) 06/11/2024    TP 6.6 06/11/2024    AST 35 (H) 06/11/2024    ALT 10 06/11/2024    PTT 34.8 06/03/2024    INR 1.69 (H) 06/11/2024    T4F 1.0 04/03/2024    TSH 8.140 (H) 04/03/2024    LIP 49 06/03/2024    MG 2.0 04/04/2024    TROPHS <3 06/03/2024     04/03/2024    B12 >2,000 (H) 04/04/2024    ETOH <3 04/03/2024       No results found.        Assessment & Plan:       Daughter updated     Bcx came back +?, no fevers, no wbc, ID on board, ? Contaminate will repeat BCx today, continue to monitor off AB     Hypokalemia electrolyte protocol-  Fever- resolved she is afebrile     Legt leg are of tenderness and lump, us neg for dvt     + covid cough improved  cxr unremarkable., ID on board             Fluid overload- improved, continue with diuretics, echo ok cardio is following., continue with diuresis  component of liver cirrhosis           Alcoholic cirrhosis of liver with ascites (HCC)-continues he is seen by GI outpatient MRI continue with current medication     Hypokalemia replace potassium             Anemia- of chronic disease stable , will follow                    Type 2 diabetes mellitus without complication, without long-term current use of insulin (HCC)-continue with Accu-Chek sliding scale insulin hypoglycemia           Cerebellar hemorrhage (HCC)- repeat mri ct head today \" Developing encephalomalacia right cerebellum at site of previous hemorrhage.  No acute hemorrhage      Gen weakness- pt/ot plan for rehab awaiting insurance approval possible discharge tomorrow     Continue PT OT           Antony Zavala MD  6/17/2024

## 2024-06-17 NOTE — PAYOR COMM NOTE
--------------  CONTINUED STAY REVIEW-----REQUESTING ADDITIONAL DAY 6/17      Payor: [employer]  Subscriber #:  [id number]  Authorization Number: [id number]    Admit date: 6/3/24  Admit time:  5:56 PM    REVIEW DOCUMENTATION:  Chief Complaint:      Subjective:      Constitutional:  Positive for fatigue.   HENT: Negative.     Eyes: Negative.    Respiratory: Negative.     Cardiovascular:  Negative for leg swelling.        Leg edema is way down on both    Gastrointestinal: Negative.    Genitourinary: Negative.    Musculoskeletal: Negative.    Skin: Negative.    Neurological: Negative.          Objective:   Blood pressure 111/53, pulse 82, temperature 97.6 °F (36.4 °C), temperature source Oral, resp. rate 18, height 4' 9\" (1.448 m), weight 126 lb 8 oz (57.4 kg), SpO2 94%.  Physical Exam  Vitals and nursing note reviewed.   HENT:      Head: Normocephalic and atraumatic.      Nose: Nose normal.      Mouth/Throat:      Mouth: Mucous membranes are moist.   Cardiovascular:      Rate and Rhythm: Normal rate.   Pulmonary:      Effort: Pulmonary effort is normal.      Breath sounds: Normal breath sounds.   Abdominal:      General: Abdomen is flat. Bowel sounds are normal.      Palpations: Abdomen is soft.   Musculoskeletal:         General: Normal range of motion.      Cervical back: Normal range of motion and neck supple.      Right lower leg: No edema.      Left lower leg: No edema.      Comments: Edema to both lower ex is way down   Skin:     General: Skin is warm and dry.   Neurological:      Mental Status: She is alert and oriented to person, place, and time.      Comments: But at times slow with responding    Psychiatric:         Mood and Affect: Mood normal.            Results:         Lab Results   Component Value Date     WBC 6.3 06/13/2024     HGB 10.0 (L) 06/13/2024     HCT 28.4 (L) 06/13/2024     .0 (L) 06/13/2024     CREATSERUM 0.75 06/16/2024     BUN 7 (L) 06/16/2024      06/16/2024     K 3.9  06/16/2024      06/16/2024     CO2 31.0 06/16/2024      (H) 06/16/2024     CA 8.6 (L) 06/16/2024     ALB 2.7 (L) 06/11/2024     ALKPHO 110 06/11/2024     BILT 2.5 (H) 06/11/2024     TP 6.6 06/11/2024     AST 35 (H) 06/11/2024     ALT 10 06/11/2024     PTT 34.8 06/03/2024     INR 1.69 (H) 06/11/2024     T4F 1.0 04/03/2024     TSH 8.140 (H) 04/03/2024     LIP 49 06/03/2024     MG 2.0 04/04/2024     TROPHS <3 06/03/2024      04/03/2024     B12 >2,000 (H) 04/04/2024     ETOH <3 04/03/2024         No results found.         Assessment & Plan:       Daughter updated      Bcx came back +?, no fevers, no wbc, ID on board, ? Contaminate will repeat BCx today, continue to monitor off AB      Hypokalemia electrolyte protocol-  Fever- resolved she is afebrile      Legt leg are of tenderness and lump, us neg for dvt     + covid cough improved  cxr unremarkable., ID on board             Fluid overload- improved, continue with diuretics, echo ok cardio is following., continue with diuresis  component of liver cirrhosis           Alcoholic cirrhosis of liver with ascites (HCC)-continues he is seen by GI outpatient MRI continue with current medication     Hypokalemia replace potassium             Anemia- of chronic disease stable , will follow       Antony Zavala MD  6/17/2024                 MEDICATIONS ADMINISTERED IN LAST 1 DAY:  bumetanide (Bumex) tab 2 mg       Date Action Dose Route User    6/17/2024 0840 Given 2 mg Oral Salome Green RN    6/16/2024 1639 Given by Other 2 mg Oral Salome Green RN          escitalopram (Lexapro) tab 10 mg       Date Action Dose Route User    6/16/2024 2158 Given 10 mg Oral Kalie Lemus RN          folic acid (Folvite) tab 1 mg       Date Action Dose Route User    6/17/2024 0840 Given 1 mg Oral Salome Green, JOSE          lactulose (CHRONULAC) 10 GM/15ML solution 20 g       Date Action Dose Route User    6/17/2024 0840 Given 20 g Oral Salome Green RN    6/16/2024 5512  Given 20 g Oral Kalie Lemus RN          lamoTRIgine (LaMICtal) tab 25 mg       Date Action Dose Route User    6/17/2024 0840 Given 25 mg Oral Salome Green RN    6/16/2024 2158 Given 25 mg Oral Kalie Lemus RN          levothyroxine (Synthroid) tab 50 mcg       Date Action Dose Route User    6/17/2024 0508 Given 50 mcg Oral Kalie Lemus RN          midodrine (ProAmatine) tab 2.5 mg       Date Action Dose Route User    6/17/2024 1350 Given 2.5 mg Oral Salome Green RN    6/17/2024 0508 Given 2.5 mg Oral Kalie Lemus RN    6/16/2024 1640 Given by Other 2.5 mg Oral Salome Green RN          pantoprazole (Protonix) DR tab 20 mg       Date Action Dose Route User    6/17/2024 0508 Given 20 mg Oral Kalie Lemus RN          potassium chloride (Klor-Con) 20 MEQ oral powder 10 mEq       Date Action Dose Route User    6/17/2024 0848 Given 10 mEq Oral Salome Green RN          potassium chloride (Klor-Con M20) tab 40 mEq       Date Action Dose Route User    6/16/2024 1640 Given by Other 40 mEq Oral Salome Green RN          rifAXIMin (Xifaxan) tab 550 mg       Date Action Dose Route User    6/17/2024 0840 Given 550 mg Oral Salome Green RN    6/16/2024 2158 Given 550 mg Oral Kalie Lemus RN          spironolactone (Aldactone) tab 50 mg       Date Action Dose Route User    6/17/2024 0840 Given 50 mg Oral Salome Green RN          traZODone (Desyrel) tab 25 mg       Date Action Dose Route User    6/16/2024 2157 Given 25 mg Oral Kalie Lemus RN            Vitals (last day)       Date/Time Temp Pulse Resp BP SpO2 Weight O2 Device O2 Flow Rate (L/min) [facility]    06/17/24 1348 97.7 °F (36.5 °C) 83 16 122/59 96 % -- None (Room air) --     06/17/24 0838 98 °F (36.7 °C) 87 18 107/56 93 % -- None (Room air) -- JL    06/17/24 0508 97.6 °F (36.4 °C) 82 18 111/53 94 % -- None (Room air) -- TQ    06/17/24 0300 -- -- -- -- -- 126 lb 8 oz -- -- BP    06/16/24 2158 97.6 °F (36.4 °C) 79 16 125/65 98 % -- None  (Room air) --     06/16/24 1638 97.5 °F (36.4 °C) 76 16 108/62 99 % -- None (Room air) --     06/16/24 1402 97.5 °F (36.4 °C) 82 16 117/60 96 % -- None (Room air) --     06/16/24 1143 -- 89 18 115/79 97 % -- None (Room air) --     06/16/24 0824 97.5 °F (36.4 °C) 80 18 107/60 96 % -- None (Room air) --     06/16/24 0526 99.1 °F (37.3 °C) 73 18 104/54 94 % -- None (Room air) --     06/16/24 0500 -- -- -- -- -- 119 lb 12.8 oz -- -- SH          CIWA Scores (since admission)       None

## 2024-06-17 NOTE — CM/SW NOTE
Social work was able to follow up with [facility] regarding ELISABETH auth.    Baudilio Torres submitted PT/OT notes, ELISABETH order and MD note to [facility] .     has submitted all documentation to [facility] medical director for review.    Insurance auth should be approved today or tomorrow 6/18.    SW/CM to remain available for support and/or discharge planning.     Jannette Dobson MSW, LSW  Discharge Planner [id number]

## 2024-06-17 NOTE — PLAN OF CARE
Patient is from home w/ daughter. A&Ox4. Room air. Patient denies pain at this time. Bed in lowest position and locked. Call light in hand. Personal belongings w/in reach.     Problem: Patient Centered Care  Goal: Patient preferences are identified and integrated in the patient's plan of care  Description: Interventions:  - What would you like us to know as we care for you? Recently at [facility]   - Provide timely, complete, and accurate information to patient/family  - Incorporate patient and family knowledge, values, beliefs, and cultural backgrounds into the planning and delivery of care  - Encourage patient/family to participate in care and decision-making at the level they choose  - Honor patient and family perspectives and choices  Outcome: Progressing     Problem: Diabetes/Glucose Control  Goal: Glucose maintained within prescribed range  Description: INTERVENTIONS:  - Monitor Blood Glucose as ordered  - Assess for signs and symptoms of hyperglycemia and hypoglycemia  - Administer ordered medications to maintain glucose within target range  - Assess barriers to adequate nutritional intake and initiate nutrition consult as needed  - Instruct patient on self management of diabetes  Outcome: Progressing     Problem: Patient/Family Goals  Goal: Patient/Family Long Term Goal  Description: Patient's Long Term Goal: go home    Interventions:  - follow plan of care  - See additional Care Plan goals for specific interventions  Outcome: Progressing  Goal: Patient/Family Short Term Goal  Description: Patient's Short Term Goal: get stronger    Interventions:   - work with pt/ot. Sit in chairs for meals   - See additional Care Plan goals for specific interventions  Outcome: Progressing     Problem: CARDIOVASCULAR - ADULT  Goal: Maintains optimal cardiac output and hemodynamic stability  Description: INTERVENTIONS:  - Monitor vital signs, rhythm, and trends  - Monitor for bleeding, hypotension and signs of decreased cardiac  output  - Evaluate effectiveness of vasoactive medications to optimize hemodynamic stability  - Monitor arterial and/or venous puncture sites for bleeding and/or hematoma  - Assess quality of pulses, skin color and temperature  - Assess for signs of decreased coronary artery perfusion - ex. Angina  - Evaluate fluid balance, assess for edema, trend weights  Outcome: Progressing  Goal: Absence of cardiac arrhythmias or at baseline  Description: INTERVENTIONS:  - Continuous cardiac monitoring, monitor vital signs, obtain 12 lead EKG if indicated  - Evaluate effectiveness of antiarrhythmic and heart rate control medications as ordered  - Initiate emergency measures for life threatening arrhythmias  - Monitor electrolytes and administer replacement therapy as ordered  Outcome: Progressing     Problem: METABOLIC/FLUID AND ELECTROLYTES - ADULT  Goal: Electrolytes maintained within normal limits  Description: INTERVENTIONS:  - Monitor labs and rhythm and assess patient for signs and symptoms of electrolyte imbalances  - Administer electrolyte replacement as ordered  - Monitor response to electrolyte replacements, including rhythm and repeat lab results as appropriate  - Fluid restriction as ordered  - Instruct patient on fluid and nutrition restrictions as appropriate  Outcome: Progressing     Problem: SAFETY ADULT - FALL  Goal: Free from fall injury  Description: INTERVENTIONS:  - Assess pt frequently for physical needs  - Identify cognitive and physical deficits and behaviors that affect risk of falls.  - [place] fall precautions as indicated by assessment.  - Educate pt/family on patient safety including physical limitations  - Instruct pt to call for assistance with activity based on assessment  - Modify environment to reduce risk of injury  - Provide assistive devices as appropriate  - Consider OT/PT consult to assist with strengthening/mobility  - Encourage toileting schedule  Outcome: Progressing     Problem:  DISCHARGE PLANNING  Goal: Discharge to home or other facility with appropriate resources  Description: INTERVENTIONS:  - Identify barriers to discharge w/pt and caregiver  - Include patient/family/discharge partner in discharge planning  - Arrange for needed discharge resources and transportation as appropriate  - Identify discharge learning needs (meds, wound care, etc)  - Arrange for interpreters to assist at discharge as needed  - Consider post-discharge preferences of patient/family/discharge partner  - Complete POLST form as appropriate  - Assess patient's ability to be responsible for managing their own health  - Refer to Case Management Department for coordinating discharge planning if the patient needs post-hospital services based on physician/LIP order or complex needs related to functional status, cognitive ability or social support system  Outcome: Progressing

## 2024-06-18 VITALS
WEIGHT: 116.69 LBS | SYSTOLIC BLOOD PRESSURE: 112 MMHG | OXYGEN SATURATION: 97 % | HEIGHT: 57 IN | BODY MASS INDEX: 25.17 KG/M2 | TEMPERATURE: 98 F | HEART RATE: 87 BPM | RESPIRATION RATE: 16 BRPM | DIASTOLIC BLOOD PRESSURE: 56 MMHG

## 2024-06-18 LAB
ANION GAP SERPL CALC-SCNC: 6 MMOL/L (ref 0–18)
BACTERIA BLD CULT: POSITIVE
BASOPHILS # BLD AUTO: 0.06 X10(3) UL (ref 0–0.2)
BASOPHILS NFR BLD AUTO: 0.9 %
BUN BLD-MCNC: 7 MG/DL (ref 9–23)
BUN/CREAT SERPL: 10.8 (ref 10–20)
CALCIUM BLD-MCNC: 8.4 MG/DL (ref 8.7–10.4)
CHLORIDE SERPL-SCNC: 102 MMOL/L (ref 98–112)
CO2 SERPL-SCNC: 30 MMOL/L (ref 21–32)
CREAT BLD-MCNC: 0.65 MG/DL
DEPRECATED RDW RBC AUTO: 60.6 FL (ref 35.1–46.3)
EGFRCR SERPLBLD CKD-EPI 2021: 99 ML/MIN/1.73M2 (ref 60–?)
EOSINOPHIL # BLD AUTO: 0.11 X10(3) UL (ref 0–0.7)
EOSINOPHIL NFR BLD AUTO: 1.7 %
ERYTHROCYTE [DISTWIDTH] IN BLOOD BY AUTOMATED COUNT: 16 % (ref 11–15)
GLUCOSE BLD-MCNC: 103 MG/DL (ref 70–99)
GLUCOSE BLDC GLUCOMTR-MCNC: 104 MG/DL (ref 70–99)
GLUCOSE BLDC GLUCOMTR-MCNC: 110 MG/DL (ref 70–99)
GLUCOSE BLDC GLUCOMTR-MCNC: 182 MG/DL (ref 70–99)
HCT VFR BLD AUTO: 30.8 %
HGB BLD-MCNC: 11 G/DL
IMM GRANULOCYTES # BLD AUTO: 0.02 X10(3) UL (ref 0–1)
IMM GRANULOCYTES NFR BLD: 0.3 %
LYMPHOCYTES # BLD AUTO: 2.71 X10(3) UL (ref 1–4)
LYMPHOCYTES NFR BLD AUTO: 41.6 %
MCH RBC QN AUTO: 36.8 PG (ref 26–34)
MCHC RBC AUTO-ENTMCNC: 35.7 G/DL (ref 31–37)
MCV RBC AUTO: 103 FL
MONOCYTES # BLD AUTO: 0.99 X10(3) UL (ref 0.1–1)
MONOCYTES NFR BLD AUTO: 15.2 %
NEUTROPHILS # BLD AUTO: 2.62 X10 (3) UL (ref 1.5–7.7)
NEUTROPHILS # BLD AUTO: 2.62 X10(3) UL (ref 1.5–7.7)
NEUTROPHILS NFR BLD AUTO: 40.3 %
OSMOLALITY SERPL CALC.SUM OF ELEC: 284 MOSM/KG (ref 275–295)
PLATELET # BLD AUTO: 129 10(3)UL (ref 150–450)
POTASSIUM SERPL-SCNC: 3.2 MMOL/L (ref 3.5–5.1)
POTASSIUM SERPL-SCNC: 3.8 MMOL/L (ref 3.5–5.1)
RBC # BLD AUTO: 2.99 X10(6)UL
SODIUM SERPL-SCNC: 138 MMOL/L (ref 136–145)
WBC # BLD AUTO: 6.5 X10(3) UL (ref 4–11)

## 2024-06-18 PROCEDURE — 99239 HOSP IP/OBS DSCHRG MGMT >30: CPT | Performed by: INTERNAL MEDICINE

## 2024-06-18 RX ORDER — POTASSIUM CHLORIDE 20 MEQ/1
40 TABLET, EXTENDED RELEASE ORAL ONCE
Status: COMPLETED | OUTPATIENT
Start: 2024-06-18 | End: 2024-06-18

## 2024-06-18 RX ORDER — POTASSIUM CHLORIDE 1.5 G/1.58G
40 POWDER, FOR SOLUTION ORAL DAILY
Qty: 30 EACH | Refills: 2 | Status: SHIPPED | OUTPATIENT
Start: 2024-06-19

## 2024-06-18 RX ORDER — POTASSIUM CHLORIDE 20 MEQ/1
40 TABLET, EXTENDED RELEASE ORAL EVERY 4 HOURS
Status: COMPLETED | OUTPATIENT
Start: 2024-06-18 | End: 2024-06-18

## 2024-06-18 RX ORDER — POTASSIUM CHLORIDE 1.5 G/1.58G
40 POWDER, FOR SOLUTION ORAL DAILY
Status: DISCONTINUED | OUTPATIENT
Start: 2024-06-19 | End: 2024-06-18

## 2024-06-18 NOTE — PLAN OF CARE
Problem: Patient Centered Care  Goal: Patient preferences are identified and integrated in the patient's plan of care  Description: Interventions:  - What would you like us to know as we care for you? Recently at [facility]   - Provide timely, complete, and accurate information to patient/family  - Incorporate patient and family knowledge, values, beliefs, and cultural backgrounds into the planning and delivery of care  - Encourage patient/family to participate in care and decision-making at the level they choose  - Honor patient and family perspectives and choices  Outcome: Progressing     Problem: Diabetes/Glucose Control  Goal: Glucose maintained within prescribed range  Description: INTERVENTIONS:  - Monitor Blood Glucose as ordered  - Assess for signs and symptoms of hyperglycemia and hypoglycemia  - Administer ordered medications to maintain glucose within target range  - Assess barriers to adequate nutritional intake and initiate nutrition consult as needed  - Instruct patient on self management of diabetes  Outcome: Progressing     Problem: Patient/Family Goals  Goal: Patient/Family Long Term Goal  Description: Patient's Long Term Goal: go home    Interventions:  - follow plan of care  - See additional Care Plan goals for specific interventions  Outcome: Progressing  Goal: Patient/Family Short Term Goal  Description: Patient's Short Term Goal: get stronger    Interventions:   - work with pt/ot. Sit in chairs for meals   - See additional Care Plan goals for specific interventions  Outcome: Progressing     Problem: CARDIOVASCULAR - ADULT  Goal: Maintains optimal cardiac output and hemodynamic stability  Description: INTERVENTIONS:  - Monitor vital signs, rhythm, and trends  - Monitor for bleeding, hypotension and signs of decreased cardiac output  - Evaluate effectiveness of vasoactive medications to optimize hemodynamic stability  - Monitor arterial and/or venous puncture sites for bleeding and/or hematoma  -  Assess quality of pulses, skin color and temperature  - Assess for signs of decreased coronary artery perfusion - ex. Angina  - Evaluate fluid balance, assess for edema, trend weights  Outcome: Progressing  Goal: Absence of cardiac arrhythmias or at baseline  Description: INTERVENTIONS:  - Continuous cardiac monitoring, monitor vital signs, obtain 12 lead EKG if indicated  - Evaluate effectiveness of antiarrhythmic and heart rate control medications as ordered  - Initiate emergency measures for life threatening arrhythmias  - Monitor electrolytes and administer replacement therapy as ordered  Outcome: Progressing     Problem: METABOLIC/FLUID AND ELECTROLYTES - ADULT  Goal: Electrolytes maintained within normal limits  Description: INTERVENTIONS:  - Monitor labs and rhythm and assess patient for signs and symptoms of electrolyte imbalances  - Administer electrolyte replacement as ordered  - Monitor response to electrolyte replacements, including rhythm and repeat lab results as appropriate  - Fluid restriction as ordered  - Instruct patient on fluid and nutrition restrictions as appropriate  Outcome: Progressing     Problem: SAFETY ADULT - FALL  Goal: Free from fall injury  Description: INTERVENTIONS:  - Assess pt frequently for physical needs  - Identify cognitive and physical deficits and behaviors that affect risk of falls.  - [place] fall precautions as indicated by assessment.  - Educate pt/family on patient safety including physical limitations  - Instruct pt to call for assistance with activity based on assessment  - Modify environment to reduce risk of injury  - Provide assistive devices as appropriate  - Consider OT/PT consult to assist with strengthening/mobility  - Encourage toileting schedule  Outcome: Progressing     Problem: DISCHARGE PLANNING  Goal: Discharge to home or other facility with appropriate resources  Description: INTERVENTIONS:  - Identify barriers to discharge w/pt and caregiver  -  Include patient/family/discharge partner in discharge planning  - Arrange for needed discharge resources and transportation as appropriate  - Identify discharge learning needs (meds, wound care, etc)  - Arrange for interpreters to assist at discharge as needed  - Consider post-discharge preferences of patient/family/discharge partner  - Complete POLST form as appropriate  - Assess patient's ability to be responsible for managing their own health  - Refer to Case Management Department for coordinating discharge planning if the patient needs post-hospital services based on physician/LIP order or complex needs related to functional status, cognitive ability or social support system  Outcome: Progressing     Pt alert and oriented X 3. Pt on room air. No complaints throughout the day. Safety precautions in place, call light within reach. Plan is for discharge.

## 2024-06-18 NOTE — PROGRESS NOTES
INFECTIOUS DISEASE PROGRESS NOTE  [facility]  part of [facility] ID PROGRESS NOTE    Christi Tavarez Patient Status:  Inpatient    1960 MRN [id number]   Location [facility] 3W/SW Attending Antony Zavala MD   Hosp Day # 15 PCP ÁNGEL Delgado     Subjective:  ROS reviewed. Afebrile. No acute overnight events.    ASSESSMENT:    Antibiotics: OFF     # Bifdobacterium/Eggerthella lentus bacteremia, likely contaminant   -Repeat blood cx negative  # COVID-19 infection without hypoxia with negative CXR  # Acute fever 2/2 above, R/o bacteremia   -FU blood cx  # ETOH cirrhosis               -S/p paracentesis  with 4L removed, cx NGTD  # Weakness  # ICH 2024  # Diabetes mellitus     PLAN:  -  Continue to monitor off abx. Repeat blood cx negative. Positive blood cx likely contaminants.  -  Isolation per protocol.  -  Follow fever curve, wbc.  -  Reviewed labs, micro, imaging reports, available old records.  -  Case d/w patient, RN.     History of Present Illness:  Christi Tavarez is a 63 year old female with a history of recent ICH 2024, diabetes mellitus, ETOH abuse, who presented to [facility] ED on 6/3 for shortness of breath and BLE edema. On arrival, afebrile, CXR with edema, seen by GI, s/p paracentesis  with 4L off, cx NGTD, hospital course c/b BLE weakness, MRI without acute changes. Had CT chest showing RUL nodule. Family noted patient with a new cough and congestions so a rapid COVID was sent that was positive. Denies body aches. ID consulted.    Physical Exam:  /57 (BP Location: Right arm)   Pulse 84   Temp 98.1 °F (36.7 °C) (Oral)   Resp 18   Ht 4' 9\" (1.448 m)   Wt 116 lb 11.2 oz (52.9 kg)   SpO2 93%   BMI 25.25 kg/m²     Gen:   Awake, in bed  HEENT:  EOMI, neck supple  CV/lungs:  RRR, CTAB  Abdom:  Soft, NT/ND, +BS  Skin/extrem:  No rashes, no c/c/e  Lines:  PIV+    Laboratory Data: Reviewed    Microbiology: Reviewed    Radiology: Reviewed      Laura  [facility]  [phone]  6/18/2024

## 2024-06-18 NOTE — CM/SW NOTE
06/18/24 1558   Discharge disposition   Expected discharge disposition subacute   Post Acute Care Provider Peter ARTEAGA   Discharge transportation [facility]     The patient received a MDO for discharge.    The patient will be transported to [facility] via [facility] at 7pm.  PCS complete.  The quote for the [facility] is $50 and patient and family are agreeable.    The number to call report is [phone].  Petty in admissions is aware of transport time.    Social work informed patient's daughter and patient of transport time.    SW/CM to remain available for support and/or discharge planning.     Jannette Dobson MSW, LSW  Discharge Planner [id number]

## 2024-06-18 NOTE — PLAN OF CARE
Problem: Patient Centered Care  Goal: Patient preferences are identified and integrated in the patient's plan of care  Description: Interventions:  - What would you like us to know as we care for you? Recently at [facility]   - Provide timely, complete, and accurate information to patient/family  - Incorporate patient and family knowledge, values, beliefs, and cultural backgrounds into the planning and delivery of care  - Encourage patient/family to participate in care and decision-making at the level they choose  - Honor patient and family perspectives and choices  Outcome: Progressing     Problem: Diabetes/Glucose Control  Goal: Glucose maintained within prescribed range  Description: INTERVENTIONS:  - Monitor Blood Glucose as ordered  - Assess for signs and symptoms of hyperglycemia and hypoglycemia  - Administer ordered medications to maintain glucose within target range  - Assess barriers to adequate nutritional intake and initiate nutrition consult as needed  - Instruct patient on self management of diabetes  Outcome: Progressing     Problem: Patient/Family Goals  Goal: Patient/Family Long Term Goal  Description: Patient's Long Term Goal: go home    Interventions:  - follow plan of care  - See additional Care Plan goals for specific interventions  Outcome: Progressing  Goal: Patient/Family Short Term Goal  Description: Patient's Short Term Goal: get stronger    Interventions:   - work with pt/ot. Sit in chairs for meals   - See additional Care Plan goals for specific interventions  Outcome: Progressing     Problem: CARDIOVASCULAR - ADULT  Goal: Maintains optimal cardiac output and hemodynamic stability  Description: INTERVENTIONS:  - Monitor vital signs, rhythm, and trends  - Monitor for bleeding, hypotension and signs of decreased cardiac output  - Evaluate effectiveness of vasoactive medications to optimize hemodynamic stability  - Monitor arterial and/or venous puncture sites for bleeding and/or hematoma  -  Assess quality of pulses, skin color and temperature  - Assess for signs of decreased coronary artery perfusion - ex. Angina  - Evaluate fluid balance, assess for edema, trend weights  Outcome: Progressing  Goal: Absence of cardiac arrhythmias or at baseline  Description: INTERVENTIONS:  - Continuous cardiac monitoring, monitor vital signs, obtain 12 lead EKG if indicated  - Evaluate effectiveness of antiarrhythmic and heart rate control medications as ordered  - Initiate emergency measures for life threatening arrhythmias  - Monitor electrolytes and administer replacement therapy as ordered  Outcome: Progressing     Problem: METABOLIC/FLUID AND ELECTROLYTES - ADULT  Goal: Electrolytes maintained within normal limits  Description: INTERVENTIONS:  - Monitor labs and rhythm and assess patient for signs and symptoms of electrolyte imbalances  - Administer electrolyte replacement as ordered  - Monitor response to electrolyte replacements, including rhythm and repeat lab results as appropriate  - Fluid restriction as ordered  - Instruct patient on fluid and nutrition restrictions as appropriate  Outcome: Progressing     Problem: SAFETY ADULT - FALL  Goal: Free from fall injury  Description: INTERVENTIONS:  - Assess pt frequently for physical needs  - Identify cognitive and physical deficits and behaviors that affect risk of falls.  - [place] fall precautions as indicated by assessment.  - Educate pt/family on patient safety including physical limitations  - Instruct pt to call for assistance with activity based on assessment  - Modify environment to reduce risk of injury  - Provide assistive devices as appropriate  - Consider OT/PT consult to assist with strengthening/mobility  - Encourage toileting schedule  Outcome: Progressing     Problem: DISCHARGE PLANNING  Goal: Discharge to home or other facility with appropriate resources  Description: INTERVENTIONS:  - Identify barriers to discharge w/pt and caregiver  -  Include patient/family/discharge partner in discharge planning  - Arrange for needed discharge resources and transportation as appropriate  - Identify discharge learning needs (meds, wound care, etc)  - Arrange for interpreters to assist at discharge as needed  - Consider post-discharge preferences of patient/family/discharge partner  - Complete POLST form as appropriate  - Assess patient's ability to be responsible for managing their own health  - Refer to Case Management Department for coordinating discharge planning if the patient needs post-hospital services based on physician/LIP order or complex needs related to functional status, cognitive ability or social support system  Outcome: Progressing    Repeat blood cultures pending. Afebrile overnight. Denies pain. Safety precautions maintained, personal belongings and call light within reach.

## 2024-06-18 NOTE — PROGRESS NOTES
1818: This RN tried to call and give report to [facility]. No answer and busy signal. Will attempt to call.     1839: Attempted to call and give report. No answer and then went to busy signal.

## 2024-06-18 NOTE — PROGRESS NOTES
[facility] All bedside belongings returned. IV and tele removed. Pt discharged.

## 2024-06-18 NOTE — DISCHARGE SUMMARY
DISCHARGE SUMMARY     Christi Tavarze Patient Status:  Inpatient    1960 MRN [id number]   Location [facility] 3W/SW Attending Antony Zavala MD   Hosp Day # 15 PCP ÁNGEL Delgado     Date of Admission: 6/3/2024  Date of Discharge: 24  Discharge Disposition: Inpt Physical Rehab Facility or Physical Rehab Unit    Admitting Diagnosis:   Alcoholic cirrhosis of liver with ascites (HCC) [K70.31]  Acute on chronic congestive heart failure, unspecified heart failure type (HCC) [I50.9]    Hospital Discharge Diagnoses: fluid overload, liver cirrhosis with ascitics, covid infection    TCM Diagnosis at discharge from Hospital: Other: liver cirrhosis with fluid overload ; still recommend for TCM follow-up    Please note that only patients enrolled in the Medicare ACO, [facility] ACO and [facility] HMOs will be handled by a member of the Care Management Team.  For all other patients, please follow usual protocol for discharge care transition.    Lace+ Score: 81  59-90 High Risk  29-58 Medium Risk  0-28   Low Risk.    Risk of readmission: Christi Tavarez has High Risk of readmission after discharge from the hospital.    History of Present Illness:     Repeat vcx are negative ID ok'd pt for dc     Bcx came back +?, no fevers, no wbc, ID on board, ? Contaminate will repeat BCx today, continue to monitor off AB      Hypokalemia electrolyte protocol-  Fever- resolved she is afebrile      Legt leg are of tenderness and lump, us neg for dvt     + covid cough improved  cxr unremarkable., ID on board             Fluid overload- improved, continue with diuretics, echo ok cardio is following., continue with diuresis  component of liver cirrhosis           Alcoholic cirrhosis of liver with ascites (HCC)-continues he is seen by GI outpatient MRI continue with current medication     Hypokalemia replace potassium             Anemia- of chronic disease stable , will follow                    Type 2 diabetes mellitus without complication,  without long-term current use of insulin (HCC)-continue with Accu-Chek sliding scale insulin hypoglycemia           Cerebellar hemorrhage (HCC)- repeat mri ct head today \" Developing encephalomalacia right cerebellum at site of previous hemorrhage.  No acute hemorrhage      Gen weakness- pt/ot plan for rehab awaiting insurance approval possible discharge tomorrow     Continue PT OT    Brief Synopsis:     Discharge Physical Exam: General appearance:  alert, appears stated age, and cooperative  Head: Normocephalic, without obvious abnormality, atraumatic  Pulmonary: clear to auscultation bilaterally  Cardiovascular: S1, S2 normal, no murmur, click, rub or gallop, regular rate and rhythm  Extremities: extremities normal, atraumatic, no cyanosis or edema  Skin: Skin color, texture, turgor normal. No rashes or lesions  Neurologic: Grossly normal    Procedures during hospitalization:     Abdominal paracentesis     Incidental or significant findings and recommendations (brief descriptions):      Lab/Test results pending at Discharge:       Consultants:      Discharge Medication List:     Discharge Medications        START taking these medications        Instructions Prescription details   bumetanide 2 MG Tabs  Commonly known as: Bumex      Take 1 tablet (2 mg total) by mouth BID (Diuretic).   Quantity: 60 tablet  Refills: 2     midodrine 2.5 MG Tabs  Commonly known as: ProAmatine      Take 1 tablet (2.5 mg total) by mouth in the morning and 1 tablet (2.5 mg total) at noon and 1 tablet (2.5 mg total) in the evening.   Quantity: 90 tablet  Refills: 1     rifAXIMin 550 MG Tabs  Commonly known as: Xifaxan      Take 1 tablet (550 mg total) by mouth 2 (two) times daily.   Quantity: 60 tablet  Refills: 2            CHANGE how you take these medications        Instructions Prescription details   potassium chloride 20 MEQ Pack  Commonly known as: Klor-Con  What changed: Another medication with the same name was added. Make sure you  understand how and when to take each.      Take 10 mEq by mouth daily.   Refills: 0     potassium chloride 20 MEQ Pack  Commonly known as: Klor-Con  Start taking on: June 19, 2024  What changed: You were already taking a medication with the same name, and this prescription was added. Make sure you understand how and when to take each.      Take 40 mEq by mouth daily.   Quantity: 30 each  Refills: 2     spironolactone 50 MG Tabs  Commonly known as: Aldactone  What changed:   medication strength  how much to take      Take 1 tablet (50 mg total) by mouth daily.   Quantity: 90 tablet  Refills: 1            CONTINUE taking these medications        Instructions Prescription details   ergocalciferol 1.25 MG (01290 UT) Caps  Commonly known as: Vitamin D2      Take 1 capsule (50,000 Units total) by mouth every 7 days. Sundays   Refills: 0     escitalopram 10 MG Tabs  Commonly known as: Lexapro      Take 1 tablet (10 mg total) by mouth nightly.   Refills: 0     folic acid 1 MG Tabs  Commonly known as: Folvite      Take 1 tablet (1 mg total) by mouth daily.   Refills: 0     glipiZIDE 5 MG Tabs  Commonly known as: Glucotrol      Take 1 tablet (5 mg total) by mouth every morning before breakfast. With food   Refills: 0     insulin aspart 100 Units/mL Sopn  Commonly known as: NovoLOG      Inject 1-5 Units into the skin 3 (three) times daily before meals. Per sliding scale   Refills: 0     lactulose 10 GM/15ML Soln  Commonly known as: CHRONULAC      Take 30 mL (20 g total) by mouth 2 (two) times daily as needed.   Refills: 0     lamoTRIgine 25 MG Tabs  Commonly known as: LaMICtal      Take 1 tablet (25 mg total) by mouth 2 (two) times daily.   Refills: 0     levothyroxine 50 MCG Tabs  Commonly known as: Synthroid      Take 1 tablet (50 mcg total) by mouth before breakfast.   Refills: 0     omeprazole 20 MG Cpdr  Commonly known as: PriLOSEC      Take 1 capsule (20 mg total) by mouth every morning before breakfast.   Refills: 0      traZODone 50 MG Tabs  Commonly known as: Desyrel      Take 0.5 tablets (25 mg total) by mouth nightly.   Refills: 0            STOP taking these medications      furosemide 40 MG Tabs  Commonly known as: Lasix                  Where to Get Your Medications        These medications were sent to [id number] - [place], [place] - [address] AT [facility], [phone], [phone]  [address], [facility] [address]      Phone: [phone]   bumetanide 2 MG Tabs  midodrine 2.5 MG Tabs  potassium chloride 20 MEQ Pack  rifAXIMin 550 MG Tabs  spironolactone 50 MG Tabs         Discharge Plan:  Discharge Condition: Stable    Current Discharge Medication List        New Orders    Details   bumetanide 2 MG Oral Tab Take 1 tablet (2 mg total) by mouth BID (Diuretic).      midodrine 2.5 MG Oral Tab Take 1 tablet (2.5 mg total) by mouth in the morning and 1 tablet (2.5 mg total) at noon and 1 tablet (2.5 mg total) in the evening.      rifAXIMin 550 MG Oral Tab Take 1 tablet (550 mg total) by mouth 2 (two) times daily.           Home Meds - Modified    Details   !! potassium chloride 20 MEQ Oral Powd Pack Take 40 mEq by mouth daily.      spironolactone 50 MG Oral Tab Take 1 tablet (50 mg total) by mouth daily.       !! - Potential duplicate medications found. Please discuss with provider.        Home Meds - Unchanged    Details   omeprazole 20 MG Oral Capsule Delayed Release Take 1 capsule (20 mg total) by mouth every morning before breakfast.      !! potassium chloride 20 MEQ Oral Powd Pack Take 10 mEq by mouth daily.      insulin aspart 100 Units/mL Subcutaneous Solution Pen-injector Inject 1-5 Units into the skin 3 (three) times daily before meals. Per sliding scale      levothyroxine 50 MCG Oral Tab Take 1 tablet (50 mcg total) by mouth before breakfast.      lactulose 10 GM/15ML Oral Solution Take 30 mL (20 g total) by mouth 2 (two) times daily as needed.       ergocalciferol 1.25 MG (71468 UT) Oral Cap Take 1 capsule (50,000 Units total) by mouth every 7 days. Sundays      escitalopram 10 MG Oral Tab Take 1 tablet (10 mg total) by mouth nightly.      folic acid 1 MG Oral Tab Take 1 tablet (1 mg total) by mouth daily.      lamoTRIgine 25 MG Oral Tab Take 1 tablet (25 mg total) by mouth 2 (two) times daily.      traZODone 50 MG Oral Tab Take 0.5 tablets (25 mg total) by mouth nightly.      glipiZIDE 5 MG Oral Tab Take 1 tablet (5 mg total) by mouth every morning before breakfast. With food       !! - Potential duplicate medications found. Please discuss with provider.              Discharge Diet:diabetic , Cardiac diet 2 gr na, 1800 ada     Discharge Activity: As tolerated    Follow-up appointment:   Pepper Hedrick DO  [address]  [facility] [id number] [phone]    Follow up in 1 month(s)  Neuro/Stroke follow up    [place]  [address] [address]  [facility] [address]  [facility] [address] [phone]    Schedule an appointment as soon as possible for a visit in 1 week(s)        Vital signs:  Temp:  [98 °F (36.7 °C)-98.2 °F (36.8 °C)] 98 °F (36.7 °C)  Pulse:  [81-84] 84  Resp:  [18] 18  BP: (104-117)/(56-66) 115/57  SpO2:  [93 %-97 %] 93 %    -----------------------------------------------------------------------------------------------  PATIENT DISCHARGE INSTRUCTIONS: See electronic chart    Antony Zavala MD 6/18/2024    Time spent:  30 to 74 minutes

## 2024-08-12 ENCOUNTER — OFFICE VISIT (OUTPATIENT)
Dept: INTERNAL MEDICINE CLINIC | Facility: CLINIC | Age: 64
End: 2024-08-12

## 2024-08-12 ENCOUNTER — TELEPHONE (OUTPATIENT)
Facility: CLINIC | Age: 64
End: 2024-08-12

## 2024-08-12 ENCOUNTER — PATIENT MESSAGE (OUTPATIENT)
Dept: INTERNAL MEDICINE CLINIC | Facility: CLINIC | Age: 64
End: 2024-08-12

## 2024-08-12 ENCOUNTER — OFFICE VISIT (OUTPATIENT)
Facility: CLINIC | Age: 64
End: 2024-08-12
Payer: COMMERCIAL

## 2024-08-12 ENCOUNTER — LAB ENCOUNTER (OUTPATIENT)
Dept: LAB | Facility: REFERENCE LAB | Age: 64
End: 2024-08-12
Attending: INTERNAL MEDICINE
Payer: COMMERCIAL

## 2024-08-12 VITALS
BODY MASS INDEX: 26.32 KG/M2 | HEART RATE: 75 BPM | DIASTOLIC BLOOD PRESSURE: 50 MMHG | OXYGEN SATURATION: 100 % | WEIGHT: 122 LBS | SYSTOLIC BLOOD PRESSURE: 109 MMHG | HEIGHT: 57 IN | RESPIRATION RATE: 17 BRPM | TEMPERATURE: 98 F

## 2024-08-12 VITALS
HEIGHT: 57 IN | BODY MASS INDEX: 26.32 KG/M2 | DIASTOLIC BLOOD PRESSURE: 63 MMHG | WEIGHT: 122 LBS | HEART RATE: 93 BPM | SYSTOLIC BLOOD PRESSURE: 102 MMHG

## 2024-08-12 DIAGNOSIS — Z09 ENCOUNTER FOR EXAMINATION FOLLOWING TREATMENT AT HOSPITAL: ICD-10-CM

## 2024-08-12 DIAGNOSIS — E11.9 TYPE 2 DIABETES MELLITUS WITHOUT COMPLICATION, WITHOUT LONG-TERM CURRENT USE OF INSULIN (HCC): ICD-10-CM

## 2024-08-12 DIAGNOSIS — K70.30 ALCOHOLIC CIRRHOSIS OF LIVER WITHOUT ASCITES (HCC): ICD-10-CM

## 2024-08-12 DIAGNOSIS — R60.0 BILATERAL LEG EDEMA: ICD-10-CM

## 2024-08-12 DIAGNOSIS — Z00.00 ENCOUNTER FOR PREVENTIVE CARE: ICD-10-CM

## 2024-08-12 DIAGNOSIS — K76.9 LIVER LESION: Primary | ICD-10-CM

## 2024-08-12 DIAGNOSIS — K70.31 ALCOHOLIC CIRRHOSIS OF LIVER WITH ASCITES (HCC): ICD-10-CM

## 2024-08-12 DIAGNOSIS — K74.60 HEPATIC CIRRHOSIS, UNSPECIFIED HEPATIC CIRRHOSIS TYPE, UNSPECIFIED WHETHER ASCITES PRESENT (HCC): Primary | ICD-10-CM

## 2024-08-12 DIAGNOSIS — Z12.31 SCREENING MAMMOGRAM FOR BREAST CANCER: ICD-10-CM

## 2024-08-12 DIAGNOSIS — L65.9 HAIR LOSS: ICD-10-CM

## 2024-08-12 DIAGNOSIS — Z09 ENCOUNTER FOR EXAMINATION FOLLOWING TREATMENT AT HOSPITAL: Primary | ICD-10-CM

## 2024-08-12 LAB
ALBUMIN SERPL-MCNC: 3.5 G/DL (ref 3.2–4.8)
ALBUMIN/GLOB SERPL: 0.8 {RATIO} (ref 1–2)
ALP LIVER SERPL-CCNC: 229 U/L
ALT SERPL-CCNC: 29 U/L
ANION GAP SERPL CALC-SCNC: 6 MMOL/L (ref 0–18)
AST SERPL-CCNC: 68 U/L (ref ?–34)
BILIRUB SERPL-MCNC: 3.2 MG/DL (ref 0.2–1.1)
BUN BLD-MCNC: 14 MG/DL (ref 9–23)
BUN/CREAT SERPL: 18.9 (ref 10–20)
CALCIUM BLD-MCNC: 9 MG/DL (ref 8.7–10.4)
CHLORIDE SERPL-SCNC: 104 MMOL/L (ref 98–112)
CO2 SERPL-SCNC: 31 MMOL/L (ref 21–32)
CREAT BLD-MCNC: 0.74 MG/DL
EGFRCR SERPLBLD CKD-EPI 2021: 90 ML/MIN/1.73M2 (ref 60–?)
FASTING STATUS PATIENT QL REPORTED: NO
GLOBULIN PLAS-MCNC: 4.2 G/DL (ref 2–3.5)
GLUCOSE BLD-MCNC: 98 MG/DL (ref 70–99)
INR BLD: 1.46 (ref 0.8–1.2)
OSMOLALITY SERPL CALC.SUM OF ELEC: 292 MOSM/KG (ref 275–295)
POTASSIUM SERPL-SCNC: 4.1 MMOL/L (ref 3.5–5.1)
PROT SERPL-MCNC: 7.7 G/DL (ref 5.7–8.2)
PROTHROMBIN TIME: 18.7 SECONDS (ref 11.6–14.8)
SODIUM SERPL-SCNC: 141 MMOL/L (ref 136–145)

## 2024-08-12 PROCEDURE — 36415 COLL VENOUS BLD VENIPUNCTURE: CPT | Performed by: PHYSICIAN ASSISTANT

## 2024-08-12 PROCEDURE — 3078F DIAST BP <80 MM HG: CPT | Performed by: PHYSICIAN ASSISTANT

## 2024-08-12 PROCEDURE — 80053 COMPREHEN METABOLIC PANEL: CPT

## 2024-08-12 PROCEDURE — 83036 HEMOGLOBIN GLYCOSYLATED A1C: CPT

## 2024-08-12 PROCEDURE — 3044F HG A1C LEVEL LT 7.0%: CPT | Performed by: INTERNAL MEDICINE

## 2024-08-12 PROCEDURE — 3008F BODY MASS INDEX DOCD: CPT | Performed by: PHYSICIAN ASSISTANT

## 2024-08-12 PROCEDURE — 3074F SYST BP LT 130 MM HG: CPT | Performed by: INTERNAL MEDICINE

## 2024-08-12 PROCEDURE — 99214 OFFICE O/P EST MOD 30 MIN: CPT | Performed by: INTERNAL MEDICINE

## 2024-08-12 PROCEDURE — 3008F BODY MASS INDEX DOCD: CPT | Performed by: INTERNAL MEDICINE

## 2024-08-12 PROCEDURE — 3074F SYST BP LT 130 MM HG: CPT | Performed by: PHYSICIAN ASSISTANT

## 2024-08-12 PROCEDURE — 85025 COMPLETE CBC W/AUTO DIFF WBC: CPT

## 2024-08-12 PROCEDURE — 3078F DIAST BP <80 MM HG: CPT | Performed by: INTERNAL MEDICINE

## 2024-08-12 PROCEDURE — 85610 PROTHROMBIN TIME: CPT | Performed by: PHYSICIAN ASSISTANT

## 2024-08-12 PROCEDURE — 99214 OFFICE O/P EST MOD 30 MIN: CPT | Performed by: PHYSICIAN ASSISTANT

## 2024-08-12 RX ORDER — LEVOTHYROXINE SODIUM 0.05 MG/1
50 TABLET ORAL
COMMUNITY
Start: 2024-04-29

## 2024-08-12 NOTE — TELEPHONE ENCOUNTER
Scheduled for:  Egd 04275  Provider Name:  Dr. Guardado   Date:  11/6/24  Location:[facility]  Sedation:  MAC  Time: 09:40 Am ,(pt is aware that Endo will call the day before to confirm arrival time)   Prep:  Egd -Npo after midnight  Meds/Allergies Reconciled?:  Gita Wheeler PA-C Reviewed  Diagnosis with codes:  Cirrhosis of the liver K74.60  Was patient informed to call insurance with codes (Y/N):  Yes  Referral sent?:  Referral was sent at the time of electronic surgical scheduling.  [facility] or [facility] notified?:  I sent an electronic request to Endo Scheduling and received a confirmation today.  Medication Orders:  Pt is aware to NOT take iron pills, herbal meds and diet supplements for 7 days before exam. Also to NOT take any form of alcohol, recreational drugs and any forms of ED meds 24 hours before exam.   Misc Orders: Patient was informed about the new cancellation policy for his/her procedure. Patient was also given a copy of the cancellation policy at the time of the appointment and verbalized understanding.       Further instructions given by staff:  I provide prep instructions to patient at the time of the appointment and reviewed date and location, patient verbalized that she understood and is aware to call if she has any questions.

## 2024-08-12 NOTE — PROGRESS NOTES
[facility]                                                                                                               Reason for consult:   Chief Complaint   Patient presents with    Hospital F/U     Cirrhosis of liver ,edema       Requesting physician or provider: ÁNGEL Delgado      HPI:   Christi Tvaarez is a 64 year old year-old female with history of BMI 29.21, DM2, hypothyroidism, HLD, disordered ETOH, ICH in April 2024 who presented to ER for worsening SOB and edema. GI consulted given history of cirrhosis. Patient presents today for follow up.     Cirrhosis-Has been doing well. Last drink in April. Denies abdominal pain, nausea or vomiting. Appetite slowly improving. No bloating or abdomen distention. Having 2 BM per day. Brown in color. No melena or bright red blood per rectum. Patient states she feels better. Daughter with her at visit and states patient has not been confused and slowly is getting strength back.     Patient does note increased swelling of both LE the last 3 days. Denies any pain, erythema or warmth to ankles. Has not increased salt in diet. Continues diuretics as prescribed.     Weight has been increasing slowly.     NSAIDS: none  Tobacco: none  Alcohol: last drink 4/2024  Marijuana: none  Illicit drugs: none    FH GI malignancy- none  FH celiac dz- none  FH liver dz- none  FH IBD- none    Last endoscopies:  None  Wt Readings from Last 6 Encounters:   08/12/24 122 lb (55.3 kg)   06/18/24 116 lb 11.2 oz (52.9 kg)   04/09/24 153 lb 11.2 oz (69.7 kg)   04/03/24 139 lb 12.4 oz (63.4 kg)        History, Medications, Allergies, ROS:      Past Medical History:    CHF (congestive heart failure) (HCC)    Diabetes (HCC)    Stroke (HCC)      No past surgical history on file.   Family Hx: No family history on file.   Social History:   Social History     Socioeconomic History    Marital status:    Tobacco Use    Smoking  status: Never    Smokeless tobacco: Never   Vaping Use    Vaping status: Never Used   Substance and Sexual Activity    Alcohol use: Not Currently     Comment: socially ,not since April 2024    Drug use: Never     Social Determinants of Health     Food Insecurity: No Food Insecurity (6/3/2024)    Food Insecurity     Food Insecurity: Never true   Transportation Needs: No Transportation Needs (6/3/2024)    Transportation Needs     Lack of Transportation: No   Housing Stability: Low Risk  (6/3/2024)    Housing Stability     Housing Instability: No        Medications (Active prior to today's visit):  Current Outpatient Medications   Medication Sig Dispense Refill    levothyroxine 50 MCG Oral Tab Take 1 tablet (50 mcg total) by mouth before breakfast.      potassium chloride 20 MEQ Oral Powd Pack Take 40 mEq by mouth daily. 30 each 2    spironolactone 50 MG Oral Tab Take 1 tablet (50 mg total) by mouth daily. 90 tablet 1    bumetanide 2 MG Oral Tab Take 1 tablet (2 mg total) by mouth BID (Diuretic). 60 tablet 2    midodrine 2.5 MG Oral Tab Take 1 tablet (2.5 mg total) by mouth in the morning and 1 tablet (2.5 mg total) at noon and 1 tablet (2.5 mg total) in the evening. 90 tablet 1    rifAXIMin 550 MG Oral Tab Take 1 tablet (550 mg total) by mouth 2 (two) times daily. 60 tablet 2    omeprazole 20 MG Oral Capsule Delayed Release Take 1 capsule (20 mg total) by mouth every morning before breakfast.      potassium chloride 20 MEQ Oral Powd Pack Take 10 mEq by mouth daily.      levothyroxine 50 MCG Oral Tab Take 1 tablet (50 mcg total) by mouth before breakfast.      lactulose 10 GM/15ML Oral Solution Take 30 mL (20 g total) by mouth 2 (two) times daily as needed.      ergocalciferol 1.25 MG (87765 UT) Oral Cap Take 1 capsule (50,000 Units total) by mouth every 7 days. Sundays      escitalopram 10 MG Oral Tab Take 1 tablet (10 mg total) by mouth nightly.      folic acid 1 MG Oral Tab Take 1 tablet (1 mg total) by mouth daily.       lamoTRIgine 25 MG Oral Tab Take 1 tablet (25 mg total) by mouth 2 (two) times daily.      insulin aspart 100 Units/mL Subcutaneous Solution Pen-injector Inject 1-5 Units into the skin 3 (three) times daily before meals. Per sliding scale (Patient not taking: Reported on 8/12/2024)      traZODone 50 MG Oral Tab Take 0.5 tablets (25 mg total) by mouth nightly. (Patient not taking: Reported on 8/12/2024)      glipiZIDE 5 MG Oral Tab Take 1 tablet (5 mg total) by mouth every morning before breakfast. With food         Allergies:  No Known Allergies    ROS:   CONSTITUTIONAL: negative for fevers, chills, sweats and weight loss  EYES Negative for red eyes, yellow eyes, changes in vision  HEENT: Negative for dysphagia and hoarseness  RESPIRATORY: Negative for cough and shortness of breath  CARDIOVASCULAR: Negative for chest pain, palpitations  GASTROINTESTINAL: See HPI  GENITOURINARY: Negative for dysuria and frequency  MUSCULOSKELETAL: Negative for arthralgias and myalgias  NEUROLOGICAL: Negative for dizziness and headaches  BEHAVIOR/PSYCH: Negative for anxiety and poor appetite    PHYSICAL EXAM:   Blood pressure 102/63, pulse 93, height 4' 9\" (1.448 m), weight 122 lb (55.3 kg).    GEN: WD/WN, NAD  HEENT: Supple symmetrical, trachea midline  CV: RRR, the extremities are warm and well perfused   LUNGS: No increased work of breathing  ABDOMEN: No scars, normal bowel sounds, soft, non-tender, non-distended no rebound or guarding, no masses, no hepatomegaly  MSK: No redness, no warmth, no swelling of joints  SKIN: No jaundice, no erythema, no rashes  HEMATOLOGIC: No bleeding, no bruising  NEURO: Alert and interactive, normal gait    Labs/Imaging/Procedures:     Patient's pertinent labs and imaging were reviewed and discussed with patient today.     Lab Results   Component Value Date    WBC 6.5 06/18/2024    RBC 2.99 (L) 06/18/2024    HGB 11.0 (L) 06/18/2024    HCT 30.8 (L) 06/18/2024    .0 (H) 06/18/2024    MCH  36.8 (H) 06/18/2024    MCHC 35.7 06/18/2024    RDW 16.0 (H) 06/18/2024    .0 (L) 06/18/2024        Lab Results   Component Value Date     (H) 06/18/2024    BUN 7 (L) 06/18/2024    BUNCREA 10.8 06/18/2024    CREATSERUM 0.65 06/18/2024    ANIONGAP 6 06/18/2024    CA 8.4 (L) 06/18/2024    OSMOCALC 284 06/18/2024    ALKPHO 110 06/11/2024    AST 35 (H) 06/11/2024    ALT 10 06/11/2024    BILT 2.5 (H) 06/11/2024    TP 6.6 06/11/2024    ALB 2.7 (L) 06/11/2024    GLOBULIN 4.0 (H) 06/09/2024     06/18/2024    K 3.8 06/18/2024     06/18/2024    CO2 30.0 06/18/2024        No results found.          .  ASSESSMENT/PLAN:   Christi Tavarez is a 64 year old year-old female with history of BMI 29.21, DM2, hypothyroidism, HLD, disordered ETOH, ICH in April 2024 who presented to ER for worsening SOB and edema. GI consulted given history of cirrhosis. Patient presents today for follow up.     #LE edema  -on bumex/aldactone   -started 3 days prior, no warmth, erythema or pain   -will obtain CMP today  -consider compression stocking and elevation prior to adjustment of medication with low BP   -if worsening will need adjustments of medications    # Cirrhosis- due to ETOH, last drink 4/2024, chronic disease work up negative.   - PSE:  no asterixis. On lactulose and rifaximin. Continue same regiment. Plan for 2-3 BM per day.   - EV: Needs screening EGD as outpatient  - ascites:  On bumex/aldactone as well as low sodium diet.    - HCC: AFP 19.8, no lesion on ultrasound 5/29. CT triple phase with small right hepatic lobe lesion (unable to further characterize). MRI ordered.   -will give referral for hepatology at this time as well    #crc screening  -consider once completion of liver work up as listed above    Recommendations:  -get lab work done today   -call to schedule MRI/MRCP  -will schedule EGD dx: cirrhosis with MAC with Dr. Guardado  -nothing by mouth midnight day of procedure    -ok to take midodrine if blood  pressure drops below systolic of 100  -keep lactulose as once a day, goal 2-3 bowel movements per day   -if decrease in bowel movements per day increase to twice a day   -continue diuretics as prescribed  -start compression socks and elevation for lower extremity swelling  -can consider changing diuretics if swelling continues  -call to schedule follow up with hepatology     EGD consent: I have discussed the risks, benefits, and alternatives to upper endoscopy/enteroscopy with the patient/primary decision maker [who demonstrated understanding], including but not limited to the risks of bleeding, infection, pain, death, as well as the risks of anesthesia and perforation all leading to prolonged hospitalization, surgical intervention, or even death. I also specifically mentioned the miss rate of upper endoscopy of 5-10% in the best of all circumstances.  The patient has agreed to sign an informed consent and elected to proceed with procedure with possible intervention [i.e. polypectomy, stent placement, etc.] as indicated.      Orders This Visit:  Orders Placed This Encounter   Procedures    Comp Metabolic Panel (14)    CBC W Differential W Platelet    Prothrombin Time (PT)       Meds This Visit:  Requested Prescriptions      No prescriptions requested or ordered in this encounter       Imaging & Referrals:  HEPATOLOGY - EXTERNAL  MRI ABDOMEN AND MRCP W/3D (LMA=[id number])      Gita Wheeler PA-C   8/12/2024        This note was partially prepared using Dragon Medical voice recognition dictation software. As a result, errors may occur. When identified, these errors have been corrected. While every attempt is made to correct errors during dictation, discrepancies may still exist.

## 2024-08-12 NOTE — PATIENT INSTRUCTIONS
Recommendations:  -get lab work done today   -call to schedule MRI/MRCP  -will schedule EGD dx: cirrhosis with MAC with Dr. Guardado  -nothing by mouth midnight day of procedure    -ok to take midodrine if blood pressure drops below systolic of 100  -keep lactulose as once a day, goal 2-3 bowel movements per day   -if decrease in bowel movements per day increase to twice a day   -continue diuretics as prescribed  -start compression socks and elevation for lower extremity swelling  -can consider changing diuretics if swelling continues  -call to schedule follow up with hepatology

## 2024-08-14 NOTE — TELEPHONE ENCOUNTER
, please advise.  Pt nor pt family brought this up to my attn.   Did they ask you for this?  Regardless okay to start completing form?

## 2024-08-14 NOTE — TELEPHONE ENCOUNTER
Spoke to pts daughter, informed her forms are ready. She & pt need to sign form.     Will  at [place], as pt needs labs.      Ppwk placed at .

## 2024-08-14 NOTE — TELEPHONE ENCOUNTER
Dr Zavala, please advise  Clinical Staff, please assist    Patient is asking for Handicap Placard paper work?    Last office visit was 8/12/24    From: Christi Tavarez  To: Antony Zavala  Sent: 8/12/2024  8:50 PM CDT  Subject: Handicap placard    The MA did not give us the paperwork to fill out. Is this something I can come to the office to ? Or can you fax to my office?

## 2024-08-15 ENCOUNTER — TELEPHONE (OUTPATIENT)
Facility: CLINIC | Age: 64
End: 2024-08-15

## 2024-08-15 ENCOUNTER — LAB ENCOUNTER (OUTPATIENT)
Dept: LAB | Facility: REFERENCE LAB | Age: 64
End: 2024-08-15
Attending: INTERNAL MEDICINE
Payer: COMMERCIAL

## 2024-08-15 DIAGNOSIS — E11.9 TYPE 2 DIABETES MELLITUS WITHOUT COMPLICATION, WITHOUT LONG-TERM CURRENT USE OF INSULIN (HCC): ICD-10-CM

## 2024-08-15 DIAGNOSIS — Z09 ENCOUNTER FOR EXAMINATION FOLLOWING TREATMENT AT HOSPITAL: ICD-10-CM

## 2024-08-15 DIAGNOSIS — K70.30 ALCOHOLIC CIRRHOSIS OF LIVER WITHOUT ASCITES (HCC): ICD-10-CM

## 2024-08-15 LAB
BASOPHILS # BLD AUTO: 0.12 X10(3) UL (ref 0–0.2)
BASOPHILS NFR BLD AUTO: 1.5 %
DEPRECATED RDW RBC AUTO: 66.2 FL (ref 35.1–46.3)
EOSINOPHIL # BLD AUTO: 0.13 X10(3) UL (ref 0–0.7)
EOSINOPHIL NFR BLD AUTO: 1.6 %
ERYTHROCYTE [DISTWIDTH] IN BLOOD BY AUTOMATED COUNT: 17.5 % (ref 11–15)
EST. AVERAGE GLUCOSE BLD GHB EST-MCNC: 74 MG/DL (ref 68–126)
HBA1C MFR BLD: 4.2 % (ref ?–5.7)
HCT VFR BLD AUTO: 30.5 %
HGB BLD-MCNC: 11 G/DL
IMM GRANULOCYTES # BLD AUTO: 0.02 X10(3) UL (ref 0–1)
IMM GRANULOCYTES NFR BLD: 0.2 %
LYMPHOCYTES # BLD AUTO: 2.86 X10(3) UL (ref 1–4)
LYMPHOCYTES NFR BLD AUTO: 35.7 %
MCH RBC QN AUTO: 37.5 PG (ref 26–34)
MCHC RBC AUTO-ENTMCNC: 36.1 G/DL (ref 31–37)
MCV RBC AUTO: 104.1 FL
MONOCYTES # BLD AUTO: 0.9 X10(3) UL (ref 0.1–1)
MONOCYTES NFR BLD AUTO: 11.2 %
NEUTROPHILS # BLD AUTO: 3.98 X10 (3) UL (ref 1.5–7.7)
NEUTROPHILS # BLD AUTO: 3.98 X10(3) UL (ref 1.5–7.7)
NEUTROPHILS NFR BLD AUTO: 49.8 %
PLATELET # BLD AUTO: 169 10(3)UL (ref 150–450)
PLATELET MORPHOLOGY: NORMAL
RBC # BLD AUTO: 2.93 X10(6)UL
WBC # BLD AUTO: 8 X10(3) UL (ref 4–11)

## 2024-08-15 PROCEDURE — 83036 HEMOGLOBIN GLYCOSYLATED A1C: CPT

## 2024-08-15 PROCEDURE — 85025 COMPLETE CBC W/AUTO DIFF WBC: CPT

## 2024-08-15 PROCEDURE — 36415 COLL VENOUS BLD VENIPUNCTURE: CPT

## 2024-08-15 RX ORDER — LAMOTRIGINE 25 MG/1
25 TABLET ORAL 2 TIMES DAILY
Qty: 180 TABLET | Refills: 3 | Status: SHIPPED | OUTPATIENT
Start: 2024-08-15

## 2024-08-15 RX ORDER — ESCITALOPRAM OXALATE 10 MG/1
10 TABLET ORAL NIGHTLY
Qty: 90 TABLET | Refills: 3 | Status: SHIPPED | OUTPATIENT
Start: 2024-08-15

## 2024-08-15 RX ORDER — OMEPRAZOLE 20 MG/1
20 CAPSULE, DELAYED RELEASE ORAL
Qty: 90 CAPSULE | Refills: 3 | Status: SHIPPED | OUTPATIENT
Start: 2024-08-15

## 2024-08-15 RX ORDER — POLYETHYLENE GLYCOL 3350, SODIUM CHLORIDE, SODIUM BICARBONATE, POTASSIUM CHLORIDE 420; 11.2; 5.72; 1.48 G/4L; G/4L; G/4L; G/4L
POWDER, FOR SOLUTION ORAL
Qty: 1 EACH | Refills: 0 | Status: SHIPPED | OUTPATIENT
Start: 2024-08-15

## 2024-08-15 RX ORDER — FOLIC ACID 1 MG/1
1 TABLET ORAL DAILY
Qty: 90 TABLET | Refills: 0 | Status: SHIPPED | OUTPATIENT
Start: 2024-08-15

## 2024-08-15 RX ORDER — SPIRONOLACTONE 50 MG/1
50 TABLET, FILM COATED ORAL DAILY
Qty: 90 TABLET | Refills: 3 | Status: SHIPPED | OUTPATIENT
Start: 2024-08-15

## 2024-08-15 RX ORDER — ESCITALOPRAM OXALATE 10 MG/1
10 TABLET ORAL NIGHTLY
Refills: 0 | OUTPATIENT
Start: 2024-08-15

## 2024-08-15 RX ORDER — LEVOTHYROXINE SODIUM 0.05 MG/1
50 TABLET ORAL
Refills: 0 | OUTPATIENT
Start: 2024-08-15

## 2024-08-15 RX ORDER — LEVOTHYROXINE SODIUM 0.05 MG/1
50 TABLET ORAL
Qty: 90 TABLET | Refills: 3 | Status: SHIPPED | OUTPATIENT
Start: 2024-08-15

## 2024-08-15 RX ORDER — LAMOTRIGINE 25 MG/1
25 TABLET ORAL 2 TIMES DAILY
Refills: 0 | OUTPATIENT
Start: 2024-08-15

## 2024-08-15 NOTE — TELEPHONE ENCOUNTER
Called patient phone number and it was disconnected. Called daughter Nancy. Corrected patient's listed phone number.     Discussed labs are stable. Recommend continuation of plan of diuretics, lactulose, rifaximin at this time.   Leg swelling has improved with compression stockings.   Plan to repeat labs in 2 months. Working on follow up with hepatology .     Patient now agreeable to colonoscopy. Will try to add to Egd in November.     Daughter acknowledged understanding. All questions answered.     Gita Wheeler PA-C          Schedulers: Please see if colonoscopy can be added to EGD in November.   Dx: crc screening.     Thanks!

## 2024-08-15 NOTE — TELEPHONE ENCOUNTER
Please review. Protocol Pass    Please review if refills are appropriate.    Requested Prescriptions   Pending Prescriptions Disp Refills    escitalopram 10 MG Oral Tab  0     Sig: Take 1 tablet (10 mg total) by mouth nightly.       Psychiatric Non-Scheduled (Anti-Anxiety) Passed - 8/12/2024  8:16 PM        Passed - In person appointment or virtual visit in the past 6 mos or appointment in next 3 mos     Recent Outpatient Visits              3 days ago Encounter for examination following treatment at [facility] [place] Bridgett Garcia Agron B, MD    Office Visit    3 days ago [facility]    [facility]          Future Appointments         Provider Department Appt Notes    In 1 week ADO MRI [facility] (1.5T) [facility] MRI - [place]     In 1 month Pepper Hedrick DO [facility] HFU+ NP    In 1 month [address] [facility]     In 2 months LD VILA [facility] Egd with [facility]                    Passed - Depression Screening completed within the past 12 months          lamoTRIgine 25 MG Oral Tab  0     Sig: Take 1 tablet (25 mg total) by mouth 2 (two) times daily.       Neurology Medications Passed - 8/12/2024  8:16 PM        Passed - In person appointment or virtual visit in the past 6 mos or appointment in next 3 mos     Recent Outpatient Visits              [facility]    Office Visit    3 days ago [facility]    [facility]    [facility] Visit          Future Appointments         Provider Department Appt Notes    In 1 week ADO MRI RM1 (1.5T) [facility] MRI -  [place]     In 1 month Pepper Hedrick DO [facility] [place] HFU+ NP    In 1 month [address]     In 2 months [facility] Egd with Mac @[facility]                      spironolactone 50 MG Oral Tab 90 tablet 1     Sig: Take 1 tablet (50 mg total) by mouth daily.       Hypertension Medications Protocol Passed - 8/12/2024  8:16 PM        Passed - CMP or BMP in past 12 months        Passed - Last BP reading less than 140/90     BP Readings from Last 1 Encounters:   08/12/24 109/50               Passed - In person appointment or virtual visit in the past 12 mos or appointment in next 3 mos     Recent Outpatient Visits              3 days ago Encounter for examination following treatment at [facility], Bridgett Crump Agron B, MD    Office Visit    3 days ago Liver lesion    [facility] [place] Gita Wheeler PA-C    Office Visit          Future Appointments         Provider Department Appt Notes    In 1 week ADO MRI [facility] (1.5T) [facility] MRI - [place]     In 1 month Pepper Hedrick [facility] HFU+ NP    In 1 month [address]     In 2 months [facility] Egd with Mac @[facility]                    Passed - EGFRCR or GFRNAA > 50     GFR Evaluation  EGFRCR: 90 , resulted on 8/12/2024            levothyroxine 50 MCG Oral Tab  0     Sig: Take 1 tablet (50 mcg total) by mouth before breakfast.       Thyroid Medication Protocol Failed - 8/12/2024  8:16 PM        Failed - Last TSH value is normal     Lab Results   Component Value Date    TSH 8.140 (H) 04/03/2024                 Passed - TSH in past 12 months        Passed - In person appointment or  virtual visit in the past 12 mos or appointment in next 3 mos     Recent Outpatient Visits              3 days ago Encounter for examination following treatment at [facility], Bridgett Crump Agron B, MD    Office Visit    3 days ago Liver lesion    [facility], Gita Tavarez PA-C    Office Visit          Future Appointments         Provider Department Appt Notes    In 1 week ADO MRI RM1 (1.5T) [facility] MRI - [place]     In 1 month Pepper Hedrick DO [facility] [place] HFU+ NP    In 1 month [address]     In 2 months LD VILA [facility] Egd with [facility]                           Future Appointments         Provider Department Appt Notes    In 1 week ADO MRI RM1 (1.5T) [facility] MRI - [place]     [facility]    In 1 month [address]     In 2 months LD VILA [facility] Egd with [facility]          Recent Outpatient Visits              3 days ago Encounter for examination following treatment at [facility], Bridgett Crump Agron B, MD    Office Visit    3 days ago Liver lesion    [facility], Gita Tavarez PA-C    Office Visit

## 2024-08-15 NOTE — TELEPHONE ENCOUNTER
Daughter, states that the patient is requesting a refill on her omeprazole, folic acid, lamotrigine, levothyroxine and escitalopram medications. Patient is out of medications. Pharmacy: [facility]/[place], [place] (listed)     Current Outpatient Medications   Medication Sig Dispense Refill    omeprazole 20 MG Oral Capsule Delayed Release Take 1 capsule (20 mg total) by mouth every morning before breakfast.      levothyroxine 50 MCG Oral Tab Take 1 tablet (50 mcg total) by mouth before breakfast.      escitalopram 10 MG Oral Tab Take 1 tablet (10 mg total) by mouth nightly.      folic acid 1 MG Oral Tab Take 1 tablet (1 mg total) by mouth daily.      lamoTRIgine 25 MG Oral Tab Take 1 tablet (25 mg total) by mouth 2 (two) times daily.

## 2024-08-15 NOTE — TELEPHONE ENCOUNTER
Gita -    Can you specify what bowel prep is recommended for the colonoscopy?    Please send the script to the [facility] in [place] on file.    Thank you!

## 2024-08-15 NOTE — TELEPHONE ENCOUNTER
Please review. Protocol Pass    Please review if refills are appropriate.    Requested Prescriptions   Pending Prescriptions Disp Refills    omeprazole 20 MG Oral Capsule Delayed Release  0     Sig: Take 1 capsule (20 mg total) by mouth every morning before breakfast.       Gastrointestional Medication Protocol Passed - 8/15/2024  8:58 AM        Passed - In person appointment or virtual visit in the past 12 mos or appointment in next 3 mos     Recent Outpatient Visits              3 days ago Encounter for examination following treatment at [facility], Bridgett Crump Agron B, MD    Office Visit    3 days ago [facility]    Office Visit          Future Appointments         Provider Department Appt Notes    In 1 week ADO MRI [facility] (1.5T) [facility] - [place]     In 1 month Pepper Hedrick DO [facility] HFU+ NP    In 1 month [address] [facility]     In 2 months LD VILA [facility] Egd with [facility]                      folic acid 1 MG Oral Tab  0     Sig: Take 1 tablet (1 mg total) by mouth daily.       There is no refill protocol information for this order      Refused Prescriptions Disp Refills    levothyroxine 50 MCG Oral Tab  0     Sig: Take 1 tablet (50 mcg total) by mouth before breakfast.       Thyroid Medication Protocol Failed - 8/15/2024  8:58 AM        Failed - Last TSH value is normal     Lab Results   Component Value Date    TSH 8.140 (H) 04/03/2024                 Passed - TSH in past 12 months        Passed - In person appointment or virtual visit in the past 12 mos or appointment in next 3 mos     Recent Outpatient Visits              3 days ago [facility] [place]    Office Visit    3 days ago Liver lesion    [facility], Gita Tavarez PA-C    Office Visit          Future Appointments         Provider Department Appt Notes    In 1 week ADO MRI 1 (1.5T) [facility] MRI - [place]     In 1 month Pepper Hedrick [facility] [place] HFU+ NP    In 1 month [address]     [facility]                      lamoTRIgine 25 MG Oral Tab  0     Sig: Take 1 tablet (25 mg total) by mouth 2 (two) times daily.       Neurology Medications Passed - 8/15/2024  8:58 AM        Passed - In person appointment or virtual visit in the past 6 mos or appointment in next 3 mos     Recent Outpatient Visits              3 days ago Encounter for examination following treatment at [facility] [place] Bridgett Garcia Agron B, MD    Office Visit    3 days ago Liver lesion    [facility], Gita Tavarez PA-C    Office Visit          Future Appointments         Provider Department Appt Notes    In 1 week ADO MRI [facility] (1.5T) [facility] [place]     [facility]    In 1 month [address]     In 2 months [facility] with [facility]                      escitalopram 10 MG Oral Tab  0     Sig: Take 1 tablet (10 mg total) by mouth nightly.       Psychiatric Non-Scheduled (Anti-Anxiety) Passed - 8/15/2024  8:58 AM        Passed - In person appointment or virtual visit in the past 6 mos or appointment in next 3 mos     Recent Outpatient Visits              3  days ago Encounter for examination following treatment at [facility], Bridgett Crump Agron B, MD    Office Visit    3 days ago Liver lesion    [facility], Gita Tavarez PA-C    Office Visit          Future Appointments         Provider Department Appt Notes    In 1 week ADO MRI [facility] (1.5T) [facility] MRI - [place]     In 1 month Pepper Hedrick DO [facility] HFU+ NP    In 1 month [address]     In 2 months [facility] Egd with Mac @[facility]                    Passed - Depression Screening completed within the past 12 months               Future Appointments         Provider Department Appt Notes    In 1 week ADO MRI [facility] (1.5T) [facility] MRI - [place]     In 1 month [facility]    In 1 month [address]     In 2 months [facility] Egd with [facility]          Recent Outpatient Visits              3 days ago Encounter for examination following treatment at [facility], Bridgett Crump Agron B, MD    Office Visit    3 days ago Liver lesion    [facility], Gita Tavarez PA-C    Office Visit

## 2024-08-15 NOTE — TELEPHONE ENCOUNTER
Called patient's daughter Nancy - confirmed we are adding a colonoscopy to patient's already scheduled EGD on 11/6/2024 at [facility] with Dr. Guardado.    Please refer to telephone encounter dated 8/12/2024 for scheduling orders.    Telephone encounter closed.

## 2024-08-16 NOTE — PROGRESS NOTES
Subjective:     Patient ID: Christited Tavarez is a 64 year old female.    HPI    Patient here with daughter for follow-up after she was admitted to the hospital then discharged to rehab she went home last week overall doing okay some increased swelling to lower extremities she is taking the Bumex daughter wondering if she should be taking any diabetes medications she was not discharged on any from the facility since her sugars have been okay.  Also asking for handicap placard  Patient will be staying with her son    History/Other:   Review of Systems   Constitutional:  Positive for fatigue.   HENT: Negative.     Eyes: Negative.    Respiratory: Negative.     Cardiovascular:  Positive for leg swelling.   Gastrointestinal: Negative.    Genitourinary: Negative.    Musculoskeletal: Negative.    Skin: Negative.    Neurological: Negative.    Psychiatric/Behavioral: Negative.       Current Outpatient Medications   Medication Sig Dispense Refill    potassium chloride 20 MEQ Oral Powd Pack Take 40 mEq by mouth daily. 30 each 2    bumetanide 2 MG Oral Tab Take 1 tablet (2 mg total) by mouth BID (Diuretic). 60 tablet 2    midodrine 2.5 MG Oral Tab Take 1 tablet (2.5 mg total) by mouth in the morning and 1 tablet (2.5 mg total) at noon and 1 tablet (2.5 mg total) in the evening. 90 tablet 1    rifAXIMin 550 MG Oral Tab Take 1 tablet (550 mg total) by mouth 2 (two) times daily. 60 tablet 2    potassium chloride 20 MEQ Oral Powd Pack Take 10 mEq by mouth daily.      levothyroxine 50 MCG Oral Tab Take 1 tablet (50 mcg total) by mouth before breakfast.      lactulose 10 GM/15ML Oral Solution Take 30 mL (20 g total) by mouth 2 (two) times daily as needed.      ergocalciferol 1.25 MG (76080 UT) Oral Cap Take 1 capsule (50,000 Units total) by mouth every 7 days. Sundays      escitalopram 10 MG Oral Tab Take 1 tablet (10 mg total) by mouth nightly. 90 tablet 3    lamoTRIgine 25 MG Oral Tab Take 1 tablet (25 mg total) by mouth 2 (two)  times daily. 180 tablet 3    spironolactone 50 MG Oral Tab Take 1 tablet (50 mg total) by mouth daily. 90 tablet 3    levothyroxine 50 MCG Oral Tab Take 1 tablet (50 mcg total) by mouth before breakfast. 90 tablet 3    omeprazole 20 MG Oral Capsule Delayed Release Take 1 capsule (20 mg total) by mouth every morning before breakfast. 90 capsule 3    folic acid 1 MG Oral Tab Take 1 tablet (1 mg total) by mouth daily. 90 tablet 0    PEG 3350-KCl-Na Bicarb-NaCl (TRILYTE) 420 g Oral Recon Soln Take prep as directed by gastro office. May substitute with Trilyte/generic equivalent if needed. 1 each 0    traZODone 50 MG Oral Tab Take 0.5 tablets (25 mg total) by mouth nightly. (Patient not taking: Reported on 8/12/2024)       Allergies:No Known Allergies    Past Medical History:    CHF (congestive heart failure) (HCC)    Diabetes (HCC)    Stroke (HCC)      No past surgical history on file.   No family history on file.   Social History:   Social History     Socioeconomic History    Marital status:    Tobacco Use    Smoking status: Never    Smokeless tobacco: Never   Vaping Use    Vaping status: Never Used   Substance and Sexual Activity    Alcohol use: Not Currently     Comment: socially ,not since April 2024    Drug use: Never     Social Determinants of Health     Food Insecurity: No Food Insecurity (6/3/2024)    Food Insecurity     Food Insecurity: Never true   Transportation Needs: No Transportation Needs (6/3/2024)    Transportation Needs     Lack of Transportation: No   Housing Stability: Low Risk  (6/3/2024)    Housing Stability     Housing Instability: No        Objective:   Physical Exam  Vitals and nursing note reviewed.   Constitutional:       Appearance: She is well-developed.   HENT:      Head: Normocephalic and atraumatic.      Right Ear: External ear normal.      Left Ear: External ear normal.      Nose: Nose normal.   Eyes:      Conjunctiva/sclera: Conjunctivae normal.      Pupils: Pupils are equal,  round, and reactive to light.   Cardiovascular:      Rate and Rhythm: Normal rate and regular rhythm.      Heart sounds: Normal heart sounds.   Pulmonary:      Effort: Pulmonary effort is normal.      Breath sounds: Normal breath sounds.   Abdominal:      General: Bowel sounds are normal.      Palpations: Abdomen is soft.   Genitourinary:     Vagina: Normal.   Musculoskeletal:         General: Normal range of motion.      Cervical back: Normal range of motion and neck supple.      Right lower leg: Edema present.      Left lower leg: Edema present.   Skin:     General: Skin is warm and dry.   Neurological:      Mental Status: She is alert and oriented to person, place, and time.      Deep Tendon Reflexes: Reflexes are normal and symmetric.   Psychiatric:         Behavior: Behavior normal.         Thought Content: Thought content normal.         Judgment: Judgment normal.         Assessment & Plan:   1. Encounter for examination following treatment at hospital - over all improved   2. Type 2 diabetes mellitus without complication, without long-term current use of insulin (HCC) - will check A1c and decide for further treatments will refer to eye specialist   3. Alcoholic cirrhosis of liver with ascites (HCC) - stable , follow with gi   4. Encounter for preventive care - follow with gi    5. Screening mammogram for breast cancer we will order mammogram we will follow results   6. Hair loss follow-up with Derm       Orders Placed This Encounter   Procedures    Hemoglobin A1C       Meds This Visit:  Requested Prescriptions      No prescriptions requested or ordered in this encounter       Imaging & Referrals:  GASTRO - INTERNAL  OPHTHALMOLOGY - INTERNAL  PODIATRY - INTERNAL  DERM - INTERNAL  CA [facility]WR[facility] 2D+3D SCREENING BILAT (CPT=[id number])

## 2024-08-22 ENCOUNTER — HOSPITAL ENCOUNTER (OUTPATIENT)
Dept: MRI IMAGING | Age: 64
Discharge: HOME | End: 2024-08-22
Attending: PHYSICIAN ASSISTANT
Payer: COMMERCIAL

## 2024-08-22 DIAGNOSIS — K76.9 LIVER LESION: ICD-10-CM

## 2024-08-22 DIAGNOSIS — K70.30 ALCOHOLIC CIRRHOSIS OF LIVER WITHOUT ASCITES (HCC): ICD-10-CM

## 2024-08-22 PROCEDURE — 74183 MRI ABD W/O CNTR FLWD CNTR: CPT | Performed by: PHYSICIAN ASSISTANT

## 2024-08-22 PROCEDURE — A9575 INJ GADOTERATE MEGLUMI 0.1ML: HCPCS | Performed by: PHYSICIAN ASSISTANT

## 2024-08-22 RX ORDER — GADOTERATE MEGLUMINE 376.9 MG/ML
20 INJECTION INTRAVENOUS
Status: COMPLETED | OUTPATIENT
Start: 2024-08-22 | End: 2024-08-22

## 2024-08-22 RX ADMIN — GADOTERATE MEGLUMINE 11 ML: 376.9 INJECTION INTRAVENOUS at 14:37:00

## 2024-08-26 ENCOUNTER — PATIENT MESSAGE (OUTPATIENT)
Dept: INTERNAL MEDICINE CLINIC | Facility: CLINIC | Age: 64
End: 2024-08-26

## 2024-08-26 DIAGNOSIS — R60.0 LOCALIZED EDEMA: ICD-10-CM

## 2024-08-26 DIAGNOSIS — I10 PRIMARY HYPERTENSION: Primary | ICD-10-CM

## 2024-08-27 ENCOUNTER — NURSE TRIAGE (OUTPATIENT)
Dept: INTERNAL MEDICINE CLINIC | Facility: CLINIC | Age: 64
End: 2024-08-27

## 2024-08-27 RX ORDER — SPIRONOLACTONE 50 MG/1
75 TABLET, FILM COATED ORAL DAILY
Qty: 135 TABLET | Refills: 1 | Status: SHIPPED | OUTPATIENT
Start: 2024-08-27

## 2024-08-27 NOTE — TELEPHONE ENCOUNTER
Reason for Disposition  • MILD swelling of both ankles (i.e., pedal edema) AND new-onset or worsening    Protocols used: Leg Swelling and Edema-A-OH

## 2024-08-27 NOTE — TELEPHONE ENCOUNTER
carrie Tavarez   to P Em Rn Triage (supporting Antony Zavala MD)   YF      8/26/24  7:13 PM  Dr. Zavala,     My legs and feet are swelling a bit more. I’ve been wearing the compression socks during the day along with elevating them. What should I do next?  This encounter is not signed. The conversation

## 2024-08-27 NOTE — TELEPHONE ENCOUNTER
So go up on spironolactone to 75 mg daily so she can take 1 and half tablet for now we will send new prescription.  Also the Bumex should take 2 tablets in the morning for 3 days only and 1 in evening and after that go back to 1 tablet twice daily also would like to check her potassium level this Friday to make sure is stable will place order

## 2024-08-27 NOTE — TELEPHONE ENCOUNTER
Spoke to the patient & pts daughter, Nancy, informing them of drs msg below. Patient & daughter states understanding, denies having any further questions.     Just ELLIS Zavala

## 2024-08-27 NOTE — TELEPHONE ENCOUNTER
Patient states she is returning a call from our office.  Patient states she canceled her appointment with Dr. Zavala because she was instructed to change her medications instead.  Patient needed clarification on medications.  Reviewed Dr. Zavala's instructions with patient.  Patient also asked to send [place] to [employer] [employer].  Advised to call back if more clarification is needed.

## 2024-08-27 NOTE — TELEPHONE ENCOUNTER
Action Requested: Summary for Provider     []  Critical Lab, Recommendations Needed  [] Need Additional Advice  []   FYI    []   Need Orders  [] Need Medications Sent to Pharmacy  []  Other     SUMMARY: patient calling, has swelling in both lower legs and feet. No CP,SOB wheezing, cough, discoloration, weeping wounds. Has swelling consistently, has been a bit worse lately.     Patient wearing compression stockings and elevating when she can. Non pitting edema - does not leave indentation.   Patient taking Medications as prescribed. Bumex 2mg daily and k+   Patient does not monitor b/p at home.     Patient scheduled a visit for Thursday for evaluation.   Future Appointments   Date Time Provider Department Center   8/29/2024 11:15 AM Antony Zavala MD [id number]  [place] [facility]   9/11/2024  2:45 PM [facility]   9/25/2024 11:40 AM Pepper Hedrick DO ENIEL[facility] [place] [facility]   10/30/2024 11:20 AM [facility] RM1 [facility] EM [facility]   11/6/2024  1:00 PM CADENCE, PROCEDURE [id number] None   12/20/2024 10:00 AM Lillian Baron DPM [facility]       ER warning signs reviewed, advised to call us if needed prior for any new or concerning symptoms.     Patient states understanding and agrees to plan.        Reason for call: Edema  Onset: ongoing chronic

## 2024-08-28 ENCOUNTER — TELEPHONE (OUTPATIENT)
Facility: CLINIC | Age: 64
End: 2024-08-28

## 2024-08-28 NOTE — TELEPHONE ENCOUNTER
----- Message from Gita Wheeler sent at 8/27/2024 12:36 PM CDT -----  Reviewed MRI with patient's daughter. Previous liver lesion noted in CT described as a simple cyst. Plan for repeat imaging in 6 months and repeat AFP.     Gita Wheeler PA-C    Nursing: Place recall order for MRI and repeat AFP in 6 months.

## 2024-08-28 NOTE — TELEPHONE ENCOUNTER
6 month lab and MRI recall entered into patient outreach in [facility].    Next due February 2025.

## 2024-08-29 ENCOUNTER — TELEPHONE (OUTPATIENT)
Dept: INTERNAL MEDICINE CLINIC | Facility: CLINIC | Age: 64
End: 2024-08-29

## 2024-08-29 NOTE — TELEPHONE ENCOUNTER
Patient calling ( name and date of birth of patient verified ) asking about BMP for tomorrow     Please follow these instructions in preparation of your upcoming lab tests.     1. Please fast 10-12 hours prior to your test.   2. Do not eat food, chew gum or exercise during your fast.   3. You should drink plain water to maintain good hydration.   4. You may drink plain black coffee or tea during your fast.    5. You may take necessary medication during your fast.      Patient verbalizes understanding and agrees with plan.

## 2024-08-30 ENCOUNTER — LAB ENCOUNTER (OUTPATIENT)
Dept: LAB | Facility: REFERENCE LAB | Age: 64
End: 2024-08-30
Attending: INTERNAL MEDICINE
Payer: COMMERCIAL

## 2024-08-30 DIAGNOSIS — I10 PRIMARY HYPERTENSION: ICD-10-CM

## 2024-08-30 DIAGNOSIS — R60.0 LOCALIZED EDEMA: ICD-10-CM

## 2024-08-30 LAB
ANION GAP SERPL CALC-SCNC: 8 MMOL/L (ref 0–18)
BUN BLD-MCNC: 16 MG/DL (ref 9–23)
BUN/CREAT SERPL: 20 (ref 10–20)
CALCIUM BLD-MCNC: 9.2 MG/DL (ref 8.7–10.4)
CHLORIDE SERPL-SCNC: 99 MMOL/L (ref 98–112)
CO2 SERPL-SCNC: 28 MMOL/L (ref 21–32)
CREAT BLD-MCNC: 0.8 MG/DL
EGFRCR SERPLBLD CKD-EPI 2021: 82 ML/MIN/1.73M2 (ref 60–?)
FASTING STATUS PATIENT QL REPORTED: YES
GLUCOSE BLD-MCNC: 95 MG/DL (ref 70–99)
OSMOLALITY SERPL CALC.SUM OF ELEC: 281 MOSM/KG (ref 275–295)
POTASSIUM SERPL-SCNC: 3.9 MMOL/L (ref 3.5–5.1)
SODIUM SERPL-SCNC: 135 MMOL/L (ref 136–145)

## 2024-08-30 PROCEDURE — 36415 COLL VENOUS BLD VENIPUNCTURE: CPT

## 2024-08-30 PROCEDURE — 80048 BASIC METABOLIC PNL TOTAL CA: CPT

## 2024-09-04 ENCOUNTER — NURSE TRIAGE (OUTPATIENT)
Dept: INTERNAL MEDICINE CLINIC | Facility: CLINIC | Age: 64
End: 2024-09-04

## 2024-09-04 DIAGNOSIS — I10 PRIMARY HYPERTENSION: ICD-10-CM

## 2024-09-04 DIAGNOSIS — R60.0 LOCALIZED EDEMA: ICD-10-CM

## 2024-09-04 RX ORDER — SPIRONOLACTONE 50 MG/1
50 TABLET, FILM COATED ORAL 2 TIMES DAILY
Qty: 180 TABLET | Refills: 1 | Status: SHIPPED | OUTPATIENT
Start: 2024-09-04

## 2024-09-04 NOTE — TELEPHONE ENCOUNTER
Dr. Zavala patient was called and inform of your message below. Patient has questions see below.     1.Dr. Zavala patient will like to know why does she need to follow -up with Gastro for her leg swelling. Please advise   2.Dr. Zavala for the potassium she is currently taking  2 packet of  20 meq each but per patient she has a script for potassium 10 MEQ and she wants to know if she should be taking this also. Patient stated that she has a script for the 10 meq but the pharmacy will not fill it please advise.     3. patient takes Bumex 2 mg dose and she takes  2 tablets (4mg) in the morning. Is this ok?

## 2024-09-04 NOTE — TELEPHONE ENCOUNTER
Patient should follow-up with GI on this also please send this message to them I would go up on the spironolactone to 50 mg twice a day so 1 tablet in the morning 1 in evening continue same with the potassium at 40 mg continue same with Bumex , I would like to repeat a BMP on Monday to make sure potassium and kidney function stable

## 2024-09-04 NOTE — TELEPHONE ENCOUNTER
Dr Zavala, please address patient questions below.  She also asks for refill of Vitamin D 50,000 units as she has taken last tablet.  Will you re order?  There are already pended orders in the computer for Vit D.  Also, anything else to to for leg swelling.

## 2024-09-04 NOTE — TELEPHONE ENCOUNTER
Action Requested: Summary for Provider     []  Critical Lab, Recommendations Needed  [x] Need Additional Advice  []   FYI    []   Need Orders  [] Need Medications Sent to Pharmacy  []  Other     SUMMARY: Please advise if you would like to see patient in the office. Patient states she will only go in if  wants her to. Also patient asking to clarify dosage of potassium  chloride oral powder she is taking, currently taking 40 meq daily.     Patient states she called last week had swelling/edema in feet and ankles. Patient taking prescribed spironolactone 50mg 1.5 mg tablets daily. Patient continues to have edema to both lower legs, ankles and tops of feet.     Patient wearing compression hose,elevating legs. She denies shortness of breath or any other symptoms.    Reason for call: Edema  Onset: continuing from last week.   Reason for Disposition   MILD swelling of both ankles (i.e., pedal edema) AND new-onset or worsening    Protocols used: Leg Swelling and Edema-A-OH

## 2024-09-05 NOTE — TELEPHONE ENCOUNTER
Please Review. Protocol Failed; No Protocol   Previously written     Hanna Barajas internalist      Requested Prescriptions   Pending Prescriptions Disp Refills    ergocalciferol 1.25 MG (67484 UT) Oral Cap  0     Sig: Take 1 capsule (50,000 Units total) by mouth every 7 days. Sundays       There is no refill protocol information for this order            Future Appointments         Provider Department Appt Notes    In 6 days Ozzie Castanon MD [facility] hair loss    In 2 weeks Pepper Hedrick DO [facility] HFU+ NP    In 3 weeks Leydi Bonilla MD [facility] CONSULT CIRRHOSIS    In 1 month [address] for Health r/s from 10/04/24 [place]  Scheduled with patient's Daughter Nancy. She is aware to bring previous imaging    In 2 months LD VILA [facility] CLN/JARROD w/MAC @ [facility]    In 3 months Lillian Baron DPM [facility] Diabetic          Recent Outpatient Visits              3 weeks ago Encounter for examination following treatment at [facility], Bridgett Crump Agron B, MD    Office Visit    3 weeks ago Liver lesion    [facility] Gita Wheeler PA-C    Office Visit

## 2024-09-05 NOTE — TELEPHONE ENCOUNTER
Please Review. Protocol Failed; No Protocol     Requested Prescriptions   Pending Prescriptions Disp Refills    KLOR-CON 20 MEQ Oral Powd Pack [Pharmacy Med Name: KLOR-CON POWDER PKTS 20MEQ 100S] 90 each 0     Sig: TAKE 40 MEQ BY MOUTH DAILY       There is no refill protocol information for this order            Future Appointments         Provider Department Appt Notes    In 6 days Ozzie Castanon MD [facility] hair loss    In 2 weeks Pepper Hedrick DO [facility] HFU+ NP    In 3 weeks Leydi Bonilla MD [facility] CONSULT CIRRHOSIS    In 1 month [address] for Health r/s from 10[place]04/24 [place]  Scheduled with patient's Daughter Nancy. She is aware to bring previous imaging    In 2 months LD VILA [facility] CLN/EGD w/MAC @ [facility]    In 3 months Lillian Baron DPM [facility] Diabetic          Recent Outpatient Visits              3 weeks ago Encounter for examination following treatment at hospital    [facility], Bridgett Crump Agron B, MD    Office Visit    3 weeks ago Liver lesion    [facility] Gita Wheeler PA-C    Office Visit

## 2024-09-05 NOTE — TELEPHONE ENCOUNTER
Left message to call back and transfer to triage or check MyChart message.  MyChart message sent.

## 2024-09-05 NOTE — TELEPHONE ENCOUNTER
Needs to see GI because the swelling is due to GI causes due to liver cirrhosis  She should take 40 mill equivalent of potassium daily  Bumex was written as 1 tablet twice a day so 2 mg in the morning 2 in the evening or afternoon

## 2024-09-05 NOTE — TELEPHONE ENCOUNTER
Please Review. Protocol Failed; No Protocol     Requested Prescriptions   Pending Prescriptions Disp Refills    potassium chloride 20 MEQ Oral Powd Pack 30 each 2     Sig: Take 40 mEq by mouth daily.       There is no refill protocol information for this order            Future Appointments         Provider Department Appt Notes    In 6 days Ozzie Castanon MD [facility] hair loss    In 2 weeks Pepper Hedrick DO [facility] HFU+ NP    In 3 weeks Leydi Bonilla MD [facility] CONSULT CIRRHOSIS    In 1 month [address] for Health [place]/s from [place]04[place]24 [place]  Scheduled with patient's Daughter Nancy. She is aware to bring previous imaging    In 2 months LD VILA [facility] CLN/EGD w/MAC @ [facility]    In 3 months Lillian Baron DPM [facility] Diabetic          Recent Outpatient Visits              3 weeks ago Encounter for examination following treatment at [facility], Bridgett Crump Agron B, MD    Office Visit    3 weeks ago Liver lesion    [facility] Gita Wheeler PA-C    Office Visit

## 2024-09-05 NOTE — TELEPHONE ENCOUNTER
Patient has read the FIT[facility]ST message:    From  Cynthia Diaz RN To  Christi Tavarez Sent and Delivered  9/5/2024  5:46 PM   Last Read in [employer]  9/5/2024  5:49 PM by Christi Tavarez

## 2024-09-06 ENCOUNTER — TELEPHONE (OUTPATIENT)
Dept: INTERNAL MEDICINE CLINIC | Facility: CLINIC | Age: 64
End: 2024-09-06

## 2024-09-06 ENCOUNTER — TELEPHONE (OUTPATIENT)
Facility: CLINIC | Age: 64
End: 2024-09-06

## 2024-09-06 RX ORDER — POTASSIUM CHLORIDE 1.5 G/1.58G
40 POWDER, FOR SOLUTION ORAL DAILY
Qty: 90 PACKET | Refills: 0 | Status: SHIPPED | OUTPATIENT
Start: 2024-09-06

## 2024-09-06 RX ORDER — POTASSIUM CHLORIDE 1.5 G/1.58G
40 POWDER, FOR SOLUTION ORAL DAILY
Qty: 30 EACH | Refills: 5 | Status: SHIPPED | OUTPATIENT
Start: 2024-09-06

## 2024-09-06 RX ORDER — ERGOCALCIFEROL 1.25 MG/1
50000 CAPSULE, LIQUID FILLED ORAL
Qty: 4 CAPSULE | Refills: 0 | Status: SHIPPED | OUTPATIENT
Start: 2024-09-06

## 2024-09-06 NOTE — TELEPHONE ENCOUNTER
Gita/Dr. Guardado    Is first available in November ok or may I add somewhere?  Per daughter her legs are swelling up again and primary care told her to see GI in office about this again.  Does not have other symptoms like abdominal distention or shortness of breath.    Please advise    Thank you

## 2024-09-06 NOTE — TELEPHONE ENCOUNTER
Gita    Ok to follow up with you or should she get established with one of our regular Gi providers?    Thank you

## 2024-09-06 NOTE — TELEPHONE ENCOUNTER
Mar from [facility] is calling to advise they will be faxing orders to PCP today.    Please advise.

## 2024-09-06 NOTE — TELEPHONE ENCOUNTER
Patient called, verified Name and . Reviewed Dr. Antony Zavala's message regarding Bumex dosing and repeat BMP on . Patient states [facility] is showing Bumex to be taken once daily only, and wants us to change it so it will reflect the correct dosing. Relayed that we cannot change information in [employer]. Advised to call [facility] Service Desk, number provided. Medication reviewed, did not find any prescription for Bumex that states it should be taken daily.

## 2024-09-06 NOTE — TELEPHONE ENCOUNTER
Patients daughter Nancy calling for patient to request follow up visit. Please call at [phone],thanks.

## 2024-09-07 NOTE — TELEPHONE ENCOUNTER
Patient needs to establish care with hepatology.    She can establish with Dr. Leydi Bonilla who comes to [place] through [facility].    I am happy to see her but would have to be in October. Okay to add on 10/15 at 3pm for a clinic visit.    In the meantime though - I would have the patient establish with Dr. Bonilla. (I have sent a message to their scheduling department).    If something opens up can seen sooner. I am double booking clinic until mid October so can't see sooner.    Thanks.

## 2024-09-09 ENCOUNTER — MED REC SCAN ONLY (OUTPATIENT)
Dept: INTERNAL MEDICINE CLINIC | Facility: CLINIC | Age: 64
End: 2024-09-09

## 2024-09-09 RX ORDER — MIDODRINE HYDROCHLORIDE 2.5 MG/1
2.5 TABLET ORAL 3 TIMES DAILY
Qty: 90 TABLET | Refills: 1 | Status: SHIPPED | OUTPATIENT
Start: 2024-09-09

## 2024-09-09 NOTE — TELEPHONE ENCOUNTER
Please review. Protocol Failed; No Protocol    Requested Prescriptions   Pending Prescriptions Disp Refills    midodrine 2.5 MG Oral Tab 90 tablet 1     Sig: Take 1 tablet (2.5 mg total) by mouth in the morning and 1 tablet (2.5 mg total) at noon and 1 tablet (2.5 mg total) in the evening.       There is no refill protocol information for this order            Future Appointments         Provider Department Appt Notes    In 2 days Ozzie Castanon MD [facility] hair loss    In 2 weeks Pepper Hedrick DO [facility] HFU+ NP    In 2 weeks Leydi Bonilla MD [facility] CONSULT CIRRHOSIS    In 1 month [address] [facility]  Scheduled with patient's Daughter Nancy. She is aware to bring previous imaging    In 1 month LD VILA [facility] CLN/JARROD w/MAC @ [facility]    In 3 months Lillian Baron DPM [facility] Diabetic          Recent Outpatient Visits              4 weeks ago Encounter for examination following treatment at [facility], Bridgett Crump Agron B, MD    Office Visit    4 weeks ago Liver lesion    [facility] Gita Wheeler PA-C    Office Visit

## 2024-09-09 NOTE — TELEPHONE ENCOUNTER
I spoke to Nancy, accepted below appointment for her mother and aware where our office is located.    Your Appointments      Wednesday October 09, 2024 3:00 PM  Follow Up Visit with Yobany Guardado MD  [facility] [address] [place] [id number]  [phone]

## 2024-09-09 NOTE — TELEPHONE ENCOUNTER
Dr. Guardado    I relayed below message with daughter Christi  Patient has an appointment with hepatology on 9/26/24.  Daughter works here and has a meeting every Tuesday at 2pm so she can't take her mother to a 3pm on a Tuesday.  She is asking if there is a Wednesday that she can be seen sooner than first available.    Thank you

## 2024-09-11 ENCOUNTER — OFFICE VISIT (OUTPATIENT)
Dept: DERMATOLOGY CLINIC | Facility: CLINIC | Age: 64
End: 2024-09-11
Payer: COMMERCIAL

## 2024-09-11 ENCOUNTER — LAB ENCOUNTER (OUTPATIENT)
Dept: LAB | Age: 64
End: 2024-09-11
Attending: INTERNAL MEDICINE
Payer: COMMERCIAL

## 2024-09-11 DIAGNOSIS — I10 PRIMARY HYPERTENSION: ICD-10-CM

## 2024-09-11 DIAGNOSIS — R60.0 LOCALIZED EDEMA: ICD-10-CM

## 2024-09-11 DIAGNOSIS — L65.0 TELOGEN EFFLUVIUM: Primary | ICD-10-CM

## 2024-09-11 LAB
ANION GAP SERPL CALC-SCNC: 8 MMOL/L (ref 0–18)
BUN BLD-MCNC: 16 MG/DL (ref 9–23)
BUN/CREAT SERPL: 19.5 (ref 10–20)
CALCIUM BLD-MCNC: 9.5 MG/DL (ref 8.7–10.4)
CHLORIDE SERPL-SCNC: 101 MMOL/L (ref 98–112)
CO2 SERPL-SCNC: 28 MMOL/L (ref 21–32)
CREAT BLD-MCNC: 0.82 MG/DL
EGFRCR SERPLBLD CKD-EPI 2021: 80 ML/MIN/1.73M2 (ref 60–?)
FASTING STATUS PATIENT QL REPORTED: NO
GLUCOSE BLD-MCNC: 135 MG/DL (ref 70–99)
OSMOLALITY SERPL CALC.SUM OF ELEC: 287 MOSM/KG (ref 275–295)
POTASSIUM SERPL-SCNC: 4.2 MMOL/L (ref 3.5–5.1)
SODIUM SERPL-SCNC: 137 MMOL/L (ref 136–145)

## 2024-09-11 PROCEDURE — 36415 COLL VENOUS BLD VENIPUNCTURE: CPT

## 2024-09-11 PROCEDURE — 80048 BASIC METABOLIC PNL TOTAL CA: CPT

## 2024-09-11 PROCEDURE — 99243 OFF/OP CNSLTJ NEW/EST LOW 30: CPT | Performed by: STUDENT IN AN ORGANIZED HEALTH CARE EDUCATION/TRAINING PROGRAM

## 2024-09-11 RX ORDER — INSULIN ASPART 100 [IU]/ML
INJECTION, SOLUTION INTRAVENOUS; SUBCUTANEOUS
COMMUNITY
Start: 2024-05-01

## 2024-09-11 NOTE — PROGRESS NOTES
September 11, 2024    New patient     Referred by: Antony Zavala MD     CHIEF COMPLAINT: Hair Loss Consult     HISTORY OF PRESENT ILLNESS: .    1. Hair Loss  Location: Scalp  Duration: 2 months  Signs and symptoms: Thinning hair, hair falling out, more scalp visit. Pt had a stroke April 2nd, 2024.  Current treatment: Accure clarifying shampoo and conditioner x 1 month   Past treatments: None     History/Other:    REVIEW OF SYSTEMS:  Constitutional: Denies fever, chills, unintentional weight loss.   Skin as per HPI    PAST MEDICAL HISTORY:  Past Medical History:    CHF (congestive heart failure) (HCC)    Diabetes (HCC)    Stroke (HCC)       Medications  Current Outpatient Medications   Medication Sig Dispense Refill    midodrine 2.5 MG Oral Tab Take 1 tablet (2.5 mg total) by mouth in the morning and 1 tablet (2.5 mg total) at noon and 1 tablet (2.5 mg total) in the evening. 90 tablet 1    potassium chloride (KLOR-CON) 20 MEQ Oral Powd Pack Take 40 mEq by mouth daily. 90 packet 0    potassium chloride 20 MEQ Oral Powd Pack Take 40 mEq by mouth daily. 30 each 5    ergocalciferol 1.25 MG (83306 UT) Oral Cap Take 1 capsule (50,000 Units total) by mouth every 7 days. Sundays 4 capsule 0    spironolactone 50 MG Oral Tab Take 1 tablet (50 mg total) by mouth 2 (two) times daily. 180 tablet 1    escitalopram 10 MG Oral Tab Take 1 tablet (10 mg total) by mouth nightly. 90 tablet 3    lamoTRIgine 25 MG Oral Tab Take 1 tablet (25 mg total) by mouth 2 (two) times daily. 180 tablet 3    levothyroxine 50 MCG Oral Tab Take 1 tablet (50 mcg total) by mouth before breakfast. 90 tablet 3    omeprazole 20 MG Oral Capsule Delayed Release Take 1 capsule (20 mg total) by mouth every morning before breakfast. 90 capsule 3    folic acid 1 MG Oral Tab Take 1 tablet (1 mg total) by mouth daily. 90 tablet 0    PEG 3350-KCl-Na Bicarb-NaCl (TRILYTE) 420 g Oral Recon Soln Take prep as directed by gastro office. May substitute with  Trilyte/generic equivalent if needed. 1 each 0    bumetanide 2 MG Oral Tab Take 1 tablet (2 mg total) by mouth BID (Diuretic). 60 tablet 2    rifAXIMin 550 MG Oral Tab Take 1 tablet (550 mg total) by mouth 2 (two) times daily. 60 tablet 2    levothyroxine 50 MCG Oral Tab Take 1 tablet (50 mcg total) by mouth before breakfast.      lactulose 10 GM/15ML Oral Solution Take 30 mL (20 g total) by mouth 2 (two) times daily as needed.      traZODone 50 MG Oral Tab Take 0.5 tablets (25 mg total) by mouth nightly. (Patient not taking: Reported on 8/12/2024)         Objective:    PHYSICAL EXAM:  General: awake, alert, no acute distress  Skin: Skin exam was performed today including the following: scal. Pertinent findings include:   - with generalize thinning    ASSESSMENT & PLAN:  Pathophysiology of diagnoses discussed with patient.  Therapeutic options reviewed. Risks, benefits, and alternatives discussed with patient. Instructions reviewed at length.    #Telogen effluvium  - Discussed etiology. Discussed starting OTC minoxidil 5% which would need to be continued life long. Discussed hair will regrow naturally and will asuncion 6-9 month to start regrowth once shedding stabilizes.     Return to clinic: 6 months or sooner if something concerning arises     Ozzie Castanon MD

## 2024-09-13 ENCOUNTER — TELEPHONE (OUTPATIENT)
Dept: INTERNAL MEDICINE CLINIC | Facility: CLINIC | Age: 64
End: 2024-09-13

## 2024-09-13 NOTE — TELEPHONE ENCOUNTER
Dr Antony Zavala=please order second dose Shingles vaccine and clarify MIDODRINE instruction , as needed for BP lower than 100 mmhg or routinely 1 in the morning and 1 in the evening ? Questions #1 and #4..  Yesterday's RU=192/72    MEDICATION RECORD :  Medication Quantity Refills Start End   midodrine 2.5 MG Oral Tab 90 tablet 1 9/9/2024 --   Sig:   Take 1 tablet (2.5 mg total) by mouth in the morning and 1 tablet (2.5 mg total) at noon and 1 tablet (2.5 mg total) in the evening.     Route:   Oral     Order #:   [id number]         Has the following questions;  1.MIDODRINE instruction ===to take it ONLY as needed for blood pressure less than 100 ?   2.Can she get FLU vaccine=informed that we do not have the supply yet, instructed to contact the office by the end of September or first week of October.She could also get it from the pharmacy and then update us.   3.COVID vaccine=we only offered it  at [facility] thru location ===address provided per request .Instructed to schedule it online via Down.=she can also get it from the pharmacy and update us .   4.She would like to schedule for  a second dose of Shingles vaccine, prefers [place] office location ====warm transferred to  Deborah for RN appointment assistance .         Future Appointments   Date Time Provider Department Center   9/25/2024 11:40 AM Pepper Hedrick DO ENIELHUR [place] [facility]   9/26/2024 10:00 AM Leydi Bonilla MD EMGSURONCELM EMG Surg [facility]   10/9/2024  3:00 PM Yobany Guardado MD ECCFHGASTRO EC [facility]   10/30/2024 11:20 AM [facility] CA RM1 [facility] EM [facility]   11/6/2024  1:00 PM LD GUARDADO ECCFHGIPROC None   12/20/2024 10:00 AM Lillian Baron DPM ECADOPOD EC ADO

## 2024-09-13 NOTE — TELEPHONE ENCOUNTER
Spoke with patient and MD message provided.  Patient verbalizes understanding. Patient prefers to get shingles vaccine at [facility].

## 2024-09-25 ENCOUNTER — OFFICE VISIT (OUTPATIENT)
Dept: NEUROLOGY | Facility: CLINIC | Age: 64
End: 2024-09-25
Payer: COMMERCIAL

## 2024-09-25 VITALS
BODY MASS INDEX: 26.97 KG/M2 | HEIGHT: 57 IN | HEART RATE: 86 BPM | SYSTOLIC BLOOD PRESSURE: 110 MMHG | DIASTOLIC BLOOD PRESSURE: 58 MMHG | WEIGHT: 125 LBS

## 2024-09-25 DIAGNOSIS — R25.1 TREMOR: ICD-10-CM

## 2024-09-25 DIAGNOSIS — G62.9 LENGTH-DEPENDENT PERIPHERAL NEUROPATHY: ICD-10-CM

## 2024-09-25 DIAGNOSIS — I61.4 CEREBELLAR HEMORRHAGE (HCC): Primary | ICD-10-CM

## 2024-09-25 PROCEDURE — 3008F BODY MASS INDEX DOCD: CPT | Performed by: OTHER

## 2024-09-25 PROCEDURE — 3078F DIAST BP <80 MM HG: CPT | Performed by: OTHER

## 2024-09-25 PROCEDURE — 3074F SYST BP LT 130 MM HG: CPT | Performed by: OTHER

## 2024-09-25 PROCEDURE — 99214 OFFICE O/P EST MOD 30 MIN: CPT | Performed by: OTHER

## 2024-09-25 NOTE — PROGRESS NOTES
[facility] [place]  [address], [address]  [place] [address]  [phone]          Established  Follow Up Visit       Date: September 25, 2024  Patient Name: Christi Tavarez   MRN: [id number]  Primary physician: [address]  [facility] [id number]    Interval History:   -- The patient was initially seen April 2024 for subacute cerebellar hemorrhage.    -She was then seen by neurology In June 2024.  For Stroke Alert for ataxia, actually described more as dysmetria with missing her mouth when trying to feed herself.  Updated neuroimaging with no new stroke.  Symptoms were thought related to neuropathy.    -She has an uncle with tremors.  She is also noted fine postural tremor of upper extremities.  No resting tremor or action tremor.  No intentional tremor.  No other tremors.  Not bothersome or affecting her ability to carry out daily activities.    -She is using a cane to walk.  She feels quite steady with it.  She does not always use it at home.  She has completed speech therapy and Occupational Therapy and is still working with PT at home.    OUTPATIENT MEDICATIONS  Current Outpatient Medications on File Prior to Visit   Medication Sig Dispense Refill    potassium chloride (KLOR-CON) 20 MEQ Oral Powd Pack Take 40 mEq by mouth daily. 90 packet 0    ergocalciferol 1.25 MG (38695 UT) Oral Cap Take 1 capsule (50,000 Units total) by mouth every 7 days. Sundays 4 capsule 0    spironolactone 50 MG Oral Tab Take 1 tablet (50 mg total) by mouth 2 (two) times daily. 180 tablet 1    escitalopram 10 MG Oral Tab Take 1 tablet (10 mg total) by mouth nightly. 90 tablet 3    lamoTRIgine 25 MG Oral Tab Take 1 tablet (25 mg total) by mouth 2 (two) times daily. 180 tablet 3    levothyroxine 50 MCG Oral Tab Take 1 tablet (50 mcg total) by mouth before breakfast. 90 tablet 3    omeprazole 20 MG Oral Capsule Delayed Release Take 1 capsule (20 mg total) by mouth every morning before breakfast. 90 capsule 3     folic acid 1 MG Oral Tab Take 1 tablet (1 mg total) by mouth daily. 90 tablet 0    bumetanide 2 MG Oral Tab Take 1 tablet (2 mg total) by mouth BID (Diuretic). 60 tablet 2    rifAXIMin 550 MG Oral Tab Take 1 tablet (550 mg total) by mouth 2 (two) times daily. 60 tablet 2    lactulose 10 GM/15ML Oral Solution Take 30 mL (20 g total) by mouth 2 (two) times daily as needed.      midodrine 2.5 MG Oral Tab Take 1 tablet (2.5 mg total) by mouth in the morning and 1 tablet (2.5 mg total) at noon and 1 tablet (2.5 mg total) in the evening. (Patient not taking: Reported on 9/25/2024) 90 tablet 1    PEG 3350-KCl-Na Bicarb-NaCl (TRILYTE) 420 g Oral Recon Soln Take prep as directed by gastro office. May substitute with Trilyte/generic equivalent if needed. (Patient not taking: Reported on 9/11/2024) 1 each 0    traZODone 50 MG Oral Tab Take 0.5 tablets (25 mg total) by mouth nightly. (Patient not taking: Reported on 8/12/2024)       No current facility-administered medications on file prior to visit.         PHYSICAL EXAM:     /58 (BP Location: Left arm, Patient Position: Sitting, Cuff Size: child)   Pulse 86   Ht 57\"   Wt 125 lb (56.7 kg)   BMI 27.05 kg/m²   General appearance: Well appearing, and in no acute distress  Skin: skin color, texture normal.  No rashes or lesions.    Head: Normocephalic, atraumatic.    Neurological exam:    Mental Status:   Attention/Concentration: intact attention on bedside test   Fund of knowledge: intact  Speech: no dysarthria or aphasia     Normal facial expressions, no hypomimia.  Visual fields are full, extraocular movements are intact, face sensation is intact, no facial droop.    Fine postural tremors of upper extremities.  No intention or resting tremors.    Strength is full throughout.    Sensation is intact to light touch.    Gait is stable while using cane.  Normal arm swing.    LABS/DATA:  Reviewed CBC and CMP.  She is anemic.  Platelet count is normal.      IMAGING:  June  2024 MRI brain and CTA head and neck, CT stroke brain    ASSESSMENT:  The patient is a 64 year old woman with past medical history of type 2 diabetes, hypothyroidism, fatty liver, anxiety, hyperlipidemia, alcohol use disorder who presents for follow-up after being found to have a subacute right cerebellar hemorrhage in April 2024 after presenting with 2 weeks of lower extremity weakness and falls including 1 with head trauma.  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      CT brain with right cerebellar vermis/hemispheric hyperdensity with mass effect, further characterized by contrasted MRI brain most compatible with subacute hematoma.    Her neurological exam is much improved and is only significant for a mild upper extremity bilateral postural tremor and gait requiring the use of a cane.  There is no sign of parkinsonism.     Subacute right cerebellar intracerebral hemorrhage suspect multifactorial from thrombocytopenia (could be hepatic in etiology), alcohol use disorder, hepatic injury itself and SSRI use.  She is recovering quite well.  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  Not bothersome.  Probably there is a component of excessive physiological tremor as well  -As her tremors are not bothersome, we will hold on any therapeutic interventions.  I did discuss the use of devices.  - MRI brain with and without as a follow-up to her right cerebellar ICH  - Continue PT  - She is no longer using alcohol  - She is not on an SSRI  - She will follow-up with psychiatry about her lamotrigine  - We can check neuropathy labs at our follow up visit      Follow up: 8 months     Discussed indication, administration, dose, and side effects with patient of any medications personally prescribed. Patient was advised to let my office know if they have any questions or concerns.        Today, I personally spent 20 minutes in this case, including chart review, time spent with patient doing face to face evaluation w/ interview and exam and patient education, counseling, and time was spent in patient education, counseling, and coordination of care as described above.   Issues discussed: Diagnosis and implications on future health, benefits and side effects of present and future medications, test results as well as further testing and medications required.    This note was prepared using Dragon Medical voice recognition dictation software and as a result, errors may occur. When identified, these errors have been corrected. While every attempt is made to correct errors during dictation, discrepancies may still exist    AKASH Hedrick DO   Staff Physician, Neurology   9/25/2024  12:05 PM

## 2024-09-26 ENCOUNTER — MED REC SCAN ONLY (OUTPATIENT)
Dept: INTERNAL MEDICINE CLINIC | Facility: CLINIC | Age: 64
End: 2024-09-26

## 2024-09-26 ENCOUNTER — LAB ENCOUNTER (OUTPATIENT)
Dept: LAB | Facility: HOSPITAL | Age: 64
End: 2024-09-26
Attending: INTERNAL MEDICINE
Payer: COMMERCIAL

## 2024-09-26 ENCOUNTER — TELEPHONE (OUTPATIENT)
Dept: INTERNAL MEDICINE CLINIC | Facility: CLINIC | Age: 64
End: 2024-09-26

## 2024-09-26 DIAGNOSIS — K70.31 ALCOHOLIC CIRRHOSIS OF LIVER WITH ASCITES (HCC): ICD-10-CM

## 2024-09-26 LAB
AFP-TM SERPL-MCNC: 10.7 NG/ML
ALBUMIN SERPL-MCNC: 3.9 G/DL (ref 3.2–4.8)
ALBUMIN/GLOB SERPL: 0.8 {RATIO} (ref 1–2)
ALP LIVER SERPL-CCNC: 227 U/L
ALT SERPL-CCNC: 23 U/L
ANION GAP SERPL CALC-SCNC: 9 MMOL/L (ref 0–18)
AST SERPL-CCNC: 52 U/L (ref ?–34)
BASOPHILS # BLD AUTO: 0.08 X10(3) UL (ref 0–0.2)
BASOPHILS NFR BLD AUTO: 1.1 %
BILIRUB SERPL-MCNC: 3.3 MG/DL (ref 0.2–1.1)
BUN BLD-MCNC: 14 MG/DL (ref 9–23)
BUN/CREAT SERPL: 14.9 (ref 10–20)
CALCIUM BLD-MCNC: 9.8 MG/DL (ref 8.7–10.4)
CHLORIDE SERPL-SCNC: 101 MMOL/L (ref 98–112)
CO2 SERPL-SCNC: 25 MMOL/L (ref 21–32)
CREAT BLD-MCNC: 0.94 MG/DL
DEPRECATED RDW RBC AUTO: 52.1 FL (ref 35.1–46.3)
EGFRCR SERPLBLD CKD-EPI 2021: 68 ML/MIN/1.73M2 (ref 60–?)
EOSINOPHIL # BLD AUTO: 0.03 X10(3) UL (ref 0–0.7)
EOSINOPHIL NFR BLD AUTO: 0.4 %
ERYTHROCYTE [DISTWIDTH] IN BLOOD BY AUTOMATED COUNT: 13.7 % (ref 11–15)
FASTING STATUS PATIENT QL REPORTED: NO
GLOBULIN PLAS-MCNC: 4.6 G/DL (ref 2–3.5)
GLUCOSE BLD-MCNC: 118 MG/DL (ref 70–99)
HBV CORE AB SERPL QL IA: NONREACTIVE
HBV SURFACE AB SER QL: NONREACTIVE
HBV SURFACE AB SERPL IA-ACNC: <3.1 MIU/ML
HBV SURFACE AG SER-ACNC: <0.1 [IU]/L
HBV SURFACE AG SERPL QL IA: NONREACTIVE
HCT VFR BLD AUTO: 34.4 %
HCV AB SERPL QL IA: NONREACTIVE
HGB BLD-MCNC: 11.8 G/DL
IMM GRANULOCYTES # BLD AUTO: 0.02 X10(3) UL (ref 0–1)
IMM GRANULOCYTES NFR BLD: 0.3 %
INR BLD: 1.3 (ref 0.8–1.2)
LYMPHOCYTES # BLD AUTO: 1.82 X10(3) UL (ref 1–4)
LYMPHOCYTES NFR BLD AUTO: 25.5 %
MCH RBC QN AUTO: 36 PG (ref 26–34)
MCHC RBC AUTO-ENTMCNC: 34.3 G/DL (ref 31–37)
MCV RBC AUTO: 104.9 FL
MONOCYTES # BLD AUTO: 0.75 X10(3) UL (ref 0.1–1)
MONOCYTES NFR BLD AUTO: 10.5 %
NEUTROPHILS # BLD AUTO: 4.45 X10 (3) UL (ref 1.5–7.7)
NEUTROPHILS # BLD AUTO: 4.45 X10(3) UL (ref 1.5–7.7)
NEUTROPHILS NFR BLD AUTO: 62.2 %
OSMOLALITY SERPL CALC.SUM OF ELEC: 282 MOSM/KG (ref 275–295)
PLATELET # BLD AUTO: 162 10(3)UL (ref 150–450)
POTASSIUM SERPL-SCNC: 4.1 MMOL/L (ref 3.5–5.1)
PROT SERPL-MCNC: 8.5 G/DL (ref 5.7–8.2)
PROTHROMBIN TIME: 17 SECONDS (ref 11.6–14.8)
RBC # BLD AUTO: 3.28 X10(6)UL
SODIUM SERPL-SCNC: 135 MMOL/L (ref 136–145)
WBC # BLD AUTO: 7.2 X10(3) UL (ref 4–11)

## 2024-09-26 PROCEDURE — 86709 HEPATITIS A IGM ANTIBODY: CPT

## 2024-09-26 PROCEDURE — 86803 HEPATITIS C AB TEST: CPT

## 2024-09-26 PROCEDURE — 85025 COMPLETE CBC W/AUTO DIFF WBC: CPT

## 2024-09-26 PROCEDURE — 80053 COMPREHEN METABOLIC PANEL: CPT

## 2024-09-26 PROCEDURE — 86706 HEP B SURFACE ANTIBODY: CPT

## 2024-09-26 PROCEDURE — 87340 HEPATITIS B SURFACE AG IA: CPT

## 2024-09-26 PROCEDURE — 85610 PROTHROMBIN TIME: CPT

## 2024-09-26 PROCEDURE — 86708 HEPATITIS A ANTIBODY: CPT

## 2024-09-26 PROCEDURE — 86038 ANTINUCLEAR ANTIBODIES: CPT

## 2024-09-26 PROCEDURE — 86225 DNA ANTIBODY NATIVE: CPT

## 2024-09-26 PROCEDURE — 36415 COLL VENOUS BLD VENIPUNCTURE: CPT

## 2024-09-26 PROCEDURE — 82105 ALPHA-FETOPROTEIN SERUM: CPT

## 2024-09-26 PROCEDURE — 86704 HEP B CORE ANTIBODY TOTAL: CPT

## 2024-09-28 LAB
HAV AB SER QL IA: REACTIVE
HAV IGM SER QL: NONREACTIVE

## 2024-09-30 NOTE — TELEPHONE ENCOUNTER
Please review. Protocol Failed; No Protocol    Requested Prescriptions   Pending Prescriptions Disp Refills    rifAXIMin 550 MG Oral Tab 60 tablet 2     Sig: Take 1 tablet (550 mg total) by mouth 2 (two) times daily.       There is no refill protocol information for this order            Future Appointments         Provider Department Appt Notes    In 2 days Antony Zavala MD [facility], [place] Bridgett Garcia Red spots on arms    In 2 days [facility] SCHEDULED RESOURCE [facility]     In 1 week Yobany Guardado MD [facility] Follow up - Ok'd per Dr Guardado add on JS/SB 9/9/2024    In 1 month [address] Mammography - [facility] r/s from 10/04/24 [place]  Scheduled with patient's Daughter Nancy. She is aware to bring previous imaging    In 1 month LD GUARDADO [facility] CLN/JARROD w/MAC @ [facility]    In 2 months Leydi Bonilla MD [facility] 3 months    In 2 months Lillian Baron DPM [facility] Diabetic    In 3 months [facility] MRI RM2 (3T WIDE) [facility] system MRI Scheduled with daughter -bt          Recent Outpatient Visits              4 days ago Alcoholic cirrhosis of liver with ascites (HCC)    [facility] Leydi Bonilla MD    Office Visit    5 days ago Cerebellar hemorrhage (HCC)    [facility] Pepper Hedrick DO    Office Visit    2 weeks ago Telogen effluvium    [facility] Ozzie Castanon MD    Office Visit    1 month ago Encounter for examination following treatment at hospital    [facility], [place] Agron B, MD    Office Visit    1 month ago Liver lesion    [facility],  [facility], Gita Tavarez PA-C    Office Visit

## 2024-10-01 LAB
DSDNA IGG SERPL IA-ACNC: 1.8 IU/ML
ENA AB SER QL IA: 0.2 UG/L
ENA AB SER QL IA: NEGATIVE

## 2024-10-02 ENCOUNTER — OFFICE VISIT (OUTPATIENT)
Dept: INTERNAL MEDICINE CLINIC | Facility: CLINIC | Age: 64
End: 2024-10-02
Payer: COMMERCIAL

## 2024-10-02 ENCOUNTER — TELEPHONE (OUTPATIENT)
Dept: INTERNAL MEDICINE CLINIC | Facility: CLINIC | Age: 64
End: 2024-10-02

## 2024-10-02 ENCOUNTER — LAB ENCOUNTER (OUTPATIENT)
Dept: LAB | Facility: HOSPITAL | Age: 64
End: 2024-10-02
Attending: INTERNAL MEDICINE
Payer: COMMERCIAL

## 2024-10-02 VITALS
HEIGHT: 57 IN | OXYGEN SATURATION: 99 % | HEART RATE: 101 BPM | DIASTOLIC BLOOD PRESSURE: 59 MMHG | TEMPERATURE: 98 F | SYSTOLIC BLOOD PRESSURE: 130 MMHG | BODY MASS INDEX: 26.97 KG/M2 | RESPIRATION RATE: 18 BRPM | WEIGHT: 125 LBS

## 2024-10-02 DIAGNOSIS — R60.0 BILATERAL LEG EDEMA: ICD-10-CM

## 2024-10-02 DIAGNOSIS — Z00.00 ENCOUNTER FOR PREVENTIVE CARE: ICD-10-CM

## 2024-10-02 DIAGNOSIS — K70.31 ALCOHOLIC CIRRHOSIS OF LIVER WITH ASCITES (HCC): ICD-10-CM

## 2024-10-02 DIAGNOSIS — L81.4 SKIN SPOTS-AGING: Primary | ICD-10-CM

## 2024-10-02 LAB
ANION GAP SERPL CALC-SCNC: 9 MMOL/L (ref 0–18)
BUN BLD-MCNC: 19 MG/DL (ref 9–23)
BUN/CREAT SERPL: 19.8 (ref 10–20)
CALCIUM BLD-MCNC: 9.7 MG/DL (ref 8.7–10.4)
CHLORIDE SERPL-SCNC: 100 MMOL/L (ref 98–112)
CO2 SERPL-SCNC: 27 MMOL/L (ref 21–32)
CREAT BLD-MCNC: 0.96 MG/DL
EGFRCR SERPLBLD CKD-EPI 2021: 66 ML/MIN/1.73M2 (ref 60–?)
FASTING STATUS PATIENT QL REPORTED: YES
GLUCOSE BLD-MCNC: 157 MG/DL (ref 70–99)
IGM SERPL-MCNC: 241.8 MG/DL (ref 50–300)
IMMUNOGLOBULIN PNL SER-MCNC: 1908 MG/DL (ref 650–1600)
OSMOLALITY SERPL CALC.SUM OF ELEC: 288 MOSM/KG (ref 275–295)
POTASSIUM SERPL-SCNC: 4.4 MMOL/L (ref 3.5–5.1)
SODIUM SERPL-SCNC: 136 MMOL/L (ref 136–145)

## 2024-10-02 PROCEDURE — 3078F DIAST BP <80 MM HG: CPT | Performed by: INTERNAL MEDICINE

## 2024-10-02 PROCEDURE — 3075F SYST BP GE 130 - 139MM HG: CPT | Performed by: INTERNAL MEDICINE

## 2024-10-02 PROCEDURE — 3044F HG A1C LEVEL LT 7.0%: CPT | Performed by: INTERNAL MEDICINE

## 2024-10-02 PROCEDURE — 80048 BASIC METABOLIC PNL TOTAL CA: CPT | Performed by: INTERNAL MEDICINE

## 2024-10-02 PROCEDURE — 90677 PCV20 VACCINE IM: CPT | Performed by: INTERNAL MEDICINE

## 2024-10-02 PROCEDURE — 90472 IMMUNIZATION ADMIN EACH ADD: CPT | Performed by: INTERNAL MEDICINE

## 2024-10-02 PROCEDURE — 82784 ASSAY IGA/IGD/IGG/IGM EACH: CPT

## 2024-10-02 PROCEDURE — 90746 HEPB VACCINE 3 DOSE ADULT IM: CPT | Performed by: INTERNAL MEDICINE

## 2024-10-02 PROCEDURE — 90471 IMMUNIZATION ADMIN: CPT | Performed by: INTERNAL MEDICINE

## 2024-10-02 PROCEDURE — 83516 IMMUNOASSAY NONANTIBODY: CPT

## 2024-10-02 PROCEDURE — 3008F BODY MASS INDEX DOCD: CPT | Performed by: INTERNAL MEDICINE

## 2024-10-02 PROCEDURE — 36415 COLL VENOUS BLD VENIPUNCTURE: CPT | Performed by: INTERNAL MEDICINE

## 2024-10-02 PROCEDURE — 99214 OFFICE O/P EST MOD 30 MIN: CPT | Performed by: INTERNAL MEDICINE

## 2024-10-02 RX ORDER — ERGOCALCIFEROL 1.25 MG/1
CAPSULE, LIQUID FILLED ORAL
Qty: 4 CAPSULE | Refills: 0 | OUTPATIENT
Start: 2024-10-02

## 2024-10-02 RX ORDER — ERGOCALCIFEROL 1.25 MG/1
50000 CAPSULE, LIQUID FILLED ORAL
Qty: 4 CAPSULE | Refills: 0 | OUTPATIENT
Start: 2024-10-02

## 2024-10-02 NOTE — TELEPHONE ENCOUNTER
Please review. Protocol Failed; No Protocol    Medication(s) to Refill:   Requested Prescriptions     Pending Prescriptions Disp Refills    ERGOCALCIFEROL 1.25 MG (50548 UT) Oral Cap [Pharmacy Med Name: VITAMIN D2 50,000IU (ERGO) CAP RX] 4 capsule 0     Sig: TAKE 1 CAPSULE BY MOUTH EVERY 7 DAYS ON SUNDAYS         Reason for Medication Refill being sent to Provider / Reason Protocol Failed:  [x] Non-Protocol Medication        Recent Labs:  No results found for: \"VITD\", \"QVITD\", \"[id number]\"           Requested Prescriptions   Pending Prescriptions Disp Refills    ERGOCALCIFEROL 1.25 MG (45137 UT) Oral Cap [Pharmacy Med Name: VITAMIN D2 50,000IU (ERGO) CAP RX] 4 capsule 0     Sig: TAKE 1 CAPSULE BY MOUTH EVERY 7 DAYS ON SUNDAYS       There is no refill protocol information for this order            Future Appointments         Provider Department Appt Notes    Today Antony Zavala MD [facility], [facility] [place] Red spots on arms    Today [facility] SCHEDULED RESOURCE [facility] qa dd    In 1 week Yobany Guardado MD [facility] Follow up - Ok'd per Dr Guardado add on JS/SB 9/9/2024    In 4 weeks [address] Mammography - [facility] r/s from 10/04/24 [place]  Scheduled with patient's Daughter Nancy. She is aware to bring previous imaging    In 1 month LD GUARDADO [facility] CLN/EGJENNI w/MAC @ [facility]    In 2 months Leydi Bonilla MD [facility] 3 months    In 2 months Lillian Baron DPM [facility] Diabetic    In 3 months [facility] MRI 2 (3T WIDE) [facility] MRI Scheduled with daughter -bt    In 4 months [facility] US  2 [facility] Ultrasound SCHEDULED W/DAUGHTER          Recent Outpatient Visits              6 days ago Alcoholic cirrhosis of liver with ascites  [facility]CC)    [facility]    Office Visit    1 week ago Cerebellar hemorrhage (HCC)    [facility], [place]    Office Visit    3 weeks ago Telogen effluvium    [facility], [place]    Office Visit    1 month ago Encounter for examination following treatment at hospital    [facility], Bridgett Crump Agron B, MD    Office Visit    1 month ago Liver lesion    [facility], Gita Tavarez PA-C    Office Visit

## 2024-10-02 NOTE — TELEPHONE ENCOUNTER
Prior authorization initiated through cover my meds for xifaxan 550mg  (Key: [id number]    Your PA has been resolved, no additional PA is required. For further inquiries please contact the number on the back of the member prescription card. (Message [phone])  XIFAXAN 550MG TABLETS 09/24/2024 06/14/2024  30 each  15 Antony Zavala MD [facility] DRUG STORE #...       Approx time taken on encounter:  +10 minutes

## 2024-10-02 NOTE — TELEPHONE ENCOUNTER
Nikki Dobson, [facility]    10/2/24 10:13 AM  Note     MESSAGE:   RE: Christi Daysi  :1960  KEY#[id number]  Medication-Xifaxan 550MG Tabs

## 2024-10-02 NOTE — TELEPHONE ENCOUNTER
MESSAGE:   RE: Christi Tavarez  :1960  KEY#[id number]  Medication-Xifaxan 550MG Tabs     Plan does not cover this medication. Please call plan () to initiate prior authorization or call/fax/eRx to pharmacy change of medication.   Patients ID#:      - If changing med, please send directly to pharmacy.   - If doing Prior Auth, please route to RN Triage Support.

## 2024-10-03 LAB
ACTIN SMOOTH MUSCLE AB: 9 UNITS
M2 MITOCHONDRIAL AB: <20 UNITS

## 2024-10-03 RX ORDER — ERGOCALCIFEROL 1.25 MG/1
50000 CAPSULE, LIQUID FILLED ORAL
Qty: 4 CAPSULE | Refills: 0 | OUTPATIENT
Start: 2024-10-03

## 2024-10-06 NOTE — PROGRESS NOTES
Subjective:     Patient ID: Christited Tavarez is a 64 year old female.    HPI  Patient comes in for follow-up here with daughter overall doing better, edema over all better but at times worst taking bumex . Some r in toed spots in the skin of arms no itching  Pt is considering to move back in to her place  daughter and son live close by.     History/Other:   Review of Systems   Constitutional: Negative.    HENT: Negative.     Eyes: Negative.    Respiratory: Negative.     Cardiovascular:  Positive for leg swelling.        Bl lower ex swelling but stable    Gastrointestinal: Negative.    Genitourinary: Negative.    Musculoskeletal:  Positive for arthralgias.   Skin: Negative.         Red spots to arms   Neurological: Negative.    Psychiatric/Behavioral: Negative.       Current Outpatient Medications   Medication Sig Dispense Refill    rifAXIMin 550 MG Oral Tab Take 1 tablet (550 mg total) by mouth 2 (two) times daily. 60 tablet 2    potassium chloride (KLOR-CON) 20 MEQ Oral Powd Pack Take 40 mEq by mouth daily. 90 packet 0    ergocalciferol 1.25 MG (13115 UT) Oral Cap Take 1 capsule (50,000 Units total) by mouth every 7 days. Sundays 4 capsule 0    spironolactone 50 MG Oral Tab Take 1 tablet (50 mg total) by mouth 2 (two) times daily. 180 tablet 1    escitalopram 10 MG Oral Tab Take 1 tablet (10 mg total) by mouth nightly. 90 tablet 3    lamoTRIgine 25 MG Oral Tab Take 1 tablet (25 mg total) by mouth 2 (two) times daily. 180 tablet 3    levothyroxine 50 MCG Oral Tab Take 1 tablet (50 mcg total) by mouth before breakfast. 90 tablet 3    omeprazole 20 MG Oral Capsule Delayed Release Take 1 capsule (20 mg total) by mouth every morning before breakfast. 90 capsule 3    folic acid 1 MG Oral Tab Take 1 tablet (1 mg total) by mouth daily. 90 tablet 0    bumetanide 2 MG Oral Tab Take 1 tablet (2 mg total) by mouth BID (Diuretic). 60 tablet 2    lactulose 10 GM/15ML Oral Solution Take 30 mL (20 g total) by mouth 2 (two) times  daily as needed.      midodrine 2.5 MG Oral Tab Take 1 tablet (2.5 mg total) by mouth in the morning and 1 tablet (2.5 mg total) at noon and 1 tablet (2.5 mg total) in the evening. (Patient not taking: Reported on 2024) 90 tablet 1    PEG 3350-KCl-Na Bicarb-NaCl (TRILYTE) 420 g Oral Recon Soln Take prep as directed by gastro office. May substitute with Trilyte/generic equivalent if needed. (Patient not taking: Reported on 2024) 1 each 0     Allergies:No Known Allergies    Past Medical History:    Acute, but ill-defined, cerebrovascular disease    Anxiety    CHF (congestive heart failure) (HCC)    Depression    Diabetes (HCC)    Hypothyroidism    Stroke (HCC)      Past Surgical History:   Procedure Laterality Date            Family History   Problem Relation Age of Onset    Diabetes Daughter       Social History:   Social History     Socioeconomic History    Marital status:    Tobacco Use    Smoking status: Former     Current packs/day: 0.00     Types: Cigarettes     Quit date: 2024     Years since quittin.5    Smokeless tobacco: Never   Vaping Use    Vaping status: Never Used   Substance and Sexual Activity    Alcohol use: Not Currently     Comment: socially ,not since 2024    Drug use: Not Currently     Types: Cannabis, Cocaine     Social Determinants of Health     Food Insecurity: No Food Insecurity (6/3/2024)    Food Insecurity     Food Insecurity: Never true   Transportation Needs: No Transportation Needs (6/3/2024)    Transportation Needs     Lack of Transportation: No   Housing Stability: Low Risk  (6/3/2024)    Housing Stability     Housing Instability: No        Objective:   Physical Exam  Vitals and nursing note reviewed.   Constitutional:       Appearance: She is well-developed.   HENT:      Head: Normocephalic and atraumatic.      Right Ear: External ear normal.      Left Ear: External ear normal.      Nose: Nose normal.   Eyes:      Conjunctiva/sclera: Conjunctivae  normal.      Pupils: Pupils are equal, round, and reactive to light.   Cardiovascular:      Rate and Rhythm: Normal rate and regular rhythm.      Heart sounds: Normal heart sounds.   Pulmonary:      Effort: Pulmonary effort is normal.      Breath sounds: Normal breath sounds.   Abdominal:      General: Bowel sounds are normal.      Palpations: Abdomen is soft.   Genitourinary:     Vagina: Normal.   Musculoskeletal:         General: Swelling present. Normal range of motion.      Cervical back: Normal range of motion and neck supple.      Comments: Bl lower ex swelling but stable trace to 1 +   Skin:     General: Skin is warm and dry.      Comments: Red spots to arms   Neurological:      Mental Status: She is alert and oriented to person, place, and time.      Deep Tendon Reflexes: Reflexes are normal and symmetric.   Psychiatric:         Behavior: Behavior normal.         Thought Content: Thought content normal.         Judgment: Judgment normal.         Assessment & Plan:   1. Skin spots-aging - this are nl   2. Encounter for preventive care - will refer to ob/gyn   3. Bilateral leg edema - continue with current Care        Orders Placed This Encounter   Procedures    Basic Metabolic Panel (8) [E]    HEPATITIS B VACCINE, OVER 20    Prevnar 20 (PCV20) [68806]       Meds This Visit:  Requested Prescriptions      No prescriptions requested or ordered in this encounter       Imaging & Referrals:  OBG - INTERNAL  HEPATITIS B VACCINE, OVER 20  PCV20 VACCINE FOR INTRAMUSCULAR USE

## 2024-10-10 RX ORDER — BUMETANIDE 2 MG/1
2 TABLET ORAL 2 TIMES DAILY
Qty: 180 TABLET | Refills: 3 | Status: SHIPPED | OUTPATIENT
Start: 2024-10-10

## 2024-10-10 NOTE — TELEPHONE ENCOUNTER
Refill passed per [facility] protocols.    Requested Prescriptions   Pending Prescriptions Disp Refills    BUMETANIDE 2 MG Oral Tab [Pharmacy Med Name: BUMETANIDE 2MG TABLETS] 180 tablet 3     Sig: TAKE 1 TABLET(2 MG) BY MOUTH TWICE DAILY FOR DIURETIC       Hypertension Medications Protocol Passed - 10/10/2024  4:39 PM        Passed - CMP or BMP in past 12 months        Passed - Last BP reading less than 140/90     BP Readings from Last 1 Encounters:   10/09/24 128/75               Passed - In person appointment or virtual visit in the past 12 mos or appointment in next 3 mos     Recent Outpatient Visits              Yesterday Alcoholic cirrhosis of liver with ascites (HCC)    [facility] Yobany Guardado MD    Office Visit    1 week ago Skin spots-aging    [facility] Bridgett Garcia Agron B, MD    Office Visit    2 weeks ago Alcoholic cirrhosis of liver with ascites (HCC)    [facility] Leydi Bonilla MD    Office Visit    2 weeks ago Cerebellar hemorrhage (HCC)    [facility] Pepper Hedrick DO    Office Visit    4 weeks ago Telogen effluvium    [facility] Ozzie Castanon MD    Office Visit          Future Appointments         Provider Department Appt Notes    In 2 weeks [address] [facility] [place]/24 [place]  Scheduled with patient's Daughter Nancy. She is aware to bring previous imaging    In 3 weeks LD GUARDADO [facility] CLN/EGD w/MAC @ [facility]    In 1 month Antony Zavala MD [facility] Bridgett Garcia physical w/ hep b injection    In 2 months Leydi Bonlila MD [facility] 3 months    In 2 months Lillian Baron DPM [place]  [facility], [facility], [facility], [place] Diabetic    In 3 months [facility] MRI RM2 (3T WIDE) [facility] MRI Scheduled with daughter -bt    In 3 months [facility] US RM 2 [facility] Ultrasound SCHEDULED W/DAUGHTER                    Passed - EGFRCR or GFRNAA > 50     GFR Evaluation  EGFRCR: 66 , resulted on 10/2/2024

## 2024-10-28 ENCOUNTER — TELEPHONE (OUTPATIENT)
Dept: INTERNAL MEDICINE CLINIC | Facility: CLINIC | Age: 64
End: 2024-10-28

## 2024-10-28 NOTE — TELEPHONE ENCOUNTER
Spoke to patient's daughter Nancy (on Release of Information), verified Name and . She states that patient received the first dose of Shingles vaccine two years ago at [facility].    She went over this with patient's primary care provider and recommendation is patient does not need to start the vaccination series again. Patient can just go ahead and proceed with the second dose, which the daughter scheduled on . Order already placed on . No further questions or concerns at this time.    Future Appointments   Date Time Provider Department Center   10/30/2024 11:20 AM [facility]   2024  1:00 PM [place], PROCEDURE ECCFHGIPROC None   2024 11:00 AM [facility] NURSING [id number] [place] [id number]

## 2024-10-28 NOTE — TELEPHONE ENCOUNTER
Patient's daughter, Nancy, scheduled patient's 2nd shingles vaccine with nurse for 11/8/24.    Patient's daughter mentions PCP advised okay to get the 2nd shingles vaccine at scheduled appointment    Order is confirmed dated 9/13/24

## 2024-10-30 ENCOUNTER — HOSPITAL ENCOUNTER (OUTPATIENT)
Dept: MAMMOGRAPHY | Facility: HOSPITAL | Age: 64
Discharge: HOME | End: 2024-10-30
Attending: INTERNAL MEDICINE
Payer: COMMERCIAL

## 2024-10-30 DIAGNOSIS — Z12.31 SCREENING MAMMOGRAM FOR BREAST CANCER: ICD-10-CM

## 2024-10-30 PROCEDURE — 77063 BREAST TOMOSYNTHESIS BI: CPT | Performed by: INTERNAL MEDICINE

## 2024-10-30 PROCEDURE — 77067 SCR MAMMO BI INCL CAD: CPT | Performed by: INTERNAL MEDICINE

## 2024-11-06 ENCOUNTER — HOSPITAL ENCOUNTER (OUTPATIENT)
Facility: HOSPITAL | Age: 64
Setting detail: OBSERVATION
Discharge: HOME | End: 2024-11-07
Attending: STUDENT IN AN ORGANIZED HEALTH CARE EDUCATION/TRAINING PROGRAM | Admitting: STUDENT IN AN ORGANIZED HEALTH CARE EDUCATION/TRAINING PROGRAM
Payer: COMMERCIAL

## 2024-11-06 ENCOUNTER — ANESTHESIA EVENT (OUTPATIENT)
Dept: ENDOSCOPY | Facility: HOSPITAL | Age: 64
End: 2024-11-06
Payer: COMMERCIAL

## 2024-11-06 ENCOUNTER — ANESTHESIA (OUTPATIENT)
Dept: ENDOSCOPY | Facility: HOSPITAL | Age: 64
End: 2024-11-06
Payer: COMMERCIAL

## 2024-11-06 DIAGNOSIS — K74.60 HEPATIC CIRRHOSIS, UNSPECIFIED HEPATIC CIRRHOSIS TYPE, UNSPECIFIED WHETHER ASCITES PRESENT (HCC): ICD-10-CM

## 2024-11-06 PROBLEM — K92.2 GI BLEED: Status: ACTIVE | Noted: 2024-11-06

## 2024-11-06 LAB
ALBUMIN SERPL-MCNC: 3.2 G/DL (ref 3.2–4.8)
ALBUMIN/GLOB SERPL: 0.8 {RATIO} (ref 1–2)
ALP LIVER SERPL-CCNC: 129 U/L
ALT SERPL-CCNC: 16 U/L
ANION GAP SERPL CALC-SCNC: 5 MMOL/L (ref 0–18)
AST SERPL-CCNC: 35 U/L (ref ?–34)
BILIRUB SERPL-MCNC: 1.9 MG/DL (ref 0.2–1.1)
BUN BLD-MCNC: 13 MG/DL (ref 9–23)
BUN/CREAT SERPL: 16.3 (ref 10–20)
CALCIUM BLD-MCNC: 9.4 MG/DL (ref 8.7–10.4)
CHLORIDE SERPL-SCNC: 102 MMOL/L (ref 98–112)
CO2 SERPL-SCNC: 29 MMOL/L (ref 21–32)
CREAT BLD-MCNC: 0.8 MG/DL
DEPRECATED RDW RBC AUTO: 57.4 FL (ref 35.1–46.3)
EGFRCR SERPLBLD CKD-EPI 2021: 82 ML/MIN/1.73M2 (ref 60–?)
ERYTHROCYTE [DISTWIDTH] IN BLOOD BY AUTOMATED COUNT: 15.5 % (ref 11–15)
GLOBULIN PLAS-MCNC: 4 G/DL (ref 2–3.5)
GLUCOSE BLD-MCNC: 143 MG/DL (ref 70–99)
GLUCOSE BLDC GLUCOMTR-MCNC: 143 MG/DL (ref 70–99)
HCT VFR BLD AUTO: 32.3 %
HGB BLD-MCNC: 10.9 G/DL
INR BLD: 1.49 (ref 0.8–1.2)
MCH RBC QN AUTO: 33.7 PG (ref 26–34)
MCHC RBC AUTO-ENTMCNC: 33.7 G/DL (ref 31–37)
MCV RBC AUTO: 100 FL
OSMOLALITY SERPL CALC.SUM OF ELEC: 285 MOSM/KG (ref 275–295)
PLATELET # BLD AUTO: 133 10(3)UL (ref 150–450)
POTASSIUM SERPL-SCNC: 3.7 MMOL/L (ref 3.5–5.1)
PROT SERPL-MCNC: 7.2 G/DL (ref 5.7–8.2)
PROTHROMBIN TIME: 18.9 SECONDS (ref 11.6–14.8)
RBC # BLD AUTO: 3.23 X10(6)UL
SODIUM SERPL-SCNC: 136 MMOL/L (ref 136–145)
WBC # BLD AUTO: 6.9 X10(3) UL (ref 4–11)

## 2024-11-06 PROCEDURE — 0DBN8ZX EXCISION OF SIGMOID COLON, VIA NATURAL OR ARTIFICIAL OPENING ENDOSCOPIC, DIAGNOSTIC: ICD-10-PCS | Performed by: STUDENT IN AN ORGANIZED HEALTH CARE EDUCATION/TRAINING PROGRAM

## 2024-11-06 PROCEDURE — 99222 1ST HOSP IP/OBS MODERATE 55: CPT | Performed by: HOSPITALIST

## 2024-11-06 PROCEDURE — 45385 COLONOSCOPY W/LESION REMOVAL: CPT | Performed by: STUDENT IN AN ORGANIZED HEALTH CARE EDUCATION/TRAINING PROGRAM

## 2024-11-06 PROCEDURE — 0DJ08ZZ INSPECTION OF UPPER INTESTINAL TRACT, VIA NATURAL OR ARTIFICIAL OPENING ENDOSCOPIC: ICD-10-PCS | Performed by: STUDENT IN AN ORGANIZED HEALTH CARE EDUCATION/TRAINING PROGRAM

## 2024-11-06 PROCEDURE — 43235 EGD DIAGNOSTIC BRUSH WASH: CPT | Performed by: STUDENT IN AN ORGANIZED HEALTH CARE EDUCATION/TRAINING PROGRAM

## 2024-11-06 PROCEDURE — 3E0H8GC INTRODUCTION OF OTHER THERAPEUTIC SUBSTANCE INTO LOWER GI, VIA NATURAL OR ARTIFICIAL OPENING ENDOSCOPIC: ICD-10-PCS | Performed by: STUDENT IN AN ORGANIZED HEALTH CARE EDUCATION/TRAINING PROGRAM

## 2024-11-06 DEVICE — REPLAY HEMOSTASIS CLIP, 11MM SPAN
Type: IMPLANTABLE DEVICE | Site: COLON | Status: FUNCTIONAL
Brand: REPLAY

## 2024-11-06 RX ORDER — HYDROMORPHONE HYDROCHLORIDE 1 MG/ML
0.4 INJECTION, SOLUTION INTRAMUSCULAR; INTRAVENOUS; SUBCUTANEOUS EVERY 5 MIN PRN
Status: DISCONTINUED | OUTPATIENT
Start: 2024-11-06 | End: 2024-11-06 | Stop reason: HOSPADM

## 2024-11-06 RX ORDER — ACETAMINOPHEN 500 MG
500 TABLET ORAL EVERY 4 HOURS PRN
Status: DISCONTINUED | OUTPATIENT
Start: 2024-11-06 | End: 2024-11-07

## 2024-11-06 RX ORDER — MORPHINE SULFATE 4 MG/ML
4 INJECTION, SOLUTION INTRAMUSCULAR; INTRAVENOUS EVERY 10 MIN PRN
Status: DISCONTINUED | OUTPATIENT
Start: 2024-11-06 | End: 2024-11-06 | Stop reason: HOSPADM

## 2024-11-06 RX ORDER — LIDOCAINE HYDROCHLORIDE 10 MG/ML
INJECTION, SOLUTION EPIDURAL; INFILTRATION; INTRACAUDAL; PERINEURAL AS NEEDED
Status: DISCONTINUED | OUTPATIENT
Start: 2024-11-06 | End: 2024-11-06 | Stop reason: SURG

## 2024-11-06 RX ORDER — BUMETANIDE 1 MG/1
2 TABLET ORAL 2 TIMES DAILY
Status: DISCONTINUED | OUTPATIENT
Start: 2024-11-07 | End: 2024-11-07

## 2024-11-06 RX ORDER — PANTOPRAZOLE SODIUM 20 MG/1
20 TABLET, DELAYED RELEASE ORAL
Status: DISCONTINUED | OUTPATIENT
Start: 2024-11-07 | End: 2024-11-07

## 2024-11-06 RX ORDER — MORPHINE SULFATE 4 MG/ML
2 INJECTION, SOLUTION INTRAMUSCULAR; INTRAVENOUS EVERY 10 MIN PRN
Status: DISCONTINUED | OUTPATIENT
Start: 2024-11-06 | End: 2024-11-06 | Stop reason: HOSPADM

## 2024-11-06 RX ORDER — FOLIC ACID 1 MG/1
1 TABLET ORAL DAILY
Status: DISCONTINUED | OUTPATIENT
Start: 2024-11-06 | End: 2024-11-07

## 2024-11-06 RX ORDER — KETAMINE HYDROCHLORIDE 50 MG/ML
INJECTION, SOLUTION INTRAMUSCULAR; INTRAVENOUS AS NEEDED
Status: DISCONTINUED | OUTPATIENT
Start: 2024-11-06 | End: 2024-11-06 | Stop reason: SURG

## 2024-11-06 RX ORDER — DEXTROSE MONOHYDRATE 25 G/50ML
50 INJECTION, SOLUTION INTRAVENOUS
Status: DISCONTINUED | OUTPATIENT
Start: 2024-11-06 | End: 2024-11-06 | Stop reason: HOSPADM

## 2024-11-06 RX ORDER — NICOTINE POLACRILEX 4 MG
15 LOZENGE BUCCAL
Status: DISCONTINUED | OUTPATIENT
Start: 2024-11-06 | End: 2024-11-06 | Stop reason: HOSPADM

## 2024-11-06 RX ORDER — SPIRONOLACTONE 25 MG/1
50 TABLET ORAL 2 TIMES DAILY
Status: DISCONTINUED | OUTPATIENT
Start: 2024-11-07 | End: 2024-11-07

## 2024-11-06 RX ORDER — LACTULOSE 10 G/15ML
20 SOLUTION ORAL 2 TIMES DAILY PRN
Status: DISCONTINUED | OUTPATIENT
Start: 2024-11-06 | End: 2024-11-07

## 2024-11-06 RX ORDER — NALOXONE HYDROCHLORIDE 0.4 MG/ML
0.08 INJECTION, SOLUTION INTRAMUSCULAR; INTRAVENOUS; SUBCUTANEOUS AS NEEDED
Status: DISCONTINUED | OUTPATIENT
Start: 2024-11-06 | End: 2024-11-06 | Stop reason: HOSPADM

## 2024-11-06 RX ORDER — ONDANSETRON 2 MG/ML
4 INJECTION INTRAMUSCULAR; INTRAVENOUS EVERY 6 HOURS PRN
Status: DISCONTINUED | OUTPATIENT
Start: 2024-11-06 | End: 2024-11-07

## 2024-11-06 RX ORDER — LAMOTRIGINE 25 MG/1
25 TABLET ORAL 2 TIMES DAILY
Status: DISCONTINUED | OUTPATIENT
Start: 2024-11-06 | End: 2024-11-06

## 2024-11-06 RX ORDER — TEMAZEPAM 7.5 MG/1
15 CAPSULE ORAL NIGHTLY PRN
Status: DISCONTINUED | OUTPATIENT
Start: 2024-11-06 | End: 2024-11-07

## 2024-11-06 RX ORDER — GLYCOPYRROLATE 0.2 MG/ML
INJECTION, SOLUTION INTRAMUSCULAR; INTRAVENOUS AS NEEDED
Status: DISCONTINUED | OUTPATIENT
Start: 2024-11-06 | End: 2024-11-06 | Stop reason: SURG

## 2024-11-06 RX ORDER — SODIUM CHLORIDE, SODIUM LACTATE, POTASSIUM CHLORIDE, CALCIUM CHLORIDE 600; 310; 30; 20 MG/100ML; MG/100ML; MG/100ML; MG/100ML
INJECTION, SOLUTION INTRAVENOUS CONTINUOUS
Status: DISCONTINUED | OUTPATIENT
Start: 2024-11-06 | End: 2024-11-06

## 2024-11-06 RX ORDER — HYDROMORPHONE HYDROCHLORIDE 1 MG/ML
0.2 INJECTION, SOLUTION INTRAMUSCULAR; INTRAVENOUS; SUBCUTANEOUS EVERY 5 MIN PRN
Status: DISCONTINUED | OUTPATIENT
Start: 2024-11-06 | End: 2024-11-06 | Stop reason: HOSPADM

## 2024-11-06 RX ORDER — MORPHINE SULFATE 10 MG/ML
6 INJECTION, SOLUTION INTRAMUSCULAR; INTRAVENOUS EVERY 10 MIN PRN
Status: DISCONTINUED | OUTPATIENT
Start: 2024-11-06 | End: 2024-11-06 | Stop reason: HOSPADM

## 2024-11-06 RX ORDER — PROCHLORPERAZINE EDISYLATE 5 MG/ML
5 INJECTION INTRAMUSCULAR; INTRAVENOUS EVERY 8 HOURS PRN
Status: DISCONTINUED | OUTPATIENT
Start: 2024-11-06 | End: 2024-11-07

## 2024-11-06 RX ORDER — ESCITALOPRAM OXALATE 10 MG/1
10 TABLET ORAL NIGHTLY
Status: DISCONTINUED | OUTPATIENT
Start: 2024-11-06 | End: 2024-11-07

## 2024-11-06 RX ORDER — HYDROMORPHONE HYDROCHLORIDE 1 MG/ML
0.6 INJECTION, SOLUTION INTRAMUSCULAR; INTRAVENOUS; SUBCUTANEOUS EVERY 5 MIN PRN
Status: DISCONTINUED | OUTPATIENT
Start: 2024-11-06 | End: 2024-11-06 | Stop reason: HOSPADM

## 2024-11-06 RX ORDER — NICOTINE POLACRILEX 4 MG
30 LOZENGE BUCCAL
Status: DISCONTINUED | OUTPATIENT
Start: 2024-11-06 | End: 2024-11-06 | Stop reason: HOSPADM

## 2024-11-06 RX ADMIN — LIDOCAINE HYDROCHLORIDE 25 MG: 10 INJECTION, SOLUTION EPIDURAL; INFILTRATION; INTRACAUDAL; PERINEURAL at 13:29:00

## 2024-11-06 RX ADMIN — KETAMINE HYDROCHLORIDE 20 MG: 50 INJECTION, SOLUTION INTRAMUSCULAR; INTRAVENOUS at 13:31:00

## 2024-11-06 RX ADMIN — GLYCOPYRROLATE 0.1 MG: 0.2 INJECTION, SOLUTION INTRAMUSCULAR; INTRAVENOUS at 13:25:00

## 2024-11-06 RX ADMIN — SODIUM CHLORIDE, SODIUM LACTATE, POTASSIUM CHLORIDE, CALCIUM CHLORIDE: 600; 310; 30; 20 INJECTION, SOLUTION INTRAVENOUS at 13:25:00

## 2024-11-06 NOTE — DISCHARGE INSTRUCTIONS
Home Care Instructions for Colonoscopy and Gastroscopy with Sedation    Diet:  - Resume your regular diet as tolerated unless otherwise instructed.  - Start with light meals to minimize bloating.  - Do not drink alcohol today.    Medication:  - If you have questions about resuming your normal medications, please contact your Primary Care Physician.    Activities:  - Take it easy today. Do not return to work today.  - Do not drive today.  - Do not operate any machinery today (including kitchen equipment).    Colonoscopy:  - You may notice some rectal \"spotting\" (a little blood on the toilet tissue) for a day or two after the exam. This is normal.  - If you experience any rectal bleeding (not spotting), persistent tenderness or sharp severe abdominal pains, oral temperature over 100 degrees Fahrenheit, light-headedness or dizziness, or any other problems, contact your doctor.    Gastroscopy:  - You may have a sore throat for 2-3 days following the exam. This is normal. Gargling with warm salt water (1/2 tsp salt to 1 glass warm water) or using throat lozenges will help.  - If you experience any sharp pain in your neck, abdomen or chest, vomiting of blood, oral temperature over 100 degrees Fahrenheit, light-headedness or dizziness, or any other problems, contact your doctor.    **If unable to reach your doctor, please go to the [facility] Emergency Room**    - Your referring physician will receive a full report of your examination.  - If you do not hear from your doctor's office within two weeks of your biopsy, please call them for your results.    You may be able to see your laboratory results in [facility] between 4 and 7 business days.  In some cases, your physician may not have viewed the results before they are released to [facility].  If you have questions regarding your results contact the physician who ordered the test/exam by phone or via [facility] by choosing \"Ask a Medical Question.\"

## 2024-11-06 NOTE — OPERATIVE REPORT
ESOPHAGOGASTRODUODENOSCOPY (EGD) & COLONOSCOPY REPORT    Christi Tavarez     1960 Age 64 year old   PCP Antony Zavala MD Endoscopist Yobany Guardado MD       Date of procedure: 24    Procedure: EGD & Colonoscopy w/ cold snare polypectomy with clip placement    Pre-operative diagnosis: hx of cirrhosis, colon cancer screening    Post-operative diagnosis: see impression    Medications: MAC    Withdrawal time: 37 minutes    Complications: none    Procedure: Informed consent was obtained from the patient after the risks of the procedure were discussed, including but not limited to bleeding, perforation, aspiration, infection, or possibility of a missed lesion. We discussed the risks/benefits and alternatives to this procedure, as well as the planned sedation. EGD procedure: The patient was placed in the left lateral decubitus position and begun on continuous blood pressure pulse oximetry and EKG monitoring and this was maintained throughout the procedure. Once an adequate level of sedation was obtained a bite block was placed. Then the lubricated tip of the [id number] diagnostic video upper endoscope was inserted and advanced using direct visualization into the posterior pharynx and ultimately into the esophagus with  distal extent of the second portion of the duodenum.     Colonoscopy procedure: Once an adequate level of sedation was obtained a digital rectal exam was completed. Then the lubricated tip of the Pediatric [id number] diagnostic video colonoscope was inserted and advanced with difficulty to the cecum using the CO2 insufflation technique. There was significant looping and stool burden in the right colon. Clear view of the ileocecal valve but not of the cecum. A routine second examination of the cecum/ascending colon was performed. Withdrawal was begun with thorough washing and careful examination of the colonic walls and folds. Photodocumentation was obtained. The bowel prep was  fair. Views of the colon were fair/good with washing except the right. I then carefully withdrew the instrument from the patient who tolerated the procedure well.     Complications: None    EGD findings:      1. Esophagus: The squamocolumnar junction was noted at 36 cm and appeared regular. The diaphragmatic pinch was noted noted at 36 cm from the incisors. The esophageal mucosa appeared healthy and normal. There was on column of small esophageal varices that flattened with air insufflation. There was no evidence of esophagitis, stricture or endoscopic evidence of Billy's esophagus. There were no high risk features to the varix.  2. Stomach: The stomach distended normally. Normal rugal folds were seen. The pylorus was patent. Retroflexion revealed a normal fundus. The gastric mucosa appeared mildly erythematous in the antrum.   3. Duodenum: The duodenal mucosa appeared normal in the bulb and 2nd portion of the duodenum.     EGD Impression:  -Esophagus: One column of varices that nearly flattened with insufflation.  -Stomach: Mild portal hypertensive gastropathy  -Duodenum: Unremarkable    Colonoscopy findings:    1. 1 polyp noted as follows:      A. 8 mm polyp in the distal sigmoid colon; semipedunculated morphology; cold snare polypectomy performed, polyp retrieved. There was bleeding after resection that was difficult to control as the polyp was located deep in folds within the distal sigmoid colon. 2 ccs of dilute epinephrine (1:70223) was injected which slowed the bleeding down. 7 total clips were placed at the site with control of bleeding. The site was viewed after clip placement and there was no active bleeding.  2. Diverticulosis: diverticula in the left colon.  3. Ileocecal valve appeared normal.   4. The colonic mucosa throughout the colon showed normal vascular pattern, without evidence of angioectasias or inflammation. Views of the cecum were limited due to prominent IC valve and stool in right  colon  5. Rectum was evaluated in antegrade view and notable for small internal hemorrhoids.  6. MACEY: normal rectal tone, no masses palpated.     Colonoscopy Impression:  8 mm semi-pedunculated polyp removed complicated by bleeding requiring injection of dilute epinephrine followed by placement of 7 clips with control of bleeding. No bleeding was seen after intervention.  Fair prep in the cecum.  Unremarkable IC valve.  Hemorrhoids.  Colon was otherwise normal with glistening mucosa and intact vascular pattern throughout.    Recommend:  Admit for observation to ensure no recurrence of bleeding tonight in the setting of cirrhosis with mild coagulopathy.  Will get CBC, CMP, PT-INR now.  Full liquid diet, advance tomorrow. Resume home medications.  Should bleeding occur, would obtain a CTA A/P to assess as endoscopic control would likely be difficult as there were 7 clips placed at the site.    >>>If tissue was obtained and you have not received your pathology results either by phone or letter within 2 weeks, please call our office at [phone].    Specimens: polyp    Blood loss: <1 ml

## 2024-11-06 NOTE — ANESTHESIA POSTPROCEDURE EVALUATION
Patient: Christi Tavarez    Procedure Summary       Date: 11/06/24 Room / Location: [facility] ENDOSCOPY 03 / EM ENDOSCOPY    Anesthesia Start: 1325 Anesthesia Stop: 1439    Procedures:       ESOPHAGOGASTRODUODENOSCOPY (EGD)      COLONOSCOPY Diagnosis:       Hepatic cirrhosis, unspecified hepatic cirrhosis type, unspecified whether ascites present (HCC)      (small esophageal varices, colon polyp, hemorrhoids, diverticulosis)    Surgeons: Yobany Guardado MD Anesthesiologist: Uzma Hancock CRNA    Anesthesia Type: MAC ASA Status: 3            Anesthesia Type: MAC    Vitals Value Taken Time   /60 11/06/24 1439   Temp 97 °F (36.1 °C) 11/06/24 1439   Pulse 88 11/06/24 1439   Resp 18 11/06/24 1439   SpO2 98 % 11/06/24 1439       [facility] AN Post Evaluation:   Patient Evaluated in PACU  Patient Participation: complete - patient participated  Level of Consciousness: awake and alert  Pain Score: 0  Pain Management: adequateYes    Nausea/Vomiting: none  Cardiovascular Status: acceptable  Respiratory Status: acceptable  Postoperative Hydration acceptable      Uzma Hancock CRNA  11/6/2024 2:39 PM

## 2024-11-06 NOTE — ANESTHESIA PREPROCEDURE EVALUATION
Anesthesia PreOp Note    HPI:     Christi Tavarez is a 64 year old female who presents for preoperative consultation requested by: Yobany Guardado MD    Date of Surgery: 2024    Procedure(s):  ESOPHAGOGASTRODUODENOSCOPY / COLONOSCOPY  COLONOSCOPY  Indication: Hepatic cirrhosis, unspecified hepatic cirrhosis type, unspecified whether ascites present (HCC)    Relevant Problems   No relevant active problems       NPO:                         History Review:  Patient Active Problem List    Diagnosis Date Noted    Cirrhosis of liver with ascites (HCC) 06/15/2024    COVID-19 06/15/2024    COVID-19 virus infection 2024    Fever 2024    Sensory ataxia 2024    Fluid overload 2024    Type 2 diabetes mellitus without complication, without long-term current use of insulin (HCC) 2024    Anemia 2024    Hyperglycemia 2024    Acute on chronic congestive heart failure, unspecified heart failure type (HCC) 2024    Alcoholic cirrhosis of liver with ascites (HCC) 2024    Fall 2024    Fall, initial encounter 2024    Cerebellar hemorrhage (HCC) 2024    Major depressive disorder, recurrent episode, moderate degree (HCC) 2024    Alcohol dependence, continuous (HCC) 2024    Alcohol withdrawal syndrome, uncomplicated (HCC) 2024       Past Medical History:    Acute, but ill-defined, cerebrovascular disease    Anxiety    CHF (congestive heart failure) (HCC)    Depression    Diabetes (HCC)    Disorder of liver    Hypothyroidism    Stroke (HCC)       Past Surgical History:   Procedure Laterality Date    Needle biopsy right Right 2016             Prescriptions Prior to Admission[1]  Current Medications and Prescriptions Ordered in Epic[2]    Allergies[3]    Family History   Problem Relation Age of Onset    Pancreatic Cancer Father     Diabetes Daughter      Social History     Socioeconomic History    Marital status:    Tobacco Use     Smoking status: Former     Current packs/day: 0.00     Types: Cigarettes     Quit date: 2024     Years since quittin.5    Smokeless tobacco: Never   Vaping Use    Vaping status: Never Used   Substance and Sexual Activity    Alcohol use: Not Currently     Comment: socially ,not since 2024    Drug use: Not Currently     Types: Cannabis, Cocaine       Available pre-op labs reviewed.  Lab Results   Component Value Date    WBC 7.2 2024    RBC 3.28 (L) 2024    HGB 11.8 (L) 2024    HCT 34.4 (L) 2024    .9 (H) 2024    MCH 36.0 (H) 2024    MCHC 34.3 2024    RDW 13.7 2024    .0 2024     Lab Results   Component Value Date     10/02/2024    K 4.4 10/02/2024     10/02/2024    CO2 27.0 10/02/2024    BUN 19 10/02/2024    CREATSERUM 0.96 10/02/2024     (H) 10/02/2024    CA 9.7 10/02/2024          Vital Signs:  Body mass index is 27.05 kg/m².   height is 1.448 m (4' 9\") and weight is 56.7 kg (125 lb).   Vitals:    24 1336   Weight: 56.7 kg (125 lb)   Height: 1.448 m (4' 9\")        Anesthesia Evaluation      Airway   Mallampati: II  TM distance: <3 FB  Neck ROM: full  Dental    (+) upper dentures        Pulmonary     breath sounds clear to auscultation  Cardiovascular   (+) CHF    Rhythm: regular  Rate: normal    Neuro/Psych    (+)  CVA, anxiety/panic attacks,  depression      GI/Hepatic/Renal    (+) liver disease    Endo/Other    (+) diabetes mellitus  Abdominal                  Anesthesia Plan:   ASA:  3  Plan:   MAC  Post-op Pain Management: IV analgesics      I have informed Christi Tavarez and/or legal guardian or family member of the nature of the anesthetic plan, benefits, risks including possible dental damage if relevant, major complications, and any alternative forms of anesthetic management.   All of the patient's questions were answered to the best of my ability. The patient desires the anesthetic management as  planned.  Uzma HancockJOSE  11/6/2024 10:52 AM  Present on Admission:  **None**           [1]   No medications prior to admission.   [2]   No current Epic-ordered facility-administered medications on file.     Current Outpatient Medications Ordered in Epic   Medication Sig Dispense Refill    bumetanide 2 MG Oral Tab Take 1 tablet (2 mg total) by mouth 2 (two) times daily. 180 tablet 3    rifAXIMin 550 MG Oral Tab Take 1 tablet (550 mg total) by mouth 2 (two) times daily. 60 tablet 2    potassium chloride (KLOR-CON) 20 MEQ Oral Powd Pack Take 40 mEq by mouth daily. 90 packet 0    ergocalciferol 1.25 MG (80476 UT) Oral Cap Take 1 capsule (50,000 Units total) by mouth every 7 days. Sundays 4 capsule 0    spironolactone 50 MG Oral Tab Take 1 tablet (50 mg total) by mouth 2 (two) times daily. 180 tablet 1    escitalopram 10 MG Oral Tab Take 1 tablet (10 mg total) by mouth nightly. 90 tablet 3    lamoTRIgine 25 MG Oral Tab Take 1 tablet (25 mg total) by mouth 2 (two) times daily. 180 tablet 3    omeprazole 20 MG Oral Capsule Delayed Release Take 1 capsule (20 mg total) by mouth every morning before breakfast. 90 capsule 3    folic acid 1 MG Oral Tab Take 1 tablet (1 mg total) by mouth daily. 90 tablet 0    lactulose 10 GM/15ML Oral Solution Take 30 mL (20 g total) by mouth 2 (two) times daily as needed.      levothyroxine 50 MCG Oral Tab Take 1 tablet (50 mcg total) by mouth before breakfast. (Patient not taking: Reported on 11/4/2024) 90 tablet 3   [3] No Known Allergies

## 2024-11-06 NOTE — H&P
History & Physical Examination    Patient Name: Christi Tavarez  MRN: [id number]  CSN: [id number]  YOB: 1960    Diagnosis: hx of cirrhosis. EGD today for variceal screening. Colonoscopy for screening purposes.    Prescriptions Prior to Admission[1]  Current Facility-Administered Medications   Medication Dose Route Frequency    lactated ringers infusion   Intravenous Continuous       Allergies: Allergies[2]    Past Medical History:    Acute, but ill-defined, cerebrovascular disease    Anxiety    CHF (congestive heart failure) (HCC)    Depression    Diabetes (HCC)    Disorder of liver    Hypothyroidism    Stroke (HCC)     Past Surgical History:   Procedure Laterality Date    Needle biopsy right Right            Family History   Problem Relation Age of Onset    Pancreatic Cancer Father     Diabetes Daughter      Social History     Tobacco Use    Smoking status: Former     Current packs/day: 0.00     Types: Cigarettes     Quit date: 2024     Years since quittin.5    Smokeless tobacco: Never   Substance Use Topics    Alcohol use: Not Currently     Comment: socially ,not since 2024       SYSTEM Check if Review is Normal Check if Physical Exam is Normal If not normal, please explain:   HEENT [X ] [ X]    NECK  [X ] [ X]    HEART [X ] [ X]    LUNGS [X ] [ X]    ABDOMEN [X ] [ X]    EXTREMITIES [X ] [ X]    OTHER        I have discussed the risks and benefits and alternatives of the procedure with the patient/family.  They understand and agree to proceed with plan of care.   I have reviewed the History and Physical done within the last 30 days.  Any changes noted above.    Yobany Guardado MD  [facility]                   [1]   Medications Prior to Admission   Medication Sig Dispense Refill Last Dose/Taking    bumetanide 2 MG Oral Tab Take 1 tablet (2 mg total) by mouth 2 (two) times daily. 180 tablet 3 Taking    rifAXIMin 550 MG Oral Tab Take 1 tablet (550 mg total)  by mouth 2 (two) times daily. 60 tablet 2 Taking    potassium chloride (KLOR-CON) 20 MEQ Oral Powd Pack Take 40 mEq by mouth daily. 90 packet 0 Taking    ergocalciferol 1.25 MG (26044 UT) Oral Cap Take 1 capsule (50,000 Units total) by mouth every 7 days. Sundays 4 capsule 0 Taking    spironolactone 50 MG Oral Tab Take 1 tablet (50 mg total) by mouth 2 (two) times daily. 180 tablet 1 Taking    escitalopram 10 MG Oral Tab Take 1 tablet (10 mg total) by mouth nightly. 90 tablet 3 Taking    lamoTRIgine 25 MG Oral Tab Take 1 tablet (25 mg total) by mouth 2 (two) times daily. 180 tablet 3 Taking    omeprazole 20 MG Oral Capsule Delayed Release Take 1 capsule (20 mg total) by mouth every morning before breakfast. 90 capsule 3 Taking    folic acid 1 MG Oral Tab Take 1 tablet (1 mg total) by mouth daily. 90 tablet 0 Taking    lactulose 10 GM/15ML Oral Solution Take 30 mL (20 g total) by mouth 2 (two) times daily as needed.   Taking As Needed    levothyroxine 50 MCG Oral Tab Take 1 tablet (50 mcg total) by mouth before breakfast. (Patient not taking: Reported on 11/4/2024) 90 tablet 3 Not Taking   [2] No Known Allergies

## 2024-11-07 VITALS
HEART RATE: 71 BPM | RESPIRATION RATE: 19 BRPM | HEIGHT: 57 IN | OXYGEN SATURATION: 100 % | WEIGHT: 125 LBS | TEMPERATURE: 99 F | DIASTOLIC BLOOD PRESSURE: 45 MMHG | SYSTOLIC BLOOD PRESSURE: 100 MMHG | BODY MASS INDEX: 26.97 KG/M2

## 2024-11-07 LAB
ALBUMIN SERPL-MCNC: 2.9 G/DL (ref 3.2–4.8)
ALBUMIN/GLOB SERPL: 0.8 {RATIO} (ref 1–2)
ALP LIVER SERPL-CCNC: 128 U/L
ALT SERPL-CCNC: 13 U/L
ANION GAP SERPL CALC-SCNC: 6 MMOL/L (ref 0–18)
AST SERPL-CCNC: 31 U/L (ref ?–34)
BASOPHILS # BLD AUTO: 0.05 X10(3) UL (ref 0–0.2)
BASOPHILS NFR BLD AUTO: 0.7 %
BILIRUB SERPL-MCNC: 1.5 MG/DL (ref 0.2–1.1)
BUN BLD-MCNC: 10 MG/DL (ref 9–23)
BUN/CREAT SERPL: 13.9 (ref 10–20)
CALCIUM BLD-MCNC: 9.6 MG/DL (ref 8.7–10.4)
CHLORIDE SERPL-SCNC: 104 MMOL/L (ref 98–112)
CO2 SERPL-SCNC: 29 MMOL/L (ref 21–32)
CREAT BLD-MCNC: 0.72 MG/DL
DEPRECATED RDW RBC AUTO: 55 FL (ref 35.1–46.3)
EGFRCR SERPLBLD CKD-EPI 2021: 93 ML/MIN/1.73M2 (ref 60–?)
EOSINOPHIL # BLD AUTO: 0.14 X10(3) UL (ref 0–0.7)
EOSINOPHIL NFR BLD AUTO: 2 %
ERYTHROCYTE [DISTWIDTH] IN BLOOD BY AUTOMATED COUNT: 15.6 % (ref 11–15)
GLOBULIN PLAS-MCNC: 3.5 G/DL (ref 2–3.5)
GLUCOSE BLD-MCNC: 99 MG/DL (ref 70–99)
HCT VFR BLD AUTO: 29.2 %
HGB BLD-MCNC: 10.1 G/DL
IMM GRANULOCYTES # BLD AUTO: 0.02 X10(3) UL (ref 0–1)
IMM GRANULOCYTES NFR BLD: 0.3 %
LYMPHOCYTES # BLD AUTO: 3.7 X10(3) UL (ref 1–4)
LYMPHOCYTES NFR BLD AUTO: 52.3 %
MCH RBC QN AUTO: 33.4 PG (ref 26–34)
MCHC RBC AUTO-ENTMCNC: 34.6 G/DL (ref 31–37)
MCV RBC AUTO: 96.7 FL
MONOCYTES # BLD AUTO: 0.65 X10(3) UL (ref 0.1–1)
MONOCYTES NFR BLD AUTO: 9.2 %
NEUTROPHILS # BLD AUTO: 2.52 X10 (3) UL (ref 1.5–7.7)
NEUTROPHILS # BLD AUTO: 2.52 X10(3) UL (ref 1.5–7.7)
NEUTROPHILS NFR BLD AUTO: 35.5 %
OSMOLALITY SERPL CALC.SUM OF ELEC: 287 MOSM/KG (ref 275–295)
PLATELET # BLD AUTO: 117 10(3)UL (ref 150–450)
POTASSIUM SERPL-SCNC: 4 MMOL/L (ref 3.5–5.1)
PROT SERPL-MCNC: 6.4 G/DL (ref 5.7–8.2)
RBC # BLD AUTO: 3.02 X10(6)UL
SODIUM SERPL-SCNC: 139 MMOL/L (ref 136–145)
WBC # BLD AUTO: 7.1 X10(3) UL (ref 4–11)

## 2024-11-07 PROCEDURE — 99239 HOSP IP/OBS DSCHRG MGMT >30: CPT | Performed by: HOSPITALIST

## 2024-11-07 RX ORDER — FOLIC ACID 1 MG/1
1 TABLET ORAL DAILY
Qty: 90 TABLET | Refills: 0 | Status: SHIPPED | OUTPATIENT
Start: 2024-11-07

## 2024-11-07 NOTE — CONSULTS
[facility]   Gastroenterology Consultation Note    Christi Tavarez  Patient Status:    Observation  Date of Admission:         2024, Hospital day #0  Attending:   Yobany Guardado MD  PCP:     Antony Zavala MD    Chief Complaint:  Post procedure monitoring    History of Present Illness:  Christi Tavarez is a 64 year old female w/ a history of etoh cirrhosis, CHF who presented today for outpatient EGD/Colonoscopy (see separate operative reports from today) who was admitted to the hospital for post procedure monitoring.    Patient with semi-pedunculated polyp in the distal sigmoid colon that was removed with a cold snare, this was complicated by bleeding at the polypectomy site requiring injection of dilute epinephrine and placement of 7 hemostatic clips with eventual control of bleeding.    Given coagulopathy/hx of cirrhosis and difficulty controlling bleeding patient admitted for observation.    She was monitored in endoscopy for 2 hours and appeared well with stable vital signs, no rectal bleeding post procedure.    History:  Past Medical History:    Acute, but ill-defined, cerebrovascular disease    Anxiety    CHF (congestive heart failure) (HCC)    Depression    Disorder of liver    Hypothyroidism    Stroke (HCC)     Past Surgical History:   Procedure Laterality Date    Needle biopsy right Right            Family History   Problem Relation Age of Onset    Pancreatic Cancer Father     Diabetes Daughter       reports that she quit smoking about 7 months ago. Her smoking use included cigarettes. She has never used smokeless tobacco. She reports that she does not currently use alcohol. She reports that she does not currently use drugs after having used the following drugs: Cannabis and Cocaine.    Allergies:  Allergies[1]    Medications:    Current Facility-Administered Medications:     [START ON 2024] bumetanide (Bumex) tab 2 mg, 2 mg, Oral, BID    escitalopram (Lexapro)  tab 10 mg, 10 mg, Oral, Nightly    folic acid (Folvite) tab 1 mg, 1 mg, Oral, Daily    lactulose (CHRONULAC) 10 GM/15ML solution 20 g, 20 g, Oral, BID PRN    [START ON 11/7/2024] pantoprazole (Protonix) DR tab 20 mg, 20 mg, Oral, QAM AC    rifAXIMin (Xifaxan) tab 550 mg, 550 mg, Oral, BID    [START ON 11/7/2024] spironolactone (Aldactone) tab 50 mg, 50 mg, Oral, BID    acetaminophen (Tylenol Extra Strength) tab 500 mg, 500 mg, Oral, Q4H PRN    ondansetron (Zofran) 4 MG/2ML injection 4 mg, 4 mg, Intravenous, Q6H PRN    prochlorperazine (Compazine) 10 MG/2ML injection 5 mg, 5 mg, Intravenous, Q8H PRN    temazepam (Restoril) cap 15 mg, 15 mg, Oral, Nightly PRN    Review of Systems:  CONSTITUTIONAL:  negative for fevers, rigors  EYES:  negative for diplopia   RESPIRATORY:  negative for severe shortness of breath  CARDIOVASCULAR:  negative for crushing sub-sternal chest pain  GASTROINTESTINAL:  see HPI  GENITOURINARY:  negative for dysuria or gross hematuria  INTEGUMENT/BREAST:  SKIN:  negative for jaundice   ALLERGIC/IMMUNOLOGIC:  negative for hay fever  ENDOCRINE:  negative for cold intolerance and heat intolerance  MUSCULOSKELETAL:  negative for joint effusion/severe erythema  BEHAVIOR/PSYCH:  negative for psychotic behavior    Physical Exam:    Blood pressure 109/55, pulse 73, temperature 98.7 °F (37.1 °C), temperature source Oral, resp. rate 16, height 4' 9\" (1.448 m), weight 125 lb (56.7 kg), SpO2 99%. Body mass index is 27.05 kg/m².    Gen- Patient appears comfortable and in no acute discomfort  HEENT: the sclera appears anicteric, oropharynx clear, mucus membranes appear moist  CV- regular rate and rhythm, the extremities are warm and well perfused   Lung- Moves air well; No labored breathing  Abdomen- soft, non-tender exam in all quadrants without rigidity or guarding, non-distended, no abnormal bowel sounds noted, no masses are palpated  Skin- No jaundice  Ext: no edema is evident.   Neuro- Alert and  interactive, and gross movements of extremities normal  Psych- appropriate, non-agitated    Laboratory Data:  Lab Results   Component Value Date    WBC 6.9 11/06/2024    HGB 10.9 11/06/2024    HCT 32.3 11/06/2024    .0 11/06/2024    CREATSERUM 0.80 11/06/2024    BUN 13 11/06/2024     11/06/2024    K 3.7 11/06/2024     11/06/2024    CO2 29.0 11/06/2024     11/06/2024    CA 9.4 11/06/2024    ALB 3.2 11/06/2024    ALKPHO 129 11/06/2024    BILT 1.9 11/06/2024    TP 7.2 11/06/2024    AST 35 11/06/2024    ALT 16 11/06/2024    INR 1.49 11/06/2024       Imaging:  No results found.    Assessment & Plan   Christi Tavarez is a 64 year old female w/ a history of etoh cirrhosis, CHF who presented today for outpatient EGD/Colonoscopy (see separate operative reports from today) who was admitted to the hospital for post procedure monitoring.    #Post-procedure monitoring  -Colonoscopy today with semi-pedunculated polyp removed complicated by bleeding that was difficult to control given location and oozing at the site.  -Eventually controlled after injection of dilute epinephrine and placement of 7 hemostatic clips.  -Clinically doing well post-procedure but given hx of cirrhosis with coagulopathy and bleeding that occurred after removal of polyp, plan to monitor tonight/tomorrow morning for bleeding.    Recommendations:  -Full liquid diet, advance tomorrow morning if no bleeding.  -Will evaluate early morning, if no bleeding okay to discharge then.  -Labs now and tomorrow am.    #Hx of etoh cirrhosis  -MELD 3.0 (15).  -Ascites: minimal, on diuretics.  -EV: small column of esophageal varices in the distal esophagus.  -PSE: none on lactulose  -HCC: no lesions on MRI 8/2024.    Recommend:  -Continue home diuretics and lactulose.  -CBC, CMP, PT-INR ordered    Yobany Guardado MD  [facility]    This note was partially prepared using Dragon [employer] voice recognition dictation software.  As a result, errors may occur. When identified, these errors have been corrected. While every attempt is made to correct errors during dictation, discrepancies may still exist.          [1] No Known Allergies

## 2024-11-07 NOTE — TELEPHONE ENCOUNTER
Please review.  Protocol failed / Has no protocol.     Requested Prescriptions   Pending Prescriptions Disp Refills    FOLIC ACID 1 MG Oral Tab [Pharmacy Med Name: FOLIC ACID 1MG TABLETS] 90 tablet 0     Sig: TAKE 1 TABLET(1 MG) BY MOUTH DAILY       There is no refill protocol information for this order        Future Appointments         Provider Department Appt Notes    Tomorrow [facility] 2nd shingles vaccine  Policy advised, daughter schld appt  See TE 10/28/24    In 3 weeks Antony Zavala MD [place], [place] physical w/ hep b injection    In 1 month Leydi Bonilla MD [facility] 3 months    In 1 month Lillian Baron DPM [facility] [place] Diabetic    In 2 months [facility] MRI 2 (3T WIDE) [facility] MRI Scheduled with daughter -bt    In 3 months [facility] US  2 [facility] Ultrasound SCHEDULED W/DAUGHTER    In 5 months Pepper Hedrick DO [facility] f/up          Recent Outpatient Visits              4 weeks ago Alcoholic cirrhosis of liver with ascites (HCC)    [facility] Yobany Guardado MD    Office Visit    1 month ago [facility]    1 month ago Alcoholic cirrhosis of liver with ascites (HCC)    [facility] Leydi Bonilla MD    Office Visit    1 month ago Cerebellar hemorrhage (HCC)    [facility] Pepper Hedrick DO    Office Visit    1 month ago Telogen effluvium    [facility] Ozzie Castanon MD    Office Visit

## 2024-11-07 NOTE — PLAN OF CARE
Problem: Diabetes/Glucose Control  Goal: Glucose maintained within prescribed range  Description: INTERVENTIONS:  - Monitor Blood Glucose as ordered  - Assess for signs and symptoms of hyperglycemia and hypoglycemia  - Administer ordered medications to maintain glucose within target range  - Assess barriers to adequate nutritional intake and initiate nutrition consult as needed  - Instruct patient on self management of diabetes  Outcome: Progressing     Problem: HEMATOLOGIC - ADULT  Goal: Maintains hematologic stability  Description: INTERVENTIONS  - Assess for signs and symptoms of bleeding or hemorrhage  - Monitor labs and vital signs for trends  - Administer supportive blood products/factors, fluids and medications as ordered and appropriate  - Administer supportive blood products/factors as ordered and appropriate  Outcome: Progressing  Goal: Free from bleeding injury  Description: (Example usage: patient with low platelets)  INTERVENTIONS:  - Avoid intramuscular injections, enemas and rectal medication administration  - Ensure safe mobilization of patient  - Hold pressure on venipuncture sites to achieve adequate hemostasis  - Assess for signs and symptoms of internal bleeding  - Monitor lab trends  - Patient is to report abnormal signs of bleeding to staff  - Avoid use of toothpicks and dental floss  - Use electric shaver for shaving  - Use soft bristle tooth brush  - Limit straining and forceful nose blowing  Outcome: Progressing

## 2024-11-07 NOTE — PROGRESS NOTES
[facility]     Gastroenterology Progress Note    Christi Tavarez Patient Status:  Observation    1960 MRN [id number]   Location [facility] 5SW/SE Attending Luke Johnson MD   Hosp Day # 0 PCP Antony Zavala MD       Subjective:   Patient feels well this morning.    No abdominal pain.    Had a water/brown stool this morning with no blood present.    Eating without issues.      Objective:   Blood pressure 105/64, pulse 88, temperature 98.5 °F (36.9 °C), temperature source Oral, resp. rate 16, height 4' 9\" (1.448 m), weight 125 lb (56.7 kg), SpO2 96%. Body mass index is 27.05 kg/m².    Gen- Patient appears comfortable and in no acute discomfort  HEENT: the sclera appears anicteric, oropharynx clear, mucus membranes appear moist  CV- regular rate and rhythm, the extremities are warm and well perfused   Lung- Moves air well; No labored breathing  Abdomen- soft, non-tender exam in all quadrants without rigidity or guarding, non-distended, no abnormal bowel sounds noted, no masses are palpated  Skin- No jaundice  Ext: no cyanosis, clubbing or edema is evident.   Neuro- Alert and interactive, and gross movements of extremities normal    Results:     Lab Results   Component Value Date    WBC 7.1 2024    HGB 10.1 (L) 2024    HCT 29.2 (L) 2024    .0 (L) 2024    CREATSERUM 0.72 2024    BUN 10 2024     2024    K 4.0 2024     2024    CO2 29.0 2024    GLU 99 2024    CA 9.6 2024    ALB 2.9 (L) 2024    ALKPHO 128 2024    BILT 1.5 (H) 2024    TP 6.4 2024    AST 31 2024    ALT 13 2024    PTT 34.8 2024    INR 1.49 (H) 2024    T4F 1.0 2024    TSH 8.140 (H) 2024    LIP 49 2024    MG 2.0 2024     2024    B12 >2,000 (H) 2024    ETOH <3 2024       No results found.        Assessment and Plan:   Christi le a  64 year old female w/ a history of etoh cirrhosis, CHF who presented today for outpatient EGD/Colonoscopy (see separate operative reports from today) who was admitted to the hospital for post procedure monitoring.     #Post-procedure monitoring  -Doing well for the last 18 hours with no blood in stool. Had a non-bloody bowel movement this morning.  -The polyp was removed from the distal sigmoid colon with a cold snare, reassuring that most recent bowel movement was non-bloody.  -Given that it was removed cold snare would expect immediate bleeding to occur and have not seen any blood in stool since procedure.  -There is always a risk of bleeding at the site but given stable for almost the past 24 hours and labs are good with minimal coagulopathy in the setting of cirrhosis okay to discharge.  -7 clips were placed at the site along with epinephrine injection. If has bleeding would need CTA A/P and possible IR intervention.     Recommendations:  -Advance diet as tolerated.  -Discussed with patient the need to monitor stool output and return if blood in stool.  -Okay to discharge       #Hx of etoh cirrhosis  -MELD 3.0 (15).  -Ascites: minimal, on diuretics.  -EV: small column of esophageal varices in the distal esophagus.  -PSE: none on lactulose  -HCC: no lesions on MRI 8/2024.    Resume home meds on discharge.    Yobany Guardado MD  [facility]

## 2024-11-07 NOTE — PLAN OF CARE
Problem: Patient Centered Care  Goal: Patient preferences are identified and integrated in the patient's plan of care  Description: Interventions:  - What would you like us to know as we care for you?     - Provide timely, complete, and accurate information to patient/family  - Incorporate patient and family knowledge, values, beliefs, and cultural backgrounds into the planning and delivery of care  - Encourage patient/family to participate in care and decision-making at the level they choose  - Honor patient and family perspectives and choices  Outcome: Adequate for Discharge     Problem: Diabetes/Glucose Control  Goal: Glucose maintained within prescribed range  Description: INTERVENTIONS:  - Monitor Blood Glucose as ordered  - Assess for signs and symptoms of hyperglycemia and hypoglycemia  - Administer ordered medications to maintain glucose within target range  - Assess barriers to adequate nutritional intake and initiate nutrition consult as needed  - Instruct patient on self management of diabetes  Outcome: Adequate for Discharge     Problem: Patient/Family Goals  Goal: Patient/Family Long Term Goal  Description: Patient's Long Term Goal:       Interventions:  -     - See additional Care Plan goals for specific interventions  Outcome: Adequate for Discharge  Goal: Patient/Family Short Term Goal  Description: Patient's Short Term Goal:       Interventions:   -       - See additional Care Plan goals for specific interventions  Outcome: Adequate for Discharge     Problem: HEMATOLOGIC - ADULT  Goal: Maintains hematologic stability  Description: INTERVENTIONS  - Assess for signs and symptoms of bleeding or hemorrhage  - Monitor labs and vital signs for trends  - Administer supportive blood products/factors, fluids and medications as ordered and appropriate  - Administer supportive blood products/factors as ordered and appropriate  Outcome: Adequate for Discharge  Goal: Free from bleeding injury  Description:  (Example usage: patient with low platelets)  INTERVENTIONS:  - Avoid intramuscular injections, enemas and rectal medication administration  - Ensure safe mobilization of patient  - Hold pressure on venipuncture sites to achieve adequate hemostasis  - Assess for signs and symptoms of internal bleeding  - Monitor lab trends  - Patient is to report abnormal signs of bleeding to staff  - Avoid use of toothpicks and dental floss  - Use electric shaver for shaving  - Use soft bristle tooth brush  - Limit straining and forceful nose blowing  Outcome: Adequate for Discharge

## 2024-11-07 NOTE — DISCHARGE SUMMARY
[facility]  part of [facility]    Discharge Summary    Christi Tavarez Patient Status:  Observation    1960 MRN [id number]   Location [facility] 5SW/SE Attending Luke Johnson MD   Hosp Day # 0 PCP Antony Zavala MD     Date of Admission: 2024 Disposition: Home     Date of Discharge: 24.    Admitting Diagnosis: Hepatic cirrhosis, unspecified hepatic cirrhosis type, unspecified whether ascites present (HCC)  GI bleed    Hospital Discharge Diagnoses: Hepatic cirrhosis, post colonoscopy bleeding    Lace+ Score: 59  59-90 High Risk  29-58 Medium Risk  0-28   Low Risk.    TCM Follow-Up Recommendation:  LACE > 58: High Risk of readmission after discharge from the hospital.    Problem List:   Patient Active Problem List   Diagnosis    Fall    Fall, initial encounter    Cerebellar hemorrhage (HCC)    Major depressive disorder, recurrent episode, moderate degree (HCC)    Alcohol dependence, continuous (HCC)    Alcohol withdrawal syndrome, uncomplicated (HCC)    Anemia    Hyperglycemia    Acute on chronic congestive heart failure, unspecified heart failure type (HCC)    Alcoholic cirrhosis of liver with ascites (HCC)    Fluid overload    Type 2 diabetes mellitus without complication, without long-term current use of insulin (HCC)    Sensory ataxia    COVID-19 virus infection    Fever    Hepatic cirrhosis (HCC)    COVID-19    Hepatic cirrhosis, unspecified hepatic cirrhosis type, unspecified whether ascites present (HCC)    GI bleed       Reason for Admission: post colonoscopy bleeding     Physical Exam:   Vitals:    24 0852   BP: 100/45   Pulse: 71   Resp: 19   Temp: 98.7 °F (37.1 °C)     GENERAL:  Alert and oriented to time, place, and person.  Slightly jaundiced.  No acute distress.  HEENT:  Atraumatic.  Eyes:  Slightly icteric sclerae.  Pupils equal, round, reactive.  NECK:  Supple.  No lymphadenopathy.  Trachea midline.  Full range of motion.  LUNGS:  Clear to  auscultation bilaterally.  Normal respiratory effort.  HEART:  Regular rate and rhythm.  S1 and S2 auscultated.  No murmur.  ABDOMEN:  Soft, nondistended.  No tenderness.  Positive bowel sounds.  EXTREMITIES:  +1 edema both legs.  No clubbing or cyanosis.  NEUROLOGIC:   Motor and sensory intact.    History of Present Illness:   Per Dr. Olea  Patient is a 64-year-old  female with underlying alcoholic liver cirrhosis. Today, underwent colonoscopy and polypectomy with clip placement. Postprocedure, there was some oozing of blood. Because of her underlying liver cirrhosis, she will be observed overnight to monitor hemodynamic status.     Hospital Course:   1.       Bleed postcolonoscopy snare, polypectomy, and clip placement.  Resolved, hgb stable  Stable for GI standpoint to dc    2.       Underlying alcoholic liver cirrhosis.  Continue cessation     Consultations: GI    Procedures: none    Complications: none    Discharge Condition: Stable    Discharge Medications:      Discharge Medications        CONTINUE taking these medications        Instructions Prescription details   bumetanide 2 MG Tabs  Commonly known as: Bumex      Take 1 tablet (2 mg total) by mouth 2 (two) times daily.   Quantity: 180 tablet  Refills: 3     ergocalciferol 1.25 MG (41148 UT) Caps  Commonly known as: Vitamin D2      Take 1 capsule (50,000 Units total) by mouth every 7 days. Sundays   Quantity: 4 capsule  Refills: 0     escitalopram 10 MG Tabs  Commonly known as: Lexapro      Take 1 tablet (10 mg total) by mouth nightly.   Quantity: 90 tablet  Refills: 3     lactulose 10 GM/15ML Soln  Commonly known as: CHRONULAC      Take 30 mL (20 g total) by mouth 2 (two) times daily as needed.   Refills: 0     lamoTRIgine 25 MG Tabs  Commonly known as: LaMICtal      Take 1 tablet (25 mg total) by mouth 2 (two) times daily.   Quantity: 180 tablet  Refills: 3     omeprazole 20 MG Cpdr  Commonly known as: PriLOSEC      Take 1 capsule (20 mg  total) by mouth every morning before breakfast.   Quantity: 90 capsule  Refills: 3     potassium chloride 20 MEQ Pack  Commonly known as: Klor-Con      Take 40 mEq by mouth daily.   Quantity: 90 packet  Refills: 0     rifAXIMin 550 MG Tabs  Commonly known as: Xifaxan      Take 1 tablet (550 mg total) by mouth 2 (two) times daily.   Quantity: 60 tablet  Refills: 2     spironolactone 50 MG Tabs  Commonly known as: Aldactone      Take 1 tablet (50 mg total) by mouth 2 (two) times daily.   Quantity: 180 tablet  Refills: 1              Greater than 35 minutes spent, >50% spent counseling re: treatment plan and workup     Luke Johnson MD  11/7/2024

## 2024-11-07 NOTE — H&P
[facility]    PATIENT'S NAME: FIDELIA ZAMUDIO   ATTENDING PHYSICIAN: Luke Johnson MD   PATIENT ACCOUNT#:   [id number]    LOCATION:  [address] [place] [address]  MEDICAL RECORD #:   [id number]       YOB: 1960  ADMISSION DATE:       11/06/2024    HISTORY AND PHYSICAL EXAMINATION    #####EDITING MAY BE REQUIRED#####    CHIEF COMPLAINT:  Postcolonoscopy and polypectomy with rectal bleed.    HISTORY OF PRESENT ILLNESS:  Patient is a 64-year-old  female with underlying alcoholic liver cirrhosis.  Today, underwent colonoscopy and polypectomy with clip placement.  Postprocedure, there was some oozing of blood.  Because of her underlying liver cirrhosis, she will be observed overnight to monitor hemodynamic status.    PAST MEDICAL HISTORY:  Heart failure with preserved ejection fraction, alcoholic liver cirrhosis, depression, anxiety, prediabetic state.    PAST SURGICAL HISTORY:  Right breast biopsy.    MEDICATIONS:  Please see medication reconciliation list.    ALLERGIES:  No known drug allergies.    FAMILY HISTORY:  Father with pancreatic cancer.    SOCIAL HISTORY:  Ex-tobacco and alcohol user.  No current tobacco, alcohol, or drug use.  Lives with her family.  Independent for basic activities of daily living.    REVIEW OF SYSTEMS:  Currently resting in bed.  No abdominal pain.  No chest pain.  Other 12-point review of systems is negative.      PHYSICAL EXAMINATION:    GENERAL:  Alert and oriented to time, place, and person.  Slightly jaundiced.  No acute distress.  VITAL SIGNS:  Temperature 98.0, pulse 84, respiratory rate 17, blood pressure 98/55, pulse ox 99% on room air.  HEENT:  Atraumatic.  Eyes:  Slightly icteric sclerae.  Pupils equal, round, reactive.  NECK:  Supple.  No lymphadenopathy.  Trachea midline.  Full range of motion.  LUNGS:  Clear to auscultation bilaterally.  Normal respiratory effort.  HEART:  Regular rate and rhythm.  S1 and S2 auscultated.  No murmur.  ABDOMEN:  Soft,  nondistended.  No tenderness.  Positive bowel sounds.  EXTREMITIES:  +1 edema both legs.  No clubbing or cyanosis.  NEUROLOGIC:   Motor and sensory intact.    ASSESSMENT AND PLAN:    1.   Bleed postcolonoscopy snare, polypectomy, and clip placement.  2.   Underlying alcoholic liver cirrhosis.    Patient _______  hemodynamic status.  We will observe overnight.  Clear liquid diet.  Gastroenterology consult.  Further recommendations to follow.    Dictated By Krystal Olea MD  d: 11/06/2024 17:25:47  t: 11/06/2024 20:06:00  Job [id number]  FB/    cc: Luke Johnson MD

## 2024-11-08 ENCOUNTER — PATIENT OUTREACH (OUTPATIENT)
Dept: CASE MANAGEMENT | Age: 64
End: 2024-11-08

## 2024-11-08 DIAGNOSIS — K70.31 ALCOHOLIC CIRRHOSIS OF LIVER WITH ASCITES (HCC): ICD-10-CM

## 2024-11-08 DIAGNOSIS — Z02.9 ENCOUNTERS FOR UNSPECIFIED ADMINISTRATIVE PURPOSE: Primary | ICD-10-CM

## 2024-11-08 PROCEDURE — 1111F DSCHRG MED/CURRENT MED MERGE: CPT

## 2024-11-08 NOTE — PROGRESS NOTES
1 adenoma removed on recent colonoscopy, consider repeat in 7 years.    EGD with small esophageal varices. Repeat date to be decided by hepatologist.    Discussed with family.

## 2024-11-08 NOTE — PROGRESS NOTES
LM for pt to call [facility] for TCM since discharge. [facility] phone number was provided for pt to call back.

## 2024-11-11 ENCOUNTER — TELEPHONE (OUTPATIENT)
Dept: INTERNAL MEDICINE CLINIC | Facility: CLINIC | Age: 64
End: 2024-11-11

## 2024-11-11 RX ORDER — LEVOTHYROXINE SODIUM 50 UG/1
50 TABLET ORAL
COMMUNITY

## 2024-11-11 RX ORDER — CHOLECALCIFEROL (VITAMIN D3) 25 MCG
1000 TABLET ORAL DAILY
COMMUNITY

## 2024-11-11 NOTE — PROGRESS NOTES
Pt called back     Transitional Care Management   Discharge Date: 24  Contact Date: 2024    Assessment:  TCM Initial Assessment    General:  Assessment completed with: Patient  Patient Subjective: Pt reports she is doing good. Pt denies symptoms or concerns at this time. Pt denies bleeding concerns, dizziness, weakness, chest pain, shortness of breath, fevers, n/v/d and pain. Pt is urinating well and able to have bowel movements. Pt has a few bowel movements a day, stool is formed and no blood in the stool. Pt is eating and drinking well,  Pt does complete HHC PT once a week, pt stated she has had this service for a while, it will be ending soon. Pt has a BP cuff at home, plans to start checking BP more often. Pt has help to take her to appointments. Pt does do home exercises as advised per PT. Pt has no questions at this time.  Chief Complaint: Hepatic cirrhosis, post colonoscopy bleeding  Verify patient name and  with patient/ caregiver: Yes    Hospital Stay/Discharge:  Tell me what you understand of why you were in the hospital or emergency department: bleeding concerns  Prior to leaving the hospital were your Discharge Instructions reviewed with you?: Yes  Did you receive a copy of your written Discharge Instructions?: Yes  What questions do you have about your Discharge Instructions?: None at this time  Do you feel better or worse since you left the hospital or emergency department?: Better    Follow - Up Appointment:  Do you have a follow-up appointment?: No  Are there any barriers to getting to your follow-up appointment?: No    Home Health/DME:  Prior to leaving the hospital was Home Health (HH) arranged for you?: No (Pt stated she is current with [facility])     Prior to leaving the hospital or emergency department was Durable Medical Equipment (DME), medical supplies, or infusions arranged for you?: No  Are DME/medical supply/infusions needs identified by staff during this assessment?: No      Medications/Diet:  Did any of your medications change, during or after your hospital stay or ED visit?: No  Do you understand what your medications are for and possible side effects?: Yes  Are there any reasons that keep you from taking your medication as prescribed?: No  Any concerns about medication refills?: No    Were you given a different diet per your Discharge Instructions?: No     Questions/Concerns:  Do you have any questions or concerns that have not been discussed?: No           Nursing Interventions: All d/c instructions reviewed with the pt. Reviewed when to call MD vs when to call 911 or go the ED. Educated pt on the importance of taking all meds as prescribed as well as close f/u with PCP/specialists. Pt verbalized understanding and will contact the office with any further questions or concerns.     Medication Review: Reviewed medication list with the patient. Medications are up to date.    Current Outpatient Medications   Medication Sig Dispense Refill    folic acid 1 MG Oral Tab Take 1 tablet (1 mg total) by mouth daily. 90 tablet 0    bumetanide 2 MG Oral Tab Take 1 tablet (2 mg total) by mouth 2 (two) times daily. 180 tablet 3    rifAXIMin 550 MG Oral Tab Take 1 tablet (550 mg total) by mouth 2 (two) times daily. 60 tablet 2    potassium chloride (KLOR-CON) 20 MEQ Oral Powd Pack Take 40 mEq by mouth daily. 90 packet 0    ergocalciferol 1.25 MG (62858 UT) Oral Cap Take 1 capsule (50,000 Units total) by mouth every 7 days. Sundays 4 capsule 0    spironolactone 50 MG Oral Tab Take 1 tablet (50 mg total) by mouth 2 (two) times daily. 180 tablet 1    escitalopram 10 MG Oral Tab Take 1 tablet (10 mg total) by mouth nightly. 90 tablet 3    lamoTRIgine 25 MG Oral Tab Take 1 tablet (25 mg total) by mouth 2 (two) times daily. 180 tablet 3    omeprazole 20 MG Oral Capsule Delayed Release Take 1 capsule (20 mg total) by mouth every morning before breakfast. 90 capsule 3    lactulose 10 GM/15ML Oral  Solution Take 30 mL (20 g total) by mouth 2 (two) times daily as needed.       Did patient review medications using current pill bottles and not just a medication list?  Yes  Discharge medications reviewed/discussed/and reconciled against outpatient medications with patient.  Any changes or updates to medications sent to primary care provider.  Patient Acknowledged    SDOH:     Transportation Needs: No Transportation Needs (11/11/2024)    Transportation Needs     Lack of Transportation: No     Car Seat: Not on file     Financial Resource Strain: Low Risk  (11/11/2024)    Financial Resource Strain     Difficulty of Paying Living Expenses: Not very hard     Med Affordability: No     Follow-up Appointments: Reviewed recommended follow up appointments. Pt denies any barriers to keeping/getting to U appts. Pt stated her daughter drives her to appointments.   Your appointments       Date & Time Appointment Department (Center)    Nov 19, 2024 9:00 AM CST MAMMOGRAM ADDITIONAL VIEWS with [facility] RM5 [facility] ([facility])    Please bring a copy of all your prior outside breast imaging at least 72 hours prior to your appointment. You may drop off your prior images at the location of your upcoming mammogram. If we do not have your prior outside breast images at the time of your appointment, your appointment may be rescheduled.    You may be subject to a fee if you do not show up for your appointment or you cancel within 24 hours of your appointment.    Please arrive 15 minutes prior to your appointment. If you are late to your appointment, you will be rescheduled.    If breastfeeding, pump or breastfeed one hour prior to your appointment time.    Continuous glucose monitors must be removed so the appointment should be scheduled near the normal replacement date.    Do NOT use perfumes, deodorant, talcum powder, lotions, or creams on your breasts or underarms.  They leave a coating that may be picked up by the x-rays, thereby distorting the mammogram.    Wear a two piece outfit the day of the exam. This allows you to be more comfortable during the exam.    There are no eating or activity restrictions for this exam.    The estimated duration of this exam could take up to 2 hours if an ultrasound is required. If you have questions regarding this exam, please contact a mammography technologist at [facility] at [phone] or [facility] at [phone].      Dec 04, 2024 10:00 AM CST Physical - Established with Antony Zavala MD [facility] ([facility])        Dec 12, 2024 10:15 AM CST Office Visit with Leydi Bonilla MD [facility] ([facility] [place])        Dec 20, 2024 10:00 AM CST New Patient with Lillian Baron DPM [facility] ([facility] [place])        Harsha 15, 2025 3:00 PM CST MRI BRAIN WWO with [facility] MRI RM2 (3T WIDE) [facility] MRI ([facility])    You may be subject to a fee if you do not show up for your appointment or you cancel within 24 hours of your appointment.    Please arrive 30 minutes prior to your scheduled appointment time.  You will need time to change your clothes, fill out screening forms, use the restroom, and may need an IV if your exam requires contrast.  If you arrive too late, your appointment may need to be rescheduled.    Please do not wear any form of eye make-up to your MRI appointment. It can cause artifacts on the images and potentially obscure vital information.  You will be required to remove any eye make-up at your appointment.  You can eat, drink, and take your medication(s).     IF YOU REQUIRE ORAL SEDATION FOR YOUR MRI: Your physician is responsible for giving you a prescription for oral medication which you would fill at your local  pharmacy. If you will be taking oral sedation, you must bring a  who will drive you home (the  does not necessarily have to stay throughout the procedure).        Feb 05, 2025 9:00 AM CST US ABDOMEN COMPLETE W DOPPLER with [facility] US RM 2 [facility] ([facility])    Please arrive 15 minutes prior to the scheduled appointment time.    Fasting instructions for adults 18 years of age and older:    Please do not eat or drink anything for 8 hours prior to your exam. If you need to take oral medication in the morning, please take it with plain water only. Not following instructions may compromise your exam and you may need to reschedule.      Apr 09, 2025 11:20 AM CDT Exam - Established with Pepper Hedrick DO [facility] [place])              [facility] Mammography [facility]  [address] [address]  [phone] [facility] MRI  [facility]  [address] [address]  [phone] [facility] Ultrasound  [facility]  [address] [address]  [phone]    [facility]  [address]  [facility] [address] [phone] [facility]  [address] [address]  [phone] [facility]  [address]  [facility] [id number] [phone]    [facility]  [facility] [place]  [address]  [facility] [id number] [phone]            Transitional Care Clinic  Was TCC Ordered: No      Primary Care Provider (If no TCC appointment)  Does patient already have a PCP appointment scheduled?  No  Nurse Care Manager Attempted to schedule PCP office TCM appointment with patient   -If no appointment scheduled: Explain: pt declined a sooner appt. Pt is scheduled on 12/4/24 with PCP, TE to PCP Office to FU on a sooner appt.     Specialist  Does the patient have any other follow-up appointment(s) that need to be scheduled? Yes   -If yes: Nurse Care Manager reviewed upcoming specialist appointments with patient: Yes   -Does the patient need assistance scheduling appointment(s): No- Pt plans to see GI if needed, declined assistance at this time.     CCM referral placed:  Not Applicable    Book By Date: 11/21/24

## 2024-11-11 NOTE — TELEPHONE ENCOUNTER
Just an FYI-     Spoke to patient for TCM today.  Patient does not have an appointment scheduled at this time. Pt declined an appt when offered with PCP.  TCM appointment recommended by 11/14/24 as patient is a High risk for readmission.  Please advise.    BOOK BY DATE (last date for TCM): 11/21/24    Thank you!

## 2024-11-12 ENCOUNTER — TELEPHONE (OUTPATIENT)
Dept: INTERNAL MEDICINE CLINIC | Facility: CLINIC | Age: 64
End: 2024-11-12

## 2024-11-12 NOTE — TELEPHONE ENCOUNTER
Spoke to patient regarding a medication question in another encounter and also encouraged patient to schedule a hospital follow up appt.    Future Appointments   Date Time Provider Department Center   11/15/2024 10:15 AM Antony Zavala MD [id number] [place] [id number]   11/19/2024  9:00 AM [facility] RM5 [facility] EM [facility]   12/4/2024 10:00 AM Antony Zavala MD [facility]   12/12/2024 10:15 AM Leydi Bonilla MD [facility]   12/20/2024 10:00 AM Lillian Baron DPM [facility]  [facility]   1/15/2025  3:00 PM [facility] MRI RM2 (3T WIDE) [facility] MRI [place]   2/5/2025  9:00 AM [facility] US RM 2 [facility] US [place]   4/9/2025 11:20 AM Pepper Hedrick DO [facility]

## 2024-11-12 NOTE — TELEPHONE ENCOUNTER
Dr Antony Zavala, please advise    Patient (name and  verified) is asking if she can take this supplement  \"8 greens supplement vitamin gummy\"with her current medications?

## 2024-11-19 ENCOUNTER — HOSPITAL ENCOUNTER (OUTPATIENT)
Dept: MAMMOGRAPHY | Facility: HOSPITAL | Age: 64
Discharge: HOME | End: 2024-11-19
Attending: INTERNAL MEDICINE
Payer: COMMERCIAL

## 2024-11-19 DIAGNOSIS — R92.8 ABNORMAL MAMMOGRAM: ICD-10-CM

## 2024-11-19 PROCEDURE — 77061 BREAST TOMOSYNTHESIS UNI: CPT | Performed by: INTERNAL MEDICINE

## 2024-11-19 PROCEDURE — 77065 DX MAMMO INCL CAD UNI: CPT | Performed by: INTERNAL MEDICINE

## 2024-11-20 DIAGNOSIS — R92.8 ABNORMAL MAMMOGRAM: Primary | ICD-10-CM

## 2024-11-21 ENCOUNTER — TELEPHONE (OUTPATIENT)
Dept: INTERNAL MEDICINE CLINIC | Facility: CLINIC | Age: 64
End: 2024-11-21

## 2024-11-21 DIAGNOSIS — R92.8 ABNORMAL MAMMOGRAM: Primary | ICD-10-CM

## 2024-11-21 NOTE — TELEPHONE ENCOUNTER
Received call from central scheduling.  Mammogram ordered is for bilateral additional views.  Need order for left diagnostic mammogram.  Copied from result report:    CONCLUSION:        Probably benign calcification left breast.  Recommend six-month follow-up left diagnostic mammogram and possible ultrasound.     New order pended for signature.

## 2024-11-22 NOTE — TELEPHONE ENCOUNTER
Please review. Protocol Pass    Medication is listed as patient reported. Please advise if refill is appropriate.    Requested Prescriptions   Pending Prescriptions Disp Refills    lactulose 10 GM/15ML Oral Solution  0     Sig: Take 30 mL (20 g total) by mouth 2 (two) times daily as needed.       Gastrointestional Medication Protocol Passed - 11/22/2024  4:07 PM        Passed - In person appointment or virtual visit in the past 12 mos or appointment in next 3 mos     Recent Outpatient Visits              1 month ago Alcoholic cirrhosis of liver with ascites (HCC)    [facility]    Office Visit    1 month ago [facility] spots[facility]    Office Visit    1 month ago Alcoholic cirrhosis of liver with ascites (HCC)    [facility] Leydi Bonilla MD    Office Visit    1 month ago Cerebellar hemorrhage (HCC)    [facility] Pepper Hedrick DO    Office Visit    2 months ago Telogen effluvium    [facility] Ozzie Castanon MD    Office Visit          Future Appointments         Provider Department Appt Notes    In 1 week [facility]/ [facility]    In 2 weeks Leydi Bonilla MD [facility] 3 months    In 4 weeks Lillian Baron DPM [facility] Diabetic    In 1 month [facility] MRI 2 (3T WIDE) [facility] MRI Scheduled with daughter -bt    In 2 months [facility] US  2 [facility] Ultrasound SCHEDULED W/DAUGHTER    In 4 months Pepper Hedrick DO [facility] f/up    In 6 months [facility] CA 1 [facility] Mammography - Center for [facility]                             Future Appointments         Provider Department Appt Notes    In 1 week [facility]y Z[facility]ala MD [facility]/ [facility] b [facility]    In 2 weeks Leydi Bonilla MD [facility] 3 months    In 4 weeks Lillian Baron DPM [facility] [place] Diabetic    In 1 month [facility] MRI 2 (3T WIDE) [facility] MRI Scheduled with daughter -bt    In 2 months [facility] US  2 [facility] Ultrasound SCHEDULED W/DAUGHTER    In 4 months Pepper Hedrick DO [facility] f/up    In 6 months [facility] CA 1 [facility]           Recent Outpatient Visits              1 month ago Alcoholic cirrhosis of liver with ascites (HCC)    [facility] Yobany Guardado MD    Office Visit    1 month ago Skin spots-aging    [facility] [facility]    Office Visit    1 month ago Alcoholic cirrhosis of liver with ascites (HCC)    [facility] Leydi Bonilla MD    Office Visit    1 month ago Cerebellar hemorrhage (HCC)    [facility] Pepper Hedrick DO    Office Visit    2 months ago Telogen effluvium    [facility] Ozzie Castanon MD    Office Visit

## 2024-11-24 RX ORDER — LACTULOSE 10 G/15ML
20 SOLUTION ORAL 2 TIMES DAILY PRN
Qty: 1800 ML | Refills: 2 | Status: SHIPPED | OUTPATIENT
Start: 2024-11-24

## 2024-12-03 ENCOUNTER — TELEPHONE (OUTPATIENT)
Dept: DERMATOLOGY CLINIC | Facility: CLINIC | Age: 64
End: 2024-12-03

## 2024-12-04 ENCOUNTER — OFFICE VISIT (OUTPATIENT)
Dept: INTERNAL MEDICINE CLINIC | Facility: CLINIC | Age: 64
End: 2024-12-04

## 2024-12-04 VITALS
BODY MASS INDEX: 29.3 KG/M2 | RESPIRATION RATE: 18 BRPM | WEIGHT: 135.81 LBS | SYSTOLIC BLOOD PRESSURE: 108 MMHG | DIASTOLIC BLOOD PRESSURE: 44 MMHG | HEIGHT: 57 IN | OXYGEN SATURATION: 99 %

## 2024-12-04 DIAGNOSIS — E11.9 TYPE 2 DIABETES MELLITUS WITHOUT COMPLICATION, WITHOUT LONG-TERM CURRENT USE OF INSULIN (HCC): ICD-10-CM

## 2024-12-04 DIAGNOSIS — I10 PRIMARY HYPERTENSION: ICD-10-CM

## 2024-12-04 DIAGNOSIS — R21 RASH: ICD-10-CM

## 2024-12-04 DIAGNOSIS — K70.31 ALCOHOLIC CIRRHOSIS OF LIVER WITH ASCITES (HCC): ICD-10-CM

## 2024-12-04 DIAGNOSIS — E87.70 HYPERVOLEMIA, UNSPECIFIED HYPERVOLEMIA TYPE: ICD-10-CM

## 2024-12-04 DIAGNOSIS — F41.8 DEPRESSION WITH ANXIETY: ICD-10-CM

## 2024-12-04 DIAGNOSIS — R60.0 EDEMA OF BOTH LEGS: ICD-10-CM

## 2024-12-04 DIAGNOSIS — Z00.00 ANNUAL PHYSICAL EXAM: Primary | ICD-10-CM

## 2024-12-04 DIAGNOSIS — R91.1 PULMONARY NODULE: ICD-10-CM

## 2024-12-04 PROCEDURE — 90471 IMMUNIZATION ADMIN: CPT | Performed by: INTERNAL MEDICINE

## 2024-12-04 PROCEDURE — 3008F BODY MASS INDEX DOCD: CPT | Performed by: INTERNAL MEDICINE

## 2024-12-04 PROCEDURE — 3078F DIAST BP <80 MM HG: CPT | Performed by: INTERNAL MEDICINE

## 2024-12-04 PROCEDURE — G0439 PPPS, SUBSEQ VISIT: HCPCS | Performed by: INTERNAL MEDICINE

## 2024-12-04 PROCEDURE — 90746 HEPB VACCINE 3 DOSE ADULT IM: CPT | Performed by: INTERNAL MEDICINE

## 2024-12-04 PROCEDURE — 3074F SYST BP LT 130 MM HG: CPT | Performed by: INTERNAL MEDICINE

## 2024-12-04 RX ORDER — TRIAMCINOLONE ACETONIDE 1 MG/G
CREAM TOPICAL 2 TIMES DAILY PRN
Qty: 15 G | Refills: 1 | Status: SHIPPED | OUTPATIENT
Start: 2024-12-04

## 2024-12-04 NOTE — PROGRESS NOTES
Subjective:     Patient ID: Christi Tavarez is a 64 year old female.    HPI    Patient comes in today for annual physical complaining of increased edema to lower extremities but admitting that she has been eating pickles and olives she says she was not aware that she is not supposed to.  She is taking her medications.  Patient also complains today of rash on the right side of the neck with some itchiness no new jewelry or anything that she can think of that could have caused the contact dermatitis like.  No other complaints recently had a colonoscopy was kept overnight to make sure no bleeding doing okay she has a follow-up with hepatology this week.  Patient also in June while admitted in hospital had a CT scan done which showed a lung nodule was supposed to have a repeat CT     History/Other:   Review of Systems   Constitutional: Negative.    HENT: Negative.     Eyes: Negative.    Respiratory: Negative.     Cardiovascular:  Positive for leg swelling.   Gastrointestinal: Negative.    Genitourinary: Negative.    Musculoskeletal: Negative.    Skin: Negative.    Neurological: Negative.    Psychiatric/Behavioral: Negative.       Current Outpatient Medications   Medication Sig Dispense Refill    triamcinolone 0.1 % External Cream Apply topically 2 (two) times daily as needed. 15 g 1    lactulose 10 GM/15ML Oral Solution Take 30 mL (20 g total) by mouth 2 (two) times daily as needed. 1800 mL 2    cholecalciferol 25 MCG (1000 UT) Oral Tab Take 1 tablet (1,000 Units total) by mouth daily.      levothyroxine 50 MCG Oral Tab Take 1 tablet (50 mcg total) by mouth before breakfast.      folic acid 1 MG Oral Tab Take 1 tablet (1 mg total) by mouth daily. 90 tablet 0    bumetanide 2 MG Oral Tab Take 1 tablet (2 mg total) by mouth 2 (two) times daily. 180 tablet 3    rifAXIMin 550 MG Oral Tab Take 1 tablet (550 mg total) by mouth 2 (two) times daily. 60 tablet 2    potassium chloride (KLOR-CON) 20 MEQ Oral Powd Pack Take 40 mEq  by mouth daily. 90 packet 0    spironolactone 50 MG Oral Tab Take 1 tablet (50 mg total) by mouth 2 (two) times daily. 180 tablet 1    escitalopram 10 MG Oral Tab Take 1 tablet (10 mg total) by mouth nightly. 90 tablet 3    omeprazole 20 MG Oral Capsule Delayed Release Take 1 capsule (20 mg total) by mouth every morning before breakfast. 90 capsule 3    ergocalciferol 1.25 MG (45254 UT) Oral Cap Take 1 capsule (50,000 Units total) by mouth every 7 days. Sundays (Patient not taking: Reported on 2024) 4 capsule 0    lamoTRIgine 25 MG Oral Tab Take 1 tablet (25 mg total) by mouth 2 (two) times daily. (Patient not taking: Reported on 2024) 180 tablet 3     Allergies:Allergies[1]    Past Medical History:    Acute, but ill-defined, cerebrovascular disease    Anxiety    CHF (congestive heart failure) (HCC)    Depression    Disorder of liver    Hypothyroidism    Stroke (HCC)      Past Surgical History:   Procedure Laterality Date    Colonoscopy N/A 2024    Procedure: COLONOSCOPY;  Surgeon: Yobany Guardado MD;  [facility] ENDOSCOPY    Needle biopsy right Right             Family History   Problem Relation Age of Onset    Pancreatic Cancer Father     Diabetes Daughter       Social History:   Social History     Socioeconomic History    Marital status:    Tobacco Use    Smoking status: Former     Current packs/day: 0.00     Types: Cigarettes     Quit date: 2024     Years since quittin.6    Smokeless tobacco: Never   Vaping Use    Vaping status: Never Used   Substance and Sexual Activity    Alcohol use: Not Currently     Comment: socially ,not since 2024    Drug use: Not Currently     Types: Cannabis, Cocaine     Social Drivers of Health     Financial Resource Strain: Low Risk  (2024)    Financial Resource Strain     Difficulty of Paying Living Expenses: Not very hard     Med Affordability: No   Food Insecurity: No Food Insecurity (2024)    NCSS - Food Insecurity      Worried About Running Out of Food in the Last Year: No     Ran Out of Food in the Last Year: No   Transportation Needs: No Transportation Needs (12/4/2024)    NCSS - Transportation     Lack of Transportation: No   Housing Stability: Not At Risk (12/4/2024)    NCSS - Housing/Utilities     Has Housing: Yes     Worried About Losing Housing: No     Unable to Get Utilities: No        Objective:   Physical Exam  Vitals and nursing note reviewed.   Constitutional:       Appearance: She is well-developed.   HENT:      Head: Normocephalic and atraumatic.      Right Ear: External ear normal.      Left Ear: External ear normal.      Nose: Nose normal.   Eyes:      Conjunctiva/sclera: Conjunctivae normal.      Pupils: Pupils are equal, round, and reactive to light.   Cardiovascular:      Rate and Rhythm: Normal rate and regular rhythm.      Heart sounds: Normal heart sounds.   Pulmonary:      Effort: Pulmonary effort is normal.      Breath sounds: Normal breath sounds.   Abdominal:      General: Bowel sounds are normal.      Palpations: Abdomen is soft.   Genitourinary:     Vagina: Normal.   Musculoskeletal:         General: Normal range of motion.      Cervical back: Normal range of motion and neck supple.      Right lower leg: Edema present.      Left lower leg: Edema present.      Comments: 1-2 + edema   Skin:     General: Skin is warm and dry.   Neurological:      Mental Status: She is alert and oriented to person, place, and time.      Deep Tendon Reflexes: Reflexes are normal and symmetric.   Psychiatric:         Behavior: Behavior normal.         Thought Content: Thought content normal.         Judgment: Judgment normal.         Assessment & Plan:   1. Annual physical exam exam as above we will order labs   2. Primary hypertension continue current treatment   3. Alcoholic cirrhosis of liver with ascites (HCC) continue current treatment follows with GI   4. Edema of both legs pt will stop eating salty food especially  patient will increase to Bumex to 1 and half tablet twice a day will call us next week to let us know how she is doing   5. Type 2 diabetes mellitus without complication, without long-term current use of insulin (HCC) patient not taking medication A1c has been great we will retest   6. Hypervolemia, unspecified hypervolemia type as above    7. Depression with anxiety stable medical management   8. Rash will give steroid cream   9. Pulmonary nodule will refer to pulmonary will follow       Orders Placed This Encounter   Procedures    Lipid Panel    TSH W Reflex To Free T4    Urinalysis, Routine    Hemoglobin A1C    Microalb/Creat Ratio, Random Urine    HEPATITIS B VACCINE, OVER 20       Meds This Visit:  Requested Prescriptions     Signed Prescriptions Disp Refills    triamcinolone 0.1 % External Cream 15 g 1     Sig: Apply topically 2 (two) times daily as needed.       Imaging & Referrals:  HEPATITIS B VACCINE, OVER 20  PULMONARY - INTERNAL            [1] No Known Allergies
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
## 2024-12-07 PROBLEM — U07.1 COVID-19: Status: RESOLVED | Noted: 2024-06-15 | Resolved: 2024-12-07

## 2024-12-07 PROBLEM — F10.930 ALCOHOL WITHDRAWAL SYNDROME, UNCOMPLICATED (HCC): Status: RESOLVED | Noted: 2024-04-03 | Resolved: 2024-12-07

## 2024-12-07 PROBLEM — U07.1 COVID-19 VIRUS INFECTION: Status: RESOLVED | Noted: 2024-06-12 | Resolved: 2024-12-07

## 2024-12-07 PROBLEM — K74.60 HEPATIC CIRRHOSIS (HCC): Status: RESOLVED | Noted: 2024-06-15 | Resolved: 2024-12-07

## 2024-12-07 PROBLEM — R27.8 SENSORY ATAXIA: Status: RESOLVED | Noted: 2024-06-05 | Resolved: 2024-12-07

## 2024-12-07 PROBLEM — R73.9 HYPERGLYCEMIA: Status: RESOLVED | Noted: 2024-06-03 | Resolved: 2024-12-07

## 2024-12-07 PROBLEM — K92.2 GI BLEED: Status: RESOLVED | Noted: 2024-11-06 | Resolved: 2024-12-07

## 2024-12-07 PROBLEM — I50.9 ACUTE ON CHRONIC CONGESTIVE HEART FAILURE, UNSPECIFIED HEART FAILURE TYPE (HCC): Status: RESOLVED | Noted: 2024-06-03 | Resolved: 2024-12-07
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
## 2024-12-07 PROBLEM — I61.4 CEREBELLAR HEMORRHAGE (HCC): Status: RESOLVED | Noted: 2024-04-03 | Resolved: 2024-12-07

## 2024-12-07 PROBLEM — F10.20 ALCOHOL DEPENDENCE, CONTINUOUS (HCC): Status: RESOLVED | Noted: 2024-04-03 | Resolved: 2024-12-07

## 2024-12-07 PROBLEM — K74.60 HEPATIC CIRRHOSIS, UNSPECIFIED HEPATIC CIRRHOSIS TYPE, UNSPECIFIED WHETHER ASCITES PRESENT (HCC): Status: RESOLVED | Noted: 2024-11-06 | Resolved: 2024-12-07

## 2024-12-07 PROBLEM — R50.9 FEVER: Status: RESOLVED | Noted: 2024-06-12 | Resolved: 2024-12-07

## 2024-12-09 ENCOUNTER — TELEPHONE (OUTPATIENT)
Dept: PULMONOLOGY | Facility: CLINIC | Age: 64
End: 2024-12-09

## 2024-12-09 NOTE — TELEPHONE ENCOUNTER
Stevie Shi,   P Em Pulmo Clinical Staff  Can we schedule this patient next Thursday at 12 for consult     Spoke to patient and scheduled appointment for 12/12/24 at 12. Reviewed time, date, place and where to park. Patient verbalized understanding

## 2024-12-12 ENCOUNTER — OFFICE VISIT (OUTPATIENT)
Dept: PULMONOLOGY | Facility: CLINIC | Age: 64
End: 2024-12-12
Payer: COMMERCIAL

## 2024-12-12 ENCOUNTER — LAB ENCOUNTER (OUTPATIENT)
Dept: LAB | Facility: HOSPITAL | Age: 64
End: 2024-12-12
Attending: INTERNAL MEDICINE
Payer: COMMERCIAL

## 2024-12-12 ENCOUNTER — OFFICE VISIT (OUTPATIENT)
Dept: SURGERY | Facility: CLINIC | Age: 64
End: 2024-12-12

## 2024-12-12 VITALS
HEART RATE: 72 BPM | OXYGEN SATURATION: 100 % | RESPIRATION RATE: 18 BRPM | HEIGHT: 59 IN | WEIGHT: 137 LBS | SYSTOLIC BLOOD PRESSURE: 102 MMHG | BODY MASS INDEX: 27.62 KG/M2 | DIASTOLIC BLOOD PRESSURE: 59 MMHG

## 2024-12-12 DIAGNOSIS — K70.31 ALCOHOLIC CIRRHOSIS OF LIVER WITH ASCITES (HCC): ICD-10-CM

## 2024-12-12 DIAGNOSIS — R60.0 LOCALIZED EDEMA: ICD-10-CM

## 2024-12-12 DIAGNOSIS — K74.69 OTHER CIRRHOSIS OF LIVER (HCC): ICD-10-CM

## 2024-12-12 DIAGNOSIS — E87.70 HYPERVOLEMIA, UNSPECIFIED HYPERVOLEMIA TYPE: Primary | ICD-10-CM

## 2024-12-12 DIAGNOSIS — E11.9 DIABETES MELLITUS (HCC): ICD-10-CM

## 2024-12-12 DIAGNOSIS — K76.82 HEPATIC ENCEPHALOPATHY (HCC): ICD-10-CM

## 2024-12-12 DIAGNOSIS — I10 ESSENTIAL HYPERTENSION, MALIGNANT: ICD-10-CM

## 2024-12-12 DIAGNOSIS — R91.1 LUNG NODULE: Primary | ICD-10-CM

## 2024-12-12 DIAGNOSIS — Z00.00 ROUTINE GENERAL MEDICAL EXAMINATION AT A HEALTH CARE FACILITY: Primary | ICD-10-CM

## 2024-12-12 LAB
ALBUMIN SERPL-MCNC: 3.5 G/DL (ref 3.2–4.8)
ALP LIVER SERPL-CCNC: 164 U/L
ALT SERPL-CCNC: 14 U/L
ANION GAP SERPL CALC-SCNC: 8 MMOL/L (ref 0–18)
AST SERPL-CCNC: 31 U/L (ref ?–34)
BILIRUB DIRECT SERPL-MCNC: 1.1 MG/DL (ref ?–0.3)
BILIRUB SERPL-MCNC: 2.5 MG/DL (ref 0.2–1.1)
BILIRUB UR QL: NEGATIVE
BUN BLD-MCNC: 14 MG/DL (ref 9–23)
BUN/CREAT SERPL: 16.5 (ref 10–20)
CALCIUM BLD-MCNC: 9.6 MG/DL (ref 8.7–10.4)
CHLORIDE SERPL-SCNC: 102 MMOL/L (ref 98–112)
CHOLEST SERPL-MCNC: 160 MG/DL (ref ?–200)
CLARITY UR: CLEAR
CO2 SERPL-SCNC: 28 MMOL/L (ref 21–32)
COLOR UR: YELLOW
CREAT BLD-MCNC: 0.85 MG/DL
CREAT UR-SCNC: 92.2 MG/DL
EGFRCR SERPLBLD CKD-EPI 2021: 76 ML/MIN/1.73M2 (ref 60–?)
EST. AVERAGE GLUCOSE BLD GHB EST-MCNC: 105 MG/DL (ref 68–126)
FASTING PATIENT LIPID ANSWER: YES
GLUCOSE BLD-MCNC: 121 MG/DL (ref 70–99)
GLUCOSE UR-MCNC: NORMAL MG/DL
HBA1C MFR BLD: 5.3 % (ref ?–5.7)
HDLC SERPL-MCNC: 21 MG/DL (ref 40–59)
HGB UR QL STRIP.AUTO: NEGATIVE
KETONES UR-MCNC: NEGATIVE MG/DL
LDLC SERPL CALC-MCNC: 124 MG/DL (ref ?–100)
LEUKOCYTE ESTERASE UR QL STRIP.AUTO: NEGATIVE
MICROALBUMIN UR-MCNC: <0.3 MG/DL
NITRITE UR QL STRIP.AUTO: NEGATIVE
NONHDLC SERPL-MCNC: 139 MG/DL (ref ?–130)
OSMOLALITY SERPL CALC.SUM OF ELEC: 288 MOSM/KG (ref 275–295)
PH UR: 5.5 [PH] (ref 5–8)
POTASSIUM SERPL-SCNC: 4.3 MMOL/L (ref 3.5–5.1)
PROT SERPL-MCNC: 7.6 G/DL (ref 5.7–8.2)
PROT UR-MCNC: NEGATIVE MG/DL
SODIUM SERPL-SCNC: 138 MMOL/L (ref 136–145)
SP GR UR STRIP: 1.02 (ref 1–1.03)
TRIGL SERPL-MCNC: 75 MG/DL (ref 30–149)
TSI SER-ACNC: 3.91 UIU/ML (ref 0.55–4.78)
UROBILINOGEN UR STRIP-ACNC: 6
VLDLC SERPL CALC-MCNC: 13 MG/DL (ref 0–30)

## 2024-12-12 PROCEDURE — 80076 HEPATIC FUNCTION PANEL: CPT

## 2024-12-12 PROCEDURE — 81003 URINALYSIS AUTO W/O SCOPE: CPT

## 2024-12-12 PROCEDURE — 80048 BASIC METABOLIC PNL TOTAL CA: CPT

## 2024-12-12 PROCEDURE — 3078F DIAST BP <80 MM HG: CPT | Performed by: INTERNAL MEDICINE

## 2024-12-12 PROCEDURE — 3074F SYST BP LT 130 MM HG: CPT | Performed by: INTERNAL MEDICINE

## 2024-12-12 PROCEDURE — 36415 COLL VENOUS BLD VENIPUNCTURE: CPT

## 2024-12-12 PROCEDURE — 80061 LIPID PANEL: CPT

## 2024-12-12 PROCEDURE — 82570 ASSAY OF URINE CREATININE: CPT

## 2024-12-12 PROCEDURE — 3008F BODY MASS INDEX DOCD: CPT | Performed by: INTERNAL MEDICINE

## 2024-12-12 PROCEDURE — 83036 HEMOGLOBIN GLYCOSYLATED A1C: CPT

## 2024-12-12 PROCEDURE — 82043 UR ALBUMIN QUANTITATIVE: CPT

## 2024-12-12 PROCEDURE — 99204 OFFICE O/P NEW MOD 45 MIN: CPT | Performed by: INTERNAL MEDICINE

## 2024-12-12 PROCEDURE — 84443 ASSAY THYROID STIM HORMONE: CPT

## 2024-12-12 NOTE — H&P
Referring Physician  Antony Zavala MD    Chief Complaint  Abnormal chest imaging    History of Present Illness  Patient presents today to pulmonary clinic for initial visit.  Had recent CT chest 2024 with evidence of right upper lobe nodule seen.  Denies previous history of known lung disease or lung nodule.  Admits to 10-pack-year history of tobacco abuse quit earlier in .  Denies significant cough, dyspnea, weight loss, loss of appetite.    Review of Systems  Constitutional: denies weight loss, fevers, chills, weakness, fatigue, recent illness  HEENT: denies epistaxis, sore throat, postnasal drip  Cardio: denies chest pain, chest pressure, palpitations  Respiratory: denies dyspnea, cough, wheezing, hemoptysis   GI: denies nausea, vomiting, abdominal pain  : denies dysuria, hematuria  Musculoskeletal: denies arthralgia, myalgia  Integumentary: denies rash, itching  Neurological: denies syncope, weakness, dizziness,   Psychiatric: denies depression, anxiety  Hematologic: denies bruising    Past Medical History  Past Medical History:    Acute, but ill-defined, cerebrovascular disease    Anxiety    CHF (congestive heart failure) (HCC)    Depression    Disorder of liver    Hypothyroidism    Stroke (HCC)        Surgical History  Past Surgical History:   Procedure Laterality Date    Colonoscopy N/A 2024    Procedure: COLONOSCOPY;  Surgeon: Yobany Guardado MD;  Location: [facility] ENDOSCOPY    Needle biopsy right Right              Family History  Family History   Problem Relation Age of Onset    Pancreatic Cancer Father     Diabetes Daughter         Social History  Tobacco: 10-pack-year history of tobacco abuse quit earlier this year in   Alcohol: Denies significant intake  Illicit Drugs: Denies    Medications  Medications Ordered Prior to Encounter[1]    Allergies  Allergies[2]    Physical Exam  Constitutional: no acute distress  HEENT: PERRL  Neck: supple, no JVD  Cardio: RRR, S1  S2  Respiratory: clear to auscultation bilaterally, no wheezing, rales, rhonchi, crackles  GI: abdomen soft, non tender, active bowel sounds, no organomegaly  Extremities: no clubbing, cyanosis, edema  Neurologic: no gross motor deficits  Skin: warm, dry  Lymphatic: no supraclavicular lymphadenopathy     Imaging  CT chest on 6/6/2024 with 1.5 cm nodule right upper lobe.  Trace pleural effusion seen.    Pulmonary Function Testing  None available to review    Assessment  1.  Right lung nodule  2.  Prior nicotine dependence    Plan  -Patient presents today for pulm evaluation.  I reviewed CT chest from 6/6/2024 with 1.5 cm nodule seen in right upper lobe.  Discussed differential diagnosis for lung nodule which includes infectious, inflammatory and malignancy.  Recommend PET/CT which I have ordered for the patient.  Once I review the results we will make further recommendations in case biopsy needs to be considered.  -Encouraged ongoing tobacco cessation    Stevie Shi DO  Pulmonary Critical Care Medicine  [facility]  12/12/2024  12:00 PM        [1]   Current Outpatient Medications on File Prior to Visit   Medication Sig Dispense Refill    rifAXIMin 550 MG Oral Tab Take 1 tablet (550 mg total) by mouth 2 (two) times daily. 180 tablet 3    triamcinolone 0.1 % External Cream Apply topically 2 (two) times daily as needed. 15 g 1    lactulose 10 GM/15ML Oral Solution Take 30 mL (20 g total) by mouth 2 (two) times daily as needed. 1800 mL 2    cholecalciferol 25 MCG (1000 UT) Oral Tab Take 1 tablet (1,000 Units total) by mouth daily.      levothyroxine 50 MCG Oral Tab Take 1 tablet (50 mcg total) by mouth before breakfast.      folic acid 1 MG Oral Tab Take 1 tablet (1 mg total) by mouth daily. 90 tablet 0    bumetanide 2 MG Oral Tab Take 1 tablet (2 mg total) by mouth 2 (two) times daily. 180 tablet 3    potassium chloride (KLOR-CON) 20 MEQ Oral Powd Pack Take 40 mEq by mouth daily. 90 packet 0    ergocalciferol  1.25 MG (87205 UT) Oral Cap Take 1 capsule (50,000 Units total) by mouth every 7 days. Sundays (Patient not taking: Reported on 12/4/2024) 4 capsule 0    spironolactone 50 MG Oral Tab Take 1 tablet (50 mg total) by mouth 2 (two) times daily. 180 tablet 1    escitalopram 10 MG Oral Tab Take 1 tablet (10 mg total) by mouth nightly. 90 tablet 3    lamoTRIgine 25 MG Oral Tab Take 1 tablet (25 mg total) by mouth 2 (two) times daily. (Patient not taking: Reported on 12/4/2024) 180 tablet 3    omeprazole 20 MG Oral Capsule Delayed Release Take 1 capsule (20 mg total) by mouth every morning before breakfast. 90 capsule 3     No current facility-administered medications on file prior to visit.   [2] No Known Allergies

## 2024-12-13 ENCOUNTER — TELEPHONE (OUTPATIENT)
Facility: CLINIC | Age: 64
End: 2024-12-13

## 2024-12-13 NOTE — TELEPHONE ENCOUNTER
Patient calling was told by PCP to follow up with GI regards some concerns based on some results. Patient scheduled for 2/26/25 however states it might be too early for her appointment. Asking if can be called or needs to come in. Please call.

## 2024-12-16 ENCOUNTER — ORDER TRANSCRIPTION (OUTPATIENT)
Dept: ADMINISTRATIVE | Facility: HOSPITAL | Age: 64
End: 2024-12-16

## 2024-12-16 DIAGNOSIS — E87.70 HYPERVOLEMIA: Primary | ICD-10-CM

## 2024-12-16 DIAGNOSIS — K76.82 HEPATIC ENCEPHALOPATHY (HCC): ICD-10-CM

## 2024-12-17 ENCOUNTER — TELEPHONE (OUTPATIENT)
Facility: CLINIC | Age: 64
End: 2024-12-17

## 2024-12-17 NOTE — TELEPHONE ENCOUNTER
Patient has questions about the appointments that she scheduled in February and March please call

## 2024-12-17 NOTE — TELEPHONE ENCOUNTER
See TE from 12/17/2024    Pt has an elevated bilirubin level on her hepatic function panel from 12/12/24. This was ordered by her hepatologist Dr Bonilla. Her hepatologist should address this elevated level

## 2024-12-17 NOTE — TELEPHONE ENCOUNTER
Pt has two appointments with Dr Guardado    One in February and one in March    We cancelled the March appointment because she doesn't need both    Your Appointments      Wednesday February 26, 2025 9:00 AM  Follow Up Visit with Yobany Guardado MD  [facility] ([facility]) [facility] [address] [address]  [phone]

## 2024-12-20 ENCOUNTER — HOSPITAL ENCOUNTER (OUTPATIENT)
Dept: NUCLEAR MEDICINE | Facility: HOSPITAL | Age: 64
Discharge: HOME | End: 2024-12-20
Attending: INTERNAL MEDICINE
Payer: COMMERCIAL

## 2024-12-20 DIAGNOSIS — K76.82 HEPATIC ENCEPHALOPATHY (HCC): ICD-10-CM

## 2024-12-20 DIAGNOSIS — R91.1 LUNG NODULE: ICD-10-CM

## 2024-12-20 LAB — GLUCOSE BLDC GLUCOMTR-MCNC: 126 MG/DL (ref 70–99)

## 2024-12-20 PROCEDURE — 82962 GLUCOSE BLOOD TEST: CPT

## 2024-12-20 PROCEDURE — 78815 PET IMAGE W/CT SKULL-THIGH: CPT | Performed by: INTERNAL MEDICINE

## 2024-12-24 DIAGNOSIS — K76.82 HEPATIC ENCEPHALOPATHY (HCC): ICD-10-CM

## 2024-12-28 DIAGNOSIS — K76.82 HEPATIC ENCEPHALOPATHY (HCC): ICD-10-CM

## 2024-12-30 RX ORDER — POTASSIUM CHLORIDE 1.5 G/1.58G
40 POWDER, FOR SOLUTION ORAL DAILY
Qty: 180 PACKET | Refills: 0 | Status: SHIPPED | OUTPATIENT
Start: 2024-12-30

## 2024-12-30 RX ORDER — TRIAMCINOLONE ACETONIDE 1 MG/G
CREAM TOPICAL 2 TIMES DAILY PRN
Qty: 15 G | Refills: 1 | Status: SHIPPED | OUTPATIENT
Start: 2024-12-30

## 2024-12-30 NOTE — TELEPHONE ENCOUNTER
Please review; protocol failed/No Protocol    Last Office Visit: 12/04/2024    Requested Prescriptions   Pending Prescriptions Disp Refills    potassium chloride (KLOR-CON) 20 MEQ Oral Powd Pack 90 packet 0     Sig: Take 40 mEq by mouth daily.       There is no refill protocol information for this order       triamcinolone 0.1 % External Cream 15 g 1     Sig: Apply topically 2 (two) times daily as needed.       There is no refill protocol information for this order        Future Appointments         Provider Department Appt Notes    In 2 weeks [facility] MRI RM2 (3T WIDE) [facility] MRI Scheduled with daughter -bt    In 3 weeks Stevie Shi DO [facility] Pulmonary nodule  (Policy informed)    In 3 weeks Lillian Baron DPM [facility] Diabetic    In 1 month Laney Gutierrez MD [facility] - OB/GYN Comp and Pap    In 1 month Joann Webb RD [facility] Nutrition Services     In 1 month [facility] US  2 [facility] Ultrasound SCHEDULED W/DAUGHTER    In 1 month Leydi Bonilla MD [facility] 3 months    In 1 month Yobany Guardado MD [facility] review result concerns from PCP (Policy informed)    In 3 months [facility] [place] [facility], [facility] HEP B    In 3 months Pepper Hedrick DO [facility] f/up    In 4 months [facility] [place] 1 [facility] Mammography - [facility]           Recent Outpatient Visits              2 weeks ago Lung nodule    [facility] Stevie Shi DO    Office Visit    2 weeks ago Hypervolemia, unspecified hypervolemia type    [facility] Leydi Bonilla MD    Office Visit    3  weeks ago Annual physical exam    [facility], Bridgett Crump Agron B, MD    Office Visit    2 months ago Alcoholic cirrhosis of liver with ascites (HCC)    [facility], [facility], Yobany Marcelo MD    Office Visit    2 months ago Skin spots-aging    [facility], Bridgett Crump Agron B, MD    Office Visit

## 2025-01-02 ENCOUNTER — TELEPHONE (OUTPATIENT)
Dept: INTERNAL MEDICINE CLINIC | Facility: CLINIC | Age: 65
End: 2025-01-02

## 2025-01-02 NOTE — TELEPHONE ENCOUNTER
Patient calling,verified name and date of birth.  Patiant asks if she has received a pneumonia vaccine? She saw a commercial on TV for it.  Reviewed Immunization record in [facility], patient has received Pneumovax 23 and PCV 20.  She has no further questions,  and will call back to schedule Hepatitis B vaccine.

## 2025-01-03 ENCOUNTER — LAB ENCOUNTER (OUTPATIENT)
Dept: LAB | Facility: HOSPITAL | Age: 65
End: 2025-01-03
Attending: INTERNAL MEDICINE
Payer: COMMERCIAL

## 2025-01-03 DIAGNOSIS — K70.30 ALCOHOLIC CIRRHOSIS OF LIVER WITHOUT ASCITES (HCC): ICD-10-CM

## 2025-01-03 LAB
AFP-TM SERPL-MCNC: 11.3 NG/ML
ALBUMIN SERPL-MCNC: 3.5 G/DL (ref 3.2–4.8)
ALP LIVER SERPL-CCNC: 168 U/L
ALT SERPL-CCNC: 14 U/L
ANION GAP SERPL CALC-SCNC: 7 MMOL/L (ref 0–18)
AST SERPL-CCNC: 30 U/L (ref ?–34)
BILIRUB DIRECT SERPL-MCNC: 0.9 MG/DL (ref ?–0.3)
BILIRUB SERPL-MCNC: 1.8 MG/DL (ref 0.2–1.1)
BUN BLD-MCNC: 11 MG/DL (ref 9–23)
BUN/CREAT SERPL: 12.9 (ref 10–20)
CALCIUM BLD-MCNC: 9.2 MG/DL (ref 8.7–10.4)
CHLORIDE SERPL-SCNC: 104 MMOL/L (ref 98–112)
CO2 SERPL-SCNC: 30 MMOL/L (ref 21–32)
CREAT BLD-MCNC: 0.85 MG/DL
DEPRECATED RDW RBC AUTO: 59.2 FL (ref 35.1–46.3)
EGFRCR SERPLBLD CKD-EPI 2021: 76 ML/MIN/1.73M2 (ref 60–?)
ERYTHROCYTE [DISTWIDTH] IN BLOOD BY AUTOMATED COUNT: 15.9 % (ref 11–15)
FASTING STATUS PATIENT QL REPORTED: NO
GLUCOSE BLD-MCNC: 121 MG/DL (ref 70–99)
HCT VFR BLD AUTO: 33.5 %
HGB BLD-MCNC: 11.1 G/DL
INR BLD: 1.38 (ref 0.8–1.2)
MCH RBC QN AUTO: 33.7 PG (ref 26–34)
MCHC RBC AUTO-ENTMCNC: 33.1 G/DL (ref 31–37)
MCV RBC AUTO: 101.8 FL
OSMOLALITY SERPL CALC.SUM OF ELEC: 293 MOSM/KG (ref 275–295)
PLATELET # BLD AUTO: 141 10(3)UL (ref 150–450)
POTASSIUM SERPL-SCNC: 3.3 MMOL/L (ref 3.5–5.1)
PROT SERPL-MCNC: 7.4 G/DL (ref 5.7–8.2)
PROTHROMBIN TIME: 17.8 SECONDS (ref 11.6–14.8)
RBC # BLD AUTO: 3.29 X10(6)UL
SODIUM SERPL-SCNC: 141 MMOL/L (ref 136–145)
WBC # BLD AUTO: 6.8 X10(3) UL (ref 4–11)

## 2025-01-03 PROCEDURE — 85610 PROTHROMBIN TIME: CPT

## 2025-01-03 PROCEDURE — 80048 BASIC METABOLIC PNL TOTAL CA: CPT

## 2025-01-03 PROCEDURE — 85027 COMPLETE CBC AUTOMATED: CPT

## 2025-01-03 PROCEDURE — 80076 HEPATIC FUNCTION PANEL: CPT

## 2025-01-03 PROCEDURE — 82105 ALPHA-FETOPROTEIN SERUM: CPT

## 2025-01-03 PROCEDURE — 36415 COLL VENOUS BLD VENIPUNCTURE: CPT

## 2025-01-15 ENCOUNTER — HOSPITAL ENCOUNTER (OUTPATIENT)
Dept: MRI IMAGING | Facility: HOSPITAL | Age: 65
Discharge: HOME | End: 2025-01-15
Attending: Other
Payer: COMMERCIAL

## 2025-01-15 DIAGNOSIS — I61.4 CEREBELLAR HEMORRHAGE (HCC): ICD-10-CM

## 2025-01-15 PROCEDURE — A9575 INJ GADOTERATE MEGLUMI 0.1ML: HCPCS | Performed by: OTHER

## 2025-01-15 PROCEDURE — 70553 MRI BRAIN STEM W/O & W/DYE: CPT | Performed by: OTHER

## 2025-01-15 RX ORDER — GADOTERATE MEGLUMINE 376.9 MG/ML
20 INJECTION INTRAVENOUS
Status: COMPLETED | OUTPATIENT
Start: 2025-01-15 | End: 2025-01-15

## 2025-01-15 RX ADMIN — GADOTERATE MEGLUMINE 13 ML: 376.9 INJECTION INTRAVENOUS at 16:05:00

## 2025-02-04 ENCOUNTER — LAB ENCOUNTER (OUTPATIENT)
Dept: LAB | Facility: HOSPITAL | Age: 65
End: 2025-02-04
Attending: INTERNAL MEDICINE
Payer: COMMERCIAL

## 2025-02-04 ENCOUNTER — HOSPITAL ENCOUNTER (OUTPATIENT)
Dept: NUTRITION | Facility: HOSPITAL | Age: 65
Discharge: HOME | End: 2025-02-04
Attending: INTERNAL MEDICINE
Payer: COMMERCIAL

## 2025-02-04 DIAGNOSIS — K70.30 ALCOHOLIC CIRRHOSIS OF LIVER WITHOUT ASCITES (HCC): ICD-10-CM

## 2025-02-04 DIAGNOSIS — E87.70 HYPERVOLEMIA, UNSPECIFIED HYPERVOLEMIA TYPE: Primary | ICD-10-CM

## 2025-02-04 LAB
ALBUMIN SERPL-MCNC: 3.8 G/DL (ref 3.2–4.8)
ALP LIVER SERPL-CCNC: 193 U/L
ALT SERPL-CCNC: 9 U/L
ANION GAP SERPL CALC-SCNC: 9 MMOL/L (ref 0–18)
AST SERPL-CCNC: 27 U/L (ref ?–34)
BILIRUB DIRECT SERPL-MCNC: 1 MG/DL (ref ?–0.3)
BILIRUB SERPL-MCNC: 2.2 MG/DL (ref 0.2–1.1)
BUN BLD-MCNC: 12 MG/DL (ref 9–23)
BUN/CREAT SERPL: 12.2 (ref 10–20)
CALCIUM BLD-MCNC: 9.1 MG/DL (ref 8.7–10.4)
CHLORIDE SERPL-SCNC: 98 MMOL/L (ref 98–112)
CO2 SERPL-SCNC: 31 MMOL/L (ref 21–32)
CREAT BLD-MCNC: 0.98 MG/DL
DEPRECATED RDW RBC AUTO: 54.3 FL (ref 35.1–46.3)
EGFRCR SERPLBLD CKD-EPI 2021: 64 ML/MIN/1.73M2 (ref 60–?)
ERYTHROCYTE [DISTWIDTH] IN BLOOD BY AUTOMATED COUNT: 14.6 % (ref 11–15)
FASTING STATUS PATIENT QL REPORTED: YES
GLUCOSE BLD-MCNC: 143 MG/DL (ref 70–99)
HCT VFR BLD AUTO: 35.6 %
HGB BLD-MCNC: 12.2 G/DL
INR BLD: 1.36 (ref 0.8–1.2)
MCH RBC QN AUTO: 34.9 PG (ref 26–34)
MCHC RBC AUTO-ENTMCNC: 34.3 G/DL (ref 31–37)
MCV RBC AUTO: 101.7 FL
OSMOLALITY SERPL CALC.SUM OF ELEC: 288 MOSM/KG (ref 275–295)
PLATELET # BLD AUTO: 140 10(3)UL (ref 150–450)
POTASSIUM SERPL-SCNC: 4 MMOL/L (ref 3.5–5.1)
PROT SERPL-MCNC: 8.2 G/DL (ref 5.7–8.2)
PROTHROMBIN TIME: 17.5 SECONDS (ref 11.6–14.8)
RBC # BLD AUTO: 3.5 X10(6)UL
SODIUM SERPL-SCNC: 138 MMOL/L (ref 136–145)
WBC # BLD AUTO: 6.1 X10(3) UL (ref 4–11)

## 2025-02-04 PROCEDURE — 80076 HEPATIC FUNCTION PANEL: CPT

## 2025-02-04 PROCEDURE — 36415 COLL VENOUS BLD VENIPUNCTURE: CPT

## 2025-02-04 PROCEDURE — 97802 MEDICAL NUTRITION INDIV IN: CPT | Performed by: DIETITIAN, REGISTERED

## 2025-02-04 PROCEDURE — 80048 BASIC METABOLIC PNL TOTAL CA: CPT

## 2025-02-04 PROCEDURE — 85610 PROTHROMBIN TIME: CPT

## 2025-02-04 PROCEDURE — 85027 COMPLETE CBC AUTOMATED: CPT

## 2025-02-04 NOTE — PROGRESS NOTES
Nutrition Assessment    Christi Tavarez is a 64 year old female.    Referred by: Attending  Referring Physician Name: Leydi Umanzor     Medical Nutrition Therapy Comment: Hypervolemia  Visit Information: Initial Visit 2/4/25  60 minutes spent with patient    ANTHROPOMETRICS  HT: 4' 11\"  WT: 131-133 (checks wt daily)  BMI: 26.45  BMI CLASSIFICATION: 25-29.9 kg/m2 - overweight      SIGNIFICANT MEDICAL Hx  Significant Past Medical Hx: DM2, CHF, cirrhosis, CHF, hypothyroid  Pertinent Medications:   Current Outpatient Medications:     rifAXIMin 550 MG Oral Tab, Take 1 tablet (550 mg total) by mouth 2 (two) times daily., Disp: 180 tablet, Rfl: 3    potassium chloride (KLOR-CON) 20 MEQ Oral Powd Pack, Take 40 mEq by mouth daily., Disp: 180 packet, Rfl: 0    triamcinolone 0.1 % External Cream, Apply topically 2 (two) times daily as needed., Disp: 15 g, Rfl: 1    lactulose 10 GM/15ML Oral Solution, Take 30 mL (20 g total) by mouth 2 (two) times daily as needed., Disp: 1800 mL, Rfl: 2    cholecalciferol 25 MCG (1000 UT) Oral Tab, Take 1 tablet (1,000 Units total) by mouth daily., Disp: , Rfl:     levothyroxine 50 MCG Oral Tab, Take 1 tablet (50 mcg total) by mouth before breakfast., Disp: , Rfl:     folic acid 1 MG Oral Tab, Take 1 tablet (1 mg total) by mouth daily., Disp: 90 tablet, Rfl: 0    bumetanide 2 MG Oral Tab, Take 1 tablet (2 mg total) by mouth 2 (two) times daily., Disp: 180 tablet, Rfl: 3    ergocalciferol 1.25 MG (29956 UT) Oral Cap, Take 1 capsule (50,000 Units total) by mouth every 7 days. Sundays (Patient not taking: Reported on 12/4/2024), Disp: 4 capsule, Rfl: 0    spironolactone 50 MG Oral Tab, Take 1 tablet (50 mg total) by mouth 2 (two) times daily., Disp: 180 tablet, Rfl: 1    escitalopram 10 MG Oral Tab, Take 1 tablet (10 mg total) by mouth nightly., Disp: 90 tablet, Rfl: 3    lamoTRIgine 25 MG Oral Tab, Take 1 tablet (25 mg total) by mouth 2 (two) times daily. (Patient not taking:  Reported on 1/7/2025), Disp: 180 tablet, Rfl: 3    omeprazole 20 MG Oral Capsule Delayed Release, Take 1 capsule (20 mg total) by mouth every morning before breakfast., Disp: 90 capsule, Rfl: 3  Pertinent Lab Results: 11/6/24: sodium 141, potassium 3.3 (low), Glucose 121 (high)    DIET HISTORY  Number of meals per day: 3  Number of snacks per day: 1  Number of meals away from home (per week):  varies  Comment: Patient and her daughter met with the dietitian to discuss a low sodium meal plan. Patient has recently has more issues with fluid retention in her lower extremities. She is in a diuretic, and taking twice a day as prescribed. She reports that her edema has reduced and her weight is now stable at 131-133 pounds, which she is checking daily. She is recommended to follow a low sodium diet, 1863-8441 mg per day to help manage hypervolemia.    PHYSICAL ACTIVITY  Type: other: ADLs  Physical Assessment: Some limit to activity due to needing a cane to walk.    Nutrition Diagnosis:  Fluid retention due to excess sodium intake as evidenced by hypervolemia    Intervention     Nutrition Education: Recommended Modification  Nutrition/Diet Handouts Given: Low Sodium Nutrition Therapy, High Potassium Food list, Salt Free seasoning.      NUTRITION PRESCRIPTION:      Needs:  8657-5646 (25 calories/kg) Calorie     60 gm (1 gm/kg) Protein      Oral Diet Prescription: 2g Sodium    Goals     Nutrition Goals:   Limit sodium in food, use only salt free seasoning, no table salt.  Read food labels and choose portion of food low in sodium with 140 mg of sodium or less per serving.  Limit sodium at a meal to less than 500 mg, 3 meals per day.  Add high potassium foods due to low potassium blood level- list provided. May also try NuSalt (potassium chloride) supplement on foods for seasoning.  Eat most food prepared at home. Food in restaurants are often high in sodium.  Contact RD with any questions.     Recommendation to MD: none at  this time    Assessment of Ability/Barriers     Patient and/or Family Will: Verbalize Understanding    Patient and/or Family Ability to Learn: Retain Information    Readiness to Learn: Motivated    Barriers to Learning: None      2/4/2025  Anna Elliott RD

## 2025-02-05 ENCOUNTER — HOSPITAL ENCOUNTER (OUTPATIENT)
Dept: ULTRASOUND IMAGING | Facility: HOSPITAL | Age: 65
Discharge: HOME | End: 2025-02-05
Attending: INTERNAL MEDICINE
Payer: COMMERCIAL

## 2025-02-05 DIAGNOSIS — K70.31 ALCOHOLIC CIRRHOSIS OF LIVER WITH ASCITES (HCC): ICD-10-CM

## 2025-02-05 PROCEDURE — 93975 VASCULAR STUDY: CPT | Performed by: INTERNAL MEDICINE

## 2025-02-05 PROCEDURE — 76700 US EXAM ABDOM COMPLETE: CPT | Performed by: INTERNAL MEDICINE

## 2025-02-13 ENCOUNTER — OFFICE VISIT (OUTPATIENT)
Facility: CLINIC | Age: 65
End: 2025-02-13

## 2025-02-13 DIAGNOSIS — K76.9 HEPATIC LESION: Primary | ICD-10-CM

## 2025-03-03 ENCOUNTER — OFFICE VISIT (OUTPATIENT)
Dept: PODIATRY CLINIC | Facility: CLINIC | Age: 65
End: 2025-03-03
Payer: COMMERCIAL

## 2025-03-03 DIAGNOSIS — I73.9 PVD (PERIPHERAL VASCULAR DISEASE): ICD-10-CM

## 2025-03-03 DIAGNOSIS — B35.1 ONYCHOMYCOSIS OF MULTIPLE TOENAILS WITH TYPE 2 DIABETES MELLITUS (HCC): Primary | ICD-10-CM

## 2025-03-03 DIAGNOSIS — E11.69 ONYCHOMYCOSIS OF MULTIPLE TOENAILS WITH TYPE 2 DIABETES MELLITUS (HCC): Primary | ICD-10-CM

## 2025-03-03 NOTE — PROGRESS NOTES
Reason for Visit      Christi Tavarez is a 64 year old female presents today complaining of bilateral diabetic foot evaluation.     History of Present Illness     Patient presents to clinic today for routine diabetic foot evaluation.  Patient is a non-insulin-dependent diabetic type II whose last A1c was 5.3 on 12/12/2024.  Patient presents to clinic today complaining of elongated, thickened toenails that he is unable to debride himself.    The following portions of the patient's history were reviewed and updated as appropriate: allergies, current medications, past family history, past medical history, past social history, past surgical history and problem list.    Allergies[1]      Current Outpatient Medications:     rifAXIMin 550 MG Oral Tab, Take 1 tablet (550 mg total) by mouth 2 (two) times daily., Disp: 180 tablet, Rfl: 3    potassium chloride (KLOR-CON) 20 MEQ Oral Powd Pack, Take 40 mEq by mouth daily., Disp: 180 packet, Rfl: 0    triamcinolone 0.1 % External Cream, Apply topically 2 (two) times daily as needed., Disp: 15 g, Rfl: 1    lactulose 10 GM/15ML Oral Solution, Take 30 mL (20 g total) by mouth 2 (two) times daily as needed., Disp: 1800 mL, Rfl: 2    levothyroxine 50 MCG Oral Tab, Take 1 tablet (50 mcg total) by mouth before breakfast., Disp: , Rfl:     folic acid 1 MG Oral Tab, Take 1 tablet (1 mg total) by mouth daily., Disp: 90 tablet, Rfl: 0    bumetanide 2 MG Oral Tab, Take 1 tablet (2 mg total) by mouth 2 (two) times daily., Disp: 180 tablet, Rfl: 3    spironolactone 50 MG Oral Tab, Take 1 tablet (50 mg total) by mouth 2 (two) times daily., Disp: 180 tablet, Rfl: 1    omeprazole 20 MG Oral Capsule Delayed Release, Take 1 capsule (20 mg total) by mouth every morning before breakfast., Disp: 90 capsule, Rfl: 3    There are no discontinued medications.    Patient Active Problem List   Diagnosis    Major depressive disorder, recurrent episode, moderate degree (HCC)    Anemia    Alcoholic cirrhosis  of liver with ascites (HCC)    Fluid overload    Type 2 diabetes mellitus without complication, without long-term current use of insulin (HCC)       Past Medical History:    Acute, but ill-defined, cerebrovascular disease    Anxiety    CHF (congestive heart failure) (HCC)    Depression    Disorder of liver    Hypothyroidism    Stroke (HCC)       Past Surgical History:   Procedure Laterality Date    Colonoscopy N/A 2024    Procedure: COLONOSCOPY;  Surgeon: Yobany Guardado MD;  Location: [facility] ENDOSCOPY    Needle biopsy right Right              Family History   Problem Relation Age of Onset    Pancreatic Cancer Father     Diabetes Daughter        Social History     Occupational History    Not on file   Tobacco Use    Smoking status: Former     Current packs/day: 0.00     Types: Cigarettes     Quit date: 2024     Years since quittin.9    Smokeless tobacco: Never   Vaping Use    Vaping status: Never Used   Substance and Sexual Activity    Alcohol use: Not Currently     Comment: socially ,not since 2024    Drug use: Not Currently     Types: Cannabis, Cocaine    Sexual activity: Not on file       ROS      Constitutional: negative for chills, fevers and sweats  Gastrointestinal: negative for abdominal pain, diarrhea, nausea and vomiting  Genitourinary:negative for dysuria and hematuria  Musculoskeletal:negative for arthralgias and muscle weakness  Neurological: negative for paresthesia and weakness  All others reviewed and negative.      Physical Exam     LE PHYSICAL EXAM    Constitution: Well-developed and well-nourished. Gait appears normal. No apparent distress. Alert and oriented to person, place, and time.  Integument: There are no varicosities. Skin appears moist, warm, and supple with positive hair growth. There are no color changes. No open lesions. No macerations, No Hyperkeratotic lesions.  Vascular examination: Dorsalis pedis and posterior tibial pulses are strong bilaterally with  capillary filling time less than 3 seconds to all digits. There is no peripheral edema..  Neurological Sensorium: Grossly intact to sharp/dull. Vibratory: Intact.  Musculoskeletal:   5/5 pedal muscle strength b/l     Advanced trophic changes as: hair growth decrease nail changes  (thickening) pigmentary changes (discoloration) skin texture (thin, shiny)   Procedure       Nails 1 through 5 bilateral debrided with use of a nail nipper.  Patient Toller procedure well had no complications.  Neurovascular status intact to the level of the digits post procedure.     Assessment and Plan     Encounter Diagnoses   Name Primary?    Onychomycosis of multiple toenails with type 2 diabetes mellitus (HCC) Yes    PVD (peripheral vascular disease)        Diabetic education and instructions have been provided. We reviewed and discussed the following:    -risk categories related to pts with diabetes and foot or lower extremity complications per ADA.    -adherence to medication regimen and close monitoring or blood sugar control.   -daily monitoring/inspection of feet and shoes.   -proper management of diet and weight   -regular follow up with PCP and specialty providers as recommended   -Lower extremity complications related to DM were reviewed and stressed prevention.         Evaluated the patient. Discussed treatment options with the patient.  Discussed with patient proper care and hygiene for their feet.  Patient tolerated procedures well, without incident.    Instrumentation used includes nail nippers and electric  where appropriate.  Procedure: (41029 Debridement of toenails 6-10) Surgically debrided and mechanically reduced 6 or more toenails.Hemmorhage occurred none.Nails that were debrided appeared dystrophic and caused the patient pain in shoe gear.Nails 5 Left & 5 Right.     Evaluated patient. Discussed treatment options with patient.  Discussed proper hygiene and care for feet as well as use of emollient creams (ie  Urea based creams)  Answered all patient questions. Discussed offloading hyperkeratotic lesions with proper shoe gear, offloading pads, and insoles.   Patient was instructed to call the office or on-call podiatric physician immediately with any issues or concerns before the next scheduled visit. Patient to follow-up in clinic in 3 months        Laney Greene DPM, D.GEOVANNY SALCEDO  Diplomat, American Board of Foot and Ankle Surgery  Certified in Foot and Rearfoot/Ankle Reconstruction  Fellow of the American College of Foot and Ankle Surgeons  Fellowship Trained Foot and Ankle Surgeon   [facility]     3/3/2025    7:17 AM         [1] No Known Allergies

## 2025-03-07 RX ORDER — FOLIC ACID 1 MG/1
1 TABLET ORAL DAILY
Qty: 90 TABLET | Refills: 0 | Status: SHIPPED | OUTPATIENT
Start: 2025-03-07

## 2025-03-12 ENCOUNTER — OFFICE VISIT (OUTPATIENT)
Dept: OBGYN CLINIC | Facility: CLINIC | Age: 65
End: 2025-03-12
Payer: COMMERCIAL

## 2025-03-12 VITALS
WEIGHT: 139.63 LBS | DIASTOLIC BLOOD PRESSURE: 73 MMHG | BODY MASS INDEX: 28 KG/M2 | HEART RATE: 88 BPM | SYSTOLIC BLOOD PRESSURE: 120 MMHG

## 2025-03-12 DIAGNOSIS — Z01.419 WELL WOMAN EXAM WITH ROUTINE GYNECOLOGICAL EXAM: Primary | ICD-10-CM

## 2025-03-12 DIAGNOSIS — N95.0 POSTMENOPAUSAL BLEEDING: ICD-10-CM

## 2025-03-12 DIAGNOSIS — Z12.4 SCREENING FOR CERVICAL CANCER: ICD-10-CM

## 2025-03-12 PROCEDURE — 3078F DIAST BP <80 MM HG: CPT | Performed by: OBSTETRICS & GYNECOLOGY

## 2025-03-12 PROCEDURE — 99213 OFFICE O/P EST LOW 20 MIN: CPT | Performed by: OBSTETRICS & GYNECOLOGY

## 2025-03-12 PROCEDURE — 99386 PREV VISIT NEW AGE 40-64: CPT | Performed by: OBSTETRICS & GYNECOLOGY

## 2025-03-12 PROCEDURE — 3074F SYST BP LT 130 MM HG: CPT | Performed by: OBSTETRICS & GYNECOLOGY

## 2025-03-12 NOTE — PROGRESS NOTES
Christi Tavarez is a 64 year old female  No LMP recorded. Patient is postmenopausal.   Chief Complaint   Patient presents with    Gyn Exam     New patient annual    Presenting for well woman exam. Last pap smear was normal 2019. Last mammogram was Birads 3 on 2024. She reports having spotting about 6 months ago.     OBSTETRICS HISTORY:  OB History    Para Term  AB Living   3 2 2 0 1 2   SAB IAB Ectopic Multiple Live Births   0 1 0 0 2       GYNE HISTORY:  No LMP recorded. Patient is postmenopausal.    History   Sexual Activity    Sexual activity: Not Currently        Pap Date: 19  Pap Result Notes: Neg Pap/HPV  Follow Up Recommendation: Mammo 24 Diag C3-Prob Benign      MEDICAL HISTORY:  Past Medical History:    Acute, but ill-defined, cerebrovascular disease    Anxiety    CHF (congestive heart failure) (HCC)    Depression    Disorder of liver    Hypothyroidism    Stroke (HCC)         SURGICAL HISTORY:  Past Surgical History:   Procedure Laterality Date    Colonoscopy N/A 2024    Procedure: COLONOSCOPY;  Surgeon: Yobany Guardado MD;  Location: [facility] ENDOSCOPY    Needle biopsy right Right              SOCIAL HISTORY:  Social History     Socioeconomic History    Marital status:      Spouse name: Not on file    Number of children: Not on file    Years of education: Not on file    Highest education level: Not on file   Occupational History    Not on file   Tobacco Use    Smoking status: Former     Current packs/day: 0.00     Types: Cigarettes     Quit date: 2024     Years since quittin.9    Smokeless tobacco: Never   Vaping Use    Vaping status: Never Used   Substance and Sexual Activity    Alcohol use: Not Currently     Comment: socially ,not since 2024    Drug use: Not Currently     Types: Cannabis, Cocaine    Sexual activity: Not Currently   Other Topics Concern    Not on file   Social History Narrative    Not on file     Social Drivers of  Health     Food Insecurity: No Food Insecurity (3/12/2025)    NCSS - Food Insecurity     Worried About Running Out of Food in the Last Year: No     Ran Out of Food in the Last Year: No   Transportation Needs: No Transportation Needs (3/12/2025)    NCSS - Transportation     Lack of Transportation: No   Stress: Not on file   Housing Stability: Not At Risk (3/12/2025)    NCSS - Housing/Utilities     Has Housing: Yes     Worried About Losing Housing: No     Unable to Get Utilities: No         Depression Screening (PHQ-2/PHQ-9): Over the LAST 2 WEEKS   Little interest or pleasure in doing things: Not at all    Feeling down, depressed, or hopeless: Not at all    PHQ-2 SCORE: 0           MEDICATIONS:    Current Outpatient Medications:     folic acid 1 MG Oral Tab, Take 1 tablet (1 mg total) by mouth daily., Disp: 90 tablet, Rfl: 0    potassium chloride (KLOR-CON) 20 MEQ Oral Powd Pack, Take 40 mEq by mouth daily., Disp: 180 packet, Rfl: 0    triamcinolone 0.1 % External Cream, Apply topically 2 (two) times daily as needed., Disp: 15 g, Rfl: 1    lactulose 10 GM/15ML Oral Solution, Take 30 mL (20 g total) by mouth 2 (two) times daily as needed., Disp: 1800 mL, Rfl: 2    levothyroxine 50 MCG Oral Tab, Take 1 tablet (50 mcg total) by mouth before breakfast., Disp: , Rfl:     bumetanide 2 MG Oral Tab, Take 1 tablet (2 mg total) by mouth 2 (two) times daily., Disp: 180 tablet, Rfl: 3    spironolactone 50 MG Oral Tab, Take 1 tablet (50 mg total) by mouth 2 (two) times daily., Disp: 180 tablet, Rfl: 1    omeprazole 20 MG Oral Capsule Delayed Release, Take 1 capsule (20 mg total) by mouth every morning before breakfast., Disp: 90 capsule, Rfl: 3    rifAXIMin 550 MG Oral Tab, Take 1 tablet (550 mg total) by mouth 2 (two) times daily., Disp: 180 tablet, Rfl: 3    ALLERGIES:  Allergies[1]      Review of Systems:  Review of Systems   All other systems reviewed and are negative.       Vitals:    03/12/25 1336   BP: 120/73   Pulse: 88        PHYSICAL EXAM:   Physical Exam  Vitals reviewed.   Constitutional:       Appearance: Normal appearance.   HENT:      Head: Atraumatic.   Eyes:      Pupils: Pupils are equal, round, and reactive to light.   Pulmonary:      Effort: Pulmonary effort is normal.   Chest:   Breasts:     Right: Normal. No bleeding, inverted nipple, mass, nipple discharge, skin change or tenderness.      Left: Normal. No bleeding, inverted nipple, mass, nipple discharge, skin change or tenderness.   Abdominal:      General: Abdomen is flat.      Palpations: Abdomen is soft.      Tenderness: There is no abdominal tenderness.   Genitourinary:     General: Normal vulva.      Exam position: Lithotomy position.      Labia:         Right: No rash, tenderness, lesion or injury.         Left: No rash, tenderness, lesion or injury.       Vagina: Normal.      Cervix: Normal.      Uterus: Normal. Not tender.       Adnexa: Right adnexa normal and left adnexa normal.        Right: No tenderness or fullness.          Left: No tenderness or fullness.     Lymphadenopathy:      Upper Body:      Right upper body: No supraclavicular, axillary or pectoral adenopathy.      Left upper body: No supraclavicular, axillary or pectoral adenopathy.   Skin:     General: Skin is warm and dry.   Neurological:      General: No focal deficit present.      Mental Status: She is alert and oriented to person, place, and time.   Psychiatric:         Mood and Affect: Mood normal.         Behavior: Behavior normal.         Thought Content: Thought content normal.         Judgment: Judgment normal.           Assessment & Plan:  Christi was seen today for gyn exam.    Diagnoses and all orders for this visit:    Well woman exam with routine gynecological exam    Postmenopausal bleeding  -     US PELVIS W EV (CPT=76856/05455); Future    Screening for cervical cancer  -     ThinPrep PAP Smear; Future  -     Hpv Dna  High Risk , Thin Prep Collect; Future  -     Hpv Dna  High Risk  , Thin Prep Collect  -     ThinPrep PAP Smear  -     THINPREP PAP SMEAR ONLY        Requested Prescriptions      No prescriptions requested or ordered in this encounter       Pap with HPV done. New ASSCP guidelines reviewed in detail. Annual exams encouraged. Mammogram ordered. Call if any vaginal bleeding.  Encouraged 1500 mg calcium w/ vit D.  Encouraged weight bearing exercise.  Follow-up in one year. Pelvic US to assess uterine lining.             [1] No Known Allergies

## 2025-03-13 LAB — HPV E6+E7 MRNA CVX QL NAA+PROBE: NEGATIVE

## 2025-03-28 ENCOUNTER — TELEPHONE (OUTPATIENT)
Dept: INTERNAL MEDICINE CLINIC | Facility: CLINIC | Age: 65
End: 2025-03-28

## 2025-03-28 NOTE — TELEPHONE ENCOUNTER
Called patient and went over immunizations/care gap: overdue for shingles vaccine. Patient stated that she had the second shingles vaccine at [facility]. Will have them fax the results to the office. Fax number provided.

## 2025-04-02 ENCOUNTER — NURSE ONLY (OUTPATIENT)
Dept: INTERNAL MEDICINE CLINIC | Facility: CLINIC | Age: 65
End: 2025-04-02
Payer: COMMERCIAL

## 2025-04-02 ENCOUNTER — HOSPITAL ENCOUNTER (OUTPATIENT)
Dept: ULTRASOUND IMAGING | Facility: HOSPITAL | Age: 65
Discharge: HOME | End: 2025-04-02
Attending: OBSTETRICS & GYNECOLOGY
Payer: COMMERCIAL

## 2025-04-02 DIAGNOSIS — Z23 NEED FOR HEPATITIS B VACCINATION: Primary | ICD-10-CM

## 2025-04-02 DIAGNOSIS — N95.0 POSTMENOPAUSAL BLEEDING: ICD-10-CM

## 2025-04-02 PROCEDURE — 76856 US EXAM PELVIC COMPLETE: CPT | Performed by: OBSTETRICS & GYNECOLOGY

## 2025-04-02 PROCEDURE — 76830 TRANSVAGINAL US NON-OB: CPT | Performed by: OBSTETRICS & GYNECOLOGY

## 2025-04-02 PROCEDURE — 90746 HEPB VACCINE 3 DOSE ADULT IM: CPT | Performed by: INTERNAL MEDICINE

## 2025-04-02 PROCEDURE — 90471 IMMUNIZATION ADMIN: CPT | Performed by: INTERNAL MEDICINE

## 2025-04-02 NOTE — PROGRESS NOTES
Order was verified, along with patient's name, date of birth and injection received.   Patient was given the 3rd Hepatitis B vaccine on the right deltoid and patient tolerated the injection well.

## 2025-04-09 ENCOUNTER — LAB ENCOUNTER (OUTPATIENT)
Dept: LAB | Facility: HOSPITAL | Age: 65
End: 2025-04-09
Attending: Other
Payer: COMMERCIAL

## 2025-04-09 ENCOUNTER — OFFICE VISIT (OUTPATIENT)
Dept: NEUROLOGY | Facility: CLINIC | Age: 65
End: 2025-04-09
Payer: COMMERCIAL

## 2025-04-09 VITALS
HEART RATE: 91 BPM | DIASTOLIC BLOOD PRESSURE: 75 MMHG | BODY MASS INDEX: 29.77 KG/M2 | WEIGHT: 138 LBS | SYSTOLIC BLOOD PRESSURE: 121 MMHG | HEIGHT: 57 IN

## 2025-04-09 DIAGNOSIS — I61.4 CEREBELLAR HEMORRHAGE (HCC): Primary | ICD-10-CM

## 2025-04-09 DIAGNOSIS — G62.9 LENGTH-DEPENDENT PERIPHERAL NEUROPATHY: ICD-10-CM

## 2025-04-09 DIAGNOSIS — R26.81 GAIT INSTABILITY: ICD-10-CM

## 2025-04-09 LAB
FOLATE SERPL-MCNC: 37.4 NG/ML (ref 5.4–?)
VIT B12 SERPL-MCNC: 1866 PG/ML (ref 211–911)

## 2025-04-09 PROCEDURE — 83921 ORGANIC ACID SINGLE QUANT: CPT | Performed by: OTHER

## 2025-04-09 PROCEDURE — 83521 IG LIGHT CHAINS FREE EACH: CPT | Performed by: OTHER

## 2025-04-09 PROCEDURE — 82525 ASSAY OF COPPER: CPT | Performed by: OTHER

## 2025-04-09 PROCEDURE — 84425 ASSAY OF VITAMIN B-1: CPT | Performed by: OTHER

## 2025-04-09 PROCEDURE — 99214 OFFICE O/P EST MOD 30 MIN: CPT | Performed by: OTHER

## 2025-04-09 PROCEDURE — 3008F BODY MASS INDEX DOCD: CPT | Performed by: OTHER

## 2025-04-09 PROCEDURE — 3074F SYST BP LT 130 MM HG: CPT | Performed by: OTHER

## 2025-04-09 PROCEDURE — 3078F DIAST BP <80 MM HG: CPT | Performed by: OTHER

## 2025-04-09 NOTE — PROGRESS NOTES
[facility]  [address], [address]  [place] [address]  [phone]          Established  Follow Up Visit       Date: April 9, 2025  Patient Name: Christi Tavarez   MRN: [id number]  Primary physician: [address] [facility] [address] [address]    Interval History:   -- Since last office visit, was admitted to [facility] for about a day due to hemorrhage after colonoscopy and polypectomy, thought related to hepatic cirrhosis.  She followed up with GI in January 2025.  She has been experiencing lower extremity edema.  She has ceased alcohol use since 2024.    -She has mild gait instability which she was told was related to her cerebellar hemorrhage.  Overall she is doing well.    OUTPATIENT MEDICATIONS  Medications Ordered Prior to Encounter[1]      PHYSICAL EXAM:     /75 (BP Location: Left arm, Patient Position: Sitting, Cuff Size: adult)   Pulse 91   Ht 57\"   Wt 138 lb (62.6 kg)   BMI 29.86 kg/m²   General appearance: Well appearing, and in no acute distress  Skin: skin color, texture normal.  No rashes or lesions.    Head: Normocephalic, atraumatic.    Neurological exam:    Mental Status:   Attention/Concentration: intact attention on bedside test   Fund of knowledge: intact  Speech: no dysarthria or aphasia     Wide-based gait, able to ambulate unassisted    LABS/DATA:  February 2025 labs: Elevated PT/INR, elevated MCV with mild thrombocytopenia 140, LFTs with elevated alkaline phosphatase 193 and elevated bilirubin both total and direct      IMAGING:  MRI brain [facility] 2025  I PERSONALLY REVIEWED THESE IMAGES.     ASSESSMENT:  The patient is a 64 year old woman with past medical history of type 2 diabetes, hypothyroidism, fatty liver, anxiety, hyperlipidemia, alcohol use disorder who presents for follow-up after being found to have a subacute right cerebellar hemorrhage in April 2024 after presenting with 2 weeks of lower extremity weakness and falls including 1 with  head trauma.  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       CT brain with right cerebellar vermis/hemispheric hyperdensity with mass effect, further characterized by contrasted MRI brain most compatible with subacute hematoma.  - MRI brain with and without 1/15/2024: Paramedian right cerebellar chronic ICH, smaller in size, no abnormal enhancement, no acute findings     Her neurological exam is much improved and is only significant for a mild upper extremity bilateral postural tremor and gait requiring the use of a cane.  There is no sign of parkinsonism.     History of right cerebellar intracerebral hemorrhage suspect multifactorial from alcohol related cirrhosis, SSRI use.  Updated MRI brain with decreased size of hematoma.  No underlying lesion noted.  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 Patient was advised to let my office know if they have any questions or concerns.       Today, I personally spent 20 minutes in this case, including chart review, time spent with patient doing face to face evaluation w/ interview and exam and patient education, counseling, and time was spent in patient education, counseling, and coordination of care as described above.   Issues discussed: Diagnosis and implications on future health, benefits and side effects of present and future medications, test results as well as further testing and medications required.    This note was prepared using Dragon Medical voice recognition dictation software and as a result, errors may occur.  When identified, these errors have been corrected. While every attempt is made to correct errors during dictation, discrepancies may still exist    AKASH Hedrick DO   Staff Physician, Neurology   4/9/2025  11:32 AM               [1]   Current Outpatient Medications on File Prior to Visit   Medication Sig Dispense Refill    folic acid 1 MG Oral Tab Take 1 tablet (1 mg total) by mouth daily. 90 tablet 0    rifAXIMin 550 MG Oral Tab Take 1 tablet (550 mg total) by mouth 2 (two) times daily. 180 tablet 3    potassium chloride (KLOR-CON) 20 MEQ Oral Powd Pack Take 40 mEq by mouth daily. 180 packet 0    lactulose 10 GM/15ML Oral Solution Take 30 mL (20 g total) by mouth 2 (two) times daily as needed. 1800 mL 2    levothyroxine 50 MCG Oral Tab Take 1 tablet (50 mcg total) by mouth before breakfast.      bumetanide 2 MG Oral Tab Take 1 tablet (2 mg total) by mouth 2 (two) times daily. 180 tablet 3    spironolactone 50 MG Oral Tab Take 1 tablet (50 mg total) by mouth 2 (two) times daily. 180 tablet 1    omeprazole 20 MG Oral Capsule Delayed Release Take 1 capsule (20 mg total) by mouth every morning before breakfast. 90 capsule 3    triamcinolone 0.1 % External Cream Apply topically 2 (two) times daily as needed. 15 g 1     No current facility-administered medications on file prior to visit.

## 2025-04-10 DIAGNOSIS — R76.8 ELEVATED SERUM IMMUNOGLOBULIN FREE LIGHT CHAIN LEVEL: Primary | ICD-10-CM

## 2025-04-10 LAB
ALBUMIN SERPL ELPH-MCNC: 3.48 G/DL (ref 3.75–5.21)
ALBUMIN/GLOB SERPL: 0.79 {RATIO} (ref 1–2)
ALPHA1 GLOB SERPL ELPH-MCNC: 0.27 G/DL (ref 0.19–0.46)
ALPHA2 GLOB SERPL ELPH-MCNC: 0.67 G/DL (ref 0.48–1.05)
B-GLOBULIN SERPL ELPH-MCNC: 1.3 G/DL (ref 0.68–1.23)
GAMMA GLOB SERPL ELPH-MCNC: 2.17 G/DL (ref 0.62–1.7)
KAPPA LC FREE SER-MCNC: 6.74 MG/DL (ref 0.33–1.94)
KAPPA LC FREE/LAMBDA FREE SER NEPH: 1.91 {RATIO} (ref 0.26–1.65)
LAMBDA LC FREE SERPL-MCNC: 3.52 MG/DL (ref 0.57–2.63)
PROT SERPL-MCNC: 7.9 G/DL (ref 5.7–8.2)

## 2025-04-11 ENCOUNTER — TELEPHONE (OUTPATIENT)
Dept: OBGYN CLINIC | Facility: CLINIC | Age: 65
End: 2025-04-11

## 2025-04-11 NOTE — TELEPHONE ENCOUNTER
----- Message from Laney Gutierrez sent at 4/9/2025  7:21 PM CDT -----  Christi needs an endometrial biopsy for post menopausal bleeding and thickened lining. Since I will be out of the office for the next 2-3 weeks, please see if EMB would be willing to complete prior to   my return.  ----- Message -----  From: Luis Carr Rad In  Sent: 4/7/2025   9:22 AM CDT  To: Laney Gutierrez MD

## 2025-04-11 NOTE — TELEPHONE ENCOUNTER
Message to Tanya Vasquez.  See Dr. Gutierrez's notes below regarding endometrial biopsy.  Ok to book?

## 2025-04-13 LAB — VITAMIN B6: 6 UG/L

## 2025-04-14 LAB
COPPER: 78 UG/DL
METHYLMALONIC ACID: 203 NMOL/L

## 2025-04-14 NOTE — TELEPHONE ENCOUNTER
Notified patient of pelvis ultrasound result and recommendations per Dr. Gutierrez. Patient accepts appointment for endometrial biopsy with Tanya Vasquez. Advised to take Tylenol 1 hr before appointment.

## 2025-04-15 LAB — VITAMIN B1 WHOLE BLD: 76.8 NMOL/L

## 2025-04-16 LAB
VIT E ALPHA TOCO: 9.8 MG/L
VIT E GAMMA TOCO: 1.6 MG/L

## 2025-04-18 RX ORDER — POTASSIUM CHLORIDE 1.5 G/1
40 POWDER, FOR SOLUTION ORAL DAILY
Qty: 180 EACH | Refills: 3 | Status: SHIPPED | OUTPATIENT
Start: 2025-04-18

## 2025-04-18 NOTE — TELEPHONE ENCOUNTER
Please review.  Protocol failed/has no protocol    Last Office Visit:12/04/2024  Requested Prescriptions     Pending Prescriptions Disp Refills    KLOR-CON 20 MEQ Oral Powd Pack [Pharmacy Med Name: KLOR-CON POWDER PKTS 20MEQ 100S] 180 each 0     Sig: TAKE 40 MEQ BY MOUTH DAILY

## 2025-04-23 DIAGNOSIS — E61.0 COPPER DEFICIENCY: Primary | ICD-10-CM

## 2025-04-23 RX ORDER — COPPER GLUCONATE 2 MG
TABLET ORAL
Qty: 130 TABLET | Refills: 0 | Status: SHIPPED | OUTPATIENT
Start: 2025-04-23 | End: 2025-07-13

## 2025-04-24 ENCOUNTER — TELEPHONE (OUTPATIENT)
Dept: PHYSICAL THERAPY | Facility: HOSPITAL | Age: 65
End: 2025-04-24

## 2025-04-28 ENCOUNTER — TELEPHONE (OUTPATIENT)
Facility: LOCATION | Age: 65
End: 2025-04-28

## 2025-04-29 ENCOUNTER — TELEPHONE (OUTPATIENT)
Dept: INTERNAL MEDICINE CLINIC | Facility: CLINIC | Age: 65
End: 2025-04-29

## 2025-04-29 ENCOUNTER — OFFICE VISIT (OUTPATIENT)
Dept: OBGYN CLINIC | Facility: CLINIC | Age: 65
End: 2025-04-29
Payer: COMMERCIAL

## 2025-04-29 VITALS — DIASTOLIC BLOOD PRESSURE: 74 MMHG | HEART RATE: 103 BPM | SYSTOLIC BLOOD PRESSURE: 126 MMHG

## 2025-04-29 DIAGNOSIS — N95.0 POSTMENOPAUSAL BLEEDING: Primary | ICD-10-CM

## 2025-04-29 PROCEDURE — 3078F DIAST BP <80 MM HG: CPT | Performed by: NURSE PRACTITIONER

## 2025-04-29 PROCEDURE — 58100 BIOPSY OF UTERUS LINING: CPT | Performed by: NURSE PRACTITIONER

## 2025-04-29 PROCEDURE — 3074F SYST BP LT 130 MM HG: CPT | Performed by: NURSE PRACTITIONER

## 2025-04-29 RX ORDER — ESCITALOPRAM OXALATE 10 MG/1
10 TABLET ORAL DAILY
COMMUNITY

## 2025-04-29 NOTE — PROCEDURES
Endometrial Biopsy    Pre-Procedure Care:   Consent was obtained.  Procedure/risks were explained.  Questions were answered.  Correct patient was identified.  Correct side and site were confirmed.    Pregnancy Results: negative from  nA  test     Description of Procedure:  Under satisfactory analgesia, the patient was prepped and draped in the dorsal lithotomy position.   A bivalve speculum was placed in the vagina and the cervix was prepped with Betadine solution.   Single tooth tenaculum placed at the 12 o'clock position.   Cervical stenosis encountered.    The cervix was dilated using minidilators.   The uterine cavity was sounded at 7 cm.   The endometrial cavity was curetted for pipelle tissue sampling, 2 passes.  Specimen was sent to pathology.   The single tooth tenaculum was removed.   Good hemostasis was noted.  There were no complications.    There was no blood loss.      Discharge instructions were provided to the patient.    Visit Plan:  Ultrasound report was reviewed with the patient.  Lab results were reviewed with the patient.  Will follow up with results. Patient has reported yearly episodes of bleeding since menopause.    DIANE Thompson

## 2025-04-29 NOTE — PROGRESS NOTES
The following individual(s) verbally consented to be recorded using ambient AI listening technology and understand that they can each withdraw their consent to this listening technology at any point by asking the clinician to turn off or pause the recording:    Patient name: Christi DANIELS Daysi  Additional names:

## 2025-04-29 NOTE — TELEPHONE ENCOUNTER
Patient called, verified Name and . States she is taking escitalopram 10 mg daily which helps with depression and anxiety. She wants to know why [facility] reviewed. Relayed reason that it states Patient Discontinued. States she never stopped taking the medication. Medication added on the patient's medication list. She has approximately 30 pills left at this time and does not need refill.

## 2025-05-01 ENCOUNTER — TELEPHONE (OUTPATIENT)
Dept: PHYSICAL THERAPY | Facility: HOSPITAL | Age: 65
End: 2025-05-01

## 2025-05-02 ENCOUNTER — OFFICE VISIT (OUTPATIENT)
Dept: PHYSICAL THERAPY | Facility: HOSPITAL | Age: 65
End: 2025-05-02
Attending: Other
Payer: COMMERCIAL

## 2025-05-02 ENCOUNTER — TELEPHONE (OUTPATIENT)
Dept: OBGYN CLINIC | Facility: CLINIC | Age: 65
End: 2025-05-02

## 2025-05-02 DIAGNOSIS — I61.4 CEREBELLAR HEMORRHAGE (HCC): Primary | ICD-10-CM

## 2025-05-02 DIAGNOSIS — G62.9 LENGTH-DEPENDENT PERIPHERAL NEUROPATHY: ICD-10-CM

## 2025-05-02 DIAGNOSIS — R26.81 GAIT INSTABILITY: ICD-10-CM

## 2025-05-02 PROCEDURE — 97162 PT EVAL MOD COMPLEX 30 MIN: CPT

## 2025-05-02 PROCEDURE — 97112 NEUROMUSCULAR REEDUCATION: CPT

## 2025-05-02 NOTE — PROGRESS NOTES
NEUROLOGICAL EVALUATION:     Diagnosis:   Cerebellar CVA, Gait Instability Patient:  Christi Tavarez (64 year old, female)        Referring Provider: Pepper Hedrick  Today's Date   2025    Precautions:  Fall Risk Date of Evaluation: 25       PATIENT SUMMARY   Summary of chief complaints: Difficulty walking, imbalance  History of current condition: Pt. had cerebellar CVA in 2024, gradually improved with inpatient rehab, home health until 2024.   Pain level: current 0 /10, at best 0 /10, at worst 0 /10  Description of symptoms: Difficulty walking, imbalance   Occupation: retired manager for [employer].   Leisure activities/Hobbies: occ uses peddler for exercise, nothing otherwise   Prior level of function: indep no limitations.  Current limitations: Gait slow with SPC, difficulty with uneven surfaces, ramps, curbs; unable to drive, Difficulty with picking objects up from floor, must have UE support for sit to/from stand.  Pt goals: drive, walk without a cane  History of falls:       Home Environment: Lives alone, stairs in basement with railing   ADLs: Walks with SPC, son assists with garbage bins.  Pt. does own cooking and cleaning.  Family shops for pt.     Past medical history was reviewed with Christi.  Significant findings include: NIDDM type II- well controlled, hypothyroidism, anxiety, hyperlipidemia, alcohol use disorder  Imaging/Tests: CT Brain:  right cerebellar vermis/hemispheric hyperdensity with mass effect,  most compatible with subacute hematoma.   Christi  has a past medical history of Acute, but ill-defined, cerebrovascular disease (2024), Anxiety, CHF (congestive heart failure) (HCC), Depression, Disorder of liver, Hypothyroidism, and Stroke (HCC).  She  has a past surgical history that includes ; needle biopsy right (Right, 2016); and colonoscopy (N/A, 2024).    ASSESSMENT  Christi presents to physical therapy evaluation with primary c/o  Difficulty walking, imbalance. The results of the objective tests and measures show  . Functional deficits include but are not limited to Gait slow with SPC, difficulty with uneven surfaces, ramps, curbs; unable to drive, Difficulty with picking objects up from floor, must have UE support for sit to/from stand.. Signs and symptoms are consistent with diagnosis of Cerebellar CVA, Gait Instability. Pt and PT discussed evaluation findings, pathology, POC and HEP.  Pt voiced understanding and performs HEP correctly without reported pain. Skilled Physical Therapy is medically necessary to address the above impairments and reach functional goals.    OBJECTIVE:    Musculoskeletal:  Observation/Posture: Pt. arrived with SPC, slow gait, no significant postural abnormalities     ROM and Strength:  (* denotes performed with pain)  Bilateral UE and LE: WNL ROM    Neurological:  Coordination:  Finger to Nose: WNL   Pronation/Supination: decreased accuracy; WNL   Toe Tap: WNL   Some evidence of mild dysmetria in LE's during targeted tapping on step    Sensation: intact                 Balance and Functional Mobility:  Mobility / Transfers Level of Assistance   Bed Mobility IND   Supine --> Sit IND   Sit --> Supine IND   Sit --> Stand MOD I   Stand --> Sit MOD I   Chair --> Chair MOD I     Postural Control:  Romberg EO: level surface 10 sec; compliant surface 0 sec     Romberg EC: level surface 0 sec; compliant surface 0 sec  SLS: R: 0 sec; L: 0 sec; Fall Risk: Yes    Gait: pt ambulates on level ground with assistive device; decreased step length; shuffle; decreased arm swing; difficulty turning; path deviation with visual scanning (Uses SPC, wide ANNA)  Timed Up and Go (AD,time): 20 sec; Fall Risk: Yes  5x Sit -->Stand: 18.15 sec; Fall Risk: Yes  6 Minute Walk Test: TBA     Today's Treatment and Response:   Pt education was provided on exam findings, treatment diagnosis, treatment plan, expectations, and prognosis.  Today's  Treatment       5/2/2025   Neuro Treatment   Neuro Re-Education Tap ups on 8 in step, alternating  (SBA) x 10 ea  Standing trunk rotation L/R x 5 ea alternating   Reach to ceiling, look at hand x 5 ea, alternating   Neuro Re-Educ Minutes 15   Evaluation Minutes 30   Total Time Of Timed Procedures 15   Total Time Of Service-Based Procedures 30   Total Treatment Time 45   HEP Access Code: [email or web address]  URL: [email or web address]  Date: 05/02/2025  Prepared by: Leslie Ashraf    Exercises  - Sit to Stand Without Arm Support  - 1 x daily - 7 x weekly - 2 sets - 10 reps  - Stand and turn head left and right with visual focus  - 1 x daily - 7 x weekly - 5-10 reps  - Stand reach up and look at hand  - 1 x daily - 7 x weekly - 2 sets - 5-10 reps        Patient was instructed in and issued a HEP for: Access Code: [email or web address]  URL: [email or web address]  Date: 05/02/2025  Prepared by: Leslie Ashraf    Exercises  - Sit to Stand Without Arm Support  - 1 x daily - 7 x weekly - 2 sets - 10 reps  - Stand and turn head left and right with visual focus  - 1 x daily - 7 x weekly - 5-10 reps  - Stand reach up and look at hand  - 1 x daily - 7 x weekly - 2 sets - 5-10 reps    Charges:  PT EVAL: Moderate Complexity, Eval, NMR  In agreement with evaluation findings and clinical rationale, this evaluation involved MODERATE COMPLEXITY decision making due to 1-2 personal factors/comorbidities, 3 or more body structures involved/activity limitations, and evolving symptoms as documented in the evaluation.                                                        PLAN OF CARE:    Goals: (to be met in 10 visits)    Pt. Will be improved in five times sit to stand test to less than 12 seconds to reflect an improvement in balance and LE strength.  Pt. Will be improved in Timed up and Go test to less than 15 seconds to reflect an improvement in gait and balance, and decrease fall risk  Pt. Will be able to stand and reach  off of ANNA in order to cook and clean safely.  Pt. Will be indep in HEP in order to maintain functional gains.     Frequency / Duration: Patient will be seen 1-2x/week or a total of 10 visits over a 90 day period. Treatment will include:      Education or treatment limitation: None   Rehab Potential: good       Patient was advised of these findings, precautions, and treatment options and has agreed to actively participate in planning and for this course of care.    Thank you for your referral. Please co-sign or sign and return this letter via fax as soon as possible to [phone]. If you have any questions, please contact me at Dept: [phone]    Sincerely,  Electronically signed by therapist: Leslie Ashraf, PT  Physician's certification required: Yes  I certify the need for these services furnished under this plan of treatment and while under my care.    X___________________________________________________ Date____________________    Certification From: 5/2/2025  To:7/31/2025

## 2025-05-02 NOTE — TELEPHONE ENCOUNTER
Noted. Patient notified of results and Tanya Vasquez's recommendations below. Patient agreed. Assisted patient in scheduling follow up with Dr. Gutierrez on 5/15/25 at [facility]. Patient verbalized understanding.

## 2025-05-02 NOTE — TELEPHONE ENCOUNTER
Please inform patient that her endometrial biopsy is benign, there is a polyp. However due to her thickened lining on ultrasound and multiple episodes of bleeding in past and polyp - recommend follow up with Dr. Gutierrez to discuss possible dilation and curettage. Patient orginially saw Dr. Gutierrez prior to me but due to her being OOTO I did endometrial biopsy.      Final Diagnosis:        Endometrium; biopsy:   Multiple fragments of cystic atrophic endometrium.    Fragments of benign endometrial polyp.  Fragments of endocervical and ectocervical tissue with hemorrhage.  No evidence of endometrial hyperplasia, atypia, or malignancy is identified.     Electronically signed by TERRI MULLEN MD, MD on 4/30/2025 at 1238 CDT        Clinical Information      N95.0 Postmenopausal Bleeding.        Gross Description      The specimen is received in formalin labeled \"Tavarez, endometrium\" and consists of multiple fragments of pink-tan soft tissue measuring in aggregate 2.1 x 1.7 x 0.3 cm. The specimen is submitted entirely in one cassette.  (jhq)

## 2025-05-07 ENCOUNTER — OFFICE VISIT (OUTPATIENT)
Dept: PHYSICAL THERAPY | Facility: HOSPITAL | Age: 65
End: 2025-05-07
Attending: Other
Payer: COMMERCIAL

## 2025-05-07 ENCOUNTER — OFFICE VISIT (OUTPATIENT)
Age: 65
End: 2025-05-07
Attending: INTERNAL MEDICINE
Payer: COMMERCIAL

## 2025-05-07 VITALS
TEMPERATURE: 97 F | HEIGHT: 58 IN | SYSTOLIC BLOOD PRESSURE: 133 MMHG | OXYGEN SATURATION: 98 % | DIASTOLIC BLOOD PRESSURE: 62 MMHG | RESPIRATION RATE: 16 BRPM | HEART RATE: 91 BPM | WEIGHT: 137 LBS | BODY MASS INDEX: 28.76 KG/M2

## 2025-05-07 DIAGNOSIS — K70.31 ALCOHOLIC CIRRHOSIS OF LIVER WITH ASCITES (HCC): ICD-10-CM

## 2025-05-07 DIAGNOSIS — D69.6 THROMBOCYTOPENIA: ICD-10-CM

## 2025-05-07 DIAGNOSIS — D89.0 POLYCLONAL GAMMOPATHY: Primary | ICD-10-CM

## 2025-05-07 LAB
BASOPHILS # BLD AUTO: 0.04 X10(3) UL (ref 0–0.2)
BASOPHILS NFR BLD AUTO: 0.8 %
DEPRECATED HBV CORE AB SER IA-ACNC: 44 NG/ML (ref 50–306)
DEPRECATED RDW RBC AUTO: 46.4 FL (ref 35.1–46.3)
EOSINOPHIL # BLD AUTO: 0.03 X10(3) UL (ref 0–0.7)
EOSINOPHIL NFR BLD AUTO: 0.6 %
ERYTHROCYTE [DISTWIDTH] IN BLOOD BY AUTOMATED COUNT: 13.2 % (ref 11–15)
HCT VFR BLD AUTO: 39.3 % (ref 35–48)
HGB BLD-MCNC: 13.5 G/DL (ref 12–16)
IMM GRANULOCYTES # BLD AUTO: 0.01 X10(3) UL (ref 0–1)
IMM GRANULOCYTES NFR BLD: 0.2 %
IRON SATN MFR SERPL: 32 % (ref 15–50)
IRON SERPL-MCNC: 128 UG/DL (ref 50–170)
LDH SERPL L TO P-CCNC: 223 U/L (ref 120–246)
LYMPHOCYTES # BLD AUTO: 2 X10(3) UL (ref 1–4)
LYMPHOCYTES NFR BLD AUTO: 41.8 %
MCH RBC QN AUTO: 32.9 PG (ref 26–34)
MCHC RBC AUTO-ENTMCNC: 34.4 G/DL (ref 31–37)
MCV RBC AUTO: 95.9 FL (ref 80–100)
MONOCYTES # BLD AUTO: 0.51 X10(3) UL (ref 0.1–1)
MONOCYTES NFR BLD AUTO: 10.6 %
NEUTROPHILS # BLD AUTO: 2.2 X10 (3) UL (ref 1.5–7.7)
NEUTROPHILS # BLD AUTO: 2.2 X10(3) UL (ref 1.5–7.7)
NEUTROPHILS NFR BLD AUTO: 46 %
PLATELET # BLD AUTO: 126 10(3)UL (ref 150–450)
PLATELETS.RETICULATED NFR BLD AUTO: 2.9 % (ref 0–7)
RBC # BLD AUTO: 4.1 X10(6)UL (ref 3.8–5.3)
TOTAL IRON BINDING CAPACITY: 402 UG/DL (ref 250–425)
TRANSFERRIN SERPL-MCNC: 325 MG/DL (ref 250–380)
WBC # BLD AUTO: 4.8 X10(3) UL (ref 4–11)

## 2025-05-07 PROCEDURE — 97116 GAIT TRAINING THERAPY: CPT

## 2025-05-07 PROCEDURE — 97112 NEUROMUSCULAR REEDUCATION: CPT

## 2025-05-07 NOTE — CONSULTS
[facility] Hematology/Oncology Consultation Note    Patient Name: Christi Tavarez   YOB: 1960   Medical Record Number: [id number]   CSN: [id number]   Consulting Physician: Ryley De La Torre MD  Referring Physician(s): Dr Pepper Hedrick DO  Date of Consultation: 2025     Reason for Consultation:    1. Polyclonal gammopathy    2. Alcoholic cirrhosis of liver with ascites (HCC)    3. Thrombocytopenia        History of Present Illness:   Chritsi Tavarez is a 64 year old female seen today in the Hematology Clinic  for polyclonal gammopathy and elevated kappa and lambda light chains.  She has a history of alcoholic cirrhosis diagnosed about a year ago and she continues regular follow-up with gastroenterology.    She saw neurology for prior history of right cerebellar hemorrhage as well as gait instability -thought secondary to peripheral neuropathy.  Labs included a serum protein electrophoresis that showed polyclonal gammopathy as well as decreased albumin levels.  Kappa and lambda light chains were both elevated at 6.7 and 3.5 respectively.  Kappa lambda ratio was near normal at 1.91    Her renal function and calcium levels in February as well as a CBC were normal except for some mild thrombocytopenia that is chronic.  She denies any new bleeding issues and continues regular surveillance imaging of her liver with gastroenterology.  Scheduled for an MRI in the summer    Past Medical History:  Past Medical History:    Acute, but ill-defined, cerebrovascular disease    Anxiety    CHF (congestive heart failure) (HCC)    Depression    Disorder of liver    Hypothyroidism    Stroke (HCC)       Past Surgical History:  Past Surgical History:   Procedure Laterality Date    Colonoscopy N/A 2024    Procedure: COLONOSCOPY;  Surgeon: Yobany Guardado MD;  Location: [facility] ENDOSCOPY    Needle biopsy right Right              Family Medical History:  Family History   Problem Relation Age of Onset     Pancreatic Cancer Father     Diabetes Daughter        Gyne History:  OB History    Para Term  AB Living   3 2 2 0 1 2   SAB IAB Ectopic Multiple Live Births   0 1 0 0 2       Social History:  Social History     Socioeconomic History    Marital status:      Spouse name: Not on file    Number of children: Not on file    Years of education: Not on file    Highest education level: Not on file   Occupational History    Not on file   Tobacco Use    Smoking status: Former     Current packs/day: 0.00     Types: Cigarettes     Quit date: 2024     Years since quittin.0    Smokeless tobacco: Never   Vaping Use    Vaping status: Never Used   Substance and Sexual Activity    Alcohol use: Not Currently     Comment: socially ,not since 2024    Drug use: Not Currently     Types: Cannabis, Cocaine    Sexual activity: Not Currently   Other Topics Concern    Not on file   Social History Narrative    Not on file     Social Drivers of Health     Food Insecurity: No Food Insecurity (3/12/2025)    NCSS - Food Insecurity     Worried About Running Out of Food in the Last Year: No     Ran Out of Food in the Last Year: No   Transportation Needs: No Transportation Needs (3/12/2025)    NCSS - Transportation     Lack of Transportation: No   Housing Stability: Not At Risk (3/12/2025)    NCSS - Housing/Utilities     Has Housing: Yes     Worried About Losing Housing: No     Unable to Get Utilities: No       Allergies:   No Known Allergies    Current Medications:   escitalopram 10 MG Oral Tab Take 1 tablet (10 mg total) by mouth daily.      Copper Gluconate 2 MG Oral Tab Take 8 mg by mouth daily for 7 days, THEN 6 mg daily for 7 days, THEN 4 mg daily for 7 days, THEN 2 mg daily. 130 tablet 0    KLOR-CON 20 MEQ Oral Powd Pack TAKE 40 MEQ BY MOUTH DAILY 180 each 3    folic acid 1 MG Oral Tab Take 1 tablet (1 mg total) by mouth daily. 90 tablet 0    rifAXIMin 550 MG Oral Tab Take 1 tablet (550 mg total) by mouth 2  (two) times daily. 180 tablet 3    triamcinolone 0.1 % External Cream Apply topically 2 (two) times daily as needed. 15 g 1    lactulose 10 GM/15ML Oral Solution Take 30 mL (20 g total) by mouth 2 (two) times daily as needed. 1800 mL 2    levothyroxine 50 MCG Oral Tab Take 1 tablet (50 mcg total) by mouth before breakfast.      bumetanide 2 MG Oral Tab Take 1 tablet (2 mg total) by mouth 2 (two) times daily. 180 tablet 3    spironolactone 50 MG Oral Tab Take 1 tablet (50 mg total) by mouth 2 (two) times daily. 180 tablet 1    omeprazole 20 MG Oral Capsule Delayed Release Take 1 capsule (20 mg total) by mouth every morning before breakfast. 90 capsule 3       Review of Systems:  Pertinent positives and negatives noted in the the HPI.     Physical Examination:  /62 (BP Location: Right arm, Patient Position: Sitting, Cuff Size: adult)   Pulse 91   Temp 97.4 °F (36.3 °C) (Oral)   Resp 16   Ht 1.473 m (4' 10\")   Wt 62.1 kg (137 lb)   SpO2 98%   BMI 28.63 kg/m²     General: Patient is alert and oriented x 3, not in acute distress.  HEENT: EOMs intact. PERRL. Oropharynx is clear.   Neck: No JVD. No palpable lymphadenopathy. Neck is supple.  Lymphatics: There is no palpable lymphadenopathy throughout in the cervical, supraclavicular, or axillary regions.  Chest: Clear to auscultation. No wheezes or rales.  Heart: Regular rate and rhythm. S1S2 normal.  Abdomen: Soft, non tender.  Mild hepatomegaly  Extremities: trace ankle edema  Neurological: Grossly intact.       Labs:    Lab Results   Component Value Date/Time    WBC 4.8 05/07/2025 11:25 AM    RBC 4.10 05/07/2025 11:25 AM    HGB 13.5 05/07/2025 11:25 AM    HCT 39.3 05/07/2025 11:25 AM    MCV 95.9 05/07/2025 11:25 AM    MCH 32.9 05/07/2025 11:25 AM    MCHC 34.4 05/07/2025 11:25 AM    RDW 13.2 05/07/2025 11:25 AM    NEPRELIM 2.20 05/07/2025 11:25 AM    .0 (L) 05/07/2025 11:25 AM     Impression:  Diagnosis  1. Polyclonal gammopathy    2. Alcoholic  cirrhosis of liver with ascites (HCC)    3. Thrombocytopenia      64-year-old female referred here for evaluation of polyclonal gammopathy and thrombocytopenia.  Both of these are likely secondary to her chronic liver disease.  In the absence of monoclonal gammopathy, cytopenias hypercalcemia or renal dysfunction are not the likelihood of underlying plasma cell dyscrasia is low    Plan:  Polyclonal gammopathy likely from chronic liver disease.  No further hematologic workup is necessary.  She is encouraged to continue follow-up with gastroenterology  Thrombocytopenia secondary to cirrhosis.  Her platelet count is well above 100,000 and her risk of spontaneous bleeding is low  No routine hematologic follow-up is indicated.      Thank you Dr Pepper Hedrick DO for the opportunity to participate in the care of this interesting patient. Please do contact me if I may be of any further assistance    Ryley De La Torre MD  [facility]     Copy to Dr. Antony Zavala MD

## 2025-05-07 NOTE — PROGRESS NOTES
Patient: Christi Tavarez (64 year old, female) Referring Provider:  Insurance:   Diagnosis: Cerebellar CVA, Gait Instability Pepper Caballero Ryley  [employer]   Date of Surgery: N/A Next MD visit:  N/A   Precautions:  Fall Risk N/A Referral Information:    Date of Evaluation: Req: 0, Auth: 0, Exp:     05/02/25 POC Auth Visits:  10       Today's Date   5/7/2025    Subjective  Pt. reports that she is doing ok, did her exercises as prescribed.  Sit to stand was the most challenging.       Pain: pain not reported     Objective  see flow sheet          Assessment  Pt. tolerated session well, with some reported fatigue that resolved with sitting rest.  Pt. required CGA for four square stepping, but able to perform lateral stepping and A/P stepping separately with supervision.  Pt. was observed to have improved- narrower- ANNA during gait at end of session.    Goals (to be met in 10 visits)   Pt. Will be improved in five times sit to stand test to less than 12 seconds to reflect an improvement in balance and LE strength.  Pt. Will be improved in Timed up and Go test to less than 15 seconds to reflect an improvement in gait and balance, and decrease fall risk  Pt. Will be able to stand and reach off of ANNA in order to cook and clean safely.  Pt. Will be indep in HEP in order to maintain functional gains.       Plan  hurdles, LE strength    Treatment Last 4 Visits  Treatment Day: 2       5/2/2025 5/7/2025   Neuro Treatment   Neuro Re-Education Tap ups on 8 in step, alternating  (SBA) x 10 ea  Standing trunk rotation L/R x 5 ea alternating   Reach to ceiling, look at hand x 5 ea, alternating Sit to/from stand with arms extended, upper body tall, slow and controlled stand to sit x 15  Tap ups x 10 ea with 8 in step  Standing trunk rotation L/R x 10ea  Four square step test: 26 sec = fall risk  Lateral stepping over stick x 10  F/B stepping over stick x 10    Gait Training  6 minute walk test  Distance walked: 763 ft  Total time  walked: 6 min  Number of rest breaks: 0  RPE at end of test:  7/10    Age matched norms:  60-69 yrs:  F: 1765 ft         Neuro Re-Educ Minutes 15 30   Gait Training Minutes  10   Evaluation Minutes 30    Total Time Of Timed Procedures 15 40   Total Time Of Service-Based Procedures 30 0   Total Treatment Time 45 40   HEP Access Code: [email or web address]  URL: [email or web address]  Date: 05/02/2025  Prepared by: Leslie Ashraf    Exercises  - Sit to Stand Without Arm Support  - 1 x daily - 7 x weekly - 2 sets - 10 reps  - Stand and turn head left and right with visual focus  - 1 x daily - 7 x weekly - 5-10 reps  - Stand reach up and look at hand  - 1 x daily - 7 x weekly - 2 sets - 5-10 reps Access Code: [email or web address]  URL: [email or web address]  Date: 05/07/2025  Prepared by: Leslie Ashraf    Exercises  - Sit to Stand Without Arm Support  - 1 x daily - 7 x weekly - 2 sets - 10 reps  - Turn head and shoulders left and right with visual focus  - 2 x daily - 7 x weekly - 5-10 reps  - Stand reach up and look at hand  - 1 x daily - 7 x weekly - 2 sets - 5-10 reps  - Sidestepping over stick  - 1 x daily - 7 x weekly - 2 sets - 10 reps  - Step forward and back over the stick  - 1 x daily - 7 x weekly - 10 reps  - Step taps on step  - 1 x daily - 7 x weekly - 10 reps        HEP  Access Code: [email or web address]  URL: [email or web address]  Date: 05/07/2025  Prepared by: Leslie Ashraf    Exercises  - Sit to Stand Without Arm Support  - 1 x daily - 7 x weekly - 2 sets - 10 reps  - Turn head and shoulders left and right with visual focus  - 2 x daily - 7 x weekly - 5-10 reps  - Stand reach up and look at hand  - 1 x daily - 7 x weekly - 2 sets - 5-10 reps  - Sidestepping over stick  - 1 x daily - 7 x weekly - 2 sets - 10 reps  - Step forward and back over the stick  - 1 x daily - 7 x weekly - 10 reps  - Step taps on step  - 1 x daily - 7 x weekly - 10 reps    Charges  NMR x 2, Gait x 1

## 2025-05-09 ENCOUNTER — OFFICE VISIT (OUTPATIENT)
Dept: PHYSICAL THERAPY | Facility: HOSPITAL | Age: 65
End: 2025-05-09
Attending: Other
Payer: COMMERCIAL

## 2025-05-09 PROCEDURE — 97112 NEUROMUSCULAR REEDUCATION: CPT

## 2025-05-09 PROCEDURE — 97116 GAIT TRAINING THERAPY: CPT

## 2025-05-09 NOTE — PROGRESS NOTES
Patient: Christi Tavarez (64 year old, female) Referring Provider:  Insurance:   Diagnosis: Cerebellar stroke, gait instability Pepper Caballero Ryley  [place]   Date of Surgery: N/A Next MD visit:  N/A   Precautions:  Fall Risk N/A Referral Information:    Date of Evaluation: Req: 0, Auth: 0, Exp:     No data recorded POC Auth Visits:  10       Today's Date   5/9/2025    Subjective  Pt. reports that she is doing ok, did her exercises as prescribed.  Sit to stand is improving.  She notes some muscle soreness in both hip flexors, left more than right.       Pain: pain not reported     Objective  Strength:   Strength (-/5)    R L   Hip       Flexion 4 4     Extension 4 4     Abduction 4 4   Knee       Flexion 4+ 4+     Extension 5 5   Foot / Ankle        DF 5 5     PF 3- 3-               Assessment  Pt. tolerated session well, with some reported fatigue that resolved with sitting rest.  Sit to stand much improved today.  Step up and hurdles were challenging.  Pt. noted to continue to have wide ANNA during gait- will address.    Goals (to be met in 10 visits)   Pt. Will be improved in five times sit to stand test to less than 12 seconds to reflect an improvement in balance and LE strength.  Pt. Will be improved in Timed up and Go test to less than 15 seconds to reflect an improvement in gait and balance, and decrease fall risk  Pt. Will be able to stand and reach off of ANNA in order to cook and clean safely.  Pt. Will be indep in HEP in order to maintain functional gains.         Plan  narrow ANNA gait, obstacle negotiation.    Treatment Last 4 Visits  Treatment Day: 3       5/2/2025 5/9/2025   Vestibular Treatment   Neuro Re-Education   LE strength assessment- see objective    NuStep with seat 3, level 4, 10 min with cues for maintaining speed.  Rebounder x 25, 4 lbs  Step up/down from 8 in step x 10, CGA     Gait Training  Stepping forward over 6 in hurdles, step-to pattern x 15  Ramp training up/down x 5 ea way   Neuro  Re-Educ Minutes  30   Gait Training Minutes  10   Total Time Of Timed Procedures 0 40   Total Time Of Service-Based Procedures 0 0   Total Treatment Time 0 40   HEP  Reviewed written HEP.  No changes today.             HEP  Reviewed written HEP.  No changes today.         Charges  NMR x 2, Gait x 1

## 2025-05-14 ENCOUNTER — OFFICE VISIT (OUTPATIENT)
Dept: PHYSICAL THERAPY | Facility: HOSPITAL | Age: 65
End: 2025-05-14
Attending: Other
Payer: COMMERCIAL

## 2025-05-14 PROCEDURE — 97116 GAIT TRAINING THERAPY: CPT

## 2025-05-14 PROCEDURE — 97112 NEUROMUSCULAR REEDUCATION: CPT

## 2025-05-14 NOTE — PROGRESS NOTES
Patient: Christi Tavarez (64 year old, female) Referring Provider:  Insurance:   Diagnosis: Cerebellar CVA, Gait Instability Pepper Caballero Ryley  [employer]   Date of Surgery: N/A Next MD visit:  N/A   Precautions:  Fall Risk N/A Referral Information:    Date of Evaluation: Req: 0, Auth: 0, Exp:     05/02/25 POC Auth Visits:  10       Today's Date   5/14/2025    Subjective  Pt. reports that she is doing ok, did her exercises as prescribed.  She notes that muscle soreness has resolved.       Pain: pain not reported     Objective  see flow sheet         Assessment  Pt. tolerated session well, with some reported fatigue that resolved with sitting rest.  Sit to stand much improved today. Narrow ANNA gait is challenging but pt. able to improve with practice.    Goals (to be met in 10 visits)   Pt. Will be improved in five times sit to stand test to less than 12 seconds to reflect an improvement in balance and LE strength.  Pt. Will be improved in Timed up and Go test to less than 15 seconds to reflect an improvement in gait and balance, and decrease fall risk  Pt. Will be able to stand and reach off of ANNA in order to cook and clean safely.  Pt. Will be indep in HEP in order to maintain functional gains.           Plan  Tap up with reaching, rockerboard, low hurdles    Treatment Last 4 Visits  Treatment Day: 3       5/2/2025 5/7/2025 5/14/2025   Neuro Treatment   Neuro Re-Education Tap ups on 8 in step, alternating  (SBA) x 10 ea  Standing trunk rotation L/R x 5 ea alternating   Reach to ceiling, look at hand x 5 ea, alternating Sit to/from stand with arms extended, upper body tall, slow and controlled stand to sit x 15  Tap ups x 10 ea with 8 in step  Standing trunk rotation L/R x 10ea  Four square step test: 26 sec = fall risk  Lateral stepping over stick x 10  F/B stepping over stick x 10  NuStep with seat 3, level 4, 10 min with cues for maintaining speed.   Rebounder x 30, 4 lbs    Tap ups x 10 alternating on 8 in  step  Rockerboard L/R with CGA, cues for posture    Reviewed and revised HEP   Gait Training  6 minute walk test  Distance walked: 763 ft  Total time walked: 6 min  Number of rest breaks: 0  RPE at end of test:  7/10    Age matched norms:  60-69 yrs:  F: 1765 ft       Gait with narrower ANNA x 50 ft  Gait in clinic without AD, cues for narrower ANNA     Neuro Re-Educ Minutes 15 30 30   Gait Training Minutes  10 10   Evaluation Minutes 30     Total Time Of Timed Procedures 15 40 40   Total Time Of Service-Based Procedures 30 0 0   Total Treatment Time 45 40 40   HEP Access Code: [email or web address]  URL: [email or web address]  Date: 05/02/2025  Prepared by: Leslie Ashraf    Exercises  - Sit to Stand Without Arm Support  - 1 x daily - 7 x weekly - 2 sets - 10 reps  - Stand and turn head left and right with visual focus  - 1 x daily - 7 x weekly - 5-10 reps  - Stand reach up and look at hand  - 1 x daily - 7 x weekly - 2 sets - 5-10 reps Access Code: [email or web address]  URL: [email or web address]  Date: 05/07/2025  Prepared by: Leslie Ashraf    Exercises  - Sit to Stand Without Arm Support  - 1 x daily - 7 x weekly - 2 sets - 10 reps  - Turn head and shoulders left and right with visual focus  - 2 x daily - 7 x weekly - 5-10 reps  - Stand reach up and look at hand  - 1 x daily - 7 x weekly - 2 sets - 5-10 reps  - Sidestepping over stick  - 1 x daily - 7 x weekly - 2 sets - 10 reps  - Step forward and back over the stick  - 1 x daily - 7 x weekly - 10 reps  - Step taps on step  - 1 x daily - 7 x weekly - 10 reps Access Code: [email or web address]  URL: [email or web address]  Date: 05/14/2025  Prepared by: Leslie Ashraf    Exercises  - Sit to Stand Without Arm Support  - 1 x daily - 7 x weekly - 2 sets - 10 reps  - Turn head and shoulders left and right with visual focus  - 2 x daily - 7 x weekly - 5-10 reps  - Stand reach up and look at hand  - 1 x daily - 7 x weekly - 2 sets - 5-10 reps  - Step taps  on step  - 1 x daily - 7 x weekly - 10 reps  - Stepping in 4 Square Pattern  - 1 x daily - 7 x weekly - 2 sets - 10 reps        HEP  Access Code: [email or web address]  URL: [email or web address]  Date: 05/14/2025  Prepared by: Leslie Ashraf    Exercises  - Sit to Stand Without Arm Support  - 1 x daily - 7 x weekly - 2 sets - 10 reps  - Turn head and shoulders left and right with visual focus  - 2 x daily - 7 x weekly - 5-10 reps  - Stand reach up and look at hand  - 1 x daily - 7 x weekly - 2 sets - 5-10 reps  - Step taps on step  - 1 x daily - 7 x weekly - 10 reps  - Stepping in 4 Square Pattern  - 1 x daily - 7 x weekly - 2 sets - 10 reps    Charges  NMR x 2, Gait x 1

## 2025-05-15 ENCOUNTER — TELEPHONE (OUTPATIENT)
Dept: OBGYN CLINIC | Facility: CLINIC | Age: 65
End: 2025-05-15

## 2025-05-15 ENCOUNTER — OFFICE VISIT (OUTPATIENT)
Dept: OBGYN CLINIC | Facility: CLINIC | Age: 65
End: 2025-05-15
Payer: COMMERCIAL

## 2025-05-15 VITALS — DIASTOLIC BLOOD PRESSURE: 70 MMHG | SYSTOLIC BLOOD PRESSURE: 111 MMHG | HEART RATE: 80 BPM

## 2025-05-15 DIAGNOSIS — N84.0 ENDOMETRIAL POLYP: ICD-10-CM

## 2025-05-15 DIAGNOSIS — N95.0 POSTMENOPAUSAL BLEEDING: Primary | ICD-10-CM

## 2025-05-15 DIAGNOSIS — R93.89 THICKENED ENDOMETRIUM: ICD-10-CM

## 2025-05-15 DIAGNOSIS — N95.0 PMB (POSTMENOPAUSAL BLEEDING): Primary | ICD-10-CM

## 2025-05-15 PROCEDURE — 3078F DIAST BP <80 MM HG: CPT | Performed by: OBSTETRICS & GYNECOLOGY

## 2025-05-15 PROCEDURE — 3074F SYST BP LT 130 MM HG: CPT | Performed by: OBSTETRICS & GYNECOLOGY

## 2025-05-15 PROCEDURE — 99213 OFFICE O/P EST LOW 20 MIN: CPT | Performed by: OBSTETRICS & GYNECOLOGY

## 2025-05-15 NOTE — PROGRESS NOTES
Christi Tavarez is a 64 year old female  No LMP recorded. Patient is postmenopausal.   Chief Complaint   Patient presents with    Consult     Endometrial biopsy RESULTS   Presenting to discuss ultrasound and biopsy results. She has not had any further bleeding. We reviewed that previous US results show a significantly thickened lining at 23 mm. She had a benign endometrial biopsy, but with findings suggestive of endometrial polyp. We discussed that given both findings, recommendation would be for D&C. Discussed that if she would prefer to wait, that if she has any further bleeding she would need a D&C at that time. Reviewed risks/benefits of that D&C and she wishes to proceed.     OBSTETRICS HISTORY:  OB History    Para Term  AB Living   3 2 2 0 1 2   SAB IAB Ectopic Multiple Live Births   0 1 0 0 2       GYNE HISTORY:  No LMP recorded. Patient is postmenopausal.    History   Sexual Activity    Sexual activity: Not Currently        Pap Date: 25  Pap Result Notes: Neg Pap/HPV neg  Follow Up Recommendation: Mammo BILATERAL 10/30/24 INCOMPLETE   24 LEFT BREAST BENIGN      MEDICAL HISTORY:  Past Medical History[1]      SURGICAL HISTORY:  Past Surgical History[2]    SOCIAL HISTORY:  Social History     Socioeconomic History    Marital status:      Spouse name: Not on file    Number of children: Not on file    Years of education: Not on file    Highest education level: Not on file   Occupational History    Not on file   Tobacco Use    Smoking status: Former     Current packs/day: 0.00     Types: Cigarettes     Quit date: 2024     Years since quittin.1    Smokeless tobacco: Never   Vaping Use    Vaping status: Never Used   Substance and Sexual Activity    Alcohol use: Not Currently     Comment: socially ,not since 2024    Drug use: Not Currently     Types: Cannabis, Cocaine    Sexual activity: Not Currently   Other Topics Concern    Not on file   Social History Narrative     Not on file     Social Drivers of Health     Food Insecurity: No Food Insecurity (3/12/2025)    NCSS - Food Insecurity     Worried About Running Out of Food in the Last Year: No     Ran Out of Food in the Last Year: No   Transportation Needs: No Transportation Needs (3/12/2025)    NCSS - Transportation     Lack of Transportation: No   Stress: Not on file   Housing Stability: Not At Risk (3/12/2025)    NCSS - Housing/Utilities     Has Housing: Yes     Worried About Losing Housing: No     Unable to Get Utilities: No         MEDICATIONS:  Medications - Current[3]    ALLERGIES:  Allergies[4]      Review of Systems:  Review of Systems   All other systems reviewed and are negative.       Vitals:    05/15/25 1007   BP: 111/70   Pulse: 80       PHYSICAL EXAM:   Constitutional: well developed, well nourished  Head/Face: normocephalic  Skin/Hair: no unusual rashes or bruises  Extremities: no edema, no cyanosis  Psychiatric:  Oriented to time, place, person and situation. Appropriate mood and affect      Assessment & Plan:  Christi was seen today for consult.    Diagnoses and all orders for this visit:    Postmenopausal bleeding    Thickened endometrium    Endometrial polyp        Requested Prescriptions      No prescriptions requested or ordered in this encounter         Plan for hysteroscopy with D&C.          [1]   Past Medical History:   Acute, but ill-defined, cerebrovascular disease    Anxiety    CHF (congestive heart failure) (HCC)    Depression    Disorder of liver    Hypothyroidism    Stroke (HCC)   [2]   Past Surgical History:  Procedure Laterality Date    Colonoscopy N/A 2024    Procedure: COLONOSCOPY;  Surgeon: Yobany Guardado MD;  Location: [facility] ENDOSCOPY    Needle biopsy right Right 2016         [3]   Current Outpatient Medications:     escitalopram 10 MG Oral Tab, Take 1 tablet (10 mg total) by mouth daily., Disp: , Rfl:     Copper Gluconate 2 MG Oral Tab, Take 8 mg by mouth daily for 7 days,  THEN 6 mg daily for 7 days, THEN 4 mg daily for 7 days, THEN 2 mg daily., Disp: 130 tablet, Rfl: 0    KLOR-CON 20 MEQ Oral Powd Pack, TAKE 40 MEQ BY MOUTH DAILY, Disp: 180 each, Rfl: 3    folic acid 1 MG Oral Tab, Take 1 tablet (1 mg total) by mouth daily., Disp: 90 tablet, Rfl: 0    rifAXIMin 550 MG Oral Tab, Take 1 tablet (550 mg total) by mouth 2 (two) times daily., Disp: 180 tablet, Rfl: 3    triamcinolone 0.1 % External Cream, Apply topically 2 (two) times daily as needed., Disp: 15 g, Rfl: 1    lactulose 10 GM/15ML Oral Solution, Take 30 mL (20 g total) by mouth 2 (two) times daily as needed., Disp: 1800 mL, Rfl: 2    levothyroxine 50 MCG Oral Tab, Take 1 tablet (50 mcg total) by mouth before breakfast., Disp: , Rfl:     bumetanide 2 MG Oral Tab, Take 1 tablet (2 mg total) by mouth 2 (two) times daily., Disp: 180 tablet, Rfl: 3    spironolactone 50 MG Oral Tab, Take 1 tablet (50 mg total) by mouth 2 (two) times daily., Disp: 180 tablet, Rfl: 1    omeprazole 20 MG Oral Capsule Delayed Release, Take 1 capsule (20 mg total) by mouth every morning before breakfast., Disp: 90 capsule, Rfl: 3  [4] No Known Allergies

## 2025-05-15 NOTE — TELEPHONE ENCOUNTER
Please schedule the following surgery:    Procedure: Hysteroscopy with Truclear D&C    Date: soonest available    Diagnosis: Postmenopausal bleeding, endometrial polyp    Admission:No    Anesth: general    Preop Medical Clearance needed:  Yes    PCN allergy:  no antibiotic needed    Additional Orders: routine orders    Additional equipment:  Yes, Truclear    Rep needed: No    Additional surgery time needed: No    IPA tubal / hyst form signed: No      Discussed possible complications including but not limited to:  Alternatives to surgical intervention discussed with patient in detail., Likely consequences of not undergoing procedure discussed with patient., anesthesia risks, bleeding, infection, injury, internal, and perforation of uterus    Post operative follow-up appointments: 2 weeks

## 2025-05-15 NOTE — TELEPHONE ENCOUNTER
Spoke to patient aware surgery is scheduled on Wed,7/16/25 at 11:30am. Assisted in scheduling post-op appointment.     Patient aware needs medical clearance form PCP no later than Tues,7/15/25 at 3pm in order to move forward with surgery.    Per BCBS Availity, NO prior authorization is needed fir surgery.  Reference Number  [id number]      Minor case instructions sent via mychart    Delayed staff message sent to MD to please place pre-op orders    Entered in book and calendar

## 2025-05-21 ENCOUNTER — HOSPITAL ENCOUNTER (OUTPATIENT)
Dept: MAMMOGRAPHY | Facility: HOSPITAL | Age: 65
Discharge: HOME | End: 2025-05-21
Attending: INTERNAL MEDICINE
Payer: COMMERCIAL

## 2025-05-21 ENCOUNTER — OFFICE VISIT (OUTPATIENT)
Dept: PHYSICAL THERAPY | Facility: HOSPITAL | Age: 65
End: 2025-05-21
Attending: Other
Payer: COMMERCIAL

## 2025-05-21 DIAGNOSIS — R92.8 ABNORMAL MAMMOGRAM: ICD-10-CM

## 2025-05-21 DIAGNOSIS — R92.8 ABNORMAL MAMMOGRAM: Primary | ICD-10-CM

## 2025-05-21 PROCEDURE — 77065 DX MAMMO INCL CAD UNI: CPT | Performed by: INTERNAL MEDICINE

## 2025-05-21 PROCEDURE — 97112 NEUROMUSCULAR REEDUCATION: CPT

## 2025-05-21 PROCEDURE — 77061 BREAST TOMOSYNTHESIS UNI: CPT | Performed by: INTERNAL MEDICINE

## 2025-05-21 PROCEDURE — 97116 GAIT TRAINING THERAPY: CPT

## 2025-05-21 NOTE — PROGRESS NOTES
Patient: Christi Tavarez (64 year old, female) Referring Provider:  Insurance:   Diagnosis: Cerebellar CVA, Gait Instability Pepper Caballero Ryley  [employer]   Date of Surgery: N/A Next MD visit:  N/A   Precautions:  Fall Risk N/A Referral Information:    Date of Evaluation: Req: 0, Auth: 0, Exp:     05/02/25 POC Auth Visits:  10       Today's Date   5/21/2025    Subjective  Pt. reports that she is doing ok, did her exercises as prescribed.  She reports that exercises are helping with balance and also confidence.       Pain: pain not reported     Objective  see flow sheet            Assessment  Pt. tolerated session well, with some reported fatigue that resolved with sitting rest.  Narrow ANNA gait is challenging but pt. able to improve with practice.    Goals (to be met in 10 visits)   Pt. Will be improved in five times sit to stand test to less than 12 seconds to reflect an improvement in balance and LE strength.  Pt. Will be improved in Timed up and Go test to less than 15 seconds to reflect an improvement in gait and balance, and decrease fall risk  Pt. Will be able to stand and reach off of ANNA in order to cook and clean safely.  Pt. Will be indep in HEP in order to maintain functional gains.             Plan  Tap up on step with reaching, rockerboard mary A/P, low hurdles    Treatment Last 4 Visits  Treatment Day: 4       5/2/2025 5/7/2025 5/14/2025 5/21/2025   Neuro Treatment   Neuro Re-Education Tap ups on 8 in step, alternating  (SBA) x 10 ea  Standing trunk rotation L/R x 5 ea alternating   Reach to ceiling, look at hand x 5 ea, alternating Sit to/from stand with arms extended, upper body tall, slow and controlled stand to sit x 15  Tap ups x 10 ea with 8 in step  Standing trunk rotation L/R x 10ea  Four square step test: 26 sec = fall risk  Lateral stepping over stick x 10  F/B stepping over stick x 10  NuStep with seat 3, level 4, 10 min with cues for maintaining speed.   Rebounder x 30, 4 lbs    Tap ups x 10  alternating on 8 in step  Rockerboard L/R with CGA, cues for posture    Reviewed and revised HEP   NuStep with seat 3, level 4, 10 min with cues for maintaining speed.  Rebounder x 30, 4 lbs, narrower ANNA  Tap ups x 10 alternating on 3 cones x 10  Rockerboard L/R with CGA, A/P with min A, cues for posture  Agility rings stepping F/B with one foot stationary 2 x 10 reps ea, dec visual dep.    Reviewed HEP- no changes today except using cups instead of step for tap ups.       Gait Training  6 minute walk test  Distance walked: 763 ft  Total time walked: 6 min  Number of rest breaks: 0  RPE at end of test:  7/10    Age matched norms:  60-69 yrs:  F: 1765 ft       Gait with narrower ANNA x 50 ft  Gait in clinic without AD, cues for narrower ANNA   Gait with narrow ANNA between two barriers 3 x 20 ft, emphasis on dec visual dependence   Neuro Re-Educ Minutes 15 30 30 30   Gait Training Minutes  10 10 10   Evaluation Minutes 30      Total Time Of Timed Procedures 15 40 40 40   Total Time Of Service-Based Procedures 30 0 0 0   Total Treatment Time 45 40 40 40   HEP Access Code: [email or web address]  URL: [email or web address]  Date: 05/02/2025  Prepared by: Leslie Ashraf    Exercises  - Sit to Stand Without Arm Support  - 1 x daily - 7 x weekly - 2 sets - 10 reps  - Stand and turn head left and right with visual focus  - 1 x daily - 7 x weekly - 5-10 reps  - Stand reach up and look at hand  - 1 x daily - 7 x weekly - 2 sets - 5-10 reps Access Code: [email or web address]  URL: [email or web address]  Date: 05/07/2025  Prepared by: Leslie Ashraf    Exercises  - Sit to Stand Without Arm Support  - 1 x daily - 7 x weekly - 2 sets - 10 reps  - Turn head and shoulders left and right with visual focus  - 2 x daily - 7 x weekly - 5-10 reps  - Stand reach up and look at hand  - 1 x daily - 7 x weekly - 2 sets - 5-10 reps  - Sidestepping over stick  - 1 x daily - 7 x weekly - 2 sets - 10 reps  - Step forward and back over the  stick  - 1 x daily - 7 x weekly - 10 reps  - Step taps on step  - 1 x daily - 7 x weekly - 10 reps Access Code: [email or web address]  URL: [email or web address]  Date: 05/14/2025  Prepared by: Leslie Ashraf    Exercises  - Sit to Stand Without Arm Support  - 1 x daily - 7 x weekly - 2 sets - 10 reps  - Turn head and shoulders left and right with visual focus  - 2 x daily - 7 x weekly - 5-10 reps  - Stand reach up and look at hand  - 1 x daily - 7 x weekly - 2 sets - 5-10 reps  - Step taps on step  - 1 x daily - 7 x weekly - 10 reps  - Stepping in 4 Square Pattern  - 1 x daily - 7 x weekly - 2 sets - 10 reps Access Code: [email or web address]  URL: [email or web address]  Date: 05/14/2025  Prepared by: Leslie Ashraf     Exercises  - Sit to Stand Without Arm Support  - 1 x daily - 7 x weekly - 2 sets - 10 reps  - Turn head and shoulders left and right with visual focus  - 2 x daily - 7 x weekly - 5-10 reps  - Stand reach up and look at hand  - 1 x daily - 7 x weekly - 2 sets - 5-10 reps  - Step taps on step  - 1 x daily - 7 x weekly - 10 reps  - Stepping in 4 Square Pattern  - 1 x daily - 7 x weekly - 2 sets - 10 reps          HEP  Access Code: [email or web address]  URL: [email or web address]  Date: 05/14/2025  Prepared by: Leslie Ashraf     Exercises  - Sit to Stand Without Arm Support  - 1 x daily - 7 x weekly - 2 sets - 10 reps  - Turn head and shoulders left and right with visual focus  - 2 x daily - 7 x weekly - 5-10 reps  - Stand reach up and look at hand  - 1 x daily - 7 x weekly - 2 sets - 5-10 reps  - Step taps on step  - 1 x daily - 7 x weekly - 10 reps  - Stepping in 4 Square Pattern  - 1 x daily - 7 x weekly - 2 sets - 10 reps      Charges  NMR x 2, gait x 1

## 2025-05-23 ENCOUNTER — OFFICE VISIT (OUTPATIENT)
Dept: PHYSICAL THERAPY | Facility: HOSPITAL | Age: 65
End: 2025-05-23
Attending: Other
Payer: COMMERCIAL

## 2025-05-23 PROCEDURE — 97112 NEUROMUSCULAR REEDUCATION: CPT

## 2025-05-23 PROCEDURE — 97116 GAIT TRAINING THERAPY: CPT

## 2025-05-23 NOTE — PROGRESS NOTES
Patient: Christi Tavarez (64 year old, female) Referring Provider:  Insurance:   Diagnosis: Cerebellar CVA, Gait Instability Pepper Caballero Ryley  [employer]   Date of Surgery: N/A Next MD visit:  N/A   Precautions:  Fall Risk N/A Referral Information:    Date of Evaluation: Req: 0, Auth: 0, Exp:     05/02/25 POC Auth Visits:  10       Today's Date   5/23/2025    Subjective  Pt. reports that she is doing ok, did her exercises as prescribed.  She reports that her balance feels worse first thing in the AM and with fatigue in the evening.       Pain: pain not reported     Objective  see flow sheet          Assessment  Pt. tolerated session well, with some reported fatigue that resolved with sitting rest.  Gait tasks and hurdles were challenging but pt. able to improve with practice.  Rockerboard improved today with L/R.    Goals (to be met in 10 visits)   Pt. Will be improved in five times sit to stand test to less than 12 seconds to reflect an improvement in balance and LE strength.  Pt. Will be improved in Timed up and Go test to less than 15 seconds to reflect an improvement in gait and balance, and decrease fall risk  Pt. Will be able to stand and reach off of ANNA in order to cook and clean safely.  Pt. Will be indep in HEP in order to maintain functional gains.               Plan  Continue to work on hurdles, EC, rockerboard    Treatment Last 4 Visits  Treatment Day: 5 5/7/2025 5/14/2025 5/21/2025 5/23/2025   Neuro Treatment   Neuro Re-Education Sit to/from stand with arms extended, upper body tall, slow and controlled stand to sit x 15  Tap ups x 10 ea with 8 in step  Standing trunk rotation L/R x 10ea  Four square step test: 26 sec = fall risk  Lateral stepping over stick x 10  F/B stepping over stick x 10  NuStep with seat 3, level 4, 10 min with cues for maintaining speed.   Rebounder x 30, 4 lbs    Tap ups x 10 alternating on 8 in step  Rockerboard L/R with CGA, cues for posture    Reviewed and revised HEP    NuStep with seat 3, level 4, 10 min with cues for maintaining speed.  Rebounder x 30, 4 lbs, narrower ANNA  Tap ups x 10 alternating on 3 cones x 10  Rockerboard L/R with CGA, A/P with min A, cues for posture  Agility rings stepping F/B with one foot stationary 2 x 10 reps ea, dec visual dep.    Reviewed HEP- no changes today except using cups instead of step for tap ups.     NuStep with seat 3, level 4, 10 min with cues for maintaining speed.    Rebounder x 30, 7 lbs, narrower ANNA (3 in)    Tap up on step with opposite arm reaching x 15 ea alternating  Rockerboard L/R, A/P (min A for A/P only)    Stepping over 6 in hurdles fwd, alternating leading foot, step-to x 20 ea way  Sidestepping over hurdles x 20 ea way            Reviewed and revised HEP       Gait Training 6 minute walk test  Distance walked: 763 ft  Total time walked: 6 min  Number of rest breaks: 0  RPE at end of test:  7/10    Age matched norms:  60-69 yrs:  F: 1765 ft       Gait with narrower ANNA x 50 ft  Gait in clinic without AD, cues for narrower ANNA   Gait with narrow ANNA between two barriers 3 x 20 ft, emphasis on dec visual dependence Gait with horizontal head turns 40 ft x 2  F/B gait with turns 40 ft x 2     Neuro Re-Educ Minutes 30 30 30 30   Gait Training Minutes 10 10 10 10   Total Time Of Timed Procedures 40 40 40 40   Total Time Of Service-Based Procedures 0 0 0 0   Total Treatment Time 40 40 40 40   HEP Access Code: [email or web address]  URL: [email or web address]  Date: 05/07/2025  Prepared by: Leslie Ashraf    Exercises  - Sit to Stand Without Arm Support  - 1 x daily - 7 x weekly - 2 sets - 10 reps  - Turn head and shoulders left and right with visual focus  - 2 x daily - 7 x weekly - 5-10 reps  - Stand reach up and look at hand  - 1 x daily - 7 x weekly - 2 sets - 5-10 reps  - Sidestepping over stick  - 1 x daily - 7 x weekly - 2 sets - 10 reps  - Step forward and back over the stick  - 1 x daily - 7 x weekly - 10 reps  - Step  taps on step  - 1 x daily - 7 x weekly - 10 reps Access Code: [email or web address]  URL: [email or web address]  Date: 05/14/2025  Prepared by: Leslie Ashraf    Exercises  - Sit to Stand Without Arm Support  - 1 x daily - 7 x weekly - 2 sets - 10 reps  - Turn head and shoulders left and right with visual focus  - 2 x daily - 7 x weekly - 5-10 reps  - Stand reach up and look at hand  - 1 x daily - 7 x weekly - 2 sets - 5-10 reps  - Step taps on step  - 1 x daily - 7 x weekly - 10 reps  - Stepping in 4 Square Pattern  - 1 x daily - 7 x weekly - 2 sets - 10 reps Access Code: [email or web address]  URL: [email or web address]  Date: 05/14/2025  Prepared by: Leslie Ashraf     Exercises  - Sit to Stand Without Arm Support  - 1 x daily - 7 x weekly - 2 sets - 10 reps  - Turn head and shoulders left and right with visual focus  - 2 x daily - 7 x weekly - 5-10 reps  - Stand reach up and look at hand  - 1 x daily - 7 x weekly - 2 sets - 5-10 reps  - Step taps on step  - 1 x daily - 7 x weekly - 10 reps  - Stepping in 4 Square Pattern  - 1 x daily - 7 x weekly - 2 sets - 10 reps   Access Code: [email or web address]  URL: [email or web address]  Date: 05/23/2025  Prepared by: Leslie Ashraf    Exercises  - Sit to Stand Without Arm Support  - 1 x daily - 7 x weekly - 2 sets - 10 reps  - Turn head and shoulders left and right with visual focus  - 2 x daily - 7 x weekly - 5-10 reps  - Step taps with opposite arm reach to ceiling  - 1 x daily - 7 x weekly - 2 sets - 10 reps  - Tap on 3 cones on the floor  - 1 x daily - 7 x weekly - 10 reps  - Stepping in 4 Square Pattern  - 1 x daily - 7 x weekly - 2 sets - 10 reps  - Walking with Head Rotation  - 1 x daily - 7 x weekly - 2 sets - 5 reps        HEP  Access Code: [email or web address]  URL: [email or web address]  Date: 05/23/2025  Prepared by: Leslie Ashraf    Exercises  - Sit to Stand Without Arm Support  - 1 x daily - 7 x weekly - 2 sets - 10 reps  - Turn head and  shoulders left and right with visual focus  - 2 x daily - 7 x weekly - 5-10 reps  - Step taps with opposite arm reach to ceiling  - 1 x daily - 7 x weekly - 2 sets - 10 reps  - Tap on 3 cones on the floor  - 1 x daily - 7 x weekly - 10 reps  - Stepping in 4 Square Pattern  - 1 x daily - 7 x weekly - 2 sets - 10 reps  - Walking with Head Rotation  - 1 x daily - 7 x weekly - 2 sets - 5 reps    Charges

## 2025-05-28 ENCOUNTER — APPOINTMENT (OUTPATIENT)
Dept: PHYSICAL THERAPY | Facility: HOSPITAL | Age: 65
End: 2025-05-28
Attending: Other
Payer: COMMERCIAL

## 2025-05-30 ENCOUNTER — OFFICE VISIT (OUTPATIENT)
Dept: PHYSICAL THERAPY | Facility: HOSPITAL | Age: 65
End: 2025-05-30
Attending: Other
Payer: COMMERCIAL

## 2025-05-30 PROCEDURE — 97112 NEUROMUSCULAR REEDUCATION: CPT

## 2025-05-30 PROCEDURE — 97116 GAIT TRAINING THERAPY: CPT

## 2025-05-30 NOTE — PROGRESS NOTES
Patient: Christi Tavarez (64 year old, female) Referring Provider:  Insurance:   Diagnosis: Cerebellar stroke, gait instability Pepper Caballero Ryley  [employer]   Date of Surgery: N/A Next MD visit:  N/A   Precautions:  Fall Risk N/A Referral Information:    Date of Evaluation: Req: 0, Auth: 0, Exp:     No data recorded POC Auth Visits:  10       Today's Date   5/30/2025    Subjective  Pt. reports that she is doing ok, did her exercises as prescribed.  She reports that her balance feels worse first thing in the AM and with fatigue in the evening.       Pain: pain not reported     Objective  see flow sheet          Assessment  Pt. tolerated session well, with some reported fatigue that resolved with sitting rest.  Gait tasks and hurdles were challenging but pt. able to improve with practice.  Rockerboard improved today with L/R.    Goals (to be met in 10 visits)   Pt. Will be improved in five times sit to stand test to less than 12 seconds to reflect an improvement in balance and LE strength.  Pt. Will be improved in Timed up and Go test to less than 15 seconds to reflect an improvement in gait and balance, and decrease fall risk  Pt. Will be able to stand and reach off of ANNA in order to cook and clean safely.  Pt. Will be indep in HEP in order to maintain functional gains.                 Plan  Continue to sidestepping over hurdles, Blazepods    Treatment Last 4 Visits  Treatment Day: 4       5/2/2025 5/9/2025 5/30/2025   Vestibular Treatment   Neuro Re-Education   LE strength assessment- see objective    NuStep with seat 3, level 4, 10 min with cues for maintaining speed.  Rebounder x 25, 4 lbs  Step up/down from 8 in step x 10, CGA   NuStep x 10 min Seat 5, level 5, cues for alignment  Blazepods color catch 4 x 30 sec with 2 pods on 8 in step  Rockerboard L/R and A/P with SBA  EC standing single step F/B x 10 ea     Gait Training  Stepping forward over 6 in hurdles, step-to pattern x 15  Ramp training up/down x 5 ea  way Stepping over 6 hurdles, first step-to with alternating feet, then reciprocal- 5 min\  Gait with emphasis on narrower ANNA, longer steps       Neuro Re-Educ Minutes  30 30   Gait Training Minutes  10 10   Total Time Of Timed Procedures 0 40 40   Total Time Of Service-Based Procedures 0 0 0   Total Treatment Time 0 40 40   HEP  Reviewed written HEP.  No changes today.      Access Code: [email or web address]  URL: [email or web address]  Date: 05/23/2025  Prepared by: Leslie Ashraf    Exercises  - Sit to Stand Without Arm Support  - 1 x daily - 7 x weekly - 2 sets - 10 reps  - Turn head and shoulders left and right with visual focus  - 2 x daily - 7 x weekly - 5-10 reps  - Step taps with opposite arm reach to ceiling  - 1 x daily - 7 x weekly - 2 sets - 10 reps  - Tap on 3 cones on the floor  - 1 x daily - 7 x weekly - 10 reps  - Stepping in 4 Square Pattern  - 1 x daily - 7 x weekly - 2 sets - 10 reps  - Walking with Head Rotation  - 1 x daily - 7 x weekly - 2 sets - 5 reps               HEP  Access Code: [email or web address]  URL: [email or web address]  Date: 05/23/2025  Prepared by: Leslie Ashraf    Exercises  - Sit to Stand Without Arm Support  - 1 x daily - 7 x weekly - 2 sets - 10 reps  - Turn head and shoulders left and right with visual focus  - 2 x daily - 7 x weekly - 5-10 reps  - Step taps with opposite arm reach to ceiling  - 1 x daily - 7 x weekly - 2 sets - 10 reps  - Tap on 3 cones on the floor  - 1 x daily - 7 x weekly - 10 reps  - Stepping in 4 Square Pattern  - 1 x daily - 7 x weekly - 2 sets - 10 reps  - Walking with Head Rotation  - 1 x daily - 7 x weekly - 2 sets - 5 reps           Charges  NMR x 2, Gait x 1

## 2025-06-03 RX ORDER — FOLIC ACID 1 MG/1
1 TABLET ORAL DAILY
Qty: 90 TABLET | Refills: 0 | Status: SHIPPED | OUTPATIENT
Start: 2025-06-03

## 2025-06-04 ENCOUNTER — OFFICE VISIT (OUTPATIENT)
Dept: PHYSICAL THERAPY | Facility: HOSPITAL | Age: 65
End: 2025-06-04
Attending: Other
Payer: COMMERCIAL

## 2025-06-04 ENCOUNTER — OFFICE VISIT (OUTPATIENT)
Dept: INTERNAL MEDICINE CLINIC | Facility: CLINIC | Age: 65
End: 2025-06-04

## 2025-06-04 ENCOUNTER — LAB ENCOUNTER (OUTPATIENT)
Dept: LAB | Facility: REFERENCE LAB | Age: 65
End: 2025-06-04
Attending: INTERNAL MEDICINE
Payer: COMMERCIAL

## 2025-06-04 VITALS
HEART RATE: 73 BPM | RESPIRATION RATE: 17 BRPM | TEMPERATURE: 98 F | WEIGHT: 135 LBS | SYSTOLIC BLOOD PRESSURE: 131 MMHG | BODY MASS INDEX: 28.34 KG/M2 | HEIGHT: 58 IN | DIASTOLIC BLOOD PRESSURE: 66 MMHG | OXYGEN SATURATION: 100 %

## 2025-06-04 DIAGNOSIS — E11.9 TYPE 2 DIABETES MELLITUS WITHOUT COMPLICATION, WITHOUT LONG-TERM CURRENT USE OF INSULIN (HCC): ICD-10-CM

## 2025-06-04 DIAGNOSIS — I10 PRIMARY HYPERTENSION: ICD-10-CM

## 2025-06-04 DIAGNOSIS — G62.1 ALCOHOL-INDUCED POLYNEUROPATHY (HCC): ICD-10-CM

## 2025-06-04 DIAGNOSIS — Z78.9 HISTORY OF MEASLES, MUMPS, RUBELLA (MMR) VACCINATION UNKNOWN: Primary | ICD-10-CM

## 2025-06-04 DIAGNOSIS — Z78.9 HISTORY OF MEASLES, MUMPS, RUBELLA (MMR) VACCINATION UNKNOWN: ICD-10-CM

## 2025-06-04 DIAGNOSIS — K70.31 ALCOHOLIC CIRRHOSIS OF LIVER WITH ASCITES (HCC): ICD-10-CM

## 2025-06-04 DIAGNOSIS — R60.0 EDEMA OF BOTH LEGS: ICD-10-CM

## 2025-06-04 LAB
RUBV IGG SER QL: POSITIVE
RUBV IGG SER-ACNC: 124 IU/ML (ref 10–?)

## 2025-06-04 PROCEDURE — 3075F SYST BP GE 130 - 139MM HG: CPT | Performed by: INTERNAL MEDICINE

## 2025-06-04 PROCEDURE — 86762 RUBELLA ANTIBODY: CPT

## 2025-06-04 PROCEDURE — 86765 RUBEOLA ANTIBODY: CPT

## 2025-06-04 PROCEDURE — 36415 COLL VENOUS BLD VENIPUNCTURE: CPT

## 2025-06-04 PROCEDURE — 3078F DIAST BP <80 MM HG: CPT | Performed by: INTERNAL MEDICINE

## 2025-06-04 PROCEDURE — 97116 GAIT TRAINING THERAPY: CPT

## 2025-06-04 PROCEDURE — 97112 NEUROMUSCULAR REEDUCATION: CPT

## 2025-06-04 PROCEDURE — 99214 OFFICE O/P EST MOD 30 MIN: CPT | Performed by: INTERNAL MEDICINE

## 2025-06-04 PROCEDURE — 3008F BODY MASS INDEX DOCD: CPT | Performed by: INTERNAL MEDICINE

## 2025-06-04 PROCEDURE — 86735 MUMPS ANTIBODY: CPT

## 2025-06-04 PROCEDURE — 97530 THERAPEUTIC ACTIVITIES: CPT

## 2025-06-04 NOTE — PROGRESS NOTES
Patient: Christi Tavarez (64 year old, female) Referring Provider:  Insurance:   Diagnosis: Cerebellar CVA, Gait Instability Pepper Caballero Ryley  [employer]   Date of Surgery: N/A Next MD visit:  N/A   Precautions:  Fall Risk N/A Referral Information:    Date of Evaluation: Req: 0, Auth: 0, Exp:     05/02/25 POC Auth Visits:  10       Today's Date   6/4/2025    Subjective  Pt. reports that she is doing ok, did her exercises as prescribed. She reports that she is still unsteady on the stairs and feels that she needs the cane more than she would like.  She reports that she is overall feeling more confident in her walking and feels that PT is helping.       Pain: pain not reported     Objective  see flow sheet          Assessment  Stairs with reciprocal pattern was challenging, mary descending, due to LE/quad weakness.  Pt's confidence improved with practice but will need to work on LE endurance/strength.    Goals (to be met in 10 visits)   Pt. Will be improved in five times sit to stand test to less than 12 seconds to reflect an improvement in balance and LE strength.  Pt. Will be improved in Timed up and Go test to less than 15 seconds to reflect an improvement in gait and balance, and decrease fall risk  Pt. Will be able to stand and reach off of ANNA in order to cook and clean safely.  Pt. Will be indep in HEP in order to maintain functional gains.                   Plan  Continue to sidestepping over hurdles, Blazepods    Treatment Last 4 Visits  Treatment Day: 7       5/21/2025 5/23/2025 5/30/2025 6/4/2025   Neuro Treatment   Neuro Re-Education   NuStep with seat 3, level 4, 10 min with cues for maintaining speed.  Rebounder x 30, 4 lbs, narrower ANNA  Tap ups x 10 alternating on 3 cones x 10  Rockerboard L/R with CGA, A/P with min A, cues for posture  Agility rings stepping F/B with one foot stationary 2 x 10 reps ea, dec visual dep.    Reviewed HEP- no changes today except using cups instead of step for tap ups.      NuStep with seat 3, level 4, 10 min with cues for maintaining speed.    Rebounder x 30, 7 lbs, narrower ANNA (3 in)    Tap up on step with opposite arm reaching x 15 ea alternating  Rockerboard L/R, A/P (min A for A/P only)    Stepping over 6 in hurdles fwd, alternating leading foot, step-to x 20 ea way  Sidestepping over hurdles x 20 ea way            Reviewed and revised HEP     NuStep x 10 min Seat 5, level 5, cues for alignment  Blazepods color catch 4 x 30 sec with 2 pods on 8 in step  Rockerboard L/R and A/P with SBA  EC standing single step F/B x 10 ea          NuStep x 10 min Seat 5, level 5, cues for alignment  Romberg trunk rotation x 10 ea way, 5 sec hold      Therapeutic Activity    Sit to/from stand slowly with feet together x 15  Emphasis on:  Sequencing, controlled descent, knee/hip flexion initially vs. Later in sequence, narrow ANNA     Gait Training Gait with narrow ANNA between two barriers 3 x 20 ft, emphasis on dec visual dependence Gait with horizontal head turns 40 ft x 2  F/B gait with turns 40 ft x 2   Stepping over 6 hurdles, first step-to with alternating feet, then reciprocal- 5 min\  Gait with emphasis on narrower ANNA, longer steps   Stair training:  Step-to pattern with one railing, alternating leading foot x 3 flights with rest, ascending and descending, Sup  Reciprocal pattern for 1 flight, one railing with SBA  Gait with narrow ANNA- using visual feedback with lines on floor   Neuro Re-Educ Minutes 30 30 30 15   Therapeutic Activity Minutes    10   Gait Training Minutes 10 10 10 15   Total Time Of Timed Procedures 40 40 40 40   Total Time Of Service-Based Procedures 0 0 0 0   Total Treatment Time 40 40 40 40   HEP Access Code: [email or web address]  URL: [email or web address]  Date: 05/14/2025  Prepared by: Leslie Asharf     Exercises  - Sit to Stand Without Arm Support  - 1 x daily - 7 x weekly - 2 sets - 10 reps  - Turn head and shoulders left and right with visual focus  - 2 x  daily - 7 x weekly - 5-10 reps  - Stand reach up and look at hand  - 1 x daily - 7 x weekly - 2 sets - 5-10 reps  - Step taps on step  - 1 x daily - 7 x weekly - 10 reps  - Stepping in 4 Square Pattern  - 1 x daily - 7 x weekly - 2 sets - 10 reps   Access Code: [email or web address]  URL: [email or web address]  Date: 05/23/2025  Prepared by: Leslie Ashraf    Exercises  - Sit to Stand Without Arm Support  - 1 x daily - 7 x weekly - 2 sets - 10 reps  - Turn head and shoulders left and right with visual focus  - 2 x daily - 7 x weekly - 5-10 reps  - Step taps with opposite arm reach to ceiling  - 1 x daily - 7 x weekly - 2 sets - 10 reps  - Tap on 3 cones on the floor  - 1 x daily - 7 x weekly - 10 reps  - Stepping in 4 Square Pattern  - 1 x daily - 7 x weekly - 2 sets - 10 reps  - Walking with Head Rotation  - 1 x daily - 7 x weekly - 2 sets - 5 reps Access Code: [email or web address]   URL: [email or web address]   Date: 05/23/2025   Prepared by: Leslie Ashraf     Exercises   - Sit to Stand Without Arm Support  - 1 x daily - 7 x weekly - 2 sets - 10 reps   - Turn head and shoulders left and right with visual focus  - 2 x daily - 7 x weekly - 5-10 reps   - Step taps with opposite arm reach to ceiling  - 1 x daily - 7 x weekly - 2 sets - 10 reps   - Tap on 3 cones on the floor  - 1 x daily - 7 x weekly - 10 reps   - Stepping in 4 Square Pattern  - 1 x daily - 7 x weekly - 2 sets - 10 reps   - Walking with Head Rotation  - 1 x daily - 7 x weekly - 2 sets - 5 reps      Access Code: [email or web address]  URL: [email or web address]  Date: 06/04/2025  Prepared by: Leslie Ashraf    Exercises  - Sit to stand with feet together  - 1 x daily - 7 x weekly - 2 sets - 10 reps  - Stand feet together, turn head and shoulders left and right  - 2 x daily - 7 x weekly - 5-10 reps  - Step taps with opposite arm reach to ceiling  - 1 x daily - 7 x weekly - 2 sets - 10 reps  - Tap on 3 cones on the floor  - 1 x daily - 7 x  weekly - 10 reps  - Stepping in 4 Square Pattern  - 1 x daily - 7 x weekly - 2 sets - 10 reps  - Walking with Head Rotation  - 1 x daily - 7 x weekly - 2 sets - 5 reps  - Stair Negotiation with Single Rail Using Step-Through Pattern (Foot Over Foot)  - 1 x daily - 7 x weekly - 2 sets - 10 reps  - Stair Negotiation with Single Rail Using Step-To Pattern (One Step at a Time)  - 1 x daily - 7 x weekly - 2 sets - 10 reps        HEP  Access Code: [email or web address]  URL: [email or web address]  Date: 06/04/2025  Prepared by: Leslie Tidwell  - Sit to stand with feet together  - 1 x daily - 7 x weekly - 2 sets - 10 reps  - Stand feet together, turn head and shoulders left and right  - 2 x daily - 7 x weekly - 5-10 reps  - Step taps with opposite arm reach to ceiling  - 1 x daily - 7 x weekly - 2 sets - 10 reps  - Tap on 3 cones on the floor  - 1 x daily - 7 x weekly - 10 reps  - Stepping in 4 Square Pattern  - 1 x daily - 7 x weekly - 2 sets - 10 reps  - Walking with Head Rotation  - 1 x daily - 7 x weekly - 2 sets - 5 reps  - Stair Negotiation with Single Rail Using Step-Through Pattern (Foot Over Foot)  - 1 x daily - 7 x weekly - 2 sets - 10 reps  - Stair Negotiation with Single Rail Using Step-To Pattern (One Step at a Time)  - 1 x daily - 7 x weekly - 2 sets - 10 reps    Charges  TA, NMR, Gait

## 2025-06-05 ENCOUNTER — TELEPHONE (OUTPATIENT)
Dept: NEUROLOGY | Facility: CLINIC | Age: 65
End: 2025-06-05

## 2025-06-05 DIAGNOSIS — I61.4 CEREBELLAR HEMORRHAGE (HCC): Primary | ICD-10-CM

## 2025-06-05 NOTE — TELEPHONE ENCOUNTER
Pt called in would like to speak to clinical team and get word from dr laidr if she would be able to drive again. Went to pcp but advised need to get word from neuro.   Pt was advised during LOV 04/09/25 she has improved greatly.   Pls advise.

## 2025-06-06 ENCOUNTER — OFFICE VISIT (OUTPATIENT)
Dept: PHYSICAL THERAPY | Facility: HOSPITAL | Age: 65
End: 2025-06-06
Attending: Other
Payer: COMMERCIAL

## 2025-06-06 LAB
MEV IGG SER-ACNC: 275 AU/ML (ref 16.5–?)
MUV IGG SER IA-ACNC: 11.8 AU/ML (ref 11–?)

## 2025-06-06 PROCEDURE — 97112 NEUROMUSCULAR REEDUCATION: CPT

## 2025-06-06 PROCEDURE — 97116 GAIT TRAINING THERAPY: CPT

## 2025-06-06 NOTE — PROGRESS NOTES
Patient: Christi Tavarez (64 year old, female) Referring Provider:  Insurance:   Diagnosis: Cerebellar CVA, Gait Instability Pepper Caballero Ryley  [place]   Date of Surgery: N/A Next MD visit:  N/A   Precautions:  Fall Risk N/A Referral Information:    Date of Evaluation: Req: 0, Auth: 0, Exp:     05/02/25 POC Auth Visits:  10       Today's Date   6/6/2025    Subjective  Pt. reports that she is doing ok, did her exercises as prescribed.  She reports having a little muscle soreness in calves, may be from stair training. She reports that because her stairs are a little steeper than the ones in the clinic, she had to use the cane some on the stairs for stability, along with the railing.       Pain: pain not reported     Objective  see flow sheet          Assessment  Pt. tolerated well with less sitting rest required.  Improved stability with Blazepods and rockerboard today.    Goals (to be met in 10 visits)   Pt. Will be improved in five times sit to stand test to less than 12 seconds to reflect an improvement in balance and LE strength.  Pt. Will be improved in Timed up and Go test to less than 15 seconds to reflect an improvement in gait and balance, and decrease fall risk  Pt. Will be able to stand and reach off of ANNA in order to cook and clean safely.  Pt. Will be indep in HEP in order to maintain functional gains.                     Plan  Stairs, Eyes closed, sit to stand    Treatment Last 4 Visits  Treatment Day: 8       5/23/2025 5/30/2025 6/4/2025 6/6/2025   Neuro Treatment   Neuro Re-Education NuStep with seat 3, level 4, 10 min with cues for maintaining speed.    Rebounder x 30, 7 lbs, narrower ANNA (3 in)    Tap up on step with opposite arm reaching x 15 ea alternating  Rockerboard L/R, A/P (min A for A/P only)    Stepping over 6 in hurdles fwd, alternating leading foot, step-to x 20 ea way  Sidestepping over hurdles x 20 ea way            Reviewed and revised HEP     NuStep x 10 min Seat 5, level 5, cues for  alignment  Blazepods color catch 4 x 30 sec with 2 pods on 8 in step  Rockerboard L/R and A/P with SBA  EC standing single step F/B x 10 ea          NuStep x 10 min Seat 5, level 5, cues for alignment  Romberg trunk rotation x 10 ea way, 5 sec hold    NuStep x 10 min Seat 5, level 5, cues for alignment   Romberg trunk rotation x 10 ea way, 5 sec hold   Rockerboard L/R, A/P (min A for A/P only)  Blazepods color catch 4 x 30 sec with 2 pods on 8 in step     Therapeutic Activity   Sit to/from stand slowly with feet together x 15  Emphasis on:  Sequencing, controlled descent, knee/hip flexion initially vs. Later in sequence, narrow ANNA      Gait Training Gait with horizontal head turns 40 ft x 2  F/B gait with turns 40 ft x 2   Stepping over 6 hurdles, first step-to with alternating feet, then reciprocal- 5 min\  Gait with emphasis on narrower ANNA, longer steps   Stair training:  Step-to pattern with one railing, alternating leading foot x 3 flights with rest, ascending and descending, Sup  Reciprocal pattern for 1 flight, one railing with SBA  Gait with narrow ANNA- using visual feedback with lines on floor Stepping over 6 hurdles, step-to with alternating feet x 30  Sidestepping L/R x 20 ea    Gait between barriers on floor with emphasis on narrower ANNA, longer steps      Neuro Re-Educ Minutes 30 30 15 25   Therapeutic Activity Minutes   10    Gait Training Minutes 10 10 15 15   Total Time Of Timed Procedures 40 40 40 40   Total Time Of Service-Based Procedures 0 0 0 0   Total Treatment Time 40 40 40 40   HEP Access Code: [email or web address]  URL: [email or web address]  Date: 05/23/2025  Prepared by: Leslie Ashraf    Exercises  - Sit to Stand Without Arm Support  - 1 x daily - 7 x weekly - 2 sets - 10 reps  - Turn head and shoulders left and right with visual focus  - 2 x daily - 7 x weekly - 5-10 reps  - Step taps with opposite arm reach to ceiling  - 1 x daily - 7 x weekly - 2 sets - 10 reps  - Tap on 3 cones  on the floor  - 1 x daily - 7 x weekly - 10 reps  - Stepping in 4 Square Pattern  - 1 x daily - 7 x weekly - 2 sets - 10 reps  - Walking with Head Rotation  - 1 x daily - 7 x weekly - 2 sets - 5 reps Access Code: [email or web address]   URL: [email or web address]   Date: 05/23/2025   Prepared by: Leslie Ashraf     Exercises   - Sit to Stand Without Arm Support  - 1 x daily - 7 x weekly - 2 sets - 10 reps   - Turn head and shoulders left and right with visual focus  - 2 x daily - 7 x weekly - 5-10 reps   - Step taps with opposite arm reach to ceiling  - 1 x daily - 7 x weekly - 2 sets - 10 reps   - Tap on 3 cones on the floor  - 1 x daily - 7 x weekly - 10 reps   - Stepping in 4 Square Pattern  - 1 x daily - 7 x weekly - 2 sets - 10 reps   - Walking with Head Rotation  - 1 x daily - 7 x weekly - 2 sets - 5 reps      Access Code: [email or web address]  URL: [email or web address]  Date: 06/04/2025  Prepared by: Leslie Ashraf    Exercises  - Sit to stand with feet together  - 1 x daily - 7 x weekly - 2 sets - 10 reps  - Stand feet together, turn head and shoulders left and right  - 2 x daily - 7 x weekly - 5-10 reps  - Step taps with opposite arm reach to ceiling  - 1 x daily - 7 x weekly - 2 sets - 10 reps  - Tap on 3 cones on the floor  - 1 x daily - 7 x weekly - 10 reps  - Stepping in 4 Square Pattern  - 1 x daily - 7 x weekly - 2 sets - 10 reps  - Walking with Head Rotation  - 1 x daily - 7 x weekly - 2 sets - 5 reps  - Stair Negotiation with Single Rail Using Step-Through Pattern (Foot Over Foot)  - 1 x daily - 7 x weekly - 2 sets - 10 reps  - Stair Negotiation with Single Rail Using Step-To Pattern (One Step at a Time)  - 1 x daily - 7 x weekly - 2 sets - 10 reps         HEP  Access Code: [email or web address]  URL: [email or web address]  Date: 06/04/2025  Prepared by: Leslie Ashraf    Exercises  - Sit to stand with feet together  - 1 x daily - 7 x weekly - 2 sets - 10 reps  - Stand feet together,  turn head and shoulders left and right  - 2 x daily - 7 x weekly - 5-10 reps  - Step taps with opposite arm reach to ceiling  - 1 x daily - 7 x weekly - 2 sets - 10 reps  - Tap on 3 cones on the floor  - 1 x daily - 7 x weekly - 10 reps  - Stepping in 4 Square Pattern  - 1 x daily - 7 x weekly - 2 sets - 10 reps  - Walking with Head Rotation  - 1 x daily - 7 x weekly - 2 sets - 5 reps  - Stair Negotiation with Single Rail Using Step-Through Pattern (Foot Over Foot)  - 1 x daily - 7 x weekly - 2 sets - 10 reps  - Stair Negotiation with Single Rail Using Step-To Pattern (One Step at a Time)  - 1 x daily - 7 x weekly - 2 sets - 10 reps    Charges  Gait x 1, NMR x 2

## 2025-06-06 NOTE — PROGRESS NOTES
Patient: Christi Tavarez (64 year old, female) Referring Provider:  Insurance:   Diagnosis: Cerebellar stroke, gait instability Pepper Caballero Rlyey  [place]   Date of Surgery: N/A Next MD visit:  N/A   Precautions:  Fall Risk N/A Referral Information:    Date of Evaluation: Req: 0, Auth: 0, Exp:     No data recorded POC Auth Visits:  10       Today's Date   6/6/2025    Subjective  Pt. reports that she is doing ok, did her exercises as prescribed.       Pain: pain not reported     Objective  see flow sheet          Assessment  Pt. tolerated well with less sitting rest required.  Improved stability with Blazepods adn rockerboard today.    Goals (to be met in 10 visits)   Pt. Will be improved in five times sit to stand test to less than 12 seconds to reflect an improvement in balance and LE strength.  Pt. Will be improved in Timed up and Go test to less than 15 seconds to reflect an improvement in gait and balance, and decrease fall risk  Pt. Will be able to stand and reach off of ANNA in order to cook and clean safely.  Pt. Will be indep in HEP in order to maintain functional gains.                     Plan  Stairs, Eyes closed, sit to stand    Treatment Last 4 Visits  Treatment Day: 6 5/9/2025 5/30/2025 6/4/2025 6/6/2025   Vestibular Treatment   Neuro Re-Education  LE strength assessment- see objective    NuStep with seat 3, level 4, 10 min with cues for maintaining speed.  Rebounder x 25, 4 lbs  Step up/down from 8 in step x 10, CGA   NuStep x 10 min Seat 5, level 5, cues for alignment  Blazepods color catch 4 x 30 sec with 2 pods on 8 in step  Rockerboard L/R and A/P with SBA  EC standing single step F/B x 10 ea       Gait Training Stepping forward over 6 in hurdles, step-to pattern x 15  Ramp training up/down x 5 ea way Stepping over 6 hurdles, first step-to with alternating feet, then reciprocal- 5 min\  Gait with emphasis on narrower ANNA, longer steps         Neuro Re-Educ Minutes 30 30     Gait Training  Minutes 10 10     Total Time Of Timed Procedures 40 40 0 0   Total Time Of Service-Based Procedures 0 0 0 0   Total Treatment Time 40 40 0 0   HEP Reviewed written HEP.  No changes today.      Access Code: [email or web address]  URL: [email or web address]  Date: 05/23/2025  Prepared by: Leslie Ashraf    Exercises  - Sit to Stand Without Arm Support  - 1 x daily - 7 x weekly - 2 sets - 10 reps  - Turn head and shoulders left and right with visual focus  - 2 x daily - 7 x weekly - 5-10 reps  - Step taps with opposite arm reach to ceiling  - 1 x daily - 7 x weekly - 2 sets - 10 reps  - Tap on 3 cones on the floor  - 1 x daily - 7 x weekly - 10 reps  - Stepping in 4 Square Pattern  - 1 x daily - 7 x weekly - 2 sets - 10 reps  - Walking with Head Rotation  - 1 x daily - 7 x weekly - 2 sets - 5 reps                 HEP       Charges

## 2025-06-09 ENCOUNTER — OFFICE VISIT (OUTPATIENT)
Dept: PODIATRY CLINIC | Facility: CLINIC | Age: 65
End: 2025-06-09
Payer: COMMERCIAL

## 2025-06-09 DIAGNOSIS — E11.69 ONYCHOMYCOSIS OF MULTIPLE TOENAILS WITH TYPE 2 DIABETES MELLITUS (HCC): Primary | ICD-10-CM

## 2025-06-09 DIAGNOSIS — B35.1 ONYCHOMYCOSIS OF MULTIPLE TOENAILS WITH TYPE 2 DIABETES MELLITUS (HCC): Primary | ICD-10-CM

## 2025-06-09 DIAGNOSIS — I73.9 PVD (PERIPHERAL VASCULAR DISEASE): ICD-10-CM

## 2025-06-09 NOTE — PROGRESS NOTES
Reason for Visit      Christi Tavarez is a 64 year old female presents today complaining of bilateral diabetic foot evaluation.     History of Present Illness     Patient presents to clinic today for routine diabetic foot evaluation.  Patient is a non-insulin-dependent diabetic type II whose last A1c was 5.3 on 12/12/2024.  Patient presents to clinic today complaining of elongated, thickened toenails that he is unable to debride himself.    Patient returns to clinic today after last being seen on 3/3/2025.  Patient denies any changes in her medical history or medication since her previous visit.    The following portions of the patient's history were reviewed and updated as appropriate: allergies, current medications, past family history, past medical history, past social history, past surgical history and problem list.    Allergies[1]      Current Outpatient Medications:     folic acid 1 MG Oral Tab, Take 1 tablet (1 mg total) by mouth daily., Disp: 90 tablet, Rfl: 0    escitalopram 10 MG Oral Tab, Take 1 tablet (10 mg total) by mouth daily., Disp: , Rfl:     Copper Gluconate 2 MG Oral Tab, Take 8 mg by mouth daily for 7 days, THEN 6 mg daily for 7 days, THEN 4 mg daily for 7 days, THEN 2 mg daily., Disp: 130 tablet, Rfl: 0    KLOR-CON 20 MEQ Oral Powd Pack, TAKE 40 MEQ BY MOUTH DAILY, Disp: 180 each, Rfl: 3    rifAXIMin 550 MG Oral Tab, Take 1 tablet (550 mg total) by mouth 2 (two) times daily., Disp: 180 tablet, Rfl: 3    triamcinolone 0.1 % External Cream, Apply topically 2 (two) times daily as needed., Disp: 15 g, Rfl: 1    lactulose 10 GM/15ML Oral Solution, Take 30 mL (20 g total) by mouth 2 (two) times daily as needed., Disp: 1800 mL, Rfl: 2    levothyroxine 50 MCG Oral Tab, Take 1 tablet (50 mcg total) by mouth before breakfast., Disp: , Rfl:     bumetanide 2 MG Oral Tab, Take 1 tablet (2 mg total) by mouth 2 (two) times daily., Disp: 180 tablet, Rfl: 3    spironolactone 50 MG Oral Tab, Take 1 tablet (50 mg  total) by mouth 2 (two) times daily., Disp: 180 tablet, Rfl: 1    omeprazole 20 MG Oral Capsule Delayed Release, Take 1 capsule (20 mg total) by mouth every morning before breakfast., Disp: 90 capsule, Rfl: 3    There are no discontinued medications.    Patient Active Problem List   Diagnosis    Major depressive disorder, recurrent episode, moderate degree (HCC)    Anemia    Alcoholic cirrhosis of liver with ascites (HCC)    Fluid overload    Type 2 diabetes mellitus without complication, without long-term current use of insulin (HCC)       Past Medical History:    Acute, but ill-defined, cerebrovascular disease    ALCOHOL USE    Amenorrhea    Anxiety    CHF (congestive heart failure) (HCC)    Cirrhosis of liver (HCC)    Colon polyp    Depression    Diabetes (HCC)    Disorder of liver    Hypothyroidism    Stroke (HCC)       Past Surgical History:   Procedure Laterality Date    Colonoscopy N/A 2024    Procedure: COLONOSCOPY;  Surgeon: Yobany Guardado MD;  Location: [facility] ENDOSCOPY    D & c      Insert intrauterine device      Needle biopsy right Right           Remove intrauterine device         Family History   Problem Relation Age of Onset    Prostate Cancer Father         No surgery or chemotherapy    Diabetes Daughter     Breast Cancer Neg     Ovarian Cancer Neg     Pancreatic Cancer Neg        Social History     Occupational History    Not on file   Tobacco Use    Smoking status: Former     Current packs/day: 0.00     Types: Cigarettes     Quit date: 2024     Years since quittin.1     Passive exposure: Never    Smokeless tobacco: Never   Vaping Use    Vaping status: Never Used   Substance and Sexual Activity    Alcohol use: Not Currently     Comment: socially ,not since 2024    Drug use: Not Currently     Types: Cannabis, Cocaine    Sexual activity: Not Currently       ROS      Constitutional: negative for chills, fevers and sweats  Gastrointestinal: negative for abdominal pain,  diarrhea, nausea and vomiting  Genitourinary:negative for dysuria and hematuria  Musculoskeletal:negative for arthralgias and muscle weakness  Neurological: negative for paresthesia and weakness  All others reviewed and negative.      Physical Exam     LE PHYSICAL EXAM    Constitution: Well-developed and well-nourished. Gait appears normal. No apparent distress. Alert and oriented to person, place, and time.  Integument: There are no varicosities. Skin appears moist, warm, and supple with positive hair growth. There are no color changes. No open lesions. No macerations, No Hyperkeratotic lesions.  Vascular examination: Dorsalis pedis and posterior tibial pulses are strong bilaterally with capillary filling time less than 3 seconds to all digits. There is no peripheral edema..  Neurological Sensorium: Grossly intact to sharp/dull. Vibratory: Intact.  Musculoskeletal:   5/5 pedal muscle strength b/l     Advanced trophic changes as: hair growth decrease nail changes  (thickening) pigmentary changes (discoloration) skin texture (thin, shiny)   Procedure       Nails 1 through 5 bilateral debrided with use of a nail nipper.  Patient Toller procedure well had no complications.  Neurovascular status intact to the level of the digits post procedure.     Assessment and Plan     Encounter Diagnoses   Name Primary?    Onychomycosis of multiple toenails with type 2 diabetes mellitus (HCC) Yes    PVD (peripheral vascular disease)          Diabetic education and instructions have been provided. We reviewed and discussed the following:    -risk categories related to pts with diabetes and foot or lower extremity complications per ADA.    -adherence to medication regimen and close monitoring or blood sugar control.   -daily monitoring/inspection of feet and shoes.   -proper management of diet and weight   -regular follow up with PCP and specialty providers as recommended   -Lower extremity complications related to DM were reviewed  and stressed prevention.         Evaluated the patient. Discussed treatment options with the patient.  Discussed with patient proper care and hygiene for their feet.  Patient tolerated procedures well, without incident.    Instrumentation used includes nail nippers and electric  where appropriate.  Procedure: (76963 Debridement of toenails 6-10) Surgically debrided and mechanically reduced 6 or more toenails.Hemmorhage occurred none.Nails that were debrided appeared dystrophic and caused the patient pain in shoe gear.Nails 5 Left & 5 Right.     Evaluated patient. Discussed treatment options with patient.  Discussed proper hygiene and care for feet as well as use of emollient creams (ie Urea based creams)  Answered all patient questions. Discussed offloading hyperkeratotic lesions with proper shoe gear, offloading pads, and insoles.   Patient was instructed to call the office or on-call podiatric physician immediately with any issues or concerns before the next scheduled visit. Patient to follow-up in clinic in 3 months        Laney Greene DPM, [email or web address] [email or web address]  Diplomat, American Board of Foot and Ankle Surgery  Certified in Foot and Rearfoot/Ankle Reconstruction  Fellow of the American College of Foot and Ankle Surgeons  Fellowship Trained Foot and Ankle Surgeon   [facility]     3/3/2025    7:17 AM         [1] No Known Allergies

## 2025-06-09 NOTE — TELEPHONE ENCOUNTER
Received an incoming call from the pt. She went to go see her PCP last week and she wanted to know if she can start driving again. Pt was advised to ask provider who orders therapy for her.     During last office visit on 4/9 with neurology, pt was advised to continue PT. Pt said she has 1 last session of PT this week and she is requesting more therapy. Her therapist was going to send a request to provider.     Pt informed a message would be sent to Dr. Hedrick.

## 2025-06-10 NOTE — PROGRESS NOTES
Subjective:     Patient ID: Christited Tavarez is a 64 year old female.    HPI    Patient comes in for follow-up overall doing okay doing better taking treatment follows with specialist liver cirrhosis has been stable edema has been stable patient wondering about the measles mumps rubella vaccination status  Patient also asking if she can go back to driving told patient that she needs to clear that out with neurology due to her neuropathy  History/Other:   Review of Systems   Constitutional: Negative.    HENT: Negative.     Eyes: Negative.    Respiratory: Negative.     Cardiovascular: Negative.    Gastrointestinal: Negative.    Genitourinary: Negative.    Musculoskeletal: Negative.    Skin: Negative.    Neurological: Negative.    Psychiatric/Behavioral: Negative.       Current Medications[1]  Allergies:Allergies[2]    Past Medical History[3]   Past Surgical History[4]   Family History[5]   Social History: Short Social Hx on File[6]     Objective:   Physical Exam  Vitals and nursing note reviewed.   Constitutional:       Appearance: She is well-developed.   HENT:      Head: Normocephalic and atraumatic.      Right Ear: External ear normal.      Left Ear: External ear normal.      Nose: Nose normal.   Eyes:      Conjunctiva/sclera: Conjunctivae normal.      Pupils: Pupils are equal, round, and reactive to light.   Cardiovascular:      Rate and Rhythm: Normal rate and regular rhythm.      Heart sounds: Normal heart sounds.   Pulmonary:      Effort: Pulmonary effort is normal.      Breath sounds: Normal breath sounds.   Abdominal:      General: Bowel sounds are normal.      Palpations: Abdomen is soft.   Genitourinary:     Vagina: Normal.   Musculoskeletal:         General: Normal range of motion.      Cervical back: Normal range of motion and neck supple.   Skin:     General: Skin is warm and dry.   Neurological:      Mental Status: She is alert and oriented to person, place, and time.      Deep Tendon Reflexes: Reflexes  are normal and symmetric.   Psychiatric:         Behavior: Behavior normal.         Thought Content: Thought content normal.         Judgment: Judgment normal.         Assessment & Plan:   1. History of measles, mumps, rubella (MMR) vaccination unknown will check titers   2. Primary hypertension well-controlled continue current treatment   3. Alcoholic cirrhosis of liver with ascites (HCC) stable follows with GI   4. Alcohol-induced polyneuropathy (HCC) stable follows with neuro   5. Type 2 diabetes mellitus without complication, without long-term current use of insulin (HCC) stable continue current treatment   6. Edema of both legs stable continue current treatment watch diet cut down on salt       Orders Placed This Encounter   Procedures    Rubeola(Measles)Antibodies, IGG-Immunity [E]    Mumps Antibodies, IGG-Immunity [E]    Rubella, IGG [E]       Meds This Visit:  Requested Prescriptions      No prescriptions requested or ordered in this encounter       Imaging & Referrals:  None            [1]   Current Outpatient Medications   Medication Sig Dispense Refill    folic acid 1 MG Oral Tab Take 1 tablet (1 mg total) by mouth daily. 90 tablet 0    escitalopram 10 MG Oral Tab Take 1 tablet (10 mg total) by mouth daily.      Copper Gluconate 2 MG Oral Tab Take 8 mg by mouth daily for 7 days, THEN 6 mg daily for 7 days, THEN 4 mg daily for 7 days, THEN 2 mg daily. 130 tablet 0    KLOR-CON 20 MEQ Oral Powd Pack TAKE 40 MEQ BY MOUTH DAILY 180 each 3    rifAXIMin 550 MG Oral Tab Take 1 tablet (550 mg total) by mouth 2 (two) times daily. 180 tablet 3    triamcinolone 0.1 % External Cream Apply topically 2 (two) times daily as needed. 15 g 1    lactulose 10 GM/15ML Oral Solution Take 30 mL (20 g total) by mouth 2 (two) times daily as needed. 1800 mL 2    levothyroxine 50 MCG Oral Tab Take 1 tablet (50 mcg total) by mouth before breakfast.      bumetanide 2 MG Oral Tab Take 1 tablet (2 mg total) by mouth 2 (two) times daily.  180 tablet 3    spironolactone 50 MG Oral Tab Take 1 tablet (50 mg total) by mouth 2 (two) times daily. 180 tablet 1    omeprazole 20 MG Oral Capsule Delayed Release Take 1 capsule (20 mg total) by mouth every morning before breakfast. 90 capsule 3   [2] No Known Allergies  [3]   Past Medical History:   Acute, but ill-defined, cerebrovascular disease    ALCOHOL USE    Amenorrhea    Anxiety    CHF (congestive heart failure) (HCC)    Cirrhosis of liver (HCC)    Colon polyp    Depression    Diabetes (HCC)    Disorder of liver    Hypothyroidism    Stroke (HCC)   [4]   Past Surgical History:  Procedure Laterality Date    Colonoscopy N/A 2024    Procedure: COLONOSCOPY;  Surgeon: Yobany Guardado MD;  Location: [facility] ENDOSCOPY    D & c      Insert intrauterine device      Needle biopsy right Right           Remove intrauterine device     [5]   Family History  Problem Relation Age of Onset    Prostate Cancer Father         No surgery or chemotherapy    Diabetes Daughter     Breast Cancer Neg     Ovarian Cancer Neg     Pancreatic Cancer Neg    [6]   Social History  Socioeconomic History    Marital status:    Tobacco Use    Smoking status: Former     Current packs/day: 0.00     Types: Cigarettes     Quit date: 2024     Years since quittin.1     Passive exposure: Never    Smokeless tobacco: Never   Vaping Use    Vaping status: Never Used   Substance and Sexual Activity    Alcohol use: Not Currently     Comment: socially ,not since 2024    Drug use: Not Currently     Types: Cannabis, Cocaine    Sexual activity: Not Currently     Social Drivers of Health     Food Insecurity: No Food Insecurity (3/12/2025)    NCSS - Food Insecurity     Worried About Running Out of Food in the Last Year: No     Ran Out of Food in the Last Year: No   Transportation Needs: No Transportation Needs (3/12/2025)    NCSS - Transportation     Lack of Transportation: No   Housing Stability: Not At Risk (3/12/2025)     NCSS - Housing/Utilities     Has Housing: Yes     Worried About Losing Housing: No     Unable to Get Utilities: No

## 2025-06-11 ENCOUNTER — OFFICE VISIT (OUTPATIENT)
Dept: PHYSICAL THERAPY | Facility: HOSPITAL | Age: 65
End: 2025-06-11
Attending: Other
Payer: COMMERCIAL

## 2025-06-11 PROCEDURE — 97112 NEUROMUSCULAR REEDUCATION: CPT

## 2025-06-11 PROCEDURE — 97116 GAIT TRAINING THERAPY: CPT

## 2025-06-11 NOTE — PROGRESS NOTES
Patient: Christi Tavarez (64 year old, female) Referring Provider:  Insurance:   Diagnosis: Cerebellar CVA, Gait Instability Pepper Caballero Ryley  [employer] [place]   Date of Surgery: N/A Next MD visit:  N/A   Precautions:  Fall Risk N/A Referral Information:    Date of Evaluation: Req: 0, Auth: 0, Exp:     05/02/25 POC Auth Visits:  10       Today's Date   6/11/2025    Subjective  Pt. reports that she is doing ok, did her exercises as prescribed. She reports that she was able to take trashcans to curb yesterday for the first time- previously unable.    She reports that muscle soreness in calves has resolved.  She reports that because her stairs are a little steeper than the ones in the clinic, she had to use the cane some on the stairs for stability, along with the railing.  Pt. reports that she has been working on narrowing ANNA, notes some improvement.       Pain: pain not reported     Objective  see flow sheet            Assessment  Pt. tolerated well with less sitting rest required.  Improved stability with Blazepods and eyes closed today.  Pt. observed to have some improvement in ANNA during gait.    Goals (to be met in 10 visits)   Pt. Will be improved in five times sit to stand test to less than 12 seconds to reflect an improvement in balance and LE strength.  Pt. Will be improved in Timed up and Go test to less than 15 seconds to reflect an improvement in gait and balance, and decrease fall risk  Pt. Will be able to stand and reach off of ANNA in order to cook and clean safely.  Pt. Will be indep in HEP in order to maintain functional gains.                       Plan  Reassess next visit, hurdles, rockerboard, picking objects up from floor    Treatment Last 4 Visits  Treatment Day: 9       5/30/2025 6/4/2025 6/6/2025 6/11/2025   Neuro Treatment   Neuro Re-Education NuStep x 10 min Seat 5, level 5, cues for alignment  Blazepods color catch 4 x 30 sec with 2 pods on 8 in step  Rockerboard L/R and A/P with SBA  EC standing  single step F/B x 10 ea          NuStep x 10 min Seat 5, level 5, cues for alignment  Romberg trunk rotation x 10 ea way, 5 sec hold    NuStep x 10 min Seat 5, level 5, cues for alignment   Romberg trunk rotation x 10 ea way, 5 sec hold   Rockerboard L/R, A/P (min A for A/P only)  Blazepods color catch 4 x 30 sec with 2 pods on 8 in step   NuStep Level 5, Seat 4, 10 min  Sit to/from stand with 1 lb bar in both hands, slow and reach up, looking at bar x 10  Standing EC- F/B stepping with one foot stationary x 10 ea side  Blazepods with 2 pods on step, color catch 4 x 30 sec     Reviewed HEP- no changes appropriate   Therapeutic Activity  Sit to/from stand slowly with feet together x 15  Emphasis on:  Sequencing, controlled descent, knee/hip flexion initially vs. Later in sequence, narrow ANNA       Gait Training Stepping over 6 hurdles, first step-to with alternating feet, then reciprocal- 5 min\  Gait with emphasis on narrower ANNA, longer steps   Stair training:  Step-to pattern with one railing, alternating leading foot x 3 flights with rest, ascending and descending, Sup  Reciprocal pattern for 1 flight, one railing with SBA  Gait with narrow ANNA- using visual feedback with lines on floor Stepping over 6 hurdles, step-to with alternating feet x 30  Sidestepping L/R x 20 ea    Gait between barriers on floor with emphasis on narrower ANNA, longer steps    Stair training:  ascending and descending x 3 flights with brief standing rests, one railing, step-to pattern but alternating leading leg, supervised   Neuro Re-Educ Minutes 30 15 25 25   Therapeutic Activity Minutes  10     Gait Training Minutes 10 15 15 15   Total Time Of Timed Procedures 40 40 40 40   Total Time Of Service-Based Procedures 0 0 0 0   Total Treatment Time 40 40 40 40   HEP Access Code: [email or web address]   URL: [email or web address]   Date: 05/23/2025   Prepared by: Leslie Ashraf     Exercises   - Sit to Stand Without Arm Support  - 1 x  daily - 7 x weekly - 2 sets - 10 reps   - Turn head and shoulders left and right with visual focus  - 2 x daily - 7 x weekly - 5-10 reps   - Step taps with opposite arm reach to ceiling  - 1 x daily - 7 x weekly - 2 sets - 10 reps   - Tap on 3 cones on the floor  - 1 x daily - 7 x weekly - 10 reps   - Stepping in 4 Square Pattern  - 1 x daily - 7 x weekly - 2 sets - 10 reps   - Walking with Head Rotation  - 1 x daily - 7 x weekly - 2 sets - 5 reps      Access Code: [email or web address]  URL: [email or web address]  Date: 06/04/2025  Prepared by: Leslie Ashraf    Exercises  - Sit to stand with feet together  - 1 x daily - 7 x weekly - 2 sets - 10 reps  - Stand feet together, turn head and shoulders left and right  - 2 x daily - 7 x weekly - 5-10 reps  - Step taps with opposite arm reach to ceiling  - 1 x daily - 7 x weekly - 2 sets - 10 reps  - Tap on 3 cones on the floor  - 1 x daily - 7 x weekly - 10 reps  - Stepping in 4 Square Pattern  - 1 x daily - 7 x weekly - 2 sets - 10 reps  - Walking with Head Rotation  - 1 x daily - 7 x weekly - 2 sets - 5 reps  - Stair Negotiation with Single Rail Using Step-Through Pattern (Foot Over Foot)  - 1 x daily - 7 x weekly - 2 sets - 10 reps  - Stair Negotiation with Single Rail Using Step-To Pattern (One Step at a Time)  - 1 x daily - 7 x weekly - 2 sets - 10 reps  Access Code: [email or web address]  URL: [email or web address]  Date: 06/04/2025  Prepared by: Leslie Ashraf     Exercises  - Sit to stand with feet together  - 1 x daily - 7 x weekly - 2 sets - 10 reps  - Stand feet together, turn head and shoulders left and right  - 2 x daily - 7 x weekly - 5-10 reps  - Step taps with opposite arm reach to ceiling  - 1 x daily - 7 x weekly - 2 sets - 10 reps  - Tap on 3 cones on the floor  - 1 x daily - 7 x weekly - 10 reps  - Stepping in 4 Square Pattern  - 1 x daily - 7 x weekly - 2 sets - 10 reps  - Walking with Head Rotation  - 1 x daily - 7 x weekly - 2 sets - 5  reps  - Stair Negotiation with Single Rail Using Step-Through Pattern (Foot Over Foot)  - 1 x daily - 7 x weekly - 2 sets - 10 reps  - Stair Negotiation with Single Rail Using Step-To Pattern (One Step at a Time)  - 1 x daily - 7 x weekly - 2 sets - 10 reps          HEP  Access Code: [email or web address]  URL: [email or web address]  Date: 06/04/2025  Prepared by: Lelsie Ashraf     Exercises  - Sit to stand with feet together  - 1 x daily - 7 x weekly - 2 sets - 10 reps  - Stand feet together, turn head and shoulders left and right  - 2 x daily - 7 x weekly - 5-10 reps  - Step taps with opposite arm reach to ceiling  - 1 x daily - 7 x weekly - 2 sets - 10 reps  - Tap on 3 cones on the floor  - 1 x daily - 7 x weekly - 10 reps  - Stepping in 4 Square Pattern  - 1 x daily - 7 x weekly - 2 sets - 10 reps  - Walking with Head Rotation  - 1 x daily - 7 x weekly - 2 sets - 5 reps  - Stair Negotiation with Single Rail Using Step-Through Pattern (Foot Over Foot)  - 1 x daily - 7 x weekly - 2 sets - 10 reps  - Stair Negotiation with Single Rail Using Step-To Pattern (One Step at a Time)  - 1 x daily - 7 x weekly - 2 sets - 10 reps      Charges  NMR x 2, Gait x 1

## 2025-06-20 ENCOUNTER — TELEPHONE (OUTPATIENT)
Facility: CLINIC | Age: 65
End: 2025-06-20

## 2025-06-20 NOTE — TELEPHONE ENCOUNTER
Patient called stating she has a procedure on 7/16/25 for HYSTEROSCOPY with Truclear and Dilation and Curettage with Dr. Gutierrez. She states she needs medical clearance. I assisted with scheduling pre-op visit. Patient verbalized understanding. No further questions or concerns at this time.    Future Appointments   Date Time Provider Department Center   7/2/2025 10:30 AM Antony Zavala MD [facility]

## 2025-07-02 ENCOUNTER — OFFICE VISIT (OUTPATIENT)
Dept: INTERNAL MEDICINE CLINIC | Facility: CLINIC | Age: 65
End: 2025-07-02

## 2025-07-02 ENCOUNTER — LAB ENCOUNTER (OUTPATIENT)
Dept: LAB | Facility: REFERENCE LAB | Age: 65
End: 2025-07-02
Attending: INTERNAL MEDICINE
Payer: COMMERCIAL

## 2025-07-02 VITALS
HEIGHT: 58 IN | BODY MASS INDEX: 28.13 KG/M2 | HEART RATE: 72 BPM | TEMPERATURE: 98 F | SYSTOLIC BLOOD PRESSURE: 129 MMHG | RESPIRATION RATE: 17 BRPM | DIASTOLIC BLOOD PRESSURE: 77 MMHG | WEIGHT: 134 LBS | OXYGEN SATURATION: 98 %

## 2025-07-02 DIAGNOSIS — Z01.818 PRE-OP EVALUATION: Primary | ICD-10-CM

## 2025-07-02 DIAGNOSIS — R73.09 ELEVATED GLUCOSE: Primary | ICD-10-CM

## 2025-07-02 DIAGNOSIS — R73.09 ELEVATED GLUCOSE: ICD-10-CM

## 2025-07-02 DIAGNOSIS — Z01.818 PRE-OP EVALUATION: ICD-10-CM

## 2025-07-02 LAB
ALBUMIN SERPL-MCNC: 3.9 G/DL (ref 3.2–4.8)
ALBUMIN/GLOB SERPL: 1.1 {RATIO} (ref 1–2)
ALP LIVER SERPL-CCNC: 267 U/L (ref 50–130)
ALT SERPL-CCNC: 8 U/L (ref 10–49)
ANION GAP SERPL CALC-SCNC: 7 MMOL/L (ref 0–18)
AST SERPL-CCNC: 20 U/L (ref ?–34)
ATRIAL RATE: 75 BPM
BASOPHILS # BLD AUTO: 0.07 X10(3) UL (ref 0–0.2)
BASOPHILS NFR BLD AUTO: 1.3 %
BILIRUB SERPL-MCNC: 1.1 MG/DL (ref 0.2–1.1)
BUN BLD-MCNC: <5 MG/DL (ref 9–23)
CALCIUM BLD-MCNC: 9.1 MG/DL (ref 8.7–10.4)
CHLORIDE SERPL-SCNC: 102 MMOL/L (ref 98–112)
CO2 SERPL-SCNC: 27 MMOL/L (ref 21–32)
CREAT BLD-MCNC: 0.88 MG/DL (ref 0.55–1.02)
DEPRECATED RDW RBC AUTO: 53.4 FL (ref 35.1–46.3)
EGFRCR SERPLBLD CKD-EPI 2021: 73 ML/MIN/1.73M2 (ref 60–?)
EOSINOPHIL # BLD AUTO: 0.09 X10(3) UL (ref 0–0.7)
EOSINOPHIL NFR BLD AUTO: 1.6 %
ERYTHROCYTE [DISTWIDTH] IN BLOOD BY AUTOMATED COUNT: 14.3 % (ref 11–15)
EST. AVERAGE GLUCOSE BLD GHB EST-MCNC: 295 MG/DL (ref 68–126)
FASTING STATUS PATIENT QL REPORTED: NO
GLOBULIN PLAS-MCNC: 3.5 G/DL (ref 2–3.5)
GLUCOSE BLD-MCNC: 317 MG/DL (ref 70–99)
HBA1C MFR BLD: 11.9 % (ref ?–5.7)
HCT VFR BLD AUTO: 37.4 % (ref 35–48)
HGB BLD-MCNC: 12.3 G/DL (ref 12–16)
IMM GRANULOCYTES # BLD AUTO: 0.01 X10(3) UL (ref 0–1)
IMM GRANULOCYTES NFR BLD: 0.2 %
LYMPHOCYTES # BLD AUTO: 1.9 X10(3) UL (ref 1–4)
LYMPHOCYTES NFR BLD AUTO: 34.4 %
MCH RBC QN AUTO: 33.2 PG (ref 26–34)
MCHC RBC AUTO-ENTMCNC: 32.9 G/DL (ref 31–37)
MCV RBC AUTO: 100.8 FL (ref 80–100)
MONOCYTES # BLD AUTO: 0.5 X10(3) UL (ref 0.1–1)
MONOCYTES NFR BLD AUTO: 9.1 %
NEUTROPHILS # BLD AUTO: 2.95 X10 (3) UL (ref 1.5–7.7)
NEUTROPHILS # BLD AUTO: 2.95 X10(3) UL (ref 1.5–7.7)
NEUTROPHILS NFR BLD AUTO: 53.4 %
P AXIS: 56 DEGREES
P-R INTERVAL: 160 MS
PLATELET # BLD AUTO: 121 10(3)UL (ref 150–450)
POTASSIUM SERPL-SCNC: 4.6 MMOL/L (ref 3.5–5.1)
PROT SERPL-MCNC: 7.4 G/DL (ref 5.7–8.2)
Q-T INTERVAL: 426 MS
QRS DURATION: 88 MS
QTC CALCULATION (BEZET): 475 MS
R AXIS: -4 DEGREES
RBC # BLD AUTO: 3.71 X10(6)UL (ref 3.8–5.3)
SODIUM SERPL-SCNC: 136 MMOL/L (ref 136–145)
T AXIS: 24 DEGREES
VENTRICULAR RATE: 75 BPM
WBC # BLD AUTO: 5.5 X10(3) UL (ref 4–11)

## 2025-07-02 PROCEDURE — 3078F DIAST BP <80 MM HG: CPT | Performed by: INTERNAL MEDICINE

## 2025-07-02 PROCEDURE — 93000 ELECTROCARDIOGRAM COMPLETE: CPT | Performed by: INTERNAL MEDICINE

## 2025-07-02 PROCEDURE — 3074F SYST BP LT 130 MM HG: CPT | Performed by: INTERNAL MEDICINE

## 2025-07-02 PROCEDURE — 99214 OFFICE O/P EST MOD 30 MIN: CPT | Performed by: INTERNAL MEDICINE

## 2025-07-02 PROCEDURE — 3008F BODY MASS INDEX DOCD: CPT | Performed by: INTERNAL MEDICINE

## 2025-07-02 PROCEDURE — 80053 COMPREHEN METABOLIC PANEL: CPT

## 2025-07-02 PROCEDURE — 83036 HEMOGLOBIN GLYCOSYLATED A1C: CPT

## 2025-07-02 PROCEDURE — 85025 COMPLETE CBC W/AUTO DIFF WBC: CPT

## 2025-07-02 PROCEDURE — 36415 COLL VENOUS BLD VENIPUNCTURE: CPT

## 2025-07-02 RX ORDER — GLIPIZIDE 2.5 MG/1
2.5 TABLET, EXTENDED RELEASE ORAL
Qty: 30 TABLET | Refills: 2 | Status: SHIPPED | OUTPATIENT
Start: 2025-07-02

## 2025-07-02 NOTE — PROGRESS NOTES
Subjective:   Patient ID: Christited Tavarez is a 64 year old female.    HPI  Patient comes in today for preop clearance for OB/GYN procedure.  Overall doing okay denies any chest pain shortness of breath patient had recent echo which was okay she is on Bumex taking once a day usually then as needed twice a day for edema due to liver cirrhosis doing well recently that he was down  History/Other:   Review of Systems   Constitutional: Negative.    HENT: Negative.     Eyes: Negative.    Respiratory: Negative.     Cardiovascular: Negative.    Gastrointestinal: Negative.    Genitourinary: Negative.    Musculoskeletal: Negative.    Skin: Negative.    Neurological: Negative.    Psychiatric/Behavioral: Negative.       Current Medications[1]  Allergies:Allergies[2]    Objective:   Physical Exam  Vitals and nursing note reviewed.   Constitutional:       Appearance: She is well-developed.   HENT:      Head: Normocephalic and atraumatic.      Right Ear: External ear normal.      Left Ear: External ear normal.      Nose: Nose normal.   Eyes:      Conjunctiva/sclera: Conjunctivae normal.      Pupils: Pupils are equal, round, and reactive to light.   Cardiovascular:      Rate and Rhythm: Normal rate and regular rhythm.      Heart sounds: Normal heart sounds.   Pulmonary:      Effort: Pulmonary effort is normal.      Breath sounds: Normal breath sounds.   Abdominal:      General: Bowel sounds are normal.      Palpations: Abdomen is soft.   Genitourinary:     Vagina: Normal.   Musculoskeletal:         General: Normal range of motion.      Cervical back: Normal range of motion and neck supple.   Skin:     General: Skin is warm and dry.   Neurological:      Mental Status: She is alert and oriented to person, place, and time.      Deep Tendon Reflexes: Reflexes are normal and symmetric.   Psychiatric:         Behavior: Behavior normal.         Thought Content: Thought content normal.         Judgment: Judgment normal.         Assessment &  Plan:   1. Pre-op evaluation exam is okay denies any chest pain shortness of breath EKG sinus rhythm normal rate no ST-T wave changes will get labs done if okay we will clear for surgery       Orders Placed This Encounter   Procedures    CBC With Differential With Platelet    Comp Metabolic Panel (14)       Meds This Visit:  Requested Prescriptions      No prescriptions requested or ordered in this encounter       Imaging & Referrals:  ELECTROCARDIOGRAM, COMPLETE         [1]   Current Outpatient Medications   Medication Sig Dispense Refill    folic acid 1 MG Oral Tab Take 1 tablet (1 mg total) by mouth daily. 90 tablet 0    escitalopram 10 MG Oral Tab Take 1 tablet (10 mg total) by mouth daily.      Copper Gluconate 2 MG Oral Tab Take 8 mg by mouth daily for 7 days, THEN 6 mg daily for 7 days, THEN 4 mg daily for 7 days, THEN 2 mg daily. 130 tablet 0    KLOR-CON 20 MEQ Oral Powd Pack TAKE 40 MEQ BY MOUTH DAILY 180 each 3    rifAXIMin 550 MG Oral Tab Take 1 tablet (550 mg total) by mouth 2 (two) times daily. 180 tablet 3    triamcinolone 0.1 % External Cream Apply topically 2 (two) times daily as needed. 15 g 1    lactulose 10 GM/15ML Oral Solution Take 30 mL (20 g total) by mouth 2 (two) times daily as needed. 1800 mL 2    levothyroxine 50 MCG Oral Tab Take 1 tablet (50 mcg total) by mouth before breakfast.      bumetanide 2 MG Oral Tab Take 1 tablet (2 mg total) by mouth 2 (two) times daily. 180 tablet 3    spironolactone 50 MG Oral Tab Take 1 tablet (50 mg total) by mouth 2 (two) times daily. 180 tablet 1    omeprazole 20 MG Oral Capsule Delayed Release Take 1 capsule (20 mg total) by mouth every morning before breakfast. 90 capsule 3   [2] No Known Allergies

## 2025-07-11 RX ORDER — ALPRAZOLAM 0.5 MG
TABLET ORAL
COMMUNITY
Start: 2025-06-22

## 2025-07-11 NOTE — DISCHARGE INSTRUCTIONS
What happens after hysteroscopy?  You may have cramps and bleeding for short time after the procedure. This is normal. Use pads instead of tampons.  Don't douche or use tampons until your healthcare provider says it’s OK.  Don't use any vaginal medicines until you are told it’s OK.  Ask your healthcare provider when it’s OK to have sex again.    When to call your healthcare provider  Call your healthcare provider if any of the following occur:  Heavy bleeding (more than 1 pad an hour for 2 or more hours)  A fever of 100.4°F ( 38.0°C) or higher, or as directed by your provider  Increasing belly (abdominal) pain or soreness  Bad-smelling discharge  Follow-up care  Schedule a follow-up visit with your healthcare provider. Based on your test results, you may need more treatment. Be sure to follow directions and keep your appointments. HOME INSTRUCTIONS    [facility] HOME CARE INSTRUCTIONS: POST-OP ANESTHESIA  The medication that you received for sedation or general anesthesia can last up to 24 hours. Your judgment and reflexes may be altered, even if you feel like your normal self.      We Recommend:   Do not drive any motor vehicle or bicycle   Avoid mowing the lawn, playing sports, or working with power tools/applicances (power saws, electric knives or mixers)   That you have someone stay with you on your first night home   Do not drink alcohol or take sleeping pills or tranquilizers   Do not sign legal documents within 24 hours of your procedure   If you had a nerve block for your surgery, take extra care not to put any pressure on your arm or hand for 24 hours    It is normal:  For you to have a sore throat if you had a breathing tube during surgery (while you were asleep!). The sore throat should get better within 48 hours. You can gargle with warm salt water (1/2 tsp in 4 oz warm water) or use a throat lozenge for comfort  To feel muscle aches or soreness especially in the abdomen, chest or neck. The achy feeling  should go away in the next 24 hours  To feel weak, sleepy or \"wiped out\". Your should start feeling better in the next 24 hours.   To experience mild discomforts such as sore lip or tongue, headache, cramps, gas pains or a bloated feeling in your abdomen.   To experience mild back pain or soreness for a day or two if you had spinal or epidural anesthesia.   If you had laparoscopic surgery, to feel shoulder pain or discomfort on the day of surgery.   For some patients to have nausea after surgery/anesthesia    If you feel nausea or experience vomiting:   Try to move around less.   Eat less than usual or drink only liquids until the next morning   Nausea should resolve in about 24 hours    If you have a problem when you are at home:    Call your surgeons office   Discharge Instructions: After Your Surgery  You’ve just had surgery. During surgery, you were given medicine called anesthesia to keep you relaxed and free of pain. After surgery, you may have some pain or nausea. This is common. Here are some tips for feeling better and getting well after surgery.   Going home  Your healthcare provider will show you how to take care of yourself when you go home. They'll also answer your questions. Have an adult family member or friend drive you home. For the first 24 hours after your surgery:   Don't drive or use heavy equipment.  Don't make important decisions or sign legal papers.  Take medicines as directed.  Don't drink alcohol.  Have someone stay with you, if needed. They can watch for problems and help keep you safe.  Be sure to go to all follow-up visits with your healthcare provider. And rest after your surgery for as long as your provider tells you to.   Coping with pain  If you have pain after surgery, pain medicine will help you feel better. Take it as directed, before pain becomes severe. Also, ask your healthcare provider or pharmacist about other ways to control pain. This might be with heat, ice, or  relaxation. And follow any other instructions your surgeon or nurse gives you.      Stay on schedule with your medicine.     Tips for taking pain medicine  To get the best relief possible, remember these points:   Pain medicines can upset your stomach. Taking them with a little food may help.  Most pain relievers taken by mouth need at least 20 to 30 minutes to start to work.  Don't wait till your pain becomes severe before you take your medicine. Try to time your medicine so that you can take it before starting an activity. This might be before you get dressed, go for a walk, or sit down for dinner.  Constipation is a common side effect of some pain medicines. Call your healthcare provider before taking any medicines, such as laxatives or stool softeners, to help ease constipation. Also ask if you should skip any foods. Drinking lots of fluids and eating foods, such as fruits and vegetables, that are high in fiber can also help. Remember, don't take laxatives unless your surgeon has prescribed them.  Drinking alcohol and taking pain medicine can cause dizziness and slow your breathing. It can even be deadly. Don't drink alcohol while taking pain medicine.  Pain medicine can make you react more slowly to things. Don't drive or run machinery while taking pain medicine.  Your healthcare provider may tell you to take acetaminophen to help ease your pain. Ask them how much you're supposed to take each day. Acetaminophen or other pain relievers may interact with your prescription medicines or other over-the-counter (OTC) medicines. Some prescription medicines have acetaminophen and other ingredients in them. Using both prescription and OTC acetaminophen for pain can cause you to accidentally overdose. Read the labels on your OTC medicines with care. This will help you to clearly know the list of ingredients, how much to take, and any warnings. It may also help you not take too much acetaminophen. If you have questions or  don't understand the information, ask your pharmacist or healthcare provider to explain it to you before you take the OTC medicine.   Managing nausea  Some people have an upset stomach (nausea) after surgery. This is often because of anesthesia, pain, or pain medicine, less movement of food in the stomach, or the stress of surgery. These tips will help you handle nausea and eat healthy foods as you get better. If you were on a special food plan before surgery, ask your healthcare provider if you should follow it while you get better. Check with your provider on how your eating should progress. It may depend on the surgery you had. These general tips may help:   Don't push yourself to eat. Your body will tell you when to eat and how much.  Start off with clear liquids and soup. They're easier to digest.  Next try semi-solid foods as you feel ready. These include mashed potatoes, applesauce, and gelatin.  Slowly move to solid foods. Don’t eat fatty, rich, or spicy foods at first.  Don't force yourself to have 3 large meals a day. Instead eat smaller amounts more often.  Take pain medicines with a small amount of solid food, such as crackers or toast. This helps prevent nausea.  When to call your healthcare provider  Call your healthcare provider right away if you have any of these:   You still have too much pain, or the pain gets worse, after taking the medicine. The medicine may not be strong enough. Or there may be a complication from the surgery.  You feel too sleepy, dizzy, or groggy. The medicine may be too strong.  Side effects, such as nausea or vomiting. Your healthcare provider may advise taking other medicines to treat these or may change your treatment plan..  Skin changes, such as rash, itching, or hives. This may mean you have an allergic reaction. Your provider may advise taking other medicines.  The incision looks different (for instance, part of it opens up).  Bleeding or fluid leaking from the  incision site, and you weren't told to expect that.  Fever of 100.4°F (38°C) or higher, or as directed by your healthcare provider.  Call 911  Call 911 right away if you have:   Trouble breathing  Facial swelling    If you have obstructive sleep apnea   You were given anesthesia medicine during surgery to keep you comfortable and free of pain. After surgery, you may have more apnea spells because of this medicine and other medicines you were given. The spells may last longer than normal.    At home:  Keep using the continuous positive airway pressure (CPAP) device when you sleep. Unless your healthcare provider tells you not to, use it when you sleep, day or night. CPAP is a common device used to treat obstructive sleep apnea.  Talk with your provider before taking any pain medicine, muscle relaxants, or sedatives. Your provider will tell you about the possible dangers of taking these medicines.  Contact your provider if your sleeping changes a lot even when taking medicines as directed.  [employer] last reviewed this educational content on 4/1/2024  This information is for informational purposes only. This is not intended to be a substitute for professional medical advice, diagnosis, or treatment. Always seek the advice and follow the directions from your physician or other qualified health care provider.  © 7582-1424 [employer]. All rights reserved. This information is not intended as a substitute for professional medical care. Always follow your healthcare professional's instructions.

## 2025-07-16 ENCOUNTER — HOSPITAL ENCOUNTER (OUTPATIENT)
Facility: HOSPITAL | Age: 65
Setting detail: HOSPITAL OUTPATIENT SURGERY
Discharge: HOME | End: 2025-07-16
Attending: OBSTETRICS & GYNECOLOGY | Admitting: OBSTETRICS & GYNECOLOGY
Payer: COMMERCIAL

## 2025-07-16 ENCOUNTER — ANESTHESIA (OUTPATIENT)
Dept: SURGERY | Facility: HOSPITAL | Age: 65
End: 2025-07-16
Payer: COMMERCIAL

## 2025-07-16 ENCOUNTER — ANESTHESIA EVENT (OUTPATIENT)
Dept: SURGERY | Facility: HOSPITAL | Age: 65
End: 2025-07-16
Payer: COMMERCIAL

## 2025-07-16 ENCOUNTER — HOSPITAL ENCOUNTER (EMERGENCY)
Facility: HOSPITAL | Age: 65
Discharge: HOME | End: 2025-07-16
Attending: EMERGENCY MEDICINE
Payer: COMMERCIAL

## 2025-07-16 VITALS
RESPIRATION RATE: 18 BRPM | WEIGHT: 127 LBS | DIASTOLIC BLOOD PRESSURE: 76 MMHG | TEMPERATURE: 99 F | OXYGEN SATURATION: 100 % | BODY MASS INDEX: 27 KG/M2 | HEART RATE: 76 BPM | SYSTOLIC BLOOD PRESSURE: 115 MMHG

## 2025-07-16 VITALS
WEIGHT: 127 LBS | DIASTOLIC BLOOD PRESSURE: 63 MMHG | OXYGEN SATURATION: 97 % | RESPIRATION RATE: 11 BRPM | HEART RATE: 79 BPM | BODY MASS INDEX: 26.66 KG/M2 | TEMPERATURE: 98 F | HEIGHT: 58 IN | SYSTOLIC BLOOD PRESSURE: 124 MMHG

## 2025-07-16 DIAGNOSIS — E11.65 TYPE 2 DIABETES MELLITUS WITH HYPERGLYCEMIA, WITHOUT LONG-TERM CURRENT USE OF INSULIN (HCC): Primary | ICD-10-CM

## 2025-07-16 DIAGNOSIS — N84.0 ENDOMETRIAL POLYP: ICD-10-CM

## 2025-07-16 DIAGNOSIS — N95.0 PMB (POSTMENOPAUSAL BLEEDING): ICD-10-CM

## 2025-07-16 LAB
GLUCOSE BLDC GLUCOMTR-MCNC: 138 MG/DL (ref 70–99)
GLUCOSE BLDC GLUCOMTR-MCNC: 157 MG/DL (ref 70–99)
GLUCOSE BLDC GLUCOMTR-MCNC: 256 MG/DL (ref 70–99)
GLUCOSE BLDC GLUCOMTR-MCNC: 328 MG/DL (ref 70–99)

## 2025-07-16 PROCEDURE — 99284 EMERGENCY DEPT VISIT MOD MDM: CPT

## 2025-07-16 PROCEDURE — 82962 GLUCOSE BLOOD TEST: CPT

## 2025-07-16 PROCEDURE — 99283 EMERGENCY DEPT VISIT LOW MDM: CPT

## 2025-07-16 PROCEDURE — 88305 TISSUE EXAM BY PATHOLOGIST: CPT | Performed by: OBSTETRICS & GYNECOLOGY

## 2025-07-16 RX ORDER — SODIUM CHLORIDE, SODIUM LACTATE, POTASSIUM CHLORIDE, CALCIUM CHLORIDE 600; 310; 30; 20 MG/100ML; MG/100ML; MG/100ML; MG/100ML
INJECTION, SOLUTION INTRAVENOUS CONTINUOUS
Status: DISCONTINUED | OUTPATIENT
Start: 2025-07-16 | End: 2025-07-16

## 2025-07-16 RX ORDER — PROCHLORPERAZINE EDISYLATE 5 MG/ML
5 INJECTION INTRAMUSCULAR; INTRAVENOUS EVERY 8 HOURS PRN
Status: DISCONTINUED | OUTPATIENT
Start: 2025-07-16 | End: 2025-07-16

## 2025-07-16 RX ORDER — NICOTINE POLACRILEX 4 MG
30 LOZENGE BUCCAL
Status: DISCONTINUED | OUTPATIENT
Start: 2025-07-16 | End: 2025-07-16

## 2025-07-16 RX ORDER — NICOTINE POLACRILEX 4 MG
30 LOZENGE BUCCAL
Status: DISCONTINUED | OUTPATIENT
Start: 2025-07-16 | End: 2025-07-17

## 2025-07-16 RX ORDER — NICOTINE POLACRILEX 4 MG
15 LOZENGE BUCCAL
Status: DISCONTINUED | OUTPATIENT
Start: 2025-07-16 | End: 2025-07-17

## 2025-07-16 RX ORDER — MIDAZOLAM HYDROCHLORIDE 1 MG/ML
INJECTION INTRAMUSCULAR; INTRAVENOUS AS NEEDED
Status: DISCONTINUED | OUTPATIENT
Start: 2025-07-16 | End: 2025-07-16 | Stop reason: SURG

## 2025-07-16 RX ORDER — DEXTROSE MONOHYDRATE 25 G/50ML
50 INJECTION, SOLUTION INTRAVENOUS
Status: DISCONTINUED | OUTPATIENT
Start: 2025-07-16 | End: 2025-07-17

## 2025-07-16 RX ORDER — MORPHINE SULFATE 10 MG/ML
6 INJECTION, SOLUTION INTRAMUSCULAR; INTRAVENOUS EVERY 10 MIN PRN
Status: DISCONTINUED | OUTPATIENT
Start: 2025-07-16 | End: 2025-07-16

## 2025-07-16 RX ORDER — ONDANSETRON 2 MG/ML
INJECTION INTRAMUSCULAR; INTRAVENOUS AS NEEDED
Status: DISCONTINUED | OUTPATIENT
Start: 2025-07-16 | End: 2025-07-16 | Stop reason: SURG

## 2025-07-16 RX ORDER — HYDROMORPHONE HYDROCHLORIDE 1 MG/ML
0.6 INJECTION, SOLUTION INTRAMUSCULAR; INTRAVENOUS; SUBCUTANEOUS EVERY 5 MIN PRN
Status: DISCONTINUED | OUTPATIENT
Start: 2025-07-16 | End: 2025-07-16

## 2025-07-16 RX ORDER — KETOROLAC TROMETHAMINE 30 MG/ML
INJECTION, SOLUTION INTRAMUSCULAR; INTRAVENOUS AS NEEDED
Status: DISCONTINUED | OUTPATIENT
Start: 2025-07-16 | End: 2025-07-16 | Stop reason: SURG

## 2025-07-16 RX ORDER — ONDANSETRON 2 MG/ML
4 INJECTION INTRAMUSCULAR; INTRAVENOUS EVERY 6 HOURS PRN
Status: DISCONTINUED | OUTPATIENT
Start: 2025-07-16 | End: 2025-07-16

## 2025-07-16 RX ORDER — LIDOCAINE HYDROCHLORIDE 10 MG/ML
INJECTION, SOLUTION EPIDURAL; INFILTRATION; INTRACAUDAL; PERINEURAL AS NEEDED
Status: DISCONTINUED | OUTPATIENT
Start: 2025-07-16 | End: 2025-07-16 | Stop reason: SURG

## 2025-07-16 RX ORDER — MORPHINE SULFATE 4 MG/ML
2 INJECTION, SOLUTION INTRAMUSCULAR; INTRAVENOUS EVERY 10 MIN PRN
Status: DISCONTINUED | OUTPATIENT
Start: 2025-07-16 | End: 2025-07-16

## 2025-07-16 RX ORDER — INSULIN ASPART 100 [IU]/ML
0.1 INJECTION, SOLUTION INTRAVENOUS; SUBCUTANEOUS ONCE
Status: COMPLETED | OUTPATIENT
Start: 2025-07-16 | End: 2025-07-16

## 2025-07-16 RX ORDER — NALOXONE HYDROCHLORIDE 0.4 MG/ML
0.08 INJECTION, SOLUTION INTRAMUSCULAR; INTRAVENOUS; SUBCUTANEOUS AS NEEDED
Status: DISCONTINUED | OUTPATIENT
Start: 2025-07-16 | End: 2025-07-16

## 2025-07-16 RX ORDER — HYDROMORPHONE HYDROCHLORIDE 1 MG/ML
0.2 INJECTION, SOLUTION INTRAMUSCULAR; INTRAVENOUS; SUBCUTANEOUS EVERY 5 MIN PRN
Status: DISCONTINUED | OUTPATIENT
Start: 2025-07-16 | End: 2025-07-16

## 2025-07-16 RX ORDER — HYDROMORPHONE HYDROCHLORIDE 1 MG/ML
0.4 INJECTION, SOLUTION INTRAMUSCULAR; INTRAVENOUS; SUBCUTANEOUS EVERY 5 MIN PRN
Status: DISCONTINUED | OUTPATIENT
Start: 2025-07-16 | End: 2025-07-16

## 2025-07-16 RX ORDER — GLYCOPYRROLATE 0.2 MG/ML
INJECTION, SOLUTION INTRAMUSCULAR; INTRAVENOUS AS NEEDED
Status: DISCONTINUED | OUTPATIENT
Start: 2025-07-16 | End: 2025-07-16 | Stop reason: SURG

## 2025-07-16 RX ORDER — NICOTINE POLACRILEX 4 MG
15 LOZENGE BUCCAL
Status: DISCONTINUED | OUTPATIENT
Start: 2025-07-16 | End: 2025-07-16

## 2025-07-16 RX ORDER — DEXTROSE MONOHYDRATE 25 G/50ML
50 INJECTION, SOLUTION INTRAVENOUS
Status: DISCONTINUED | OUTPATIENT
Start: 2025-07-16 | End: 2025-07-16

## 2025-07-16 RX ORDER — MORPHINE SULFATE 4 MG/ML
4 INJECTION, SOLUTION INTRAMUSCULAR; INTRAVENOUS EVERY 10 MIN PRN
Status: DISCONTINUED | OUTPATIENT
Start: 2025-07-16 | End: 2025-07-16

## 2025-07-16 RX ORDER — METOCLOPRAMIDE HYDROCHLORIDE 5 MG/ML
INJECTION INTRAMUSCULAR; INTRAVENOUS AS NEEDED
Status: DISCONTINUED | OUTPATIENT
Start: 2025-07-16 | End: 2025-07-16 | Stop reason: SURG

## 2025-07-16 RX ADMIN — LIDOCAINE HYDROCHLORIDE 30 MG: 10 INJECTION, SOLUTION EPIDURAL; INFILTRATION; INTRACAUDAL; PERINEURAL at 11:40:00

## 2025-07-16 RX ADMIN — GLYCOPYRROLATE 0.1 MG: 0.2 INJECTION, SOLUTION INTRAMUSCULAR; INTRAVENOUS at 11:36:00

## 2025-07-16 RX ADMIN — KETOROLAC TROMETHAMINE 15 MG: 30 INJECTION, SOLUTION INTRAMUSCULAR; INTRAVENOUS at 11:59:00

## 2025-07-16 RX ADMIN — SODIUM CHLORIDE, SODIUM LACTATE, POTASSIUM CHLORIDE, CALCIUM CHLORIDE: 600; 310; 30; 20 INJECTION, SOLUTION INTRAVENOUS at 12:02:00

## 2025-07-16 RX ADMIN — SODIUM CHLORIDE, SODIUM LACTATE, POTASSIUM CHLORIDE, CALCIUM CHLORIDE: 600; 310; 30; 20 INJECTION, SOLUTION INTRAVENOUS at 11:36:00

## 2025-07-16 RX ADMIN — METOCLOPRAMIDE HYDROCHLORIDE 10 MG: 5 INJECTION INTRAMUSCULAR; INTRAVENOUS at 11:40:00

## 2025-07-16 RX ADMIN — MIDAZOLAM HYDROCHLORIDE 1 MG: 1 INJECTION INTRAMUSCULAR; INTRAVENOUS at 11:36:00

## 2025-07-16 RX ADMIN — ONDANSETRON 4 MG: 2 INJECTION INTRAMUSCULAR; INTRAVENOUS at 11:59:00

## 2025-07-16 NOTE — OPERATIVE REPORT
[facility]  part of [facility]    Gyn Detailed Operative Note    Christi Tavarez Patient Status:  Hospital Outpatient Surgery    1960 MRN [id number]   Location [facility] POST ANESTHESIA CARE UNIT Attending Laney Gutierrez MD   Hosp Day # 0 PCP Antony Zavala MD     Preoperative Diagnosis: PMB (postmenopausal bleeding) [N95.0]  Endometrial polyp [N84.0]    Postoperative Diagnosis:  PMB (postmenopausal bleeding) [N95.0]  Endometrial fibroid    Procedures:    Hysteroscopy with Truclear and dilation and curettage    Primary Surgeon:   Laney Gutierrez MD    Assistant:    none    Anesthesia:    General    Estimated Blood Loss:  5 CC    Antibiotics:     none    Complications:   none    Specimens:      Specimens (From admission, onward)      None            Surgical Findings:  Atrophic endometrial lining with a 1 cm posterior submucosal fibroid    Condition: stable    Surgical risks:   The patient was informed of the risks and benefits of the procedure. Risks included but were not limited to bleeding, infection, injury to the vulva, vagina, or cervix, and uterine perforation. The patient expressed understanding of the risks involved, all questions were answered, and the patient consented to the procedure.    Description of the Procedure  The patient was taken to the operating room where a timeout was performed to confirm correct patient and correct procedure. The patient was given general anesthesia. The patient was then positioned on the operating table in the dorsal lithotomy position with the legs supported using stirrups. All pressure points were padded and a Beena hugger was placed to maintain control of core body temperature. The patient was then prepped and draped in the usual sterile fashion. An exam under anesthesia revealed a normal sized uterus, no adnexal masses.    A Graves speculum was inserted into the vagina. The anterior lip of the cervix was visualized and grasped using a  single tooth tenaculum. The cervix was carefully sounded to 8 cm. The cervix was then dilated using Hegar dilators to a size 8. The hysteroscope was introduced under direct visualization using normal saline solution as a distending media. The hysteroscope was advanced the fundus and entire uterine cavity was inspected. An atropic endometrial lining with a small posterior submucosal fibroid was noted and normal bilateral tubal ostia were visualized. The TruClear soft mini device was then inserted into the hysteroscope to convert this procedure into an operative hysteroscopy. The Myosure tip was then advanced with the cutting edge window present along the endometrial cavity and then proceeded to resect the posterior fibroid and collect endometrial curettings. After completion, the TruClear device was removed. Upon inspection, a majority of the fibroid was removed. The hysteroscope was then withdrawn under direct visualization. The single-tooth tenaculum was removed from the anterior lip of the cervix and good hemostasis was also noted at the tenaculum puncture sites. The weighted speculum was removed.     At the end of the procedure, all needle, sponge, and instrument counts were noted to be correct ×2. The patient tolerated the procedure well and was transferred to the recovery room in stable condition.        Laney Gutierrez MD  7/16/2025

## 2025-07-16 NOTE — H&P
[facility]  part of [facility]    Gyne History & Physical    Christi Tavarez Patient Status:  Hospital Outpatient Surgery    1960 MRN [id number]   Location [facility] PRE OP RECOVERY Attending Laney Gutierrez MD   Hosp Day # 0 PCP Antony Zavala MD       History of Present Illness:   Patient is a(n) 64 year old  female No LMP recorded. Patient is postmenopausal. who presents for a scheduled hysteroscopy and D&C. previous US results show a significantly thickened lining at 23 mm. She had a benign endometrial biopsy, but with findings suggestive of endometrial polyp. We discussed that given both findings, recommendation would be for D&C. Reviewed risks/benefits of that D&C and she wishes to proceed. Consent signed.     Past Medical History  Past Medical History:    Acute, but ill-defined, cerebrovascular disease    ALCOHOL USE    Amenorrhea    Anxiety    Cirrhosis of liver (HCC)    Colon polyp    Congestive heart disease (HCC)    Depression    Diabetes (HCC)    Disorder of liver    Esophageal reflux    Hypothyroidism    Stroke (HCC)       Past Surgical History  Past Surgical History:   Procedure Laterality Date    Colonoscopy N/A 2024    Procedure: COLONOSCOPY;  Surgeon: Yobany Guardado MD;  Location: [facility] ENDOSCOPY    D & c      Insert intrauterine device      Needle biopsy right Right           Remove intrauterine device         Family History  Family History   Problem Relation Age of Onset    Prostate Cancer Father         No surgery or chemotherapy    Diabetes Daughter     Breast Cancer Neg     Ovarian Cancer Neg     Pancreatic Cancer Neg        Social History  Social History     Socioeconomic History    Marital status:    Tobacco Use    Smoking status: Former     Current packs/day: 0.00     Types: Cigarettes     Quit date: 2024     Years since quittin.2     Passive exposure: Never    Smokeless tobacco: Never   Vaping Use    Vaping status:  Never Used   Substance and Sexual Activity    Alcohol use: Not Currently     Comment: not since 2024    Drug use: Not Currently     Types: Cannabis, Cocaine     Comment: in her 20's    Sexual activity: Not Currently     Social Drivers of Health     Food Insecurity: No Food Insecurity (3/12/2025)    NCSS - Food Insecurity     Worried About Running Out of Food in the Last Year: No     Ran Out of Food in the Last Year: No   Transportation Needs: No Transportation Needs (3/12/2025)    NCSS - Transportation     Lack of Transportation: No   Housing Stability: Not At Risk (3/12/2025)    NCSS - Housing/Utilities     Has Housing: Yes     Worried About Losing Housing: No     Unable to Get Utilities: No           Past OB History:  OB History    Para Term  AB Living   3 2 2  1 2   SAB IAB Ectopic Multiple Live Births    1   2      # Outcome Date GA Lbr Lazaro/2nd Weight Sex Type Anes PTL Lv   3 IAB     U       2 Term     M Vag-Spont   KAYLA   1 Term     F Vag-Spont   KAYLA       Medications / Allergies:   Outpatient meds:   No current outpatient medications on file.    Current Inpatient Medications:  Current Facility-Administered Medications   Medication Dose Route Frequency    lactated ringers infusion   Intravenous Continuous       Allergies  No Known Allergies    Review of Systems:      10 point ROS completed and was negative, except for pertinent positive and negatives stated in subjective.    Physical Exam:   Vital Signs:  Patient Vitals for the past 24 hrs:   BP Temp Temp src Pulse Resp SpO2 Height Weight   25 1040 129/56 98.3 °F (36.8 °C) Oral 77 16 99 % 4' 10\" (1.473 m) 127 lb (57.6 kg)       Temp (48hrs), Av.3 °F (36.8 °C), Min:98.3 °F (36.8 °C), Max:98.3 °F (36.8 °C)        General: No acute distress. Alert and oriented x 3.  HEENT: Moist mucous membranes.   Respiratory: Nonlabored breathing  Cardiovascular: Regular rate    Abdomen: Soft, nontender, nondistended.    Musculoskeletal: No swelling  noted.  Integument: No lesions. No unusual erythema.  Psychiatric: Appropriate mood and affect.    Results:     Laboratory Data:  Lab Results   Component Value Date    WBC 5.5 07/02/2025    HGB 12.3 07/02/2025    HCT 37.4 07/02/2025    .0 (L) 07/02/2025    CREATSERUM 0.88 07/02/2025    BUN <5 (L) 07/02/2025     07/02/2025    K 4.6 07/02/2025     07/02/2025    CO2 27.0 07/02/2025     (H) 07/02/2025    CA 9.1 07/02/2025    ALB 3.9 07/02/2025    ALKPHO 267 (H) 07/02/2025    BILT 1.1 07/02/2025    TP 7.4 07/02/2025    AST 20 07/02/2025    ALT 8 (L) 07/02/2025    PTT 34.8 06/03/2024    INR 1.36 (H) 02/04/2025    T4F 1.0 04/03/2024    TSH 3.911 12/12/2024    LIP 49 06/03/2024    MG 2.0 04/04/2024     04/03/2024    B12 1,866 (H) 04/09/2025    ETOH <3 04/03/2024       No results for input(s): \"RBC\", \"HGB\", \"HCT\", \"MCV\", \"MCH\", \"MCHC\", \"RDW\", \"NEPRELIM\", \"WBC\", \"PLT\" in the last 168 hours.  No results for input(s): \"GLU\", \"BUN\", \"CREATSERUM\", \"GFRAA\", \"GFRNAA\", \"CA\", \"ALB\", \"NA\", \"K\", \"CL\", \"CO2\", \"ALKPHO\", \"AST\", \"ALT\", \"BILT\", \"TP\" in the last 168 hours.  Lab Results   Component Value Date    INR 1.36 (H) 02/04/2025    INR 1.38 (H) 01/03/2025    INR 1.49 (H) 11/06/2024       Culture:  No results found for this visit on 07/16/25.      Imaging:  US PELVIS W EV (CPT=76856/36630)  Result Date: 4/7/2025  PROCEDURE: US PELVIS W EV (CPT=76856/89358)  COMPARISON: [facility], PET STANDARD BODY SCAN (CPT=78815), 12/20/2024, 11:31 AM.  [facility], US ABDOMEN COMPLETE WITH DOPPLER (CPT=76700/33008), 2/05/2025, 8:57 AM.  [facility], US PARACENTESIS W  IMAGING (CPT=49083), 6/05/2024, 9:04 AM.  [facility], US ABDOMEN COMPLETE (CPT=76700), 5/29/2024, 7:39 AM.  INDICATIONS: Postmenopausal bleeding  TECHNIQUE: Pelvic ultrasound using transabdominal and transvaginal technique.  A transvaginal scan was performed for assessment of  vascularity.  FINDINGS:    UTERUS:  ENDOMETRIUM: Diffusely abnormally thickened endometrial stripe measuring 23 mm.  No discrete endometrial mass is identified. MYOMETRIUM: There is a rounded hyperdense myometrial mass of the uterine fundus measuring 19 x 15 x 24 mm   OVARIES AND ADNEXA:   RIGHT:  Normal appearance with no masses.  LEFT:  Left ovary is not seen, likely due to obscuration from bowel gas and age related atrophy.  CUL-DE-SAC:  Normal.  No free fluid or mass.  OTHER:  Negative.  Bladder appears normal.   Patient Characteristics:       : 3      Para: 2 Summary:       Last Menstrual Period: Postmenopausal      Pelvis and Uterus:           Uterus Length: 7.40 cm          Uterus Height: 4.20 cm          Uterus Width: 4.46 cm          Endometrium Thickness: 2.29 cm      Findings:           Ovary:              Right Ovary Length: 2.45 cm             Right Ovary Height: 1.10 cm             Right Ovary Width: 1.28 cm           CONCLUSION:    Diffusely abnormal thickened endometrial stripe of 2.3 cm.  Given postmenopausal status and postmenopausal bleeding, further evaluation with tissue sampling is required.  Differential includes endometrial neoplasia, hyperplasia, or other underlying lesion.  2.4 cm intramural fundal uterine fibroid.      Dictated by (CST): Dana Casey MD on 2025 at 9:18 AM     Finalized by (CST): Dana Casey MD on 2025 at 9:21 AM              Impression:   Postmenopausal bleeding  Endometrial polyp      Recommendations:  Hysteroscopy with TruClear D&C      Laney Gutierrez MD  2025

## 2025-07-16 NOTE — ANESTHESIA PROCEDURE NOTES
Airway  Date/Time: 7/16/2025 11:43 AM  Reason: Elective      General Information and Staff   Patient location during procedure: OR  Anesthesiologist: Yandel Caballero MD  Resident/CRNA: Jessica Meyer CRNA  Performed: CRNA   Performed by: Jessica Meyer CRNA  Authorized by: Yandel Caballero MD        Indications and Patient Condition  Indications for airway management: anesthesia  Sedation level: deep      Preoxygenated: yesPatient position: sniffing    Mask difficulty assessment: 1 - vent by mask    Final Airway Details    Final airway type: supraglottic airway      Successful airway: classic  Size: 4     Number of attempts at approach: 1    Additional Comments  Dental exam unchanged from pre op

## 2025-07-16 NOTE — ANESTHESIA POSTPROCEDURE EVALUATION
Patient: Christi Tavarez    Procedure Summary       Date: 07/16/25 Room / Location: [facility] MAIN OR 01 / EM MAIN OR    Anesthesia Start: 1136 Anesthesia Stop: 1217    Procedure: Hysteroscopy with Truclear and dilation and curettage (Vagina ) Diagnosis:       PMB (postmenopausal bleeding)      Endometrial polyp      (PMB (postmenopausal bleeding) [N95.0]Endometrial polyp [N84.0])    Surgeons: Laney Gutierrez MD Anesthesiologist: Yandel Caballero MD    Anesthesia Type: general ASA Status: 3            Anesthesia Type: general    Vitals Value Taken Time   /67 07/16/25 12:16   Temp 97.7 °F (36.5 °C) 07/16/25 12:16   Pulse 84 07/16/25 12:16   Resp 13 07/16/25 12:16   SpO2 100 % 07/16/25 12:16   Vitals shown include unfiled device data.    [facility] AN Post Evaluation:   Patient Evaluated in PACU  Patient Participation: complete - patient participated  Level of Consciousness: awake and awake and alert  Pain Score: 0  Pain Management: adequate  Airway Patency:patent  Yes    Nausea/Vomiting: none  Cardiovascular Status: acceptable, blood pressure returned to baseline and hemodynamically stable  Respiratory Status: acceptable and nasal cannula  Postoperative Hydration acceptable    FELISA VANN CRNA  7/16/2025 12:17 PM

## 2025-07-16 NOTE — ANESTHESIA PREPROCEDURE EVALUATION
Anesthesia PreOp Note    HPI:     Christi Tavarez is a 64 year old female who presents for preoperative consultation requested by: Laney Gutierrez MD    Date of Surgery: 7/16/2025    Procedure(s):  Hysteroscopy with Truclear and dilation and curettage  Indication: PMB (postmenopausal bleeding) [N95.0]  Endometrial polyp [N84.0]    Relevant Problems   No relevant active problems       NPO:  Last Liquid Consumption Date: 07/15/25  Last Liquid Consumption Time: 2000  Last Solid Consumption Date: 07/15/25  Last Solid Consumption Time: 2000  Last Liquid Consumption Date: 07/15/25          History Review:  Patient Active Problem List    Diagnosis Date Noted    Fluid overload 06/04/2024    Type 2 diabetes mellitus without complication, without long-term current use of insulin (HCC) 06/04/2024    Anemia 06/03/2024    Alcoholic cirrhosis of liver with ascites (HCC) 06/03/2024    Major depressive disorder, recurrent episode, moderate degree (HCC) 04/03/2024       Past Medical History[1]    Past Surgical History[2]    Prescriptions Prior to Admission[3]  Current Medications and Prescriptions Ordered in Epic[4]    Allergies[5]    Family History[6]  Social Hx on file[7]    Available pre-op labs reviewed.  Lab Results   Component Value Date    WBC 5.5 07/02/2025    RBC 3.71 (L) 07/02/2025    HGB 12.3 07/02/2025    HCT 37.4 07/02/2025    .8 (H) 07/02/2025    MCH 33.2 07/02/2025    MCHC 32.9 07/02/2025    RDW 14.3 07/02/2025    .0 (L) 07/02/2025     Lab Results   Component Value Date     07/02/2025    K 4.6 07/02/2025     07/02/2025    CO2 27.0 07/02/2025    BUN <5 (L) 07/02/2025    CREATSERUM 0.88 07/02/2025     (H) 07/02/2025    PGLU 157 (H) 07/16/2025    CA 9.1 07/02/2025          Vital Signs:  Body mass index is 26.54 kg/m².   height is 1.473 m (4' 10\") and weight is 57.6 kg (127 lb). Her oral temperature is 98.3 °F (36.8 °C). Her blood pressure is 129/56 and her pulse is 77. Her respiration is  16 and oxygen saturation is 99%.   Vitals:    07/11/25 0850 07/16/25 1040   BP:  129/56   Pulse:  77   Resp:  16   Temp:  98.3 °F (36.8 °C)   TempSrc:  Oral   SpO2:  99%   Weight: 59.4 kg (131 lb) 57.6 kg (127 lb)   Height: 1.473 m (4' 10\") 1.473 m (4' 10\")        Anesthesia Evaluation     Patient summary reviewed and Nursing notes reviewed    History of anesthetic complications   Airway   Mallampati: II  TM distance: <3 FB  Neck ROM: full  Dental    (+) upper dentures    Pulmonary - normal exam   Cardiovascular - normal exam  (+) CHF    Neuro/Psych    (+)  CVA (4/2024. residual L leg weakness but speech resolved) residual symptoms, anxiety/panic attacks,  depression      GI/Hepatic/Renal    (+) GERD, liver disease (alcoholic cirrhosis)    Endo/Other    (+) diabetes mellitus (glu 157 today) type 2 poorly controlled  Abdominal                  Anesthesia Plan:   ASA:  3  Plan:   General  Airway:  LMA  Post-op Pain Management: Oral pain medication and IV analgesics  Informed Consent Plan and Risks Discussed With:  Patient and child/children  Discussed plan with:  CRNA      I have informed Christi Tavarez and/or legal guardian or family member of the nature of the anesthetic plan, benefits, risks including possible dental damage if relevant, major complications, and any alternative forms of anesthetic management.   All of the patient's questions were answered to the best of my ability. The patient desires the anesthetic management as planned.  Yandel Caballero MD  7/16/2025 10:56 AM  Present on Admission:  **None**           [1]   Past Medical History:   Acute, but ill-defined, cerebrovascular disease    ALCOHOL USE    Amenorrhea    Anxiety    Cirrhosis of liver (HCC)    Colon polyp    Congestive heart disease (HCC)    Depression    Diabetes (HCC)    Disorder of liver    Esophageal reflux    Hypothyroidism    Stroke (HCC)   [2]   Past Surgical History:  Procedure Laterality Date    Colonoscopy N/A 11/06/2024    Procedure:  COLONOSCOPY;  Surgeon: Yobany Guardado MD;  Location: [facility] ENDOSCOPY    D & c      Insert intrauterine device      Needle biopsy right Right 2016          Remove intrauterine device     [3]   Medications Prior to Admission   Medication Sig Dispense Refill Last Dose/Taking    Blood Glucose Monitoring Suppl (ACCU-CHEK GUIDE) w/Device Does not apply Kit Test blood sugar once daily. 1 kit 0 Taking    Glucose Blood (ACCU-CHEK GUIDE TEST) In Vitro Strip Test blood sugar once daily. 100 each 3 Taking    Accu-Chek Softclix Lancets Does not apply Misc 1 each by Finger stick route daily. 100 each 3 Taking    metFORMIN 500 MG Oral Tab Take 1 tablet (500 mg total) by mouth 2 (two) times daily with meals. 60 tablet 2 7/15/2025 Evening    glipiZIDE ER 2.5 MG Oral Tablet 24 Hr Take 1 tablet (2.5 mg total) by mouth daily with breakfast. 30 tablet 2 7/15/2025 Morning    folic acid 1 MG Oral Tab Take 1 tablet (1 mg total) by mouth daily. 90 tablet 0 7/15/2025 Morning    escitalopram 10 MG Oral Tab Take 1 tablet (10 mg total) by mouth daily.   2025 Morning    Copper Gluconate 2 MG Oral Tab Take 8 mg by mouth daily for 7 days, THEN 6 mg daily for 7 days, THEN 4 mg daily for 7 days, THEN 2 mg daily. 130 tablet 0 7/15/2025 Morning    KLOR-CON 20 MEQ Oral Powd Pack TAKE 40 MEQ BY MOUTH DAILY 180 each 3 7/15/2025 Morning    rifAXIMin 550 MG Oral Tab Take 1 tablet (550 mg total) by mouth 2 (two) times daily. 180 tablet 3 7/15/2025 Evening    lactulose 10 GM/15ML Oral Solution Take 30 mL (20 g total) by mouth 2 (two) times daily as needed. 1800 mL 2 7/15/2025 Morning    levothyroxine 50 MCG Oral Tab Take 1 tablet (50 mcg total) by mouth before breakfast.   2025 Morning    bumetanide 2 MG Oral Tab Take 1 tablet (2 mg total) by mouth 2 (two) times daily. 180 tablet 3 7/15/2025 Evening    spironolactone 50 MG Oral Tab Take 1 tablet (50 mg total) by mouth 2 (two) times daily. 180 tablet 1 7/15/2025 Evening    omeprazole 20 MG  Oral Capsule Delayed Release Take 1 capsule (20 mg total) by mouth every morning before breakfast. 90 capsule 3 2025 Morning    ALPRAZolam 0.5 MG Oral Tab    Unknown    triamcinolone 0.1 % External Cream Apply topically 2 (two) times daily as needed. 15 g 1 Unknown   [4]   Current Facility-Administered Medications Ordered in Epic   Medication Dose Route Frequency Provider Last Rate Last Admin    lactated ringers infusion   Intravenous Continuous Laney Gutierrez MD 20 mL/hr at 25 1045 New Bag at 25 1045     No current [facility]-ordered outpatient medications on file.   [5] No Known Allergies  [6]   Family History  Problem Relation Age of Onset    Prostate Cancer Father         No surgery or chemotherapy    Diabetes Daughter     Breast Cancer Neg     Ovarian Cancer Neg     Pancreatic Cancer Neg    [7]   Social History  Socioeconomic History    Marital status:    Tobacco Use    Smoking status: Former     Current packs/day: 0.00     Types: Cigarettes     Quit date: 2024     Years since quittin.2     Passive exposure: Never    Smokeless tobacco: Never   Vaping Use    Vaping status: Never Used   Substance and Sexual Activity    Alcohol use: Not Currently     Comment: not since 2024    Drug use: Not Currently     Types: Cannabis, Cocaine     Comment: in her 20's    Sexual activity: Not Currently

## 2025-07-17 ENCOUNTER — TELEPHONE (OUTPATIENT)
Dept: ENDOCRINOLOGY | Facility: HOSPITAL | Age: 65
End: 2025-07-17

## 2025-07-17 DIAGNOSIS — E11.9 TYPE 2 DIABETES MELLITUS WITHOUT COMPLICATION, WITHOUT LONG-TERM CURRENT USE OF INSULIN (HCC): Primary | ICD-10-CM

## 2025-07-17 NOTE — ED PROVIDER NOTES
Patient Seen in: [facility] Emergency Department    History     Chief Complaint   Patient presents with    Hyperglycemia       HPI    64-year-old female who previously had diabetes in remission but A1c had increased recently and started on metformin 500 twice daily as well as glipizide 2.5.  She was routinely checking her glucose and noted that it was elevated in the 400s.  She denies any nausea or emesis or abdominal pain or any chest pain or fatigue or malaise.  She only started her medications yesterday.    History reviewed. Past Medical History[1]    History reviewed. Past Surgical History[2]      Medications :  Prescriptions Prior to Admission[3]     Family History[4]    Smoking Status: Social Hx on file[5]    Constitutional and vital signs reviewed.      Social History and Family History elements reviewed from today, pertinent positives to the presenting problem noted.    Physical Exam     ED Triage Vitals [07/16/25 2224]   /76   Pulse 76   Resp 18   Temp 98.6 °F (37 °C)   Temp src Temporal   SpO2 100 %   O2 Device None (Room air)       All measures to prevent infection transmission during my interaction with the patient were taken. The patient was already wearing a droplet mask on my arrival to the room. Personal protective equipment was worn throughout the duration of the exam.  Handwashing was performed prior to and after the exam.  Stethoscope and any equipment used during my examination was cleaned with super sani-cloth germicidal wipes following the exam.     Physical Exam    General: NAD  Head: Normocephalic and atraumatic.  Mouth/Throat/Ears/Nose: Oropharynx is clear and moist.   Eyes: Conjunctivae and EOM are normal.   Neck: Normal range of motion. Supple.   Cardiovascular: Normal rate, regular rhythm, normal heart sounds.  Respiratory/Chest: Clear and equal bilaterally. Exhibits no tenderness.  Gastrointestinal: Soft, non-tender, non-distended. Bowel sounds are normal.    Musculoskeletal:No swelling or deformity.   Neurological: Alert and appropriate. No focal deficits.   Skin: Skin is warm and dry. No pallor.  Psychiatric: Has a normal mood and affect.      ED Course        Labs Reviewed   POCT GLUCOSE - Abnormal; Notable for the following components:       Result Value    POC Glucose  328 (*)     All other components within normal limits       As Interpreted by me    Imaging Results Available and Reviewed while in ED: No results found.  ED Medications Administered:   Medications   glucose (Dex4) 15 GM/59ML oral liquid 15 g (has no administration in time range)     Or   glucose (Glutose) 40% oral gel 15 g (has no administration in time range)     Or   glucose-vitamin C (Dex-4) chewable tab 4 tablet (has no administration in time range)     Or   dextrose 50% injection 50 mL (has no administration in time range)     Or   glucose (Dex4) 15 GM/59ML oral liquid 30 g (has no administration in time range)     Or   glucose (Glutose) 40% oral gel 30 g (has no administration in time range)     Or   glucose-vitamin C (Dex-4) chewable tab 8 tablet (has no administration in time range)   insulin aspart (NovoLOG) 100 Units/mL vial 6 Units (has no administration in time range)         MDM     Vitals:    07/16/25 2224   BP: 115/76   Pulse: 76   Resp: 18   Temp: 98.6 °F (37 °C)   TempSrc: Temporal   SpO2: 100%   Weight: 57.6 kg     *I personally reviewed and interpreted all ED vitals.    Pulse Ox: 100%, Room air, Normal     Monitor Interpretation:   normal sinus rhythm as interpreted by me.  The cardiac monitor was ordered given hyperglycemia.      Medical Decision Making      Differential Diagnosis/ Diagnostic Considerations: Type 2 diabetes with hyperglycemia, DKA    Complicating Factors: The patient already has Diabetes mellitus to contribute to the complexity of this ED evaluation.    I reviewed prior chart records including office visit note from July 2, 2025.  Mild hyperglycemia on my  interpretation of lab work, low-dose insulin subcutaneous provided.  Considered admission however no clinical evidence of DKA.  Will just slightly increase her glipizide dosing to twice daily and have her follow-up closely with PCP.    Dc In stable condition.  Patient and daughter at the bedside are comfortable with the plan.       Prescriptions:as above    I spent 31 minutes of critical care time caring for this patient. This does not include time spent on separately reported billable procedures.       Disposition and Plan     Clinical Impression:  1. Type 2 diabetes mellitus with hyperglycemia, without long-term current use of insulin (HCC)        Disposition:  Discharge    Follow-up:  Antony Zavala MD  [address] [place] [id number]  [phone]    Schedule an appointment as soon as possible for a visit in 1 day(s)        Medications Prescribed:  Current Discharge Medication List                           [1]   Past Medical History:   Acute, but ill-defined, cerebrovascular disease    ALCOHOL USE    Amenorrhea    Anxiety    Cirrhosis of liver (HCC)    Colon polyp    Congestive heart disease (HCC)    Depression    Diabetes (HCC)    Disorder of liver    Esophageal reflux    Hypothyroidism    Stroke (HCC)   [2]   Past Surgical History:  Procedure Laterality Date    Colonoscopy N/A 2024    Procedure: COLONOSCOPY;  Surgeon: Yobany Guardado MD;  Location: [facility] ENDOSCOPY    D & c      Insert intrauterine device      Needle biopsy right Right           Remove intrauterine device     [3] (Not in a hospital admission)   [4]   Family History  Problem Relation Age of Onset    Prostate Cancer Father         No surgery or chemotherapy    Diabetes Daughter     Breast Cancer Neg     Ovarian Cancer Neg     Pancreatic Cancer Neg    [5]   Social History  Socioeconomic History    Marital status:    Tobacco Use    Smoking status: Former     Current packs/day: 0.00     Types: Cigarettes     Quit  date: 2024     Years since quittin.2     Passive exposure: Never    Smokeless tobacco: Never   Vaping Use    Vaping status: Never Used   Substance and Sexual Activity    Alcohol use: Not Currently     Comment: not since 2024    Drug use: Not Currently     Types: Cannabis, Cocaine     Comment: in her 20's    Sexual activity: Not Currently

## 2025-07-17 NOTE — ED INITIAL ASSESSMENT (HPI)
Pt arrives ambulatory to ED for c/o hyperglycemia. . PT given 6 units novolog. Aox4, speaking in full sentences.

## 2025-07-17 NOTE — TELEPHONE ENCOUNTER
Chase Zavala,    Patient's daughter called and requested to have an appointment scheduled with the diabetes learning center to educate patient on good behaviors and nutrition. Can you please sign the pended order for the upcoming appointment next week?    Thank you!

## 2025-07-18 ENCOUNTER — OFFICE VISIT (OUTPATIENT)
Dept: INTERNAL MEDICINE CLINIC | Facility: CLINIC | Age: 65
End: 2025-07-18
Payer: COMMERCIAL

## 2025-07-18 VITALS
BODY MASS INDEX: 27.71 KG/M2 | WEIGHT: 132 LBS | SYSTOLIC BLOOD PRESSURE: 114 MMHG | DIASTOLIC BLOOD PRESSURE: 60 MMHG | RESPIRATION RATE: 17 BRPM | OXYGEN SATURATION: 99 % | HEART RATE: 76 BPM | TEMPERATURE: 98 F | HEIGHT: 58 IN

## 2025-07-18 DIAGNOSIS — Z09 ENCOUNTER FOR EXAMINATION FOLLOWING TREATMENT AT HOSPITAL: ICD-10-CM

## 2025-07-18 DIAGNOSIS — E11.9 TYPE 2 DIABETES MELLITUS WITHOUT COMPLICATION, WITHOUT LONG-TERM CURRENT USE OF INSULIN (HCC): Primary | ICD-10-CM

## 2025-07-18 PROCEDURE — 99214 OFFICE O/P EST MOD 30 MIN: CPT | Performed by: INTERNAL MEDICINE

## 2025-07-18 PROCEDURE — 3078F DIAST BP <80 MM HG: CPT | Performed by: INTERNAL MEDICINE

## 2025-07-18 PROCEDURE — 3074F SYST BP LT 130 MM HG: CPT | Performed by: INTERNAL MEDICINE

## 2025-07-18 PROCEDURE — 3046F HEMOGLOBIN A1C LEVEL >9.0%: CPT | Performed by: INTERNAL MEDICINE

## 2025-07-18 PROCEDURE — 3008F BODY MASS INDEX DOCD: CPT | Performed by: INTERNAL MEDICINE

## 2025-07-18 RX ORDER — INSULIN GLARGINE 100 [IU]/ML
6 INJECTION, SOLUTION SUBCUTANEOUS NIGHTLY
Qty: 5 ML | Refills: 2 | Status: SHIPPED | OUTPATIENT
Start: 2025-07-18

## 2025-07-18 RX ORDER — FLASH GLUCOSE SCANNING READER
1 EACH MISCELLANEOUS 3 TIMES DAILY
Qty: 2 EACH | Refills: 11 | Status: SHIPPED | OUTPATIENT
Start: 2025-07-18

## 2025-07-18 NOTE — PROGRESS NOTES
Subjective:     Patient ID: Christi Tavarez is a 64 year old female.    HPI  Patient comes in today for follow-up after she was seen in ER due to elevated glucose patient recently was found to have A1c at 11.5 recently patient had stopped using medication for diabetes due to well-controlled with A1c at 5.4 before but says she had not been watching her diet closely the other day her sugars went up to 450 after surgical procedure she went to ER was treated and was sent home right now sugars are better taking metformin and glipizide.  Patient has been sticking her fingers but she has a lot of pain she has some neuropathy on her fingers due to her liver cirrhosis and this makes it hard for her to fingerstick patient would benefit having the freestyle david system which will allow for pain-free continuous glucose monitoring which she is medically suited to improve glycemic control avoid hypoglycemia or hyperglycemia and enhance treatment compliance.  Based on current clinical guidelines and patient's medical needs I believe this device is essential for proper diabetic management for this patient        History/Other:   Review of Systems   Constitutional: Negative.    HENT: Negative.     Eyes: Negative.    Respiratory: Negative.     Cardiovascular: Negative.    Gastrointestinal: Negative.    Genitourinary: Negative.    Musculoskeletal: Negative.    Skin: Negative.    Neurological: Negative.    Psychiatric/Behavioral: Negative.       Current Medications[1]  Allergies:Allergies[2]    Past Medical History[3]   Past Surgical History[4]   Family History[5]   Social History: Short Social Hx on File[6]     Objective:   Physical Exam  Vitals and nursing note reviewed.   Constitutional:       Appearance: She is well-developed.   HENT:      Head: Normocephalic and atraumatic.      Right Ear: External ear normal.      Left Ear: External ear normal.      Nose: Nose normal.   Eyes:      Conjunctiva/sclera: Conjunctivae normal.       Pupils: Pupils are equal, round, and reactive to light.   Cardiovascular:      Rate and Rhythm: Normal rate and regular rhythm.      Heart sounds: Normal heart sounds.   Pulmonary:      Effort: Pulmonary effort is normal.      Breath sounds: Normal breath sounds.   Abdominal:      General: Bowel sounds are normal.      Palpations: Abdomen is soft.   Genitourinary:     Vagina: Normal.   Musculoskeletal:         General: Normal range of motion.      Cervical back: Normal range of motion and neck supple.   Skin:     General: Skin is warm and dry.   Neurological:      Mental Status: She is alert and oriented to person, place, and time.      Deep Tendon Reflexes: Reflexes are normal and symmetric.   Psychiatric:         Behavior: Behavior normal.         Thought Content: Thought content normal.         Judgment: Judgment normal.         Assessment & Plan:   1. Type 2 diabetes mellitus without complication, without long-term current use of insulin (HCC) patient taking metformin will take the glipizide 2 tablets in the morning also will add Lantus 6 units at night will need very close blood sugar monitoring will order daivd freestyle continuous monitoring   Patient has been sticking her fingers but she has a lot of pain she has some neuropathy on her fingers due to her liver cirrhosis and this makes it hard for her to fingerstick patient would benefit having the freestyle david system which will allow for pain-free continuous glucose monitoring which she is medically suited to improve glycemic control avoid hypoglycemia or hyperglycemia and enhance treatment compliance.  Based on current clinical guidelines and patient's medical needs I believe this device is essential for proper diabetic management for this patient       2. Encounter for examination following treatment at hospital as above       No orders of the defined types were placed in this encounter.      Meds This Visit:  Requested Prescriptions     Signed  Prescriptions Disp Refills    insulin glargine (LANTUS SOLOSTAR) 100 UNIT/ML Subcutaneous Solution Pen-injector 5 mL 2     Sig: Inject 6 Units into the skin nightly.    Insulin Pen Needle 32G X 4 MM Does not apply Misc 90 each 2     Sig: Use as prescribed    Continuous Glucose  (FREESTYLE SOUMYA 14 DAY READER) Does not apply Device 2 each 11     Si Application in the morning, at noon, and at bedtime.       Imaging & Referrals:  None            [1]   Current Outpatient Medications   Medication Sig Dispense Refill    insulin glargine (LANTUS SOLOSTAR) 100 UNIT/ML Subcutaneous Solution Pen-injector Inject 6 Units into the skin nightly. 5 mL 2    Insulin Pen Needle 32G X 4 MM Does not apply Misc Use as prescribed 90 each 2    Continuous Glucose  (FREESTYLE SOUMYA 14 DAY READER) Does not apply Device 1 Application in the morning, at noon, and at bedtime. 2 each 11    ALPRAZolam 0.5 MG Oral Tab       Blood Glucose Monitoring Suppl (ACCU-CHEK GUIDE) w/Device Does not apply Kit Test blood sugar once daily. 1 kit 0    Glucose Blood (ACCU-CHEK GUIDE TEST) In Vitro Strip Test blood sugar once daily. 100 each 3    Accu-Chek Softclix Lancets Does not apply Misc 1 each by Finger stick route daily. 100 each 3    metFORMIN 500 MG Oral Tab Take 1 tablet (500 mg total) by mouth 2 (two) times daily with meals. 60 tablet 2    glipiZIDE ER 2.5 MG Oral Tablet 24 Hr Take 1 tablet (2.5 mg total) by mouth daily with breakfast. 30 tablet 2    folic acid 1 MG Oral Tab Take 1 tablet (1 mg total) by mouth daily. 90 tablet 0    escitalopram 10 MG Oral Tab Take 1 tablet (10 mg total) by mouth daily.      KLOR-CON 20 MEQ Oral Powd Pack TAKE 40 MEQ BY MOUTH DAILY 180 each 3    rifAXIMin 550 MG Oral Tab Take 1 tablet (550 mg total) by mouth 2 (two) times daily. 180 tablet 3    triamcinolone 0.1 % External Cream Apply topically 2 (two) times daily as needed. 15 g 1    lactulose 10 GM/15ML Oral Solution Take 30 mL (20 g total) by  mouth 2 (two) times daily as needed. 1800 mL 2    levothyroxine 50 MCG Oral Tab Take 1 tablet (50 mcg total) by mouth before breakfast.      bumetanide 2 MG Oral Tab Take 1 tablet (2 mg total) by mouth 2 (two) times daily. 180 tablet 3    spironolactone 50 MG Oral Tab Take 1 tablet (50 mg total) by mouth 2 (two) times daily. 180 tablet 1    omeprazole 20 MG Oral Capsule Delayed Release Take 1 capsule (20 mg total) by mouth every morning before breakfast. 90 capsule 3   [2] No Known Allergies  [3]   Past Medical History:   Acute, but ill-defined, cerebrovascular disease    ALCOHOL USE    Amenorrhea    Anxiety    Cirrhosis of liver (HCC)    Colon polyp    Congestive heart disease (HCC)    Depression    Diabetes (HCC)    Disorder of liver    Esophageal reflux    Hypothyroidism    Stroke (HCC)   [4]   Past Surgical History:  Procedure Laterality Date    Colonoscopy N/A 2024    Procedure: COLONOSCOPY;  Surgeon: Yobany Guardado MD;  Location: [facility] ENDOSCOPY    D & c      Insert intrauterine device      Needle biopsy right Right           Remove intrauterine device     [5]   Family History  Problem Relation Age of Onset    Prostate Cancer Father         No surgery or chemotherapy    Diabetes Daughter     Breast Cancer Neg     Ovarian Cancer Neg     Pancreatic Cancer Neg    [6]   Social History  Socioeconomic History    Marital status:    Tobacco Use    Smoking status: Former     Current packs/day: 0.00     Types: Cigarettes     Quit date: 2024     Years since quittin.2     Passive exposure: Never    Smokeless tobacco: Never   Vaping Use    Vaping status: Never Used   Substance and Sexual Activity    Alcohol use: Not Currently     Comment: not since 2024    Drug use: Not Currently     Types: Cannabis, Cocaine     Comment: in her 20's    Sexual activity: Not Currently     Social Drivers of Health     Food Insecurity: No Food Insecurity (3/12/2025)    NCSS - Food Insecurity     Worried  About Running Out of Food in the Last Year: No     Ran Out of Food in the Last Year: No   Transportation Needs: No Transportation Needs (3/12/2025)    NCSS - Transportation     Lack of Transportation: No   Housing Stability: Not At Risk (3/12/2025)    NCSS - Housing/Utilities     Has Housing: Yes     Worried About Losing Housing: No     Unable to Get Utilities: No

## 2025-07-21 ENCOUNTER — TELEPHONE (OUTPATIENT)
Dept: INTERNAL MEDICINE CLINIC | Facility: CLINIC | Age: 65
End: 2025-07-21

## 2025-07-21 DIAGNOSIS — E11.9 TYPE 2 DIABETES MELLITUS WITHOUT COMPLICATION, WITHOUT LONG-TERM CURRENT USE OF INSULIN (HCC): ICD-10-CM

## 2025-07-21 NOTE — TELEPHONE ENCOUNTER
Patient called (identified name and ),   Can't get her needles because script says \"as prescribed.\"  This RN called [facility] and spoke with Fax, sent new script to state \"use once nightly with insulin glargine.\"  New script sent with clarification.  Patient notified and was very appreciative.

## 2025-07-22 ENCOUNTER — TELEPHONE (OUTPATIENT)
Dept: ENDOCRINOLOGY | Facility: HOSPITAL | Age: 65
End: 2025-07-22

## 2025-07-22 ENCOUNTER — HOSPITAL ENCOUNTER (OUTPATIENT)
Dept: ENDOCRINOLOGY | Facility: HOSPITAL | Age: 65
Discharge: HOME | End: 2025-07-22
Attending: INTERNAL MEDICINE
Payer: COMMERCIAL

## 2025-07-22 ENCOUNTER — TELEPHONE (OUTPATIENT)
Dept: INTERNAL MEDICINE CLINIC | Facility: CLINIC | Age: 65
End: 2025-07-22

## 2025-07-22 DIAGNOSIS — E11.9 TYPE 2 DIABETES MELLITUS WITHOUT COMPLICATION, WITHOUT LONG-TERM CURRENT USE OF INSULIN (HCC): Primary | ICD-10-CM

## 2025-07-22 NOTE — PATIENT INSTRUCTIONS
Goals       1.  EDC - Monitoring (pt-stated)       Note created  7/22/2025 11:16 AM by Joselyn Bedoya RD      I will check my blood sugar 2 x per day and record on log sheet until starting Mamadou.

## 2025-07-22 NOTE — TELEPHONE ENCOUNTER
To complete the PA for this pt:    1. Go to  [email or web address] and click \"Enter a Key\"  2. Enter the pts last name, , and key.   Key: [id number]   Pts last name:Daysi     :1960             [facility] [place]   3. Complete the form and click \"Send to Plan\"    Please notify us when you receive the determination from the plan.

## 2025-07-22 NOTE — PROGRESS NOTES
Christi Tavarez : 1960  attended individual initial assessment for Diabetes Education:    Date: 2025         Start time: 945 End time: 945    HgbA1C (%)   Date Value   2025 11.9 (H)       Wt Readings from Last 1 Encounters:   25 132 lb       Assessment: Pt diagnosed with type 2 diabetes 1 year ago; says she was taken off of her diabetes meds and recently was in ER and A1c was found to be 11.9%  Significant medical hx including cirrhosis, CHF and stroke.    Pt is checking her sugars on an Accucheck meter; she is waiting to see if insurance will cover the Freestyle Mamadou    Diet Hx: 3 meals per day; recently cut out sweets      Education provided on the below topics:     Diabetes Overview:  Pathophysiology, A1C results and treatment options for diabetes self-management reviewed.   Described basic process and treatment options for diabetes self-management.  Introduced long term impact of uncontrolled blood glucose on health.    Healthy Eating:  Obtained usual diet history.  Instructed on Basic Diet Guidelines which includes eating 3 meals/day. Eat moderate amounts at consistent times from day to day. Limit/avoid sweets. Instructed on Balanced Plate Method of meal planning. Instructed to limit grains or starchy vegetables to ¼ of plate, protein to ¼ of plate, non-starchy vegetables to ½ plate and modest portions of fruit, yogurt, milk as desired.    Being Active:   Encouraged Physical Activity and briefly reviewed ADA recommended guidelines for activity with type 2 diabetes.    Taking Medications:   Reviewed current diabetes medications (if applicable).  Demonstrated how to use Lantus insulin pen.  Pt waiting on needles at [facility].    Monitoring:  Instructed/reinforced blood glucose monitoring, testing schedules and target goals:   Fasting / Pre-meal  mg/dL 2 Hour Post-prandial <180 mg/dL  Demonstrated ability to perform blood glucose testing.     Problem Solving:   Prevention,  detection and treatment of acute complications: taught symptoms of hypoglycemia, hyperglycemia, how to treat low blood sugar (Rule of 15) and actions for lowering high blood glucose levels.     Behavior Change:  Initiated individualized goal setting process and will continue to refine throughout class series.      Recommendations:      Monitor blood glucose as directed.  Follow Balance Plate method of meal planning.   Attend all Group Class in series - will request new order for 1:1 DSMT classes; pt will be on Medicare next month.    Written materials provided for all areas covered.  Patient verbalized understanding and all questions answered.    Follow up set on 7/29/25    Joselyn Bedoya RD

## 2025-07-23 ENCOUNTER — OFFICE VISIT (OUTPATIENT)
Dept: PHYSICAL THERAPY | Facility: HOSPITAL | Age: 65
End: 2025-07-23
Attending: Other
Payer: COMMERCIAL

## 2025-07-23 PROCEDURE — 97530 THERAPEUTIC ACTIVITIES: CPT

## 2025-07-23 PROCEDURE — 97116 GAIT TRAINING THERAPY: CPT

## 2025-07-23 NOTE — PROGRESS NOTES
Patient: Christi Tavarez (64 year old, female) Referring Provider:  Insurance:   Diagnosis: Cerebellar CVA, Gait Instability Pepper Caballero R[place]y  [employer] [place]   Date of Surgery: N/A Next MD visit:  N/A   Precautions:  Fall Risk N/A Referral Information:    Date of Evaluation: Req: 0, Auth: 0, Exp:     05/02/25 POC Auth Visits:  10     Progress Summary  Pt has attended 10 visits in Physical Therapy.      Today's Date   7/23/2025    Subjective  Pt. reports that she has been improving overall with balance.  She reports that her balance is worse first thing in the morning and then later at night with fatigue.  She reports that she has been able to do more around the house.  She reports that she is not using her cane for community gait or stairs.  Pt. has been able to go shopping- gets tired after long trip to [place] but able to do it.  Pt. reports that she is still challenged on uneven surfaces/grass.       Pain: pain not reported     Objective  see flow sheet          Assessment  Pt. has made good progress in PT, improving in all objective functional testing.  She is still considered a fall risk, and is continuing to have difficulty with uneven surfaces and community gait.  Pt. has been compliant with HEP and recommendations.  She would benefit from continued skilled PT to address her remaining impairments and improve overall safety.    Goals (to be met in 10 visits)   Pt. Will be improved in five times sit to stand test to less than 12 seconds to reflect an improvement in balance and LE strength.  (GOAL MET)  Pt. Will be improved in Timed up and Go test to less than 15 seconds to reflect an improvement in gait and balance, and decrease fall risk  (PROGRESSING TOWARD GOAL)  Pt. Will be able to stand and reach off of ANNA in order to cook and clean safely.  (GOAL MET)  Pt. Will be indep in HEP in order to maintain functional gains.  (GOAL MET)  REVISED GOALS:     Pt. Improved in Functional Gait Assessment to at least 22/30 in  order to reflect an improvement in gait and decreased fall risk.  Pt. Able to ambulate on uneven surfaces and grass independently to improve community gait.     Plan  Gait with head turns, hurdles, F/B gait with turns, curbs, ramps    Treatment Last 4 Visits  Treatment Day: 10       6/4/2025 6/6/2025 6/11/2025 7/23/2025   Neuro Treatment   Neuro Re-Education NuStep x 10 min Seat 5, level 5, cues for alignment  Romberg trunk rotation x 10 ea way, 5 sec hold    NuStep x 10 min Seat 5, level 5, cues for alignment   Romberg trunk rotation x 10 ea way, 5 sec hold   Rockerboard L/R, A/P (min A for A/P only)  Blazepods color catch 4 x 30 sec with 2 pods on 8 in step   NuStep Level 5, Seat 4, 10 min  Sit to/from stand with 1 lb bar in both hands, slow and reach up, looking at bar x 10  Standing EC- F/B stepping with one foot stationary x 10 ea side  Blazepods with 2 pods on step, color catch 4 x 30 sec     Reviewed HEP- no changes appropriate    Therapeutic Activity Sit to/from stand slowly with feet together x 15  Emphasis on:  Sequencing, controlled descent, knee/hip flexion initially vs. Later in sequence, narrow ANNA     Timed Up and Go (AD,time): 13.6 sec  (was 20 sec); Fall Risk: Yes  5x Sit -->Stand: 10.7 sec   (was 18.15 sec)           Gait Training Stair training:  Step-to pattern with one railing, alternating leading foot x 3 flights with rest, ascending and descending, Sup  Reciprocal pattern for 1 flight, one railing with SBA  Gait with narrow ANNA- using visual feedback with lines on floor Stepping over 6 hurdles, step-to with alternating feet x 30  Sidestepping L/R x 20 ea    Gait between barriers on floor with emphasis on narrower ANNA, longer steps    Stair training:  ascending and descending x 3 flights with brief standing rests, one railing, step-to pattern but alternating leading leg, supervised 6 minute walk test  Distance walked: 867 ft (was 760 ft)  Total time walked: 6 min  Number of rest breaks: 0  RPE  at end of test:  3/10     Age matched norms:  60-69 yrs:  F: 1765 ft    Functional Gait Assessment   Item Description Score (0 worst, 3 best)    __1_____1. Gait Level Surface-  Time: ____7.2______seconds  ___2____2. Change in gait speed  __2_____3. Gait with horizontal head turns   ___2____4. Gait with vertical head turns  ____2___5. Gait and pivot turn  _____1__6. Step over obstacle  ___0____7. Gait with narrow base of support-  # of steps________  __1_____8. Gait with eyes closed-  Time: ___13_______seconds  _____2__9. Ambulating backward  ___1____10. Steps    TOTAL SCORE: __14_____/30    (less than 22/30 = fall risk)         Neuro Re-Educ Minutes 15 25 25    Therapeutic Activity Minutes 10   10   Gait Training Minutes 15 15 15 30   Total Time Of Timed Procedures 40 40 40 40   Total Time Of Service-Based Procedures 0 0 0 0   Total Treatment Time 40 40 40 40   HEP Access Code: [email or web address]  URL: [email or web address]  Date: 06/04/2025  Prepared by: Leslie Ashraf    Exercises  - Sit to stand with feet together  - 1 x daily - 7 x weekly - 2 sets - 10 reps  - Stand feet together, turn head and shoulders left and right  - 2 x daily - 7 x weekly - 5-10 reps  - Step taps with opposite arm reach to ceiling  - 1 x daily - 7 x weekly - 2 sets - 10 reps  - Tap on 3 cones on the floor  - 1 x daily - 7 x weekly - 10 reps  - Stepping in 4 Square Pattern  - 1 x daily - 7 x weekly - 2 sets - 10 reps  - Walking with Head Rotation  - 1 x daily - 7 x weekly - 2 sets - 5 reps  - Stair Negotiation with Single Rail Using Step-Through Pattern (Foot Over Foot)  - 1 x daily - 7 x weekly - 2 sets - 10 reps  - Stair Negotiation with Single Rail Using Step-To Pattern (One Step at a Time)  - 1 x daily - 7 x weekly - 2 sets - 10 reps  Access Code: [email or web address]  URL: [email or web address]  Date: 06/04/2025  Prepared by: Leslie Ashraf     Exercises  - Sit to stand with feet together  - 1 x daily - 7 x weekly - 2 sets  - 10 reps  - Stand feet together, turn head and shoulders left and right  - 2 x daily - 7 x weekly - 5-10 reps  - Step taps with opposite arm reach to ceiling  - 1 x daily - 7 x weekly - 2 sets - 10 reps  - Tap on 3 cones on the floor  - 1 x daily - 7 x weekly - 10 reps  - Stepping in 4 Square Pattern  - 1 x daily - 7 x weekly - 2 sets - 10 reps  - Walking with Head Rotation  - 1 x daily - 7 x weekly - 2 sets - 5 reps  - Stair Negotiation with Single Rail Using Step-Through Pattern (Foot Over Foot)  - 1 x daily - 7 x weekly - 2 sets - 10 reps  - Stair Negotiation with Single Rail Using Step-To Pattern (One Step at a Time)  - 1 x daily - 7 x weekly - 2 sets - 10 reps   Access Code: [email or web address]  URL: [email or web address]  Date: 07/23/2025  Prepared by: Leslie Ashraf    Exercises  - Sit to stand with feet together  - 1 x daily - 7 x weekly - 2 sets - 10 reps  - Stand feet together, turn head and shoulders left and right  - 2 x daily - 7 x weekly - 5-10 reps  - Step taps with opposite arm reach to ceiling  - 1 x daily - 7 x weekly - 2 sets - 10 reps  - Tap on 3 cones on the floor  - 1 x daily - 7 x weekly - 10 reps  - Stepping in 4 Square Pattern  - 1 x daily - 7 x weekly - 2 sets - 10 reps  - Walking with Head Rotation  - 1 x daily - 7 x weekly - 2 sets - 5 reps          HEP  Access Code: [email or web address]  URL: [email or web address]  Date: 07/23/2025  Prepared by: Leslie Ashraf    Exercises  - Sit to stand with feet together  - 1 x daily - 7 x weekly - 2 sets - 10 reps  - Stand feet together, turn head and shoulders left and right  - 2 x daily - 7 x weekly - 5-10 reps  - Step taps with opposite arm reach to ceiling  - 1 x daily - 7 x weekly - 2 sets - 10 reps  - Tap on 3 cones on the floor  - 1 x daily - 7 x weekly - 10 reps  - Stepping in 4 Square Pattern  - 1 x daily - 7 x weekly - 2 sets - 10 reps  - Walking with Head Rotation  - 1 x daily - 7 x weekly - 2 sets - 5 reps      Charges  Gait x  2, TA x 1       Plan: Continue skilled Physical Therapy 1-2 x/week or a total of 8 visits over a 90 day period. Treatment will include: balance and gait training, functional training, strengthening, neuromuscular re-education, home exercise program development and instruction, pt. Education.       Patient was advised of these findings, precautions, and treatment options and has agreed to actively participate in planning and for this course of care.    Thank you for your referral. If you have any questions, please contact me at Dept: [phone].    Sincerely,  Electronically signed by therapist: Leslie Ashraf PT     Physician's certification required:  Yes  Please co-sign or sign and return this letter via fax as soon as possible to [phone].   I certify the need for these services furnished under this plan of treatment and while under my care.    X___________________________________________________ Date____________________    Certification From: 7/23/2025  To:10/21/2025

## 2025-07-24 ENCOUNTER — TELEPHONE (OUTPATIENT)
Dept: PHYSICAL THERAPY | Facility: HOSPITAL | Age: 65
End: 2025-07-24

## 2025-07-24 ENCOUNTER — TELEPHONE (OUTPATIENT)
Dept: INTERNAL MEDICINE CLINIC | Facility: CLINIC | Age: 65
End: 2025-07-24

## 2025-07-24 NOTE — TELEPHONE ENCOUNTER
Patient calling because she want to know if she can switch from glipizide to glimepiride because it costs less to fill. Please advise

## 2025-07-24 NOTE — TELEPHONE ENCOUNTER
Left message to call back or can check [employer] account for messages. Please transfer to triage. 1st attempt.

## 2025-07-29 ENCOUNTER — HOSPITAL ENCOUNTER (OUTPATIENT)
Dept: ENDOCRINOLOGY | Facility: HOSPITAL | Age: 65
Discharge: HOME | End: 2025-07-29
Attending: INTERNAL MEDICINE
Payer: COMMERCIAL

## 2025-07-29 DIAGNOSIS — E11.9 TYPE 2 DIABETES MELLITUS WITHOUT COMPLICATION, WITHOUT LONG-TERM CURRENT USE OF INSULIN (HCC): Primary | ICD-10-CM

## 2025-07-30 ENCOUNTER — OFFICE VISIT (OUTPATIENT)
Dept: PHYSICAL THERAPY | Facility: HOSPITAL | Age: 65
End: 2025-07-30
Attending: Other
Payer: COMMERCIAL

## 2025-07-30 ENCOUNTER — OFFICE VISIT (OUTPATIENT)
Dept: OBGYN CLINIC | Facility: CLINIC | Age: 65
End: 2025-07-30

## 2025-07-30 VITALS
BODY MASS INDEX: 28 KG/M2 | SYSTOLIC BLOOD PRESSURE: 125 MMHG | DIASTOLIC BLOOD PRESSURE: 78 MMHG | WEIGHT: 133.38 LBS | HEART RATE: 86 BPM

## 2025-07-30 DIAGNOSIS — Z09 POSTOP CHECK: Primary | ICD-10-CM

## 2025-07-30 PROCEDURE — 3074F SYST BP LT 130 MM HG: CPT | Performed by: OBSTETRICS & GYNECOLOGY

## 2025-07-30 PROCEDURE — 99024 POSTOP FOLLOW-UP VISIT: CPT | Performed by: OBSTETRICS & GYNECOLOGY

## 2025-07-30 PROCEDURE — 97112 NEUROMUSCULAR REEDUCATION: CPT

## 2025-07-30 PROCEDURE — 97116 GAIT TRAINING THERAPY: CPT

## 2025-07-30 PROCEDURE — 3078F DIAST BP <80 MM HG: CPT | Performed by: OBSTETRICS & GYNECOLOGY

## 2025-08-06 ENCOUNTER — APPOINTMENT (OUTPATIENT)
Dept: PHYSICAL THERAPY | Facility: HOSPITAL | Age: 65
End: 2025-08-06
Attending: Other

## 2025-08-06 ENCOUNTER — HOSPITAL ENCOUNTER (OUTPATIENT)
Dept: MRI IMAGING | Facility: HOSPITAL | Age: 65
Discharge: HOME | End: 2025-08-06
Attending: INTERNAL MEDICINE

## 2025-08-06 DIAGNOSIS — K76.9 HEPATIC LESION: ICD-10-CM

## 2025-08-06 PROCEDURE — A9575 INJ GADOTERATE MEGLUMI 0.1ML: HCPCS | Performed by: INTERNAL MEDICINE

## 2025-08-06 PROCEDURE — 74183 MRI ABD W/O CNTR FLWD CNTR: CPT | Performed by: INTERNAL MEDICINE

## 2025-08-06 PROCEDURE — 76376 3D RENDER W/INTRP POSTPROCES: CPT | Performed by: INTERNAL MEDICINE

## 2025-08-06 RX ORDER — GADOTERATE MEGLUMINE 376.9 MG/ML
15 INJECTION INTRAVENOUS
Status: COMPLETED | OUTPATIENT
Start: 2025-08-06 | End: 2025-08-06

## 2025-08-06 RX ADMIN — GADOTERATE MEGLUMINE 12 ML: 376.9 INJECTION INTRAVENOUS at 09:53:00

## 2025-08-08 ENCOUNTER — OFFICE VISIT (OUTPATIENT)
Dept: PHYSICAL THERAPY | Facility: HOSPITAL | Age: 65
End: 2025-08-08
Attending: INTERNAL MEDICINE

## 2025-08-08 PROCEDURE — 97112 NEUROMUSCULAR REEDUCATION: CPT

## 2025-08-08 PROCEDURE — 97116 GAIT TRAINING THERAPY: CPT

## 2025-08-13 ENCOUNTER — OFFICE VISIT (OUTPATIENT)
Dept: PHYSICAL THERAPY | Facility: HOSPITAL | Age: 65
End: 2025-08-13
Attending: Other

## 2025-08-13 PROCEDURE — 97116 GAIT TRAINING THERAPY: CPT

## 2025-08-13 PROCEDURE — 97112 NEUROMUSCULAR REEDUCATION: CPT

## 2025-08-14 ENCOUNTER — APPOINTMENT (OUTPATIENT)
Facility: CLINIC | Age: 65
End: 2025-08-14

## 2025-08-14 ENCOUNTER — HOSPITAL ENCOUNTER (OUTPATIENT)
Dept: ENDOCRINOLOGY | Facility: HOSPITAL | Age: 65
Discharge: HOME | End: 2025-08-14
Attending: INTERNAL MEDICINE

## 2025-08-14 DIAGNOSIS — E11.9 TYPE 2 DIABETES MELLITUS WITHOUT COMPLICATION, WITHOUT LONG-TERM CURRENT USE OF INSULIN (HCC): Primary | ICD-10-CM

## 2025-08-14 RX ORDER — GLIPIZIDE 2.5 MG/1
TABLET ORAL
Qty: 60 TABLET | Refills: 0 | Status: SHIPPED | OUTPATIENT
Start: 2025-08-14

## 2025-08-14 RX ORDER — LAMOTRIGINE 25 MG/1
25 TABLET ORAL 2 TIMES DAILY
Qty: 180 TABLET | Refills: 3 | Status: CANCELLED | OUTPATIENT
Start: 2025-08-14

## 2025-08-14 RX ORDER — MIDODRINE HYDROCHLORIDE 2.5 MG/1
TABLET ORAL
Qty: 90 TABLET | Refills: 1 | OUTPATIENT
Start: 2025-08-14

## 2025-08-14 RX ORDER — MIDODRINE HYDROCHLORIDE 2.5 MG/1
2.5 TABLET ORAL 3 TIMES DAILY
Qty: 90 TABLET | Refills: 1 | Status: CANCELLED | OUTPATIENT
Start: 2025-08-14

## 2025-08-19 ENCOUNTER — TELEPHONE (OUTPATIENT)
Dept: INTERNAL MEDICINE CLINIC | Facility: CLINIC | Age: 65
End: 2025-08-19

## 2025-08-20 ENCOUNTER — OFFICE VISIT (OUTPATIENT)
Dept: DERMATOLOGY CLINIC | Facility: CLINIC | Age: 65
End: 2025-08-20

## 2025-08-20 ENCOUNTER — OFFICE VISIT (OUTPATIENT)
Dept: PHYSICAL THERAPY | Facility: HOSPITAL | Age: 65
End: 2025-08-20
Attending: Other

## 2025-08-20 DIAGNOSIS — L81.4 LENTIGINES: ICD-10-CM

## 2025-08-20 DIAGNOSIS — L82.1 SEBORRHEIC KERATOSES: Primary | ICD-10-CM

## 2025-08-20 DIAGNOSIS — D18.01 CHERRY ANGIOMA: ICD-10-CM

## 2025-08-20 DIAGNOSIS — D22.9 MULTIPLE BENIGN NEVI: ICD-10-CM

## 2025-08-20 PROCEDURE — 99213 OFFICE O/P EST LOW 20 MIN: CPT | Performed by: STUDENT IN AN ORGANIZED HEALTH CARE EDUCATION/TRAINING PROGRAM

## 2025-08-20 PROCEDURE — 97116 GAIT TRAINING THERAPY: CPT

## 2025-08-20 PROCEDURE — 97530 THERAPEUTIC ACTIVITIES: CPT

## 2025-08-22 ENCOUNTER — OFFICE VISIT (OUTPATIENT)
Dept: INTERNAL MEDICINE CLINIC | Facility: CLINIC | Age: 65
End: 2025-08-22

## 2025-08-22 VITALS
DIASTOLIC BLOOD PRESSURE: 71 MMHG | HEART RATE: 75 BPM | BODY MASS INDEX: 30.23 KG/M2 | OXYGEN SATURATION: 98 % | HEIGHT: 58 IN | TEMPERATURE: 99 F | SYSTOLIC BLOOD PRESSURE: 106 MMHG | RESPIRATION RATE: 17 BRPM | WEIGHT: 144 LBS

## 2025-08-22 DIAGNOSIS — E11.9 TYPE 2 DIABETES MELLITUS WITHOUT COMPLICATION, WITHOUT LONG-TERM CURRENT USE OF INSULIN (HCC): Primary | ICD-10-CM

## 2025-08-22 DIAGNOSIS — R63.5 INCREASED BODY WEIGHT: ICD-10-CM

## 2025-08-22 DIAGNOSIS — M79.89 LEG SWELLING: ICD-10-CM

## 2025-08-22 DIAGNOSIS — R19.00 ABDOMINAL SWELLING: ICD-10-CM

## 2025-08-22 LAB
EST. AVERAGE GLUCOSE BLD GHB EST-MCNC: 217 MG/DL (ref 68–126)
HBA1C MFR BLD: 9.2 % (ref ?–5.7)

## 2025-08-22 PROCEDURE — 3046F HEMOGLOBIN A1C LEVEL >9.0%: CPT | Performed by: INTERNAL MEDICINE

## 2025-08-22 PROCEDURE — 99214 OFFICE O/P EST MOD 30 MIN: CPT | Performed by: INTERNAL MEDICINE

## 2025-08-22 PROCEDURE — 3074F SYST BP LT 130 MM HG: CPT | Performed by: INTERNAL MEDICINE

## 2025-08-22 PROCEDURE — 3078F DIAST BP <80 MM HG: CPT | Performed by: INTERNAL MEDICINE

## 2025-08-22 PROCEDURE — 36415 COLL VENOUS BLD VENIPUNCTURE: CPT | Performed by: INTERNAL MEDICINE

## 2025-08-22 PROCEDURE — 3008F BODY MASS INDEX DOCD: CPT | Performed by: INTERNAL MEDICINE

## 2025-08-25 ENCOUNTER — RESULTS FOLLOW-UP (OUTPATIENT)
Dept: INTERNAL MEDICINE CLINIC | Facility: CLINIC | Age: 65
End: 2025-08-25

## 2025-08-27 ENCOUNTER — APPOINTMENT (OUTPATIENT)
Dept: PHYSICAL THERAPY | Facility: HOSPITAL | Age: 65
End: 2025-08-27
Attending: Other

## 2025-08-27 ENCOUNTER — OFFICE VISIT (OUTPATIENT)
Dept: PHYSICAL THERAPY | Facility: HOSPITAL | Age: 65
End: 2025-08-27
Attending: Other

## 2025-08-27 PROCEDURE — 97112 NEUROMUSCULAR REEDUCATION: CPT

## 2025-08-27 PROCEDURE — 97116 GAIT TRAINING THERAPY: CPT

## 2025-08-27 PROCEDURE — 97530 THERAPEUTIC ACTIVITIES: CPT

## 2025-08-28 ENCOUNTER — TELEPHONE (OUTPATIENT)
Dept: ENDOCRINOLOGY | Facility: HOSPITAL | Age: 65
End: 2025-08-28

## 2025-08-29 DIAGNOSIS — E11.9 TYPE 2 DIABETES MELLITUS WITHOUT COMPLICATION, WITHOUT LONG-TERM CURRENT USE OF INSULIN (HCC): ICD-10-CM

## 2025-08-29 RX ORDER — FLASH GLUCOSE SCANNING READER
1 EACH MISCELLANEOUS 3 TIMES DAILY
Qty: 2 EACH | Refills: 11 | Status: SHIPPED | OUTPATIENT
Start: 2025-08-29

## (undated) DEVICE — V2 SPECIMEN COLLECTION TRAY: Brand: NEPTUNE

## (undated) DEVICE — LASSO POLYPECTOMY SNARE: Brand: LASSO

## (undated) DEVICE — CO2 CANNULA,SSOFT,ADLT,7O2,4CO2,FEMALE: Brand: MEDLINE

## (undated) DEVICE — KIT CLEAN ENDOKIT 1.1OZ GOWNX2

## (undated) DEVICE — MEDI-VAC NON-CONDUCTIVE SUCTION TUBING: Brand: CARDINAL HEALTH

## (undated) DEVICE — SYRINGE, LUER SLIP, STERILE, 60ML: Brand: MEDLINE

## (undated) DEVICE — SINGLE-USE SNARE: Brand: CAPTIVATOR™ COLD

## (undated) DEVICE — GLOVE SUR 6.5 SENSICARE PI MIC PIP CRM PWD F

## (undated) DEVICE — CONMED SCOPE SAVER BITE BLOCK, 20X27 MM: Brand: SCOPE SAVER

## (undated) DEVICE — ELITE HYSTEROSCOPE SEAL: Brand: TRUCLEAR

## (undated) DEVICE — CANISTER SUCT 3000CC HI FLO DISP FOR FLD MGMT

## (undated) DEVICE — SOLUTION IRRIG 3000ML 0.9% NACL FLX CONT

## (undated) DEVICE — HYSTEROSCOPY: Brand: MEDLINE INDUSTRIES, INC.

## (undated) DEVICE — SOFT TISSUE SHAVER MINI: Brand: TRUCLEAR

## (undated) DEVICE — YANKAUER,BULB TIP,W/O VENT,RIGID,STERILE: Brand: MEDLINE

## (undated) DEVICE — KIT HYSTEROSCOPIC PROC INCL INFLO OUTFLO TB

## (undated) DEVICE — NEEDLE INJ 25GA CATH 230CM CHN 2.8MM ACUJECT

## (undated) DEVICE — V2 SPECIMEN COLLECTION MANIFOLD KIT: Brand: NEPTUNE

## (undated) DEVICE — KIT ENDO ORCAPOD 160/180/190

## (undated) DEVICE — MEDI-VAC NON-CONDUCTIVE SUCTION TUBING 6MM X 1.8M (6FT.) L: Brand: CARDINAL HEALTH

## (undated) NOTE — LETTER
AUTHORIZATION FOR SURGICAL OPERATION OR OTHER PROCEDURE    1. I hereby authorize VISHAL KELLY  and [facility] staff assigned to my case to perform the following operation and/or procedure at the [facility] site:    ENDOMETRIAL BIOPSY      _______________________________________________________________________________________________    2.  My physician has explained the nature and purpose of the operation or other procedure, possible alternative methods of treatment, the risks involved, and the possibility of complication to me.  I acknowledge that no guarantee has been made as to the result that may be obtained.  3.  I recognize that, during the course of this operation, or other procedure, unforseen conditions may necessitate additional or different procedure than those listed above.  I, therefore, further authorize and request that the above named physician, his/her physician assistants or designees perform such procedures as are, in his/her professional opinion, necessary and desirable.  4.  Any tissue or organs removed in the operation or other procedure may be disposed of by and at the discretion of the [facility].  5.  I understand that in the event of a medical emergency, I will be transported by local paramedics to [facility] or other hospital emergency department.  6.  I certify that I have read and fully understand the above consent to operation and/or other procedure.    7.  I acknowledge that my physician has explained sedation/analgesia administration to me including the risks and benefits.  I consent to the administration of sedation/analgesia as may be necessary or desirable in the judgement of my physician.    Witness signature: ___________________________________________________ Date:  ______/______/_____                    Time:  ________ A.M.  P.M.       Patient Name:   ______________________________________________________  (please print)      Patient signature:  ___________________________________________________             Relationship to Patient:           []  Parent    Responsible person                          []  Spouse  In case of minor or                    [] Other  _____________   Incompetent name:  __________________________________________________                               (please print)      _____________      Responsible person  In case of minor or  Incompetent signature:  _______________________________________________    Statement of Physician  My signature below affirms that prior to the time of the procedure, I have explained to the patient and/or his/her guardian, the risks and benefits involved in the proposed treatment and any reasonable alternative to the proposed treatment.  I have also explained the risks and benefits involved in the refusal of the proposed treatment and have answered the patient's questions.                        Date:  ______/______/_______  Provider                      Signature:  __________________________________________________________       Time:  ___________ A.M    P.M.

## (undated) NOTE — LETTER
[place] [address]  Authorization for Invasive Procedures  Date: 06/04/24           Time: 14:28    I hereby authorize ***, my physician and his/her assistants (if applicable), which may include medical students, residents, and/or fellows, to perform the following surgical operation/ procedure and administer such anesthesia as may be determined necessary by my physician:  Operation/Procedure name (s)  Ultrasound guided paracentesis on Christi Tavarez   2.   I recognize that during the surgical operation/procedure, unforeseen conditions may necessitate additional or different procedures than those listed above.  I, therefore, further authorize and request that the above-named surgeon, assistants, or designees perform such procedures as are, in their judgment, necessary and desirable.    3.   My surgeon/physician has discussed prior to my surgery the potential benefits, risks and side effects of this procedure; the likelihood of achieving goals; and potential problems that might occur during recuperation.  They also discussed reasonable alternatives to the procedure, including risks, benefits, and side effects related to the alternatives and risks related to not receiving this procedure.  I have had all my questions answered and I acknowledge that no guarantee has been made as to the result that may be obtained.    4.   Should the need arise during my operation/procedure, which includes change of level of care prior to discharge, I also consent to the administration of blood and/or blood products.  Further, I understand that despite careful testing and screening of blood or blood products by collecting agencies, I may still be subject to ill effects as a result of receiving a blood transfusion and/or blood products.  The following are some, but not all, of the potential risks that can occur: fever and allergic reactions, hemolytic reactions, transmission of diseases such as Hepatitis, AIDS and  Cytomegalovirus (CMV) and fluid overload.  In the event that I wish to have an autologous transfusion of my own blood, or a directed donor transfusion, I will discuss this with my physician.  Check only if Refusing Blood or Blood Products  I understand refusal of blood or blood products as deemed necessary by my physician may have serious consequences to my condition to include possible death. I hereby assume responsibility for my refusal and release the hospital, its personnel, and my physicians from any responsibility for the consequences of my refusal.          o  Refuse      5.   I authorize the use of any specimen, organs, tissues, body parts or foreign objects that may be removed from my body during the operation/procedure for diagnosis, research or teaching purposes and their subsequent disposal by hospital authorities.  I also authorize the release of specimen test results and/or written reports to my treating physician on the hospital medical staff or other referring or consulting physicians involved in my care, at the discretion of the Pathologist or my treating physician.    6.   I consent to the photographing or videotaping of the operations or procedures to be performed, including appropriate portions of my body for medical, scientific, or educational purposes, provided my identity is not revealed by the pictures or by descriptive texts accompanying them.  If the procedure has been photographed/videotaped, the surgeon will obtain the original picture, image, videotape or CD.  The hospital will not be responsible for storage, release or maintenance of the picture, image, tape or CD.    7.   I consent to the presence of a  or observers in the operating room as deemed necessary by my physician or their designees.    8.   I recognize that in the event my procedure results in extended X-Ray/fluoroscopy time, I may develop a skin reaction.    9. If I have a Do Not Attempt Resuscitation  (DNAR) order in place, that status will be suspended while in the operating room, procedural suite, and during the recovery period unless otherwise explicitly stated by me (or a person authorized to consent on my behalf). The surgeon or my attending physician will determine when the applicable recovery period ends for purposes of reinstating the DNAR order.  10. Patients having a sterilization procedure: I understand that if the procedure is successful the results will be permanent and it will therefore be impossible for me to inseminate, conceive, or bear children.  I also understand that the procedure is intended to result in sterility, although the result has not been guaranteed.   11. I acknowledge that my physician has explained sedation/analgesia administration to me including the risk and benefits I consent to the administration of sedation/analgesia as may be necessary or desirable in the judgment of my physician.    I CERTIFY THAT I HAVE READ AND FULLY UNDERSTAND THE ABOVE CONSENT TO OPERATION and/or OTHER PROCEDURE.        ____________________________________       _________________________________      ______________________________  Signature of Patient         Signature of Responsible Person        Printed Name of Responsible Person    ____________________________________      _________________________________      ______________________________       Signature of Witness          Relationship to Patient                       Date                                       Time  Patient Name: Christi Tavarez  : 1960    Reviewed: 2024   Printed: 2024  Medical Record #: [id number] Page 1 of 2           STATEMENT OF PHYSICIAN My signature below affirms that prior to the time of the procedure; I have explained to the patient and/or his/her legal representative, the risks and benefits involved in the proposed treatment and any reasonable alternative to the proposed treatment. I have also  explained the risks and benefits involved in refusal of the proposed treatment and alternatives to the proposed treatment and have answered the patient's questions. If I have a significant financial interest in a co-management agreement or a significant financial interest in any product or implant, or other significant relationship used in this procedure/surgery, I have disclosed this and had a discussion with my patient.     _______________________________________________________________ _____________________________  (Signature of Physician)                                                                                         (Date)                                   (Time)  Patient Name: Christi Tavarez  : 1960    Reviewed: 2024   Printed: 2024  Medical Record #: [id number] Page 2 of 2

## (undated) NOTE — LETTER
MINOR CASE LETTER      5/15/2025        Dear Chrsiti,    Your are having a Hysteroscopy with Truclear Dilation &Curettage on Wed,07/16/2025 at 11:30am at [facility].    Do not eat or drink anything (including water) after midnight the night before surgery.    If your procedure is scheduled later in the day, then nothing by mouth for 6 hours before arrival to the hospital.    You are to call this office if you have any cold or flu symptoms 2 days before your scheduled surgery.    Please avoid ALL aspirin and herbal supplements 7 days before surgery.  Avoid Ibuprofen, Motrin, Aleve, or Naprosyn for 3 days before surgery.    Please avoid ALL Cannabis use 24 hours prior to surgery.    You cannot wear hair pins,wigs,artificial nail or metallic nails/ nail polish for surgery.    Friendly reminder to please have your PCP fax your medical clearance to [phone]. Please be aware medical clearance is only valid for 30days. All we need is a letter stating you are medically cleared to move forward with surgery. We have no specific testing required for your medical clearance what ever your PCP deems necessary to move forward with surgery is sufficient. Please be aware clearance is needed no later than Tues,7/15/25 at 3:00pm in order to move forward with surgery.  You will be contacted by PreAdmission Testing (PAT) usually within the week before surgery.  They will take a short medical history and let you know if any preoperative testing is needed. If you have any questions for preadmission testing please feel free to contact them by calling [phone].    Per your insurance no prior authorization is needed for surgery.    Please make arrangements for someone to drive you home after your surgery.    Call our office now to schedule your post-operative appointment for 2 weeks after surgery.    Please feel free to contact our office at [phone] if you have any questions regarding these instructions or your  procedure.      Sincerely,          Laney Gutierrez MD  [facility] - OB/GYN  [address] [address] [phone]

## (undated) NOTE — Clinical Note
TCM call completed. Pt is doing well, denies concerns. TE sent to PCP office to FU on HFU appt. Thank you!

## (undated) NOTE — LETTER
[facility]  [address], [place], [place]    Authorization for Surgical Operation and Procedure                               I hereby authorize Yobany Guardado MD, my physician and his/her assistants (if applicable), which may include medical students, residents, and/or fellows, to perform the following surgical operation/ procedure and administer such anesthesia as may be determined necessary by my physician: Operation/Procedure name (s) ESOPHAGOGASTRODUODENOSCOPY / COLONOSCOPY on Christi Tavarez   2.   I recognize that during the surgical operation/procedure, unforeseen conditions may necessitate additional or different procedures than those listed above.  I, therefore, further authorize and request that the above-named surgeon, assistants, or designees perform such procedures as are, in their judgment, necessary and desirable.    3.   My surgeon/physician has discussed prior to my surgery the potential benefits, risks and side effects of this procedure; the likelihood of achieving goals; and potential problems that might occur during recuperation.  They also discussed reasonable alternatives to the procedure, including risks, benefits, and side effects related to the alternatives and risks related to not receiving this procedure.  I have had all my questions answered and I acknowledge that no guarantee has been made as to the result that may be obtained.    4.   Should the need arise during my operation/procedure, which includes change of level of care prior to discharge, I also consent to the administration of blood and/or blood products.  Further, I understand that despite careful testing and screening of blood or blood products by collecting agencies, I may still be subject to ill effects as a result of receiving a blood transfusion and/or blood products.  The following are some, but not all, of the potential risks that can occur: fever and allergic reactions, hemolytic reactions,  transmission of diseases such as Hepatitis, AIDS and Cytomegalovirus (CMV) and fluid overload.  In the event that I wish to have an autologous transfusion of my own blood, or a directed donor transfusion, I will discuss this with my physician.  Check only if Refusing Blood or Blood Products  I understand refusal of blood or blood products as deemed necessary by my physician may have serious consequences to my condition to include possible death. I hereby assume responsibility for my refusal and release the hospital, its personnel, and my physicians from any responsibility for the consequences of my refusal.    o  Refuse   5.   I authorize the use of any specimen, organs, tissues, body parts or foreign objects that may be removed from my body during the operation/procedure for diagnosis, research or teaching purposes and their subsequent disposal by hospital authorities.  I also authorize the release of specimen test results and/or written reports to my treating physician on the hospital medical staff or other referring or consulting physicians involved in my care, at the discretion of the Pathologist or my treating physician.    6.   I consent to the photographing or videotaping of the operations or procedures to be performed, including appropriate portions of my body for medical, scientific, or educational purposes, provided my identity is not revealed by the pictures or by descriptive texts accompanying them.  If the procedure has been photographed/videotaped, the surgeon will obtain the original picture, image, videotape or CD.  The hospital will not be responsible for storage, release or maintenance of the picture, image, tape or CD.    7.   I consent to the presence of a  or observers in the operating room as deemed necessary by my physician or their designees.    8.   I recognize that in the event my procedure results in extended X-Ray/fluoroscopy time, I may develop a skin reaction.    9. If  I have a Do Not Attempt Resuscitation (DNAR) order in place, that status will be suspended while in the operating room, procedural suite, and during the recovery period unless otherwise explicitly stated by me (or a person authorized to consent on my behalf). The surgeon or my attending physician will determine when the applicable recovery period ends for purposes of reinstating the DNAR order.  10. Patients having a sterilization procedure: I understand that if the procedure is successful the results will be permanent and it will therefore be impossible for me to inseminate, conceive, or bear children.  I also understand that the procedure is intended to result in sterility, although the result has not been guaranteed.   11. I acknowledge that my physician has explained sedation/analgesia administration to me including the risk and benefits I consent to the administration of sedation/analgesia as may be necessary or desirable in the judgment of my physician.    I CERTIFY THAT I HAVE READ AND FULLY UNDERSTAND THE ABOVE CONSENT TO OPERATION and/or OTHER PROCEDURE.     ____________________________________  _________________________________        ______________________________  Signature of Patient    Signature of Responsible Person                Printed Name of Responsible Person                                      ____________________________________  _____________________________                ________________________________  Signature of Witness        Date  Time         Relationship to Patient    STATEMENT OF PHYSICIAN My signature below affirms that prior to the time of the procedure; I have explained to the patient and/or his/her legal representative, the risks and benefits involved in the proposed treatment and any reasonable alternative to the proposed treatment. I have also explained the risks and benefits involved in refusal of the proposed treatment and alternatives to the proposed treatment and have  answered the patient's questions. If I have a significant financial interest in a co-management agreement or a significant financial interest in any product or implant, or other significant relationship used in this procedure/surgery, I have disclosed this and had a discussion with my patient.     _____________________________________________________              _____________________________  (Signature of Physician)                                                                                         (Date)                                   (Time)  Patient Name: Christi DANIELS Daysi      : 1960      Printed: 2024     Medical Record #: [id number]                                      Page 1 of 1

## (undated) NOTE — LETTER
[facility]  Administration of Anesthesia  I, Christi Tavarez agree to be cared for by a physician anesthesiologist alone and/or with a nurse anesthetist, who is specially trained to monitor me and give me medicine to put me to sleep or keep me comfortable during my procedure    I understand that my anesthesiologist and/or anesthetist is not an employee or agent of [facility] or [employer]. He or she works for [facility]    As the patient asking for anesthesia services, I agree to:  Allow the anesthesiologist (anesthesia doctor) to give me medicine and do additional procedures as necessary. Some examples are: Starting or using an “IV” to give me medicine, fluids or blood during my procedure, and having a breathing tube placed to help me breathe when I’m asleep (intubation). In the event that my heart stops working properly, I understand that my anesthesiologist will make every effort to sustain my life, unless otherwise directed by [facility] Do Not Resuscitate documents.  Tell my anesthesia doctor before my procedure:  If I am pregnant.  The last time that I ate or drank.  iii. All of the medicines I take (including prescriptions, herbal supplements, and pills I can buy without a prescription (including street drugs/illegal medications). Failure to inform my anesthesiologist about these medicines may increase my risk of anesthetic complications.  iv.If I am allergic to anything or have had a reaction to anesthesia before.  I understand how the anesthesia medicine will help me (benefits).  I understand that with any type of anesthesia medicine there are risks:  The most common risks are: nausea, vomiting, sore throat, muscle soreness, damage to my eyes, mouth, or teeth (from breathing tube placement).  Rare risks include: remembering what happened during my procedure, allergic reactions to medications, injury to my airway, heart, lungs, vision, nerves, or  muscles and in extremely rare instances death.  My doctor has explained to me other choices available to me for my care (alternatives).  Pregnant Patients (“epidural”):  I understand that the risks of having an epidural (medicine given into my back to help control pain during labor), include itching, low blood pressure, difficulty urinating, headache or slowing of the baby’s heart. Very rare risks include infection, bleeding, seizure, irregular heart rhythms and nerve injury.  Regional Anesthesia (“spinal”, “epidural”, & “nerve blocks”):  I understand that rare but potential complications include headache, bleeding, infection, seizure, irregular heart rhythms, and nerve injury.    _____________________________________________________________________________  Patient (or Representative) Signature/Relationship to Patient  Date   Time    _____________________________________________________________________________   Name (if used)    Language/Organization   Time    _____________________________________________________________________________  Nurse Anesthetist Signature     Date   Time  _____________________________________________________________________________  Anesthesiologist Signature     Date   Time  I have discussed the procedure and information above with the patient (or patient’s representative) and answered their questions. The patient or their representative has agreed to have anesthesia services.    _____________________________________________________________________________  Witness        Date   Time  I have verified that the signature is that of the patient or patient’s representative, and that it was signed before the procedure  Patient Name: Christi Tavarez     : 1960                 Printed: 2024 at 7:53 AM    Medical Record #: [id number]                                            Page 1 of 1  ----------ANESTHESIA CONSENT----------

## (undated) NOTE — LETTER
[address], [place], [place]    Authorization for Surgical Operation and Procedure                               I hereby authorize PAM FARIAS , my physician and his/her assistants (if applicable), which may include medical students, residents, and/or fellows, to perform the following surgical operation/ procedure and administer such anesthesia as may be determined necessary by my physician:    ULTRASOUND GUIDED PARACENTESIS on Christi Tavarez   2.   I recognize that during the surgical operation/procedure, unforeseen conditions may necessitate additional or different procedures than those listed above.  I, therefore, further authorize and request that the above-named surgeon, assistants, or designees perform such procedures as are, in their judgment, necessary and desirable.    3.   My surgeon/physician has discussed prior to my surgery the potential benefits, risks and side effects of this procedure; the likelihood of achieving goals; and potential problems that might occur during recuperation.  They also discussed reasonable alternatives to the procedure, including risks, benefits, and side effects related to the alternatives and risks related to not receiving this procedure.  I have had all my questions answered and I acknowledge that no guarantee has been made as to the result that may be obtained.    4.   Should the need arise during my operation/procedure, which includes change of level of care prior to discharge, I also consent to the administration of blood and/or blood products.  Further, I understand that despite careful testing and screening of blood or blood products by collecting agencies, I may still be subject to ill effects as a result of receiving a blood transfusion and/or blood products.  The following are some, but not all, of the potential risks that can occur: fever and allergic reactions, hemolytic reactions, transmission of diseases such as Hepatitis, AIDS  and Cytomegalovirus (CMV) and fluid overload.  In the event that I wish to have an autologous transfusion of my own blood, or a directed donor transfusion, I will discuss this with my physician.  Check only if Refusing Blood or Blood Products  I understand refusal of blood or blood products as deemed necessary by my physician may have serious consequences to my condition to include possible death. I hereby assume responsibility for my refusal and release the hospital, its personnel, and my physicians from any responsibility for the consequences of my refusal.    o  Refuse   5.   I authorize the use of any specimen, organs, tissues, body parts or foreign objects that may be removed from my body during the operation/procedure for diagnosis, research or teaching purposes and their subsequent disposal by hospital authorities.  I also authorize the release of specimen test results and/or written reports to my treating physician on the hospital medical staff or other referring or consulting physicians involved in my care, at the discretion of the Pathologist or my treating physician.    6.   I consent to the photographing or videotaping of the operations or procedures to be performed, including appropriate portions of my body for medical, scientific, or educational purposes, provided my identity is not revealed by the pictures or by descriptive texts accompanying them.  If the procedure has been photographed/videotaped, the surgeon will obtain the original picture, image, videotape or CD.  The hospital will not be responsible for storage, release or maintenance of the picture, image, tape or CD.    7.   I consent to the presence of a  or observers in the operating room as deemed necessary by my physician or their designees.    8.   I recognize that in the event my procedure results in extended X-Ray/fluoroscopy time, I may develop a skin reaction.    9. If I have a Do Not Attempt Resuscitation (DNAR)  order in place, that status will be suspended while in the operating room, procedural suite, and during the recovery period unless otherwise explicitly stated by me (or a person authorized to consent on my behalf). The surgeon or my attending physician will determine when the applicable recovery period ends for purposes of reinstating the DNAR order.  10. Patients having a sterilization procedure: I understand that if the procedure is successful the results will be permanent and it will therefore be impossible for me to inseminate, conceive, or bear children.  I also understand that the procedure is intended to result in sterility, although the result has not been guaranteed.   11. I acknowledge that my physician has explained sedation/analgesia administration to me including the risk and benefits I consent to the administration of sedation/analgesia as may be necessary or desirable in the judgment of my physician.    I CERTIFY THAT I HAVE READ AND FULLY UNDERSTAND THE ABOVE CONSENT TO OPERATION and/or OTHER PROCEDURE.     ____________________________________  _________________________________        ______________________________  Signature of Patient    Signature of Responsible Person                Printed Name of Responsible Person                                      ____________________________________  _____________________________                ________________________________  Signature of Witness        Date  Time         Relationship to Patient    STATEMENT OF PHYSICIAN My signature below affirms that prior to the time of the procedure; I have explained to the patient and/or his/her legal representative, the risks and benefits involved in the proposed treatment and any reasonable alternative to the proposed treatment. I have also explained the risks and benefits involved in refusal of the proposed treatment and alternatives to the proposed treatment and have answered the patient's questions. If I have a  significant financial interest in a co-management agreement or a significant financial interest in any product or implant, or other significant relationship used in this procedure/surgery, I have disclosed this and had a discussion with my patient.     _____________________________________________________              _____________________________  (Signature of Physician)                                                                                         (Date)                                   (Time)  Patient Name: Christi Tavarez      : 1960      Printed: 2024     Medical Record #: [id number]                                      Page 1 of 1

## (undated) NOTE — IP AVS SNAPSHOT
Patient Demographics     Address  [address] [facility] [address] Phone  [phone] (Home) *Preferred*  [phone] (Mobile) E-mail Address  [email or web address]      Patient Contacts     Name Relation Home Work Mobile    Nancy Mcmullen Daughter   [phone]    Ozzie Tavarez Son   [phone]      Allergies as of 6/18/2024  Review status set to Review Complete on 6/3/2024   No Known Allergies     Code Status Information     Code Status    Full Code        Patient Instructions       HOME HEALTH:  Sometimes managing your health at home requires assistance.  The [facility] team has recognized your preference to use   [facility]  [address] [place] [address]  Phone: [phone]  Fax: [phone]  A representative from the home health agency will contact you or your family to schedule your first visit.        Going Home    In this section you will find the tools which will guide you through the first few days after you leave the hospital. Continued use of these tools will help you develop the skills necessary to keep your heart failure under control.     Heart Failure Guidelines - place this worksheet on your refrigerator or somewhere you can refer to it daily to help you decide if your symptoms are under control, and what to do if they are not.     Home Care Instructions Following Heart Failure - the most important things to do every day include:     Weigh yourself  Take your medicines as prescribed  Limit your sodium (salt) and fluid intake  Know when to call your cardiologist, primary doctor, or nurse  Know when to seek emergency care    There is also a handy weight chart on which to record your weight every day, information on measuring fluids and limiting fluid intake, and a section for recording your thoughts or questions.     Things for You to Remember:   1. An appointment has been made for you to see your doctor or healthcare provider within 7 days of  hospital discharge. It is important that you attend this appointment to make sure your symptoms are under control.     2. Your recommended sodium intake is 8020-9775 mg daily    3. Limit your fluid intake to no more than 2 liters or 64 ounces per day    4. Some exercise and activity is important to help keep your heart functioning and strong. Unless instructed not to exercise, you may walk at a slow to moderate pace for 10-15 minutes 2-3 days per week to start. Pace your activity to prevent shortness of breath or fatigue. Stop exercise if you develop chest pain, lightheadedness, or significant shortness of breath.       Call Your Cardiologist If:   You gain 2 pounds overnight or 3-4 pounds in 3-5 days  You have more difficulty breathing  You are getting more tired with normal activity  You are more short of breath lying down, or awaken at night short of breath  You have swelling of your feet or legs  You urinate less often during the day and more often at night  You have cramps in your legs  You have blurred vision or see yellowish-green halos around objects of lights    Go to the Emergency Room If:   You have pain or tightness in your chest  You are extremely short of breath  You are coughing up pink-frothy mucus  You are traveling and develop symptoms of worsening heart failure    Additional Resources:   If you have questions or concerns about heart failure management, call your cardiologist        Patient Isolation Status     Isolation Added    Contact and Droplet 06/13/24      Microbiology Results (All)     Procedure Component Value Units Date/Time    Blood Culture [[id number]] Collected: 06/17/24 1121    Order Status: Completed Lab Status: Preliminary result Updated: 06/18/24 1201    Specimen: Blood,peripheral      Blood Culture Result No Growth 1 Day    Blood Culture [[id number]] Collected: 06/17/24 1122    Order Status: Completed Lab Status: Preliminary result Updated: 06/18/24 1201    Specimen:  Blood,peripheral      Blood Culture Result No Growth 1 Day    Blood Culture [[id number]]  (Abnormal) Collected: 06/12/24 1327    Order Status: Completed Lab Status: Final result Updated: 06/18/24 0853    Specimen: Blood,peripheral      Blood Culture Result Positive      Bifidobacterium species     Blood Smear Positive Blood Culture      Gram Positive Rods    Narrative:      Anaerobic Bottle Volume - 10 mL    Anaerobic Bottle Time to Detection - 84 hr  6 min     Blood Culture PCR [[id number]] Collected: 06/12/24 1327    Order Status: Completed Lab Status: Final result Updated: 06/17/24 1239    Specimen: Blood,peripheral      Blood Culture ID by PCR No Target Detected     PCR Comment --    Blood Culture [[id number]]  (Abnormal) Collected: 06/12/24 1327    Order Status: Completed Lab Status: Final result Updated: 06/17/24 1238    Specimen: Blood,peripheral      Blood Culture Result Positive      Bifidobacterium species      Eggerthella (Eubacterium) lentum     Blood Smear Positive Blood Culture      Gram Positive Rods    Narrative:      Anaerobic Bottle Volume - 10 mL    Anaerobic Bottle Time to Detection - 63 hr  18 min     Urine Culture, Routine [[id number]] Collected: 06/13/24 1227    Order Status: Completed Lab Status: Final result Updated: 06/14/24 0939    Specimen: Urine, clean catch      Urine Culture <10,000 cfu/ml Multiple species present- probable contamination.    Rapid SARS-CoV-2 by PCR [[id number]]  (Abnormal) Collected: 06/11/24 1654    Order Status: Completed Lab Status: Final result Updated: 06/11/24 1707    Specimen: Other from Nares      Rapid SARS-CoV-2 by PCR Detected    Body Fluid Cult Aerobic and Anaerobic [[id number]] Collected: 06/05/24 1031    Order Status: Completed Lab Status: Final result Updated: 06/10/24 1101    Specimen: Body fluid, unspecified from Ascites fluid      Body Fluid Culture Result No Growth 5 Days     Body Fluid Smear 1+ WBCs seen      No organisms seen      This is a  cytocentrifuged smear.      Immunizations     Name Date      TDAP 04/03/24        Follow-up Information     Pepper Hedrick,  Follow up in 1 month(s).    Specialty: NEUROLOGY  Why: Neuro/Stroke follow up  Contact information:  [address]  [place] [id number] [phone]             [place]. Schedule an appointment as soon as possible for a visit in 1 week(s).    Contact information:  [address]  [address]  [place] [address] [phone]                        Your Home Meds List      TAKE these medications       Instructions Authorizing Provider Morning Afternoon Evening As Needed   bumetanide 2 MG Tabs  Commonly known as: Bumex  Next dose due: Tomorrow morning       Take 1 tablet (2 mg total) by mouth BID (Diuretic).   Antony Zavala         ergocalciferol 1.25 MG (88893 UT) Caps  Commonly known as: Vitamin D2  Next dose due: Resume home schedule       Take 1 capsule (50,000 Units total) by mouth every 7 days. Sundays          escitalopram 10 MG Tabs  Commonly known as: Lexapro  Next dose due: Tonight       Take 1 tablet (10 mg total) by mouth nightly.   Alvaro Arvizu         folic acid 1 MG Tabs  Commonly known as: Folvite  Next dose due: Tomorrow morning       Take 1 tablet (1 mg total) by mouth daily.   Alvaro Arvizu         glipiZIDE 5 MG Tabs  Commonly known as: Glucotrol  Next dose due: Tomorrow morning       Take 1 tablet (5 mg total) by mouth every morning before breakfast. With food          insulin aspart 100 Units/mL Sopn  Commonly known as: NovoLOG  Next dose due: Tomorrow morning       Inject 1-5 Units into the skin 3 (three) times daily before meals. Per sliding scale          lactulose 10 GM/15ML Soln  Commonly known as: CHRONULAC      Take 30 mL (20 g total) by mouth 2 (two) times daily as needed.          lamoTRIgine 25 MG Tabs  Commonly known as: LaMICtal  Next dose due: Tonight       Take 1 tablet (25 mg total) by mouth 2 (two) times daily.   Alvaro Arvizu          levothyroxine 50 MCG Tabs  Commonly known as: Synthroid  Next dose due: Tomorrow morning       Take 1 tablet (50 mcg total) by mouth before breakfast.          midodrine 2.5 MG Tabs  Commonly known as: ProAmatine  Next dose due: Tomorrow morning       Take 1 tablet (2.5 mg total) by mouth in the morning and 1 tablet (2.5 mg total) at noon and 1 tablet (2.5 mg total) in the evening.   Antony Elezi         omeprazole 20 MG Cpdr  Commonly known as: PriLOSEC  Next dose due: Tomorrow morning       Take 1 capsule (20 mg total) by mouth every morning before breakfast.          potassium chloride 20 MEQ Pack  Commonly known as: Klor-Con  Next dose due: Tomorrow morning       Take 10 mEq by mouth daily.          potassium chloride 20 MEQ Pack  Commonly known as: Klor-Con  Start taking on: June 19, 2024  Next dose due: Tomorrow morning       Take 40 mEq by mouth daily.   Antony Elezi         rifAXIMin 550 MG Tabs  Commonly known as: Xifaxan  Next dose due: Tonight       Take 1 tablet (550 mg total) by mouth 2 (two) times daily.   Antony Elezi         spironolactone 50 MG Tabs  Commonly known as: Aldactone  Next dose due: Tomorrow morning       Take 1 tablet (50 mg total) by mouth daily.   Antony Elezi         traZODone 50 MG Tabs  Commonly known as: Desyrel  Next dose due: Tonight       Take 0.5 tablets (25 mg total) by mouth nightly.   Alvaro Arvizu               Where to Get Your Medications      These medications were sent to [id number] - [place], [place] - [address] AT [facility], [phone], [phone]  [address], [facility] [address]    Phone: [phone]   bumetanide 2 MG Tabs  midodrine 2.5 MG Tabs  potassium chloride 20 MEQ Pack  rifAXIMin 550 MG Tabs  spironolactone 50 MG Tabs           [id number] - MAR ACTION REPORT  (last 48 hrs)    ** SITE UNKNOWN **     Order ID Medication Name Action Time Action Reason Comments    [id number] bumetanide (Bumex) tab 2 mg  06/17/24 0840 Given      [id number] bumetanide (Bumex) tab 2 mg 06/17/24 1605 Given      [id number] bumetanide (Bumex) tab 2 mg 06/18/24 0826 Given      [id number] bumetanide (Bumex) tab 2 mg 06/18/24 1650 Given      [id number] escitalopram (Lexapro) tab 10 mg 06/16/24 2158 Given      [id number] escitalopram (Lexapro) tab 10 mg 06/17/24 2136 Given      [id number] folic acid (Folvite) tab 1 mg 06/17/24 0840 Given      [id number] folic acid (Folvite) tab 1 mg 06/18/24 0827 Given      [id number] lactulose (CHRONULAC) 10 GM/15ML solution 20 g 06/16/24 2158 Given      [id number] lactulose (CHRONULAC) 10 GM/15ML solution 20 g 06/17/24 0840 Given      [id number] lactulose (CHRONULAC) 10 GM/15ML solution 20 g 06/17/24 2137 Given      [id number] lactulose (CHRONULAC) 10 GM/15ML solution 20 g 06/18/24 0826 Given      [id number] lamoTRIgine (LaMICtal) tab 25 mg 06/16/24 2158 Given      [id number] lamoTRIgine (LaMICtal) tab 25 mg 06/17/24 0840 Given      [id number] lamoTRIgine (LaMICtal) tab 25 mg 06/17/24 2136 Given      [id number] lamoTRIgine (LaMICtal) tab 25 mg 06/18/24 0826 Given      [id number] levothyroxine (Synthroid) tab 50 mcg 06/17/24 0508 Given      [id number] levothyroxine (Synthroid) tab 50 mcg 06/18/24 0518 Given      [id number] midodrine (ProAmatine) tab 2.5 mg 06/17/24 0508 Given      [id number] midodrine (ProAmatine) tab 2.5 mg 06/17/24 1350 Given      [id number] midodrine (ProAmatine) tab 2.5 mg 06/17/24 1605 Given      [id number] midodrine (ProAmatine) tab 2.5 mg 06/18/24 0518 Given      [id number] midodrine (ProAmatine) tab 2.5 mg 06/18/24 1151 Given      [id number] midodrine (ProAmatine) tab 2.5 mg 06/18/24 1650 Given      [id number] pantoprazole (Protonix) DR tab 20 mg 06/17/24 0508 Given      [id number] potassium chloride (Klor-Con M20) tab 40 mEq 06/18/24 0826 Given      [id number] potassium chloride (Klor-Con M20) tab 40 mEq 06/18/24 1151 Given      [id number] potassium chloride (Klor-Con M20) tab 40 mEq 06/18/24 1650 Given       [id number] rifAXIMin (Xifaxan) tab 550 mg 06/16/24 2158 Given      [id number] rifAXIMin (Xifaxan) tab 550 mg 06/17/24 0840 Given      [id number] rifAXIMin (Xifaxan) tab 550 mg 06/17/24 2136 Given      [id number] rifAXIMin (Xifaxan) tab 550 mg 06/18/24 0827 Given      [id number] spironolactone (Aldactone) tab 50 mg 06/17/24 0840 Given      [id number] spironolactone (Aldactone) tab 50 mg 06/18/24 0827 Given      [id number] traZODone (Desyrel) tab 25 mg 06/16/24 2157 Given      [id number] traZODone (Desyrel) tab 25 mg 06/17/24 2136 Given            RIGHT LOWER ABDOMEN     Order ID Medication Name Action Time Action Reason Comments    [id number] insulin aspart (NovoLOG) 100 Units/mL FlexPen 1-5 Units 06/18/24 1357 Given              Recent Vital Signs    Flowsheet Row Most Recent Value   /56 Filed at 06/18/2024 1649   Pulse 87 Filed at 06/18/2024 1649   Resp 16 Filed at 06/18/2024 1649   Temp 98.1 °F (36.7 °C) Filed at 06/18/2024 1649   SpO2 97 % Filed at 06/18/2024 1649      Patient's Most Recent Weight    Flowsheet Row Most Recent Value   Patient Weight 52.9 kg (116 lb 11.2 oz)         Lab Results Last 24 Hours      Potassium [[id number]] (Normal)  Resulted: 06/18/24 1608, Result status: Final result   Ordering provider: Antony Zavala MD  06/18/24 0757 Resulting lab: [facility] ([facility])    Specimen Information    Type Source Collected On   Blood — 06/18/24 1534          Components    Component Value Reference Range Flag Lab   Potassium 3.8 3.5 - 5.1 mmol/L — [facility] ([facility])            Basic Metabolic Panel (8) [[id number]] (Abnormal)  Resulted: 06/18/24 0655, Result status: Final result   Ordering provider: Antony Zavala MD  06/17/24 2300 Resulting lab: [facility] ([facility])    Specimen Information    Type Source Collected On   Blood — 06/18/24 0603          Components    Component Value Reference Range Flag Lab   Glucose 103 70 - 99 mg/dL H [facility] ([facility])    Sodium 138 136 - 145 mmol/L — [facility] ([facility])   Potassium 3.2 3.5 - 5.1 mmol/L L [facility])   Chloride 102 98 - 112 mmol/L — [facility])   CO2 30.0 21.0 - 32.0 mmol/L — [facility])   Anion Gap 6 0 - 18 mmol/L — [facility])   BUN 7 9 - 23 mg/dL L [facility])   Creatinine 0.65 0.55 - 1.02 mg/dL — [facility])   BUN/CREA Ratio 10.8 10.0 - 20.0 — [facility])   Calcium, Total 8.4 8.7 - 10.4 mg/dL L [facility])   Calculated Osmolality 284 275 - 295 mOsm/kg — [facility] ([facility])   eGFR-Cr 99 >=60 mL/min/1.73m2 — [facility])            CBC With Differential With Platelet [[id number]] (Abnormal)  Resulted: 06/18/24 0625, Result status: Final result   Ordering provider: Antony Zavala MD  06/17/24 2300 Resulting lab: [facility] ([facility])   Narrative:  The following orders were created for panel order CBC With Differential With Platelet.  Procedure                               Abnormality         Status                     ---------                               -----------         ------                     CBC W/ DIFFERENTIAL[[id number]]          Abnormal            Final result                 Please view results for these tests on the individual orders.    Specimen Information    Type Source Collected On   Blood — 06/18/24 0604            Testing Performed By     Lab - Abbreviation Name Director Address Valid Date Range    162 - [facility] ([facility]) [facility] ([facility]) Adalberto Bucio [facility] [address] 03/19/20 1442 - Present            Pending Labs     Order Current Status    Blood Culture Preliminary result    Blood Culture Preliminary result         H&P - H&P Note      H&P signed by Antony Zavala MD at 6/4/2024 10:38 PM  Version 2 of 2    Author: Antony Zavala MD Service: Internal Medicine Author Type: Physician    Filed: 6/4/2024 10:38 PM Date of Service: 6/4/2024 10:02  PM Status: Addendum    : Antony Zavala MD (Physician)    Related Notes: Original Note by Antony Zavala MD (Physician) filed at 2024 10:35 PM       [facility]  part of formerly [facility]    History and Physical    Christi Tavarez Patient Status:  Inpatient    1960 MRN [id number]   Location [facility] 3W/SW Attending Antony Zavala MD   Hosp Day # 1 PCP ÁNGEL Delgado     Date:  2024  Date of Admission:  6/3/2024    History provided by:patient and daughter  HPI:     Chief Complaint   Patient presents with    Congestive Heart Failure     64 yo female  who I know wron recent rehab admition with recent hospital admitting due to etoh Withdrawal, liver cirrhosis , cerebellar bleed, transferred to rehab was dc'd home last week but noticed to have more sob with exertion and increased edema . Pt had us recently at rehab which showed moderate ascites            History     Past Medical History:    CHF (congestive heart failure) (HCC)    Diabetes (HCC)    Stroke (HCC)     No past surgical history on file.  No family history on file.  Social History:  Social History     Socioeconomic History    Marital status:    Tobacco Use    Smoking status: Never    Smokeless tobacco: Never   Vaping Use    Vaping status: Never Used   Substance and Sexual Activity    Alcohol use: Yes     Comment: socially    Drug use: Never     Social Determinants of Health     Food Insecurity: No Food Insecurity (6/3/2024)    Food Insecurity     Food Insecurity: Never true   Transportation Needs: No Transportation Needs (6/3/2024)    Transportation Needs     Lack of Transportation: No   Housing Stability: Low Risk  (6/3/2024)    Housing Stability     Housing Instability: No     Allergies/Medications:   Allergies: No Known Allergies  Medications Prior to Admission   Medication Sig    furosemide 40 MG Oral Tab Take 1 tablet (40 mg total) by mouth daily.    omeprazole 20 MG Oral Capsule Delayed Release Take  1 capsule (20 mg total) by mouth every morning before breakfast.    potassium chloride 20 MEQ Oral Powd Pack Take 10 mEq by mouth daily.    spironolactone 25 MG Oral Tab Take 1 tablet (25 mg total) by mouth daily.    insulin aspart 100 Units/mL Subcutaneous Solution Pen-injector Inject 1-5 Units into the skin 3 (three) times daily before meals. Per sliding scale    levothyroxine 50 MCG Oral Tab Take 1 tablet (50 mcg total) by mouth before breakfast.    lactulose 10 GM/15ML Oral Solution Take 30 mL (20 g total) by mouth 2 (two) times daily as needed.    ergocalciferol 1.25 MG (81409 UT) Oral Cap Take 1 capsule (50,000 Units total) by mouth every 7 days. Sundays    escitalopram 10 MG Oral Tab Take 1 tablet (10 mg total) by mouth nightly.    folic acid 1 MG Oral Tab Take 1 tablet (1 mg total) by mouth daily.    lamoTRIgine 25 MG Oral Tab Take 1 tablet (25 mg total) by mouth 2 (two) times daily.    traZODone 50 MG Oral Tab Take 0.5 tablets (25 mg total) by mouth nightly.    glipiZIDE 5 MG Oral Tab Take 1 tablet (5 mg total) by mouth every morning before breakfast. With food       Review of Systems:     Constitutional:  Positive for fatigue.   HENT: Negative.     Eyes: Negative.    Respiratory:  Positive for shortness of breath.    Cardiovascular:  Positive for leg swelling.   Gastrointestinal:  Positive for abdominal distention.   Endocrine: Negative.    Genitourinary: Negative.    Musculoskeletal: Negative.    Skin: Negative.    Neurological: Negative.    Psychiatric/Behavioral: Negative.         Physical Exam:   Vital Signs:  Blood pressure 105/58, pulse 93, temperature 98.9 °F (37.2 °C), temperature source Oral, resp. rate 20, height 4' 9\" (1.448 m), weight 155 lb 4.8 oz (70.4 kg), SpO2 98%.  Physical Exam  Vitals and nursing note reviewed.   HENT:      Head: Normocephalic.   Cardiovascular:      Rate and Rhythm: Normal rate.   Pulmonary:      Effort: Pulmonary effort is normal.      Breath sounds: Normal breath  sounds.   Abdominal:      General: Abdomen is flat. There is distension.      Palpations: Abdomen is soft.   Musculoskeletal:         General: Swelling present. Normal range of motion.      Cervical back: Normal range of motion.      Comments: 2 + edema up to thighes   Skin:     Capillary Refill: Capillary refill takes more than 3 seconds.   Neurological:      Mental Status: She is alert and oriented to person, place, and time.   Psychiatric:         Mood and Affect: Mood normal.       Cervical Papanicolaou to be done in MD's office    Results:     Lab Results   Component Value Date    WBC 9.7 06/04/2024    HGB 10.3 (L) 06/04/2024    HCT 27.9 (L) 06/04/2024    .0 (L) 06/04/2024    CREATSERUM 0.75 06/03/2024    BUN 10 06/03/2024     06/03/2024    K 3.6 06/03/2024     06/03/2024    CO2 25.0 06/03/2024     (H) 06/03/2024    CA 8.6 (L) 06/03/2024    ALB 2.5 (L) 06/04/2024    ALKPHO 122 06/04/2024    BILT 4.6 (H) 06/04/2024    TP 6.8 06/04/2024    AST 38 (H) 06/04/2024    ALT 11 06/04/2024    PTT 34.8 06/03/2024    INR 1.79 (H) 06/04/2024    T4F 1.0 04/03/2024    TSH 8.140 (H) 04/03/2024    LIP 49 06/03/2024    MG 2.0 04/04/2024    TROPHS <3 06/03/2024     04/03/2024    B12 >2,000 (H) 04/04/2024    ETOH <3 04/03/2024     CT STROKE CTA BRAIN/CTA NECK (W IV)(CPT=70496/03354)    Result Date: 6/4/2024  CONCLUSION:  1. No major vessel occlusion, hemodynamically significant stenosis, dissection, AVM or aneurysm. 2. Developing encephalomalacia right cerebellum at site of previous hemorrhage.  No acute hemorrhage. 3. 11 mm mean diameter right apical pulmonary nodule.  Follow-up complete chest CT recommended.   This report was called to patient's nurse Audrey 11:59 a.m..    Dictated by (CST): Bal Allan MD on 6/04/2024 at 11:50 AM     Finalized by (CST): Bal Allan MD on 6/04/2024 at 12:00 PM          CT STROKE BRAIN (NO IV)(CPT=70450)    Result Date: 6/4/2024  CONCLUSION:  1. No acute  infarct or hemorrhage. 2. Developing encephalomalacia in the right cerebellum at previous site of hemorrhage documented 04/03/2024.  This report was called immediately to patient's nurse and Nancy at 11:45 a.m.    Dictated by (CST): Bal Allan MD on 6/04/2024 at 11:41 AM     Finalized by (CST): Bal Allan MD on 6/04/2024 at 11:47 AM          XR CHEST AP PORTABLE  (CPT=71045)    Result Date: 6/3/2024  CONCLUSION:  1. Normal heart size with minimally prominent pulmonary vascularity and minimal interstitial edema.  I can not exclude early cardiac failure/fluid overload.  Correlate clinically.  No focal airspace consolidation or large pleural effusion.    Dictated by (CST): Daniel Moss MD on 6/03/2024 at 3:42 PM     Finalized by (CST): Daniel Moss MD on 6/03/2024 at 3:43 PM         EKG 12 Lead    Result Date: 6/3/2024  Sinus tachycardia Otherwise normal ECG When compared with ECG of 03-APR-2024 00:30, No significant change was found Confirmed by Topher Moran ([id number]) on 6/3/2024 4:12:01 PM     Assessment/Plan:         Addendum- Pt had rapid response due to ? Weakness as per family member, ct and MRi with no acute findings, neuro consulted       Fluid overload- ? Cardiac, echo pending, cardiology on board , continue with diuresing    Also component of liver cirrhosis         Alcoholic cirrhosis of liver with ascites (HCC)- abd distention, gi on board for abd paracentesis tomorrow, katherine follow labs                Anemia- of chronic disease stable , will follow                Type 2 diabetes mellitus without complication, without long-term current use of insulin (HCC)- will monitor bs        Cerebellar hemorrhage (HCC)- repeat mri ct head today \" Developing encephalomalacia right cerebellum at site of previous hemorrhage.  No acute hemorrhage     Gen weakness- pt/ot     Lung nodule incidentally noted on ct head, will order ct lungs       Antony Zavala MD  6/4/2024      Electronically signed by Antony Zavala  MD on 2024 10:38 PM     H&P signed by Antony Zavala MD at 2024 10:35 PM  Version 1 of 2    Author: Antony Zavala MD Service: Internal Medicine Author Type: Physician    Filed: 2024 10:35 PM Date of Service: 2024 10:02 PM Status: Signed    : Antony Zavala MD (Physician)    Related Notes: Addendum by Antony Zavala MD (Physician) filed at 2024 10:38 PM       [facility]  part of [facility]    History and Physical    Christi Tavarez Patient Status:  Inpatient    1960 MRN [id number]   Location [facility] 3W/ Attending Antony Zavala MD   Hosp Day # 1 PCP ÁNGEL Delgado     Date:  2024  Date of Admission:  6/3/2024    History provided by:patient and daughter  HPI:     Chief Complaint   Patient presents with    Congestive Heart Failure     64 yo female  who I know wron recent rehab admition with recent hospital admitting due to etoh Withdrawal, liver cirrhosis , cerebellar bleed, transferred to rehab was dc'd home last week but noticed to have more sob with exertion and increased edema . Pt had us recently at rehab which showed moderate ascites            History     Past Medical History:    CHF (congestive heart failure) (HCC)    Diabetes (HCC)    Stroke (HCC)     No past surgical history on file.  No family history on file.  Social History:  Social History     Socioeconomic History    Marital status:    Tobacco Use    Smoking status: Never    Smokeless tobacco: Never   Vaping Use    Vaping status: Never Used   Substance and Sexual Activity    Alcohol use: Yes     Comment: socially    Drug use: Never     Social Determinants of Health     Food Insecurity: No Food Insecurity (6/3/2024)    Food Insecurity     Food Insecurity: Never true   Transportation Needs: No Transportation Needs (6/3/2024)    Transportation Needs     Lack of Transportation: No   Housing Stability: Low Risk  (6/3/2024)    Housing Stability     Housing Instability: No      Allergies/Medications:   Allergies: No Known Allergies  Medications Prior to Admission   Medication Sig    furosemide 40 MG Oral Tab Take 1 tablet (40 mg total) by mouth daily.    omeprazole 20 MG Oral Capsule Delayed Release Take 1 capsule (20 mg total) by mouth every morning before breakfast.    potassium chloride 20 MEQ Oral Powd Pack Take 10 mEq by mouth daily.    spironolactone 25 MG Oral Tab Take 1 tablet (25 mg total) by mouth daily.    insulin aspart 100 Units/mL Subcutaneous Solution Pen-injector Inject 1-5 Units into the skin 3 (three) times daily before meals. Per sliding scale    levothyroxine 50 MCG Oral Tab Take 1 tablet (50 mcg total) by mouth before breakfast.    lactulose 10 GM/15ML Oral Solution Take 30 mL (20 g total) by mouth 2 (two) times daily as needed.    ergocalciferol 1.25 MG (36935 UT) Oral Cap Take 1 capsule (50,000 Units total) by mouth every 7 days. Sundays    escitalopram 10 MG Oral Tab Take 1 tablet (10 mg total) by mouth nightly.    folic acid 1 MG Oral Tab Take 1 tablet (1 mg total) by mouth daily.    lamoTRIgine 25 MG Oral Tab Take 1 tablet (25 mg total) by mouth 2 (two) times daily.    traZODone 50 MG Oral Tab Take 0.5 tablets (25 mg total) by mouth nightly.    glipiZIDE 5 MG Oral Tab Take 1 tablet (5 mg total) by mouth every morning before breakfast. With food       Review of Systems:     Constitutional:  Positive for fatigue.   HENT: Negative.     Eyes: Negative.    Respiratory:  Positive for shortness of breath.    Cardiovascular:  Positive for leg swelling.   Gastrointestinal:  Positive for abdominal distention.   Endocrine: Negative.    Genitourinary: Negative.    Musculoskeletal: Negative.    Skin: Negative.    Neurological: Negative.    Psychiatric/Behavioral: Negative.         Physical Exam:   Vital Signs:  Blood pressure 105/58, pulse 93, temperature 98.9 °F (37.2 °C), temperature source Oral, resp. rate 20, height 4' 9\" (1.448 m), weight 155 lb 4.8 oz (70.4 kg), SpO2  98%.  Physical Exam  Vitals and nursing note reviewed.   HENT:      Head: Normocephalic.   Cardiovascular:      Rate and Rhythm: Normal rate.   Pulmonary:      Effort: Pulmonary effort is normal.      Breath sounds: Normal breath sounds.   Abdominal:      General: Abdomen is flat. There is distension.      Palpations: Abdomen is soft.   Musculoskeletal:         General: Swelling present. Normal range of motion.      Cervical back: Normal range of motion.      Comments: 2 + edema up to thighes   Skin:     Capillary Refill: Capillary refill takes more than 3 seconds.   Neurological:      Mental Status: She is alert and oriented to person, place, and time.   Psychiatric:         Mood and Affect: Mood normal.       Cervical Papanicolaou to be done in MD's office    Results:     Lab Results   Component Value Date    WBC 9.7 06/04/2024    HGB 10.3 (L) 06/04/2024    HCT 27.9 (L) 06/04/2024    .0 (L) 06/04/2024    CREATSERUM 0.75 06/03/2024    BUN 10 06/03/2024     06/03/2024    K 3.6 06/03/2024     06/03/2024    CO2 25.0 06/03/2024     (H) 06/03/2024    CA 8.6 (L) 06/03/2024    ALB 2.5 (L) 06/04/2024    ALKPHO 122 06/04/2024    BILT 4.6 (H) 06/04/2024    TP 6.8 06/04/2024    AST 38 (H) 06/04/2024    ALT 11 06/04/2024    PTT 34.8 06/03/2024    INR 1.79 (H) 06/04/2024    T4F 1.0 04/03/2024    TSH 8.140 (H) 04/03/2024    LIP 49 06/03/2024    MG 2.0 04/04/2024    TROPHS <3 06/03/2024     04/03/2024    B12 >2,000 (H) 04/04/2024    ETOH <3 04/03/2024     CT STROKE CTA BRAIN/CTA NECK (W IV)(CPT=70496/45059)    Result Date: 6/4/2024  CONCLUSION:  1. No major vessel occlusion, hemodynamically significant stenosis, dissection, AVM or aneurysm. 2. Developing encephalomalacia right cerebellum at site of previous hemorrhage.  No acute hemorrhage. 3. 11 mm mean diameter right apical pulmonary nodule.  Follow-up complete chest CT recommended.   This report was called to patient's nurse Audrey 11:59 a.m..     Dictated by (CST): Bal Allan MD on 6/04/2024 at 11:50 AM     Finalized by (CST): Bal Allan MD on 6/04/2024 at 12:00 PM          CT STROKE BRAIN (NO IV)(CPT=70450)    Result Date: 6/4/2024  CONCLUSION:  1. No acute infarct or hemorrhage. 2. Developing encephalomalacia in the right cerebellum at previous site of hemorrhage documented 04/03/2024.  This report was called immediately to patient's nurse and Nancy at 11:45 a.m.    Dictated by (CST): Bla Allan MD on 6/04/2024 at 11:41 AM     Finalized by (CST): Bal Allan MD on 6/04/2024 at 11:47 AM          XR CHEST AP PORTABLE  (CPT=71045)    Result Date: 6/3/2024  CONCLUSION:  1. Normal heart size with minimally prominent pulmonary vascularity and minimal interstitial edema.  I can not exclude early cardiac failure/fluid overload.  Correlate clinically.  No focal airspace consolidation or large pleural effusion.    Dictated by (CST): Daniel Moss MD on 6/03/2024 at 3:42 PM     Finalized by (CST): Daniel Moss MD on 6/03/2024 at 3:43 PM         EKG 12 Lead    Result Date: 6/3/2024  Sinus tachycardia Otherwise normal ECG When compared with ECG of 03-APR-2024 00:30, No significant change was found Confirmed by Topher Moran ([id number]) on 6/3/2024 4:12:01 PM     Assessment/Plan:         Addendum- Pt had rapid response due to ? Weakness as per family member, ct and MRi with no acute findings, neuro consulted       Fluid overload- ? Cardiac, echo pending, cardiology on board , continue with diuresing    Also component of liver cirrhosis         Alcoholic cirrhosis of liver with ascites (HCC)- abd distention, gi on board for abd paracentesis tomorrow, katherine follow labs                Anemia- of chronic disease stable , will follow                Type 2 diabetes mellitus without complication, without long-term current use of insulin (HCC)        Cerebellar hemorrhage (HCC)- repeat mri ct head today \" Developing encephalomalacia right cerebellum at site of  previous hemorrhage.  No acute hemorrhage     Gen weakness- pt/ot     Lung nodule incidentally noted on ct head, will order ct lungs       Antony Zavala MD  2024      Electronically signed by Antony Zavala MD on 2024 10:35 PM              Consults - MD Consult Notes      Consults signed by Pablo Dean DO at 2024  2:25 PM     Author: Pablo Dean DO Service: Neurology Author Type: Physician    Filed: 2024  2:25 PM Date of Service: 2024 11:49 AM Status: Signed    : Pablo Dean DO (Physician)              [facility]  [address] [place] [address], [address]  [facility] [address]  [phone]          STROKE  CONSULTATION NOTE  [facility]  part of [facility]    Report of Consultation    Christi Tavarez Patient Status:  Inpatient     1960 MRN [id number]    Location [facility] 3W/SW Attending Antony Zavala MD    Hosp Day # 1 PCP ÁNGEL Delgado      Date of Admission:  6/3/2024  Date of Consult:  2024  Reason for Admission/Consultation:  stroke alert    Requested by: Antony Zavala MD  Name of Outside Hospital if Transferred: N/A  Time of patient arrival: 12:41 PM   on 6/3/24   Time of initial page: 11:15 on 24         Time of encounter:11:20  on 24  Time when imaging was reviewed by radiology:  on 2024         _________________________________________________________________________________________    Chief Complaint:   Chief Complaint   Patient presents with    Congestive Heart Failure       HPI:  Christi Tavarez is a 63 year old left handed female w/ a pmhx sig. for recent right-sided cerebellar hemorrhage in 2024, history of alcohol abuse, cognitive impairment, CHF, diabetes, seen as a stroke alert for ataxia/difficulty feeding herself that was ongoing last night, 6/3/2024 and this morning 2024.  HPI as per the patient and patient's family at bedside.  Last known well is unclear.  Sometime on 6/3/2020 4 in  the evening.  Family noticed that she had difficulty feeding herself.  They report that she was \"missing\" her mouth when she was trying to feed herself this morning.  She was eating eggs and reportedly was hitting her chin instead of her mouth.  She reportedly had slurred speech.  She reportedly had increased confusion and lethargy.  Family reports that her slurred speech is now resolved.  No slurred speech yesterday.  She had a stat head CT and CTA.  There is no large vessel occlusion.  Her exam is not concerning for acute ischemic stroke.    Explained that most likely her symptoms are due to combination of fatigue and sensory ataxia due to chronic neuropathy.    Review and summation of prior records        ROS:  Pertinent positive and negatives per HPI.  All others were reviewed and negative.    Past Medical History:    CHF (congestive heart failure) (HCC)    Diabetes (HCC)    Stroke (HCC)       No past surgical history on file.    No family history on file.    Social History     Socioeconomic History    Marital status:      Spouse name: Not on file    Number of children: Not on file    Years of education: Not on file    Highest education level: Not on file   Occupational History    Not on file   Tobacco Use    Smoking status: Never    Smokeless tobacco: Never   Vaping Use    Vaping status: Never Used   Substance and Sexual Activity    Alcohol use: Yes     Comment: socially    Drug use: Never    Sexual activity: Not on file   Other Topics Concern    Not on file   Social History Narrative    Not on file     Social Determinants of Health     Financial Resource Strain: Not on file   Food Insecurity: No Food Insecurity (6/3/2024)    Food Insecurity     Food Insecurity: Never true   Transportation Needs: No Transportation Needs (6/3/2024)    Transportation Needs     Lack of Transportation: No     Car Seat: Not on file   Physical Activity: Not on file   Stress: Not on file   Social Connections: Not on file    Housing Stability: Low Risk  (6/3/2024)    Housing Stability     Housing Instability: No     Housing Instability Emergency: Not on file     Crib or Bassinette: Not on file       Outpatient Medications  Current Outpatient Medications   Medication Instructions    ergocalciferol (VITAMIN D2) 50,000 Units, Oral, Every 7 days, Sundays    escitalopram (LEXAPRO) 10 mg, Oral, Nightly    folic acid (FOLVITE) 1 mg, Oral, Daily    furosemide (LASIX) 40 mg, Oral, Daily    glipiZIDE (GLUCOTROL) 5 mg, Oral, Every morning before breakfast, With food    insulin aspart (NOVOLOG) 1-5 Units, Subcutaneous, 3 times daily before meals, Per sliding scale    lactulose (CHRONULAC) 20 g, Oral, 2 times daily PRN    lamoTRIgine (LAMICTAL) 25 mg, Oral, 2 times daily    levothyroxine (SYNTHROID) 50 mcg, Oral, Before breakfast    omeprazole (PRILOSEC) 20 mg, Oral, Every morning before breakfast    potassium chloride 20 MEQ Oral Powd Pack 10 mEq, Oral, Daily    spironolactone (ALDACTONE) 25 mg, Oral, Daily    traZODone (DESYREL) 25 mg, Oral, Nightly        Inpatient Medications  No current outpatient medications on file.        aspirin  300 mg Rectal Daily    Or    aspirin  325 mg Oral Daily    heparin  5,000 Units Subcutaneous Q8H KUSH    atorvastatin  80 mg Oral Nightly    furosemide  40 mg Intravenous BID (Diuretic)    insulin aspart  1-5 Units Subcutaneous TID CC    traZODone  25 mg Oral Nightly    escitalopram  10 mg Oral Nightly    folic acid  1 mg Oral Daily    lactulose  20 g Oral BID    lamoTRIgine  25 mg Oral BID    levothyroxine  50 mcg Oral Before breakfast    pantoprazole  20 mg Oral QAM AC    potassium chloride  10 mEq Oral Daily    spironolactone  25 mg Oral Daily         acetaminophen **OR** acetaminophen    labetalol    hydrALAzine    ondansetron    prochlorperazine    glucose **OR** glucose **OR** glucose-vitamin C **OR** dextrose **OR** glucose **OR** glucose **OR** glucose-vitamin C    Objective:  Last vitals and weight  :  Vitals:    06/04/24 0857   BP: 106/57   Pulse:    Resp: 20   Temp: 97.9 °F (36.6 °C)       Exam:  - General: appears stated age and no distress  - CV: symmetric pulses   Carotids:   - Pulmonary: no signs of respiratory distress. Normal excursion of the chest.   Neurologic Exam  - Mental Status: Alert and attentive.  Oriented to person, place, and time/date.  Speech is spontaneous, fluent, and prosodic. Comprehension and repetition intact. Phrase length and rate are normal. No paraphasic errors, neologisms, anomia, acalculia, apraxia, anosognosia, or R/L confusion. No neglect.   - Cranial Nerves: No gaze preference. Visual fields:normal  Pupils are 5mm briskly constricting to 4mm and equally round and reactive to light  in a well lit room. EOMI. No nystagmus. No ptosis. V1-V3 intact B/L to light touch.No pathological facial asymmetry. No flattening of the nasolabial fold. .  Hearing grossly intact.  Tongue midline. No atrophy or fasiculations of the tongue noted. Palate and uvula elevate symmetrically.  Shoulder shrug symmetric.  - Fundoscopic exam:normal w/o hemorrhages, exudates, or papilledema.No attenuation. No pallor.  - Motor:  normal tone, normal bulk. No interosseous wasting. No flattening of hypothenar eminences.       Right Left     Motor Strength   Deltoids 5 5  Triceps 5 5  Biceps 5 5  Wrist Extensors 5 5   5 5     Knee extensors 5 5         Pronator drift: No pronator drift   Index Rolling: No orbiting.   Finger Taps: Finger taps are symmetric in rate and amplitude.    Rapid movements: Rapid/fine movements are symmetric. As expected their dominant hand is slightly faster.   Leg Drift: None   Foot Taps: Foot taps are symmetric.      Asterixis: No asterixis noted.   Tremor:     Reflexes:    C5 C6 C7  L4 S1   R 2+ 2+  2+ 2+   L 2+ 2+  2+ 2+   Adductor Spread: No adductor spread noted.    Frontal release signs:Not assessed.    Lai's sign:absent   Nonsustained clonus: Absent   Sustained clonus:  Absent   - Sensory:   Gradient loss to light touch or temperature and vibration in all 4 extremities and classic stocking glove distribution.  - Cerebellum: No truncal ataxia. No titubations. No dysmetria, no dysdiadochokinesis. No overshoot.   - Gait/station: Normal gait and station. Symmetric arm swing.   - Plantar response: flexor bilaterally    NIH Stroke Scale  Person Administering Scale: Pablo Dean DO  1a  Level of consciousness: 0 = Alert keenly responsive    1b. LOC questions:  0 = Answers both questions correctly     1c. LOC commands: 0 = Performs both tasks correctly    2.  Best Gaze: 0 = Normal    3.  Visual: 0 = No visual loss     4. Facial Palsy: 0 = Normal symmetrical movement     5a.  Motor left arm: 0 = No drift; limb holds 90º(or 45º) for full 10 seconds   5b.  Motor right arm: 0 = No drift; limb holds 90º(or 45º) for full 10 seconds   6a. motor left le = No drift; leg holds 30º for full 5 seconds     6b  Motor right le = No drift; leg holds 30º for full 5 seconds     7. Limb Ataxia: 0 = Absent     8.  Sensory: 0 = Normal, no sensory loss     9. Best Language:  0 = No aphasia, normal      10. Dysarthria: 0 = Normal articulation   11. Extinction and Inattention: 0 = No abnormality     Total:   0     Modified Jose Luis Score: 3 - Moderate disability. Requires some help, but able to walk unassisted.                                Data reviewed    ECG findings by monitor or 12-lead:  Ecg:   Results for orders placed or performed during the hospital encounter of 24   EKG 12 Lead   Result Value Ref Range    Ventricular rate 104 BPM    Atrial rate 104 BPM    P-R Interval 146 ms    QRS Duration 84 ms    Q-T Interval 386 ms    QTC Calculation (Bezet) 507 ms    P Axis 43 degrees    R Axis 24 degrees    T Axis 33 degrees       Test results/Imaging:   Lab Results   Component Value Date/Time    TRIG 130 2024 12:23 AM    HDL 9 (L) 2024 12:23 AM     (H) 2024 12:23 AM      Recent Labs   Lab 06/03/24  1347 06/04/24  0917   WBC 12.4* 9.7   HGB 11.0* 10.3*   HCT 30.9* 27.9*   .0 131.0*     Recent Labs   Lab 06/03/24  1347 06/04/24  0917     --    K 3.6  --      --    CO2 25.0  --    BUN 10  --    ALT 14 11   AST 38* 38*     Lab Results   Component Value Date    A1C  04/03/2024      Comment:      Hemoglobin A1C to be confirmed at [facility]    A1C 4.4 (L) 04/03/2024     Last A1c value was 4.4% done 4/3/2024.          No valid procedures specified.  No valid procedures specified.  No valid procedures specified.  No valid procedures specified.  No valid procedures specified.  No valid procedures specified.  No valid procedures specified.   CT STROKE BRAIN (NO IV)(CPT=70450)    Result Date: 6/4/2024  CONCLUSION:  1. No acute infarct or hemorrhage. 2. Developing encephalomalacia in the right cerebellum at previous site of hemorrhage documented 04/03/2024.  This report was called immediately to patient's nurse and Nancy at 11:45 a.m.    Dictated by (CST): Bal Allan MD on 6/04/2024 at 11:41 AM     Finalized by (CST): Bal Allan MD on 6/04/2024 at 11:47 AM          CT BRAIN OR HEAD (48410)    Result Date: 4/3/2024  CONCLUSION: 3 x 1 x 2 cm for ovoid soft tissue density in the right cerebellar vermis and adjacent hemisphere with surrounding edema.  It could represent an acute/subacute/recent site of hemorrhage/hematoma versus hemorrhagic lesion (such as primary malignancy versus metastatic focus).  Recommend further characterization with MRI brain with without contrast.   [facility] provided a preliminary report for this examination. This final report agrees with their preliminary findings.   Dictated by (CST): Rob Duvall MD on 4/03/2024 at 8:43 AM     Finalized by (CST): Rob Duvall MD on 4/03/2024 at 8:50 AM           Performed an independent visualization of: MRI brain WO, CT brain WO, and CTA head/neck    Imaging revealed: Agree with radiology  read.    Assessment    Doubt stroke.  Doubt TIA.  Explained that most likely her symptoms are due to combination of fatigue and sensory ataxia due to chronic neuropathy.  Ordered MRI brain to confirm that this is not a stroke and very low suspicion this is stroke.  She has a sensory ataxia due to neuropathy.  Transient neurological symptoms are due to fatigue/metabolic derangements.    Past-pointing incoordination in the left hand in the setting of chronic sensory loss due to neuropathy  Differential Diagnosis:  Sensory ataxia due to chronic neuropathy  Doubt new acute ischemic stroke; although patient's Clumsy hand dysarthria syndrome she does not have the clinical exam findings  Doubt TIA given duration of symptoms  Unrelated to cerebellar hemorrhage which was on the contralateral side      Plan   Reperfusion therapy eligibility: patient is not eligible because: out of the  time window  not an acute ischemic stroke  not a candidate for thrombectomy because no large vessel occlusion  History of previous intracranial hemorrhage Intracranial neoplasm, AVM, or an aneurysm   Agent and time of first antithrombotic administration (or contraindication):   Aspirin 325 once or rectal aspirin 300 once. Starting tomorrow Aspirin 81 mg daily or rectal aspirin 300 mg daily if still n.p.o.  BP goal:   Normotension  Additional brain and vascular imaging ordered:   MRI brain WO   Cardiac imaging: Telemetry   Additional labs ordered:   Labs: None        Education/Instructions given to: {Persons; family members:patient   Barriers to Learning:None  Content: Refer to note above.  Also provided education on recurrent stroke signs and symptoms, including but not limited to a facial droop, dysarthria, difficulty talking, word finding difficulties, weakness, falls, change in sensation. Pt should call 911 or be taken to the nearest emergency department if sx occur.  Evaluation/Outcome: Verbalized understanding    This document is not  intended to support charting by exception.  Sections left blank in a completed note should be presumed not to have been done.     Total critical care time spent exceeds 35 minutes.      Disclaimer:   This record was dictated using Dragon software. There may be errors due to voice recognition problems that were not realized and corrected during the completion of the note.       Thank you.  Pablo Dean DO   Staff Vascular & General Neurology      Electronically signed by Pablo Dean DO on 2024  2:25 PM              Discharge Summary - D/C Summary      Discharge Summary signed by Antony Zavala MD at 2024  4:00 PM  Version 1 of 1    Author: Antony Zavala MD Service: Internal Medicine Author Type: Physician    Filed: 2024  4:00 PM Date of Service: 2024  3:55 PM Status: Signed    : Antony Zavala MD (Physician)       DISCHARGE SUMMARY     Christi Tavarez Patient Status:  Inpatient    1960 MRN [id number]   [facility] 3W/ Attending Antony Zavala MD   Hosp Day # 15 PCP ÁNGEL Delgado     Date of Admission: 6/3/2024  Date of Discharge: 24  Discharge Disposition: Inpt Physical Rehab Facility or Physical Rehab Unit    Admitting Diagnosis:   Alcoholic cirrhosis of liver with ascites (HCC) [K70.31]  Acute on chronic congestive heart failure, unspecified heart failure type (HCC) [I50.9]    Hospital Discharge Diagnoses: fluid overload, liver cirrhosis with ascitics, covid infection    TCM Diagnosis at discharge from Hospital: Other: liver cirrhosis with fluid overload ; still recommend for TCM follow-up    Please note that only patients enrolled in the Medicare ACO, BCBS ACO and [facility] HMOs will be handled by a member of the Care Management Team.  For all other patients, please follow usual protocol for discharge care transition.    Lace+ Score: 81  59-90 High Risk  29-58 Medium Risk  0-28   Low Risk.    Risk of readmission: Christi Tavarez has High Risk of readmission  after discharge from the hospital.    History of Present Illness:     Repeat vcx are negative ID ok'd pt for dc     Bcx came back +?, no fevers, no wbc, ID on board, ? Contaminate will repeat BCx today, continue to monitor off AB      Hypokalemia electrolyte protocol-  Fever- resolved she is afebrile      Legt leg are of tenderness and lump, us neg for dvt     + covid cough improved  cxr unremarkable., ID on board             Fluid overload- improved, continue with diuretics, echo ok cardio is following., continue with diuresis  component of liver cirrhosis           Alcoholic cirrhosis of liver with ascites (HCC)-continues he is seen by GI outpatient MRI continue with current medication     Hypokalemia replace potassium             Anemia- of chronic disease stable , will follow                    Type 2 diabetes mellitus without complication, without long-term current use of insulin (HCC)-continue with Accu-Chek sliding scale insulin hypoglycemia           Cerebellar hemorrhage (HCC)- repeat mri ct head today \" Developing encephalomalacia right cerebellum at site of previous hemorrhage.  No acute hemorrhage      Gen weakness- pt/ot plan for rehab awaiting insurance approval possible discharge tomorrow     Continue PT OT    Brief Synopsis:     Discharge Physical Exam: General appearance:  alert, appears stated age, and cooperative  Head: Normocephalic, without obvious abnormality, atraumatic  Pulmonary: clear to auscultation bilaterally  Cardiovascular: S1, S2 normal, no murmur, click, rub or gallop, regular rate and rhythm  Extremities: extremities normal, atraumatic, no cyanosis or edema  Skin: Skin color, texture, turgor normal. No rashes or lesions  Neurologic: Grossly normal    Procedures during hospitalization:     Abdominal paracentesis     Incidental or significant findings and recommendations (brief descriptions):      Lab/Test results pending at Discharge:       Consultants:      Discharge Medication  List:     Discharge Medications        START taking these medications        Instructions Prescription details   bumetanide 2 MG Tabs  Commonly known as: Bumex      Take 1 tablet (2 mg total) by mouth BID (Diuretic).   Quantity: 60 tablet  Refills: 2     midodrine 2.5 MG Tabs  Commonly known as: ProAmatine      Take 1 tablet (2.5 mg total) by mouth in the morning and 1 tablet (2.5 mg total) at noon and 1 tablet (2.5 mg total) in the evening.   Quantity: 90 tablet  Refills: 1     rifAXIMin 550 MG Tabs  Commonly known as: Xifaxan      Take 1 tablet (550 mg total) by mouth 2 (two) times daily.   Quantity: 60 tablet  Refills: 2            CHANGE how you take these medications        Instructions Prescription details   potassium chloride 20 MEQ Pack  Commonly known as: Klor-Con  What changed: Another medication with the same name was added. Make sure you understand how and when to take each.      Take 10 mEq by mouth daily.   Refills: 0     potassium chloride 20 MEQ Pack  Commonly known as: Klor-Con  Start taking on: June 19, 2024  What changed: You were already taking a medication with the same name, and this prescription was added. Make sure you understand how and when to take each.      Take 40 mEq by mouth daily.   Quantity: 30 each  Refills: 2     spironolactone 50 MG Tabs  Commonly known as: Aldactone  What changed:   medication strength  how much to take      Take 1 tablet (50 mg total) by mouth daily.   Quantity: 90 tablet  Refills: 1            CONTINUE taking these medications        Instructions Prescription details   ergocalciferol 1.25 MG (41927 UT) Caps  Commonly known as: Vitamin D2      Take 1 capsule (50,000 Units total) by mouth every 7 days. Sundays   Refills: 0     escitalopram 10 MG Tabs  Commonly known as: Lexapro      Take 1 tablet (10 mg total) by mouth nightly.   Refills: 0     folic acid 1 MG Tabs  Commonly known as: Folvite      Take 1 tablet (1 mg total) by mouth daily.   Refills: 0      glipiZIDE 5 MG Tabs  Commonly known as: Glucotrol      Take 1 tablet (5 mg total) by mouth every morning before breakfast. With food   Refills: 0     insulin aspart 100 Units/mL Sopn  Commonly known as: NovoLOG      Inject 1-5 Units into the skin 3 (three) times daily before meals. Per sliding scale   Refills: 0     lactulose 10 GM/15ML Soln  Commonly known as: CHRONULAC      Take 30 mL (20 g total) by mouth 2 (two) times daily as needed.   Refills: 0     lamoTRIgine 25 MG Tabs  Commonly known as: LaMICtal      Take 1 tablet (25 mg total) by mouth 2 (two) times daily.   Refills: 0     levothyroxine 50 MCG Tabs  Commonly known as: Synthroid      Take 1 tablet (50 mcg total) by mouth before breakfast.   Refills: 0     omeprazole 20 MG Cpdr  Commonly known as: PriLOSEC      Take 1 capsule (20 mg total) by mouth every morning before breakfast.   Refills: 0     traZODone 50 MG Tabs  Commonly known as: Desyrel      Take 0.5 tablets (25 mg total) by mouth nightly.   Refills: 0            STOP taking these medications      furosemide 40 MG Tabs  Commonly known as: Lasix                  Where to Get Your Medications        These medications were sent to [id number] - [place], [place] - [address] AT [facility], [phone], [phone]  [address], [facility] [address]      Phone: [phone]   bumetanide 2 MG Tabs  midodrine 2.5 MG Tabs  potassium chloride 20 MEQ Pack  rifAXIMin 550 MG Tabs  spironolactone 50 MG Tabs         Discharge Plan:  Discharge Condition: Stable    Current Discharge Medication List        New Orders    Details   bumetanide 2 MG Oral Tab Take 1 tablet (2 mg total) by mouth BID (Diuretic).      midodrine 2.5 MG Oral Tab Take 1 tablet (2.5 mg total) by mouth in the morning and 1 tablet (2.5 mg total) at noon and 1 tablet (2.5 mg total) in the evening.      rifAXIMin 550 MG Oral Tab Take 1 tablet (550 mg total) by mouth 2 (two) times daily.            Home Meds - Modified    Details   !! potassium chloride 20 MEQ Oral Powd Pack Take 40 mEq by mouth daily.      spironolactone 50 MG Oral Tab Take 1 tablet (50 mg total) by mouth daily.       !! - Potential duplicate medications found. Please discuss with provider.        Home Meds - Unchanged    Details   omeprazole 20 MG Oral Capsule Delayed Release Take 1 capsule (20 mg total) by mouth every morning before breakfast.      !! potassium chloride 20 MEQ Oral Powd Pack Take 10 mEq by mouth daily.      insulin aspart 100 Units/mL Subcutaneous Solution Pen-injector Inject 1-5 Units into the skin 3 (three) times daily before meals. Per sliding scale      levothyroxine 50 MCG Oral Tab Take 1 tablet (50 mcg total) by mouth before breakfast.      lactulose 10 GM/15ML Oral Solution Take 30 mL (20 g total) by mouth 2 (two) times daily as needed.      ergocalciferol 1.25 MG (32453 UT) Oral Cap Take 1 capsule (50,000 Units total) by mouth every 7 days. Sundays      escitalopram 10 MG Oral Tab Take 1 tablet (10 mg total) by mouth nightly.      folic acid 1 MG Oral Tab Take 1 tablet (1 mg total) by mouth daily.      lamoTRIgine 25 MG Oral Tab Take 1 tablet (25 mg total) by mouth 2 (two) times daily.      traZODone 50 MG Oral Tab Take 0.5 tablets (25 mg total) by mouth nightly.      glipiZIDE 5 MG Oral Tab Take 1 tablet (5 mg total) by mouth every morning before breakfast. With food       !! - Potential duplicate medications found. Please discuss with provider.              Discharge Diet:diabetic , Cardiac diet 2 gr na, 1800 ada     Discharge Activity: As tolerated    Follow-up appointment:   Pepper Hedrick DO  [address] [facility] [address]  [facility] [address] [phone]    Follow up in 1 month(s)  Neuro/Stroke follow up    [place]  [address]  [facility] [address]  [facility] [address] [phone]    Schedule an appointment as soon as possible for a visit in 1 week(s)        Vital signs:  Temp:  [98 °F (36.7  °C)-98.2 °F (36.8 °C)] 98 °F (36.7 °C)  Pulse:  [81-84] 84  Resp:  [18] 18  BP: (104-117)/(56-66) 115/57  SpO2:  [93 %-97 %] 93 %    -----------------------------------------------------------------------------------------------  PATIENT DISCHARGE INSTRUCTIONS: See electronic chart    Antony Zavala MD 6/18/2024    Time spent:  30 to 74 minutes        Electronically signed by Antony Zavala MD on 6/18/2024  4:00 PM           Imaging Results (HF patients)    Chest X-Ray Results (HF patients only)    No exam resulted this encounter.      2D Echocardiogram Results (HF patients only)    No exam resulted this encounter.      Cath Angiogram Results (HF Patients only)    No exam resulted this encounter.       Physical Therapy Notes (last 72 hours)  Notes from 6/15/2024  6:07 PM through 6/18/2024  6:07 PM   No notes of this type exist for this encounter.     Occupational Therapy Notes (last 72 hours)  Notes from 6/15/2024  6:07 PM through 6/18/2024  6:07 PM   No notes of this type exist for this encounter.     Video Swallow Study Notes    No notes of this type exist for this encounter.        SLP Note - SLP Notes      SLP Note signed by Lili Oneil SLP at 6/13/2024  1:49 PM  Version 1 of 1    Author: Lili Oneil SLP Service: — Author Type: Speech and Language Pathologist    Filed: 6/13/2024  1:49 PM Date of Service: 6/13/2024  1:45 PM Status: Signed    : Lili nOeil SLP (Speech and Language Pathologist)       SPEECH DAILY NOTE - INPATIENT    ASSESSMENT & PLAN   ASSESSMENT  PT COVID-19 POSITIVE. CONTACT AND DROPLET ISOLATION PPE REQUIRED. THIS THERAPIST WORE GOWN, GLOVES, FACE SHIELD, AND DROPLET/N95 RESPIRATOR MASK. HANDS SANITIZED/WASHED UPON ENTRANCE/EXIT.      SLP f/u for ongoing dysphagia tx/meal assessment per recommendations of easy to chew/thin per BSE. RN reports pt tolerates diet and medication well with no overt clinical s/s aspiration. Pt denies any swallowing challenges.     Pt positioned upright in bed  with lunch tray present. Pt afebrile, tolerating room air with oxygen status 95 prior to the start of oral trials. SLP reviewed aspiration precautions and safe swallowing compensatory strategies with the patient. Safe swallow guidelines remain written on the white board in purple. Diet recommendations remain on the whiteboard in the room. Patient v/u. Provided minimal assistance, pt tolerates easy to chew and thin liquids via straw with no overt clinical signs/symptoms of aspiration. Oxygen status remained >94 t/o the entire session. Oral/buccal cavities clear of residue following all trials.     MOST RECENT CXR 6/11/24:  CONCLUSION: No evidence of acute cardiopulmonary disease.      Dictated by (CST): Henrry Garduno MD on 6/11/2024 at 4:19 PM       Finalized by (CST): Henrry Garduno MD on 6/11/2024 at 4:20 PM     PLAN: SLP to sign off at this time secondary to pt tolerating least restrictive diet with no CSA.     Diet Recommendations - Solids: Soft/ Easy to chew  Diet Recommendations - Liquids: Thin Liquids    Compensatory Strategies Recommended: Slow rate;Alternate consistencies;Small bites and sips  Aspiration Precautions: Upright position;Slow rate;Small bites and sips  Medication Administration Recommendations: Crushed in puree    Patient Experiencing Pain: No      Treatment Plan  Treatment Plan/Recommendations: No further inpatient SLP service warranted    Interdisciplinary Communication: Discussed with RN  Recommendations posted at bedside            GOALS  Goal #1 The patient will tolerate easy to chew consistency and thin liquids without overt signs or symptoms of aspiration with 100 % accuracy over 1-2 session(s).  Goal met   Goal #2 The patient/family/caregiver will demonstrate understanding and implementation of aspiration precautions and swallow strategies independently over 1-2 session(s).    Goal met   Goal #3 The patient will utilize compensatory strategies as outlined by  BSSE (clinical  evaluation) including Slow rate, Small bites, Small sips, Alternate liquids/solids, Upright 90 degrees with minimal assistance 100 % of the time across 2 sessions.  Goal met     FOLLOW UP  Follow Up Needed (Documentation Required): No  SLP Follow-up Date:  (n/a)  Number of Visits to Meet Established Goals: 0    Session: 1 following BSSE    If you have any questions, please contact ASHANTI Jc M.S. CCC-SLP  Speech Language Pathologist  Phone Number Pht. [phone]      Electronically signed by Lili Oneil SLP on 6/13/2024  1:49 PM           Future Appointments        Provider Department [place]    6/27/2024 10:40 AM Pepper Hedrick DO [facility], [facility]    7/3/2024 11:00 AM Antony Zavala MD [facility], [place], [address] [facility] [address]      Multidisciplinary Problems     Active Goals     Not on file          Resolved Goals        Problem: Patient/Family Goals    Goal Priority Disciplines Outcome Interventions   Patient/Family Long Term Goal   (Resolved)     Interdisciplinary Adequate for Discharge    Description: Patient's Long Term Goal: go home    Interventions:  - follow plan of care  - See additional Care Plan goals for specific interventions   Patient/Family Short Term Goal   (Resolved)     Interdisciplinary Adequate for Discharge    Description: Patient's Short Term Goal: get stronger    Interventions:   - work with pt/ot. Sit in chairs for meals   - See additional Care Plan goals for specific interventions

## (undated) NOTE — Clinical Note
Thank you for the opportunity to participate in the care of this interesting patient. Please do contact me if I may be of any further assistance  Ryley De La Torre MD [facility]